# Patient Record
Sex: MALE | Race: BLACK OR AFRICAN AMERICAN
[De-identification: names, ages, dates, MRNs, and addresses within clinical notes are randomized per-mention and may not be internally consistent; named-entity substitution may affect disease eponyms.]

---

## 2018-07-09 ENCOUNTER — HOSPITAL ENCOUNTER (INPATIENT)
Dept: HOSPITAL 87 - ER | Age: 68
LOS: 21 days | Discharge: NURSING, REHAB OR LONG-TERM CARE | DRG: 20 | End: 2018-07-30
Attending: INTERNAL MEDICINE | Admitting: INTERNAL MEDICINE
Payer: MEDICARE

## 2018-07-09 VITALS — WEIGHT: 124.37 LBS | BODY MASS INDEX: 16.84 KG/M2 | HEIGHT: 72 IN

## 2018-07-09 DIAGNOSIS — K70.9: ICD-10-CM

## 2018-07-09 DIAGNOSIS — R47.01: ICD-10-CM

## 2018-07-09 DIAGNOSIS — E78.1: ICD-10-CM

## 2018-07-09 DIAGNOSIS — B19.20: ICD-10-CM

## 2018-07-09 DIAGNOSIS — Z87.891: ICD-10-CM

## 2018-07-09 DIAGNOSIS — I10: ICD-10-CM

## 2018-07-09 DIAGNOSIS — Z79.899: ICD-10-CM
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
## 2018-07-09 DIAGNOSIS — E43: ICD-10-CM

## 2018-07-09 DIAGNOSIS — J96.90: ICD-10-CM

## 2018-07-09 DIAGNOSIS — N39.0: ICD-10-CM
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
## 2018-07-09 DIAGNOSIS — D68.9: ICD-10-CM

## 2018-07-09 DIAGNOSIS — S06.5X9A: Primary | ICD-10-CM

## 2018-07-09 DIAGNOSIS — K59.00: ICD-10-CM

## 2018-07-09 DIAGNOSIS — G93.49: ICD-10-CM

## 2018-07-09 DIAGNOSIS — D68.4: ICD-10-CM

## 2018-07-09 DIAGNOSIS — H91.92: ICD-10-CM

## 2018-07-09 DIAGNOSIS — R26.9: ICD-10-CM

## 2018-07-09 DIAGNOSIS — A41.9: ICD-10-CM

## 2018-07-09 DIAGNOSIS — B96.20: ICD-10-CM
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
## 2018-07-09 DIAGNOSIS — D63.8: ICD-10-CM

## 2018-07-09 DIAGNOSIS — E87.3: ICD-10-CM

## 2018-07-09 DIAGNOSIS — F10.21: ICD-10-CM

## 2018-07-09 DIAGNOSIS — I95.9: ICD-10-CM

## 2018-07-09 PROCEDURE — 87040 BLOOD CULTURE FOR BACTERIA: CPT

## 2018-07-09 PROCEDURE — 83690 ASSAY OF LIPASE: CPT

## 2018-07-09 PROCEDURE — 83605 ASSAY OF LACTIC ACID: CPT

## 2018-07-09 PROCEDURE — 84478 ASSAY OF TRIGLYCERIDES: CPT

## 2018-07-09 PROCEDURE — 85027 COMPLETE CBC AUTOMATED: CPT

## 2018-07-09 PROCEDURE — 36569 INSJ PICC 5 YR+ W/O IMAGING: CPT

## 2018-07-09 PROCEDURE — 80053 COMPREHEN METABOLIC PANEL: CPT

## 2018-07-09 PROCEDURE — 83735 ASSAY OF MAGNESIUM: CPT

## 2018-07-09 PROCEDURE — 83880 ASSAY OF NATRIURETIC PEPTIDE: CPT

## 2018-07-09 PROCEDURE — 97535 SELF CARE MNGMENT TRAINING: CPT

## 2018-07-09 PROCEDURE — 85610 PROTHROMBIN TIME: CPT

## 2018-07-09 PROCEDURE — 94002 VENT MGMT INPAT INIT DAY: CPT

## 2018-07-09 PROCEDURE — 97162 PT EVAL MOD COMPLEX 30 MIN: CPT

## 2018-07-09 PROCEDURE — 97166 OT EVAL MOD COMPLEX 45 MIN: CPT

## 2018-07-09 PROCEDURE — 81003 URINALYSIS AUTO W/O SCOPE: CPT

## 2018-07-09 PROCEDURE — 96365 THER/PROPH/DIAG IV INF INIT: CPT

## 2018-07-09 PROCEDURE — 85007 BL SMEAR W/DIFF WBC COUNT: CPT

## 2018-07-09 PROCEDURE — 71045 X-RAY EXAM CHEST 1 VIEW: CPT

## 2018-07-09 PROCEDURE — 77001 FLUOROGUIDE FOR VEIN DEVICE: CPT

## 2018-07-09 PROCEDURE — 86920 COMPATIBILITY TEST SPIN: CPT

## 2018-07-09 PROCEDURE — 70450 CT HEAD/BRAIN W/O DYE: CPT

## 2018-07-09 PROCEDURE — 84484 ASSAY OF TROPONIN QUANT: CPT

## 2018-07-09 PROCEDURE — 96376 TX/PRO/DX INJ SAME DRUG ADON: CPT

## 2018-07-09 PROCEDURE — 85025 COMPLETE CBC W/AUTO DIFF WBC: CPT

## 2018-07-09 PROCEDURE — 82805 BLOOD GASES W/O2 SATURATION: CPT

## 2018-07-09 PROCEDURE — 36415 COLL VENOUS BLD VENIPUNCTURE: CPT

## 2018-07-09 PROCEDURE — 87186 SC STD MICRODIL/AGAR DIL: CPT

## 2018-07-09 PROCEDURE — 99291 CRITICAL CARE FIRST HOUR: CPT

## 2018-07-09 PROCEDURE — 73501 X-RAY EXAM HIP UNI 1 VIEW: CPT

## 2018-07-09 PROCEDURE — 82962 GLUCOSE BLOOD TEST: CPT

## 2018-07-09 PROCEDURE — 97530 THERAPEUTIC ACTIVITIES: CPT

## 2018-07-09 PROCEDURE — 80305 DRUG TEST PRSMV DIR OPT OBS: CPT

## 2018-07-09 PROCEDURE — 93005 ELECTROCARDIOGRAM TRACING: CPT

## 2018-07-09 PROCEDURE — 82140 ASSAY OF AMMONIA: CPT

## 2018-07-09 PROCEDURE — 87086 URINE CULTURE/COLONY COUNT: CPT

## 2018-07-09 PROCEDURE — 94003 VENT MGMT INPAT SUBQ DAY: CPT

## 2018-07-09 PROCEDURE — A4315 CATH W/DRAINAGE 2-WAY SILCNE: HCPCS

## 2018-07-09 PROCEDURE — 82375 ASSAY CARBOXYHB QUANT: CPT

## 2018-07-09 PROCEDURE — 36600 WITHDRAWAL OF ARTERIAL BLOOD: CPT

## 2018-07-09 PROCEDURE — 86927 PLASMA FRESH FROZEN: CPT

## 2018-07-09 PROCEDURE — 86850 RBC ANTIBODY SCREEN: CPT

## 2018-07-09 PROCEDURE — 96375 TX/PRO/DX INJ NEW DRUG ADDON: CPT

## 2018-07-09 PROCEDURE — 80048 BASIC METABOLIC PNL TOTAL CA: CPT

## 2018-07-09 PROCEDURE — 88300 SURGICAL PATH GROSS: CPT

## 2018-07-09 PROCEDURE — 97116 GAIT TRAINING THERAPY: CPT

## 2018-07-09 PROCEDURE — 92610 EVALUATE SWALLOWING FUNCTION: CPT

## 2018-07-09 PROCEDURE — 87077 CULTURE AEROBIC IDENTIFY: CPT

## 2018-07-09 PROCEDURE — 94640 AIRWAY INHALATION TREATMENT: CPT

## 2018-07-09 PROCEDURE — 93970 EXTREMITY STUDY: CPT

## 2018-07-09 PROCEDURE — 86900 BLOOD TYPING SEROLOGIC ABO: CPT

## 2018-07-09 PROCEDURE — 76937 US GUIDE VASCULAR ACCESS: CPT

## 2018-07-09 PROCEDURE — 85730 THROMBOPLASTIN TIME PARTIAL: CPT

## 2018-07-09 PROCEDURE — 36430 TRANSFUSION BLD/BLD COMPNT: CPT

## 2018-07-10 VITALS — DIASTOLIC BLOOD PRESSURE: 85 MMHG | SYSTOLIC BLOOD PRESSURE: 124 MMHG

## 2018-07-10 VITALS — SYSTOLIC BLOOD PRESSURE: 111 MMHG | DIASTOLIC BLOOD PRESSURE: 73 MMHG

## 2018-07-10 VITALS — DIASTOLIC BLOOD PRESSURE: 82 MMHG | SYSTOLIC BLOOD PRESSURE: 111 MMHG

## 2018-07-10 VITALS — SYSTOLIC BLOOD PRESSURE: 119 MMHG | DIASTOLIC BLOOD PRESSURE: 68 MMHG

## 2018-07-10 VITALS — DIASTOLIC BLOOD PRESSURE: 81 MMHG | SYSTOLIC BLOOD PRESSURE: 100 MMHG

## 2018-07-10 VITALS — SYSTOLIC BLOOD PRESSURE: 166 MMHG | DIASTOLIC BLOOD PRESSURE: 80 MMHG

## 2018-07-10 VITALS — SYSTOLIC BLOOD PRESSURE: 115 MMHG | DIASTOLIC BLOOD PRESSURE: 76 MMHG

## 2018-07-10 VITALS — SYSTOLIC BLOOD PRESSURE: 161 MMHG | DIASTOLIC BLOOD PRESSURE: 87 MMHG

## 2018-07-10 VITALS — DIASTOLIC BLOOD PRESSURE: 51 MMHG | SYSTOLIC BLOOD PRESSURE: 103 MMHG

## 2018-07-10 VITALS — SYSTOLIC BLOOD PRESSURE: 128 MMHG | DIASTOLIC BLOOD PRESSURE: 79 MMHG

## 2018-07-10 VITALS — SYSTOLIC BLOOD PRESSURE: 123 MMHG | DIASTOLIC BLOOD PRESSURE: 78 MMHG

## 2018-07-10 VITALS — DIASTOLIC BLOOD PRESSURE: 80 MMHG | SYSTOLIC BLOOD PRESSURE: 112 MMHG

## 2018-07-10 VITALS — DIASTOLIC BLOOD PRESSURE: 74 MMHG | SYSTOLIC BLOOD PRESSURE: 139 MMHG

## 2018-07-10 VITALS — SYSTOLIC BLOOD PRESSURE: 142 MMHG | DIASTOLIC BLOOD PRESSURE: 63 MMHG

## 2018-07-10 VITALS — SYSTOLIC BLOOD PRESSURE: 128 MMHG | DIASTOLIC BLOOD PRESSURE: 70 MMHG

## 2018-07-10 VITALS — SYSTOLIC BLOOD PRESSURE: 108 MMHG | DIASTOLIC BLOOD PRESSURE: 80 MMHG

## 2018-07-10 VITALS — SYSTOLIC BLOOD PRESSURE: 147 MMHG | DIASTOLIC BLOOD PRESSURE: 87 MMHG

## 2018-07-10 VITALS — SYSTOLIC BLOOD PRESSURE: 103 MMHG | DIASTOLIC BLOOD PRESSURE: 75 MMHG

## 2018-07-10 VITALS — DIASTOLIC BLOOD PRESSURE: 80 MMHG | SYSTOLIC BLOOD PRESSURE: 133 MMHG

## 2018-07-10 VITALS — DIASTOLIC BLOOD PRESSURE: 97 MMHG | SYSTOLIC BLOOD PRESSURE: 112 MMHG

## 2018-07-10 VITALS — SYSTOLIC BLOOD PRESSURE: 111 MMHG | DIASTOLIC BLOOD PRESSURE: 79 MMHG

## 2018-07-10 VITALS — DIASTOLIC BLOOD PRESSURE: 84 MMHG | SYSTOLIC BLOOD PRESSURE: 107 MMHG

## 2018-07-10 VITALS — SYSTOLIC BLOOD PRESSURE: 133 MMHG | DIASTOLIC BLOOD PRESSURE: 105 MMHG

## 2018-07-10 VITALS — DIASTOLIC BLOOD PRESSURE: 83 MMHG | SYSTOLIC BLOOD PRESSURE: 117 MMHG

## 2018-07-10 VITALS — SYSTOLIC BLOOD PRESSURE: 131 MMHG | DIASTOLIC BLOOD PRESSURE: 86 MMHG

## 2018-07-10 VITALS — SYSTOLIC BLOOD PRESSURE: 125 MMHG | DIASTOLIC BLOOD PRESSURE: 80 MMHG

## 2018-07-10 VITALS — SYSTOLIC BLOOD PRESSURE: 139 MMHG | DIASTOLIC BLOOD PRESSURE: 95 MMHG

## 2018-07-10 VITALS — DIASTOLIC BLOOD PRESSURE: 67 MMHG | SYSTOLIC BLOOD PRESSURE: 130 MMHG

## 2018-07-10 VITALS — SYSTOLIC BLOOD PRESSURE: 142 MMHG | DIASTOLIC BLOOD PRESSURE: 83 MMHG

## 2018-07-10 VITALS — DIASTOLIC BLOOD PRESSURE: 72 MMHG | SYSTOLIC BLOOD PRESSURE: 101 MMHG

## 2018-07-10 VITALS — SYSTOLIC BLOOD PRESSURE: 112 MMHG | DIASTOLIC BLOOD PRESSURE: 77 MMHG

## 2018-07-10 VITALS — SYSTOLIC BLOOD PRESSURE: 134 MMHG | DIASTOLIC BLOOD PRESSURE: 76 MMHG

## 2018-07-10 VITALS — DIASTOLIC BLOOD PRESSURE: 78 MMHG | SYSTOLIC BLOOD PRESSURE: 110 MMHG

## 2018-07-10 VITALS — SYSTOLIC BLOOD PRESSURE: 109 MMHG | DIASTOLIC BLOOD PRESSURE: 79 MMHG

## 2018-07-10 VITALS — SYSTOLIC BLOOD PRESSURE: 116 MMHG | DIASTOLIC BLOOD PRESSURE: 77 MMHG

## 2018-07-10 VITALS — SYSTOLIC BLOOD PRESSURE: 174 MMHG | DIASTOLIC BLOOD PRESSURE: 90 MMHG

## 2018-07-10 VITALS — DIASTOLIC BLOOD PRESSURE: 85 MMHG | SYSTOLIC BLOOD PRESSURE: 116 MMHG

## 2018-07-10 VITALS — SYSTOLIC BLOOD PRESSURE: 131 MMHG | DIASTOLIC BLOOD PRESSURE: 68 MMHG

## 2018-07-10 VITALS — SYSTOLIC BLOOD PRESSURE: 100 MMHG | DIASTOLIC BLOOD PRESSURE: 81 MMHG

## 2018-07-10 VITALS — SYSTOLIC BLOOD PRESSURE: 121 MMHG | DIASTOLIC BLOOD PRESSURE: 68 MMHG

## 2018-07-10 VITALS — DIASTOLIC BLOOD PRESSURE: 77 MMHG | SYSTOLIC BLOOD PRESSURE: 161 MMHG

## 2018-07-10 VITALS — SYSTOLIC BLOOD PRESSURE: 146 MMHG | DIASTOLIC BLOOD PRESSURE: 92 MMHG

## 2018-07-10 VITALS — SYSTOLIC BLOOD PRESSURE: 117 MMHG | DIASTOLIC BLOOD PRESSURE: 78 MMHG

## 2018-07-10 VITALS — SYSTOLIC BLOOD PRESSURE: 143 MMHG | DIASTOLIC BLOOD PRESSURE: 92 MMHG

## 2018-07-10 VITALS — DIASTOLIC BLOOD PRESSURE: 96 MMHG | SYSTOLIC BLOOD PRESSURE: 106 MMHG

## 2018-07-10 VITALS — DIASTOLIC BLOOD PRESSURE: 63 MMHG | SYSTOLIC BLOOD PRESSURE: 116 MMHG

## 2018-07-10 VITALS — DIASTOLIC BLOOD PRESSURE: 85 MMHG | SYSTOLIC BLOOD PRESSURE: 130 MMHG

## 2018-07-10 VITALS — DIASTOLIC BLOOD PRESSURE: 83 MMHG | SYSTOLIC BLOOD PRESSURE: 122 MMHG

## 2018-07-10 VITALS — DIASTOLIC BLOOD PRESSURE: 76 MMHG | SYSTOLIC BLOOD PRESSURE: 157 MMHG

## 2018-07-10 VITALS — DIASTOLIC BLOOD PRESSURE: 68 MMHG | SYSTOLIC BLOOD PRESSURE: 133 MMHG

## 2018-07-10 VITALS — SYSTOLIC BLOOD PRESSURE: 111 MMHG | DIASTOLIC BLOOD PRESSURE: 76 MMHG

## 2018-07-10 VITALS — SYSTOLIC BLOOD PRESSURE: 107 MMHG | DIASTOLIC BLOOD PRESSURE: 80 MMHG

## 2018-07-10 VITALS — DIASTOLIC BLOOD PRESSURE: 86 MMHG | SYSTOLIC BLOOD PRESSURE: 153 MMHG

## 2018-07-10 VITALS — DIASTOLIC BLOOD PRESSURE: 74 MMHG | SYSTOLIC BLOOD PRESSURE: 105 MMHG

## 2018-07-10 VITALS — SYSTOLIC BLOOD PRESSURE: 117 MMHG | DIASTOLIC BLOOD PRESSURE: 85 MMHG

## 2018-07-10 VITALS — DIASTOLIC BLOOD PRESSURE: 65 MMHG | SYSTOLIC BLOOD PRESSURE: 141 MMHG

## 2018-07-10 VITALS — DIASTOLIC BLOOD PRESSURE: 76 MMHG | SYSTOLIC BLOOD PRESSURE: 115 MMHG

## 2018-07-10 VITALS — SYSTOLIC BLOOD PRESSURE: 117 MMHG | DIASTOLIC BLOOD PRESSURE: 77 MMHG

## 2018-07-10 VITALS — SYSTOLIC BLOOD PRESSURE: 113 MMHG | DIASTOLIC BLOOD PRESSURE: 81 MMHG

## 2018-07-10 VITALS — SYSTOLIC BLOOD PRESSURE: 108 MMHG | DIASTOLIC BLOOD PRESSURE: 79 MMHG

## 2018-07-10 VITALS — SYSTOLIC BLOOD PRESSURE: 114 MMHG | DIASTOLIC BLOOD PRESSURE: 79 MMHG

## 2018-07-10 VITALS — SYSTOLIC BLOOD PRESSURE: 128 MMHG | DIASTOLIC BLOOD PRESSURE: 84 MMHG

## 2018-07-10 VITALS — SYSTOLIC BLOOD PRESSURE: 112 MMHG | DIASTOLIC BLOOD PRESSURE: 74 MMHG

## 2018-07-10 VITALS — DIASTOLIC BLOOD PRESSURE: 75 MMHG | SYSTOLIC BLOOD PRESSURE: 110 MMHG

## 2018-07-10 VITALS — DIASTOLIC BLOOD PRESSURE: 108 MMHG | SYSTOLIC BLOOD PRESSURE: 153 MMHG

## 2018-07-10 VITALS — SYSTOLIC BLOOD PRESSURE: 131 MMHG | DIASTOLIC BLOOD PRESSURE: 60 MMHG

## 2018-07-10 VITALS — SYSTOLIC BLOOD PRESSURE: 117 MMHG | DIASTOLIC BLOOD PRESSURE: 73 MMHG

## 2018-07-10 VITALS — DIASTOLIC BLOOD PRESSURE: 66 MMHG | SYSTOLIC BLOOD PRESSURE: 128 MMHG

## 2018-07-10 VITALS — DIASTOLIC BLOOD PRESSURE: 78 MMHG | SYSTOLIC BLOOD PRESSURE: 116 MMHG

## 2018-07-10 VITALS — SYSTOLIC BLOOD PRESSURE: 115 MMHG | DIASTOLIC BLOOD PRESSURE: 82 MMHG

## 2018-07-10 VITALS — SYSTOLIC BLOOD PRESSURE: 118 MMHG | DIASTOLIC BLOOD PRESSURE: 85 MMHG

## 2018-07-10 VITALS — DIASTOLIC BLOOD PRESSURE: 80 MMHG | SYSTOLIC BLOOD PRESSURE: 155 MMHG

## 2018-07-10 VITALS — SYSTOLIC BLOOD PRESSURE: 115 MMHG | DIASTOLIC BLOOD PRESSURE: 77 MMHG

## 2018-07-10 LAB
AMMONIA PLAS-SCNC: 25 UMOL/L (ref ?–32)
AMPHETAMINES UR QL SCN: NEGATIVE
APPEARANCE UR: CLEAR
APTT PPP: 27.3 SEC (ref 23.4–31)
BARBITURATES UR QL SCN: NEGATIVE
BASE EXCESS BLDA CALC-SCNC: 1.5 MMOL/L (ref -2–2)
BASOPHILS NFR BLD AUTO: 0.6 % (ref 0–2)
BENZODIAZ UR QL SCN: NEGATIVE
BG TOTAL RESPIRATORY RATE: 28 B/MIN
BZE UR QL SCN: NEGATIVE
CANNABINOIDS UR QL SCN: NEGATIVE
CHLORIDE SERPL-SCNC: 104 MEQ/L (ref 98–107)
COHGB MFR BLDA: 0.2 % (ref 0.5–1.5)
COLOR UR: YELLOW
DO-HGB MFR BLDA: 1.5 % (ref 0–5)
EOSINOPHIL NFR BLD AUTO: 0.2 % (ref 0–5)
ERYTHROCYTE [DISTWIDTH] IN BLOOD BY AUTOMATED COUNT: 13.9 % (ref 11.6–14.6)
ETHANOL SERPL-MCNC: < 10 MG/DL
HCO3 BLDA-SCNC: 21.2 MMOL/L (ref 22–26)
HCT VFR BLD AUTO: 37.3 % (ref 42–52)
HGB BLD-MCNC: 13 G/DL (ref 14–18)
HGB BLDA OXIMETRY-MCNC: 12.7 G/DL (ref 12–18)
HGB UR QL STRIP: (no result)
INHALED O2 CONCENTRATION: 28 %
INR PPP: 1.5
INR PPP: 2.4
KETONES UR STRIP-MCNC: (no result) MG/DL
LEUKOCYTE ESTERASE UR QL STRIP: NEGATIVE
LYMPHOCYTES NFR BLD AUTO: 14.8 % (ref 20–50)
MCH RBC QN AUTO: 30.3 PG (ref 28–32)
MCV RBC AUTO: 87.1 FL (ref 80–94)
METHADONE UR QL SCN: NEGATIVE
METHGB MFR BLDA: 0.2 % (ref 0–1.5)
MONOCYTES NFR BLD AUTO: 7.1 % (ref 2–8)
NEUTROPHILS NFR BLD AUTO: 77.3 % (ref 40–76)
NITRITE UR QL STRIP: NEGATIVE
OPIATES UR QL SCN: NEGATIVE
OXYHGB MFR BLDA: 98.1 % (ref 94–97)
PCO2 TEMP ADJ BLDA: 21.7 MMHG (ref 35–45)
PCP UR QL SCN: NEGATIVE
PH TEMP ADJ BLDA: 7.61 [PH] (ref 7.35–7.45)
PH UR STRIP: 5 [PH] (ref 4.5–8)
PLATELET # BLD AUTO: 200 X1000/UL (ref 130–400)
PMV BLD AUTO: 7.8 FL (ref 7.4–10.4)
PO2 TEMP ADJ BLDA: 123.3 MMHG (ref 75–100)
PRESSURE SUPPORT SETTING VENT: 5 CM[H2O]
PROT UR QL STRIP: (no result)
PROTHROMBIN TIME: 15.9 SEC (ref 9.4–11.6)
PROTHROMBIN TIME: 24.8 SEC (ref 9.4–11.6)
RBC # BLD AUTO: 4.29 MILL/UL (ref 4.7–6.1)
SAO2 % BLDA: 98.5 % (ref 92–98.5)
SP GR UR STRIP: 1.02 (ref 1–1.03)
SPECIMEN DRAWN FROM PATIENT: (no result)
UROBILINOGEN UR STRIP-MCNC: 1 E.U./DL (ref 0.2–1)
VENTILATION MODE VENT: (no result)

## 2018-07-10 PROCEDURE — 00H002Z INSERTION OF MONITORING DEVICE INTO BRAIN, OPEN APPROACH: ICD-10-PCS | Performed by: NEUROLOGICAL SURGERY

## 2018-07-10 PROCEDURE — 0BH17EZ INSERTION OF ENDOTRACHEAL AIRWAY INTO TRACHEA, VIA NATURAL OR ARTIFICIAL OPENING: ICD-10-PCS | Performed by: INTERNAL MEDICINE

## 2018-07-10 PROCEDURE — 5A1935Z RESPIRATORY VENTILATION, LESS THAN 24 CONSECUTIVE HOURS: ICD-10-PCS | Performed by: INTERNAL MEDICINE

## 2018-07-10 PROCEDURE — 00C40ZZ EXTIRPATION OF MATTER FROM INTRACRANIAL SUBDURAL SPACE, OPEN APPROACH: ICD-10-PCS | Performed by: NEUROLOGICAL SURGERY

## 2018-07-10 PROCEDURE — 00U207Z SUPPLEMENT DURA MATER WITH AUTOLOGOUS TISSUE SUBSTITUTE, OPEN APPROACH: ICD-10-PCS | Performed by: NEUROLOGICAL SURGERY

## 2018-07-10 PROCEDURE — 30233L1 TRANSFUSION OF NONAUTOLOGOUS FRESH PLASMA INTO PERIPHERAL VEIN, PERCUTANEOUS APPROACH: ICD-10-PCS | Performed by: INTERNAL MEDICINE

## 2018-07-10 PROCEDURE — 30233K1 TRANSFUSION OF NONAUTOLOGOUS FROZEN PLASMA INTO PERIPHERAL VEIN, PERCUTANEOUS APPROACH: ICD-10-PCS | Performed by: INTERNAL MEDICINE

## 2018-07-10 PROCEDURE — 4A107BD MONITORING OF INTRACRANIAL PRESSURE, VIA NATURAL OR ARTIFICIAL OPENING: ICD-10-PCS | Performed by: NEUROLOGICAL SURGERY

## 2018-07-10 RX ADMIN — IPRATROPIUM BROMIDE AND ALBUTEROL SULFATE SCH ML: .5; 3 SOLUTION RESPIRATORY (INHALATION) at 20:02

## 2018-07-10 RX ADMIN — SODIUM CHLORIDE SCH MLS/HR: 9 INJECTION, SOLUTION INTRAVENOUS at 10:00

## 2018-07-10 RX ADMIN — POTASSIUM CHLORIDE SCH MLS/HR: 2 INJECTION, SOLUTION, CONCENTRATE INTRAVENOUS at 21:33

## 2018-07-10 RX ADMIN — MORPHINE SULFATE PRN MG: 4 INJECTION, SOLUTION INTRAMUSCULAR; INTRAVENOUS at 15:13

## 2018-07-10 RX ADMIN — PANTOPRAZOLE SODIUM PRN MLS/HR: 40 INJECTION, POWDER, FOR SOLUTION INTRAVENOUS at 17:45

## 2018-07-10 RX ADMIN — PROPOFOL PRN MLS/HR: 10 INJECTION, EMULSION INTRAVENOUS at 16:27

## 2018-07-10 RX ADMIN — PROPOFOL PRN MLS/HR: 10 INJECTION, EMULSION INTRAVENOUS at 21:38

## 2018-07-10 RX ADMIN — SODIUM CHLORIDE SCH MLS/HR: 9 INJECTION, SOLUTION INTRAVENOUS at 21:33

## 2018-07-10 RX ADMIN — POTASSIUM CHLORIDE SCH MLS/HR: 2 INJECTION, SOLUTION, CONCENTRATE INTRAVENOUS at 06:57

## 2018-07-11 VITALS — DIASTOLIC BLOOD PRESSURE: 76 MMHG | SYSTOLIC BLOOD PRESSURE: 111 MMHG

## 2018-07-11 VITALS — SYSTOLIC BLOOD PRESSURE: 115 MMHG | DIASTOLIC BLOOD PRESSURE: 89 MMHG

## 2018-07-11 VITALS — SYSTOLIC BLOOD PRESSURE: 138 MMHG | DIASTOLIC BLOOD PRESSURE: 76 MMHG

## 2018-07-11 VITALS — DIASTOLIC BLOOD PRESSURE: 67 MMHG | SYSTOLIC BLOOD PRESSURE: 125 MMHG

## 2018-07-11 VITALS — SYSTOLIC BLOOD PRESSURE: 111 MMHG | DIASTOLIC BLOOD PRESSURE: 75 MMHG

## 2018-07-11 VITALS — DIASTOLIC BLOOD PRESSURE: 131 MMHG | SYSTOLIC BLOOD PRESSURE: 182 MMHG

## 2018-07-11 VITALS — DIASTOLIC BLOOD PRESSURE: 80 MMHG | SYSTOLIC BLOOD PRESSURE: 107 MMHG

## 2018-07-11 VITALS — DIASTOLIC BLOOD PRESSURE: 75 MMHG | SYSTOLIC BLOOD PRESSURE: 116 MMHG

## 2018-07-11 VITALS — DIASTOLIC BLOOD PRESSURE: 85 MMHG | SYSTOLIC BLOOD PRESSURE: 117 MMHG

## 2018-07-11 VITALS — SYSTOLIC BLOOD PRESSURE: 103 MMHG | DIASTOLIC BLOOD PRESSURE: 79 MMHG

## 2018-07-11 VITALS — SYSTOLIC BLOOD PRESSURE: 119 MMHG | DIASTOLIC BLOOD PRESSURE: 88 MMHG

## 2018-07-11 VITALS — SYSTOLIC BLOOD PRESSURE: 134 MMHG | DIASTOLIC BLOOD PRESSURE: 93 MMHG

## 2018-07-11 VITALS — SYSTOLIC BLOOD PRESSURE: 144 MMHG | DIASTOLIC BLOOD PRESSURE: 95 MMHG

## 2018-07-11 VITALS — DIASTOLIC BLOOD PRESSURE: 82 MMHG | SYSTOLIC BLOOD PRESSURE: 135 MMHG

## 2018-07-11 VITALS — DIASTOLIC BLOOD PRESSURE: 83 MMHG | SYSTOLIC BLOOD PRESSURE: 134 MMHG

## 2018-07-11 VITALS — SYSTOLIC BLOOD PRESSURE: 142 MMHG | DIASTOLIC BLOOD PRESSURE: 75 MMHG

## 2018-07-11 VITALS — SYSTOLIC BLOOD PRESSURE: 115 MMHG | DIASTOLIC BLOOD PRESSURE: 85 MMHG

## 2018-07-11 VITALS — DIASTOLIC BLOOD PRESSURE: 83 MMHG | SYSTOLIC BLOOD PRESSURE: 108 MMHG

## 2018-07-11 VITALS — DIASTOLIC BLOOD PRESSURE: 77 MMHG | SYSTOLIC BLOOD PRESSURE: 114 MMHG

## 2018-07-11 VITALS — SYSTOLIC BLOOD PRESSURE: 115 MMHG | DIASTOLIC BLOOD PRESSURE: 91 MMHG

## 2018-07-11 VITALS — DIASTOLIC BLOOD PRESSURE: 87 MMHG | SYSTOLIC BLOOD PRESSURE: 130 MMHG

## 2018-07-11 VITALS — SYSTOLIC BLOOD PRESSURE: 129 MMHG | DIASTOLIC BLOOD PRESSURE: 91 MMHG

## 2018-07-11 VITALS — SYSTOLIC BLOOD PRESSURE: 133 MMHG | DIASTOLIC BLOOD PRESSURE: 75 MMHG

## 2018-07-11 VITALS — SYSTOLIC BLOOD PRESSURE: 118 MMHG | DIASTOLIC BLOOD PRESSURE: 85 MMHG

## 2018-07-11 VITALS — DIASTOLIC BLOOD PRESSURE: 84 MMHG | SYSTOLIC BLOOD PRESSURE: 115 MMHG

## 2018-07-11 VITALS — SYSTOLIC BLOOD PRESSURE: 115 MMHG | DIASTOLIC BLOOD PRESSURE: 86 MMHG

## 2018-07-11 VITALS — DIASTOLIC BLOOD PRESSURE: 70 MMHG | SYSTOLIC BLOOD PRESSURE: 117 MMHG

## 2018-07-11 VITALS — SYSTOLIC BLOOD PRESSURE: 123 MMHG | DIASTOLIC BLOOD PRESSURE: 89 MMHG

## 2018-07-11 VITALS — SYSTOLIC BLOOD PRESSURE: 115 MMHG | DIASTOLIC BLOOD PRESSURE: 83 MMHG

## 2018-07-11 VITALS — DIASTOLIC BLOOD PRESSURE: 88 MMHG | SYSTOLIC BLOOD PRESSURE: 113 MMHG

## 2018-07-11 VITALS — DIASTOLIC BLOOD PRESSURE: 90 MMHG | SYSTOLIC BLOOD PRESSURE: 131 MMHG

## 2018-07-11 VITALS — SYSTOLIC BLOOD PRESSURE: 105 MMHG | DIASTOLIC BLOOD PRESSURE: 78 MMHG

## 2018-07-11 VITALS — DIASTOLIC BLOOD PRESSURE: 78 MMHG | SYSTOLIC BLOOD PRESSURE: 105 MMHG

## 2018-07-11 VITALS — DIASTOLIC BLOOD PRESSURE: 78 MMHG | SYSTOLIC BLOOD PRESSURE: 126 MMHG

## 2018-07-11 VITALS — SYSTOLIC BLOOD PRESSURE: 110 MMHG | DIASTOLIC BLOOD PRESSURE: 85 MMHG

## 2018-07-11 VITALS — DIASTOLIC BLOOD PRESSURE: 87 MMHG | SYSTOLIC BLOOD PRESSURE: 125 MMHG

## 2018-07-11 VITALS — DIASTOLIC BLOOD PRESSURE: 86 MMHG | SYSTOLIC BLOOD PRESSURE: 143 MMHG

## 2018-07-11 VITALS — DIASTOLIC BLOOD PRESSURE: 90 MMHG | SYSTOLIC BLOOD PRESSURE: 125 MMHG

## 2018-07-11 VITALS — SYSTOLIC BLOOD PRESSURE: 123 MMHG | DIASTOLIC BLOOD PRESSURE: 83 MMHG

## 2018-07-11 VITALS — DIASTOLIC BLOOD PRESSURE: 89 MMHG | SYSTOLIC BLOOD PRESSURE: 138 MMHG

## 2018-07-11 VITALS — SYSTOLIC BLOOD PRESSURE: 126 MMHG | DIASTOLIC BLOOD PRESSURE: 72 MMHG

## 2018-07-11 VITALS — DIASTOLIC BLOOD PRESSURE: 82 MMHG | SYSTOLIC BLOOD PRESSURE: 128 MMHG

## 2018-07-11 VITALS — SYSTOLIC BLOOD PRESSURE: 112 MMHG | DIASTOLIC BLOOD PRESSURE: 79 MMHG

## 2018-07-11 VITALS — SYSTOLIC BLOOD PRESSURE: 119 MMHG | DIASTOLIC BLOOD PRESSURE: 85 MMHG

## 2018-07-11 VITALS — DIASTOLIC BLOOD PRESSURE: 84 MMHG | SYSTOLIC BLOOD PRESSURE: 128 MMHG

## 2018-07-11 VITALS — SYSTOLIC BLOOD PRESSURE: 120 MMHG | DIASTOLIC BLOOD PRESSURE: 80 MMHG

## 2018-07-11 VITALS — SYSTOLIC BLOOD PRESSURE: 114 MMHG | DIASTOLIC BLOOD PRESSURE: 74 MMHG

## 2018-07-11 VITALS — SYSTOLIC BLOOD PRESSURE: 130 MMHG | DIASTOLIC BLOOD PRESSURE: 85 MMHG

## 2018-07-11 VITALS — SYSTOLIC BLOOD PRESSURE: 123 MMHG | DIASTOLIC BLOOD PRESSURE: 85 MMHG

## 2018-07-11 VITALS — SYSTOLIC BLOOD PRESSURE: 121 MMHG | DIASTOLIC BLOOD PRESSURE: 76 MMHG

## 2018-07-11 VITALS — SYSTOLIC BLOOD PRESSURE: 143 MMHG | DIASTOLIC BLOOD PRESSURE: 91 MMHG

## 2018-07-11 VITALS — DIASTOLIC BLOOD PRESSURE: 79 MMHG | SYSTOLIC BLOOD PRESSURE: 110 MMHG

## 2018-07-11 VITALS — DIASTOLIC BLOOD PRESSURE: 91 MMHG | SYSTOLIC BLOOD PRESSURE: 126 MMHG

## 2018-07-11 VITALS — DIASTOLIC BLOOD PRESSURE: 68 MMHG | SYSTOLIC BLOOD PRESSURE: 121 MMHG

## 2018-07-11 VITALS — DIASTOLIC BLOOD PRESSURE: 70 MMHG | SYSTOLIC BLOOD PRESSURE: 127 MMHG

## 2018-07-11 VITALS — DIASTOLIC BLOOD PRESSURE: 76 MMHG | SYSTOLIC BLOOD PRESSURE: 116 MMHG

## 2018-07-11 VITALS — DIASTOLIC BLOOD PRESSURE: 85 MMHG | SYSTOLIC BLOOD PRESSURE: 119 MMHG

## 2018-07-11 VITALS — DIASTOLIC BLOOD PRESSURE: 84 MMHG | SYSTOLIC BLOOD PRESSURE: 143 MMHG

## 2018-07-11 VITALS — DIASTOLIC BLOOD PRESSURE: 76 MMHG | SYSTOLIC BLOOD PRESSURE: 135 MMHG

## 2018-07-11 VITALS — SYSTOLIC BLOOD PRESSURE: 95 MMHG | DIASTOLIC BLOOD PRESSURE: 58 MMHG

## 2018-07-11 VITALS — SYSTOLIC BLOOD PRESSURE: 124 MMHG | DIASTOLIC BLOOD PRESSURE: 82 MMHG

## 2018-07-11 VITALS — DIASTOLIC BLOOD PRESSURE: 94 MMHG | SYSTOLIC BLOOD PRESSURE: 138 MMHG

## 2018-07-11 VITALS — DIASTOLIC BLOOD PRESSURE: 83 MMHG | SYSTOLIC BLOOD PRESSURE: 131 MMHG

## 2018-07-11 VITALS — DIASTOLIC BLOOD PRESSURE: 96 MMHG | SYSTOLIC BLOOD PRESSURE: 136 MMHG

## 2018-07-11 VITALS — SYSTOLIC BLOOD PRESSURE: 117 MMHG | DIASTOLIC BLOOD PRESSURE: 88 MMHG

## 2018-07-11 VITALS — DIASTOLIC BLOOD PRESSURE: 67 MMHG | SYSTOLIC BLOOD PRESSURE: 123 MMHG

## 2018-07-11 VITALS — SYSTOLIC BLOOD PRESSURE: 112 MMHG | DIASTOLIC BLOOD PRESSURE: 81 MMHG

## 2018-07-11 VITALS — DIASTOLIC BLOOD PRESSURE: 82 MMHG | SYSTOLIC BLOOD PRESSURE: 140 MMHG

## 2018-07-11 VITALS — DIASTOLIC BLOOD PRESSURE: 89 MMHG | SYSTOLIC BLOOD PRESSURE: 133 MMHG

## 2018-07-11 VITALS — DIASTOLIC BLOOD PRESSURE: 88 MMHG | SYSTOLIC BLOOD PRESSURE: 118 MMHG

## 2018-07-11 VITALS — SYSTOLIC BLOOD PRESSURE: 109 MMHG | DIASTOLIC BLOOD PRESSURE: 75 MMHG

## 2018-07-11 VITALS — DIASTOLIC BLOOD PRESSURE: 83 MMHG | SYSTOLIC BLOOD PRESSURE: 127 MMHG

## 2018-07-11 VITALS — DIASTOLIC BLOOD PRESSURE: 72 MMHG | SYSTOLIC BLOOD PRESSURE: 116 MMHG

## 2018-07-11 VITALS — DIASTOLIC BLOOD PRESSURE: 83 MMHG | SYSTOLIC BLOOD PRESSURE: 118 MMHG

## 2018-07-11 VITALS — SYSTOLIC BLOOD PRESSURE: 120 MMHG | DIASTOLIC BLOOD PRESSURE: 73 MMHG

## 2018-07-11 VITALS — DIASTOLIC BLOOD PRESSURE: 74 MMHG | SYSTOLIC BLOOD PRESSURE: 105 MMHG

## 2018-07-11 VITALS — SYSTOLIC BLOOD PRESSURE: 133 MMHG | DIASTOLIC BLOOD PRESSURE: 73 MMHG

## 2018-07-11 VITALS — SYSTOLIC BLOOD PRESSURE: 128 MMHG | DIASTOLIC BLOOD PRESSURE: 86 MMHG

## 2018-07-11 VITALS — DIASTOLIC BLOOD PRESSURE: 76 MMHG | SYSTOLIC BLOOD PRESSURE: 130 MMHG

## 2018-07-11 VITALS — DIASTOLIC BLOOD PRESSURE: 74 MMHG | SYSTOLIC BLOOD PRESSURE: 125 MMHG

## 2018-07-11 VITALS — SYSTOLIC BLOOD PRESSURE: 110 MMHG | DIASTOLIC BLOOD PRESSURE: 79 MMHG

## 2018-07-11 VITALS — SYSTOLIC BLOOD PRESSURE: 116 MMHG | DIASTOLIC BLOOD PRESSURE: 73 MMHG

## 2018-07-11 VITALS — SYSTOLIC BLOOD PRESSURE: 111 MMHG | DIASTOLIC BLOOD PRESSURE: 74 MMHG

## 2018-07-11 VITALS — SYSTOLIC BLOOD PRESSURE: 133 MMHG | DIASTOLIC BLOOD PRESSURE: 81 MMHG

## 2018-07-11 VITALS — SYSTOLIC BLOOD PRESSURE: 120 MMHG | DIASTOLIC BLOOD PRESSURE: 70 MMHG

## 2018-07-11 VITALS — DIASTOLIC BLOOD PRESSURE: 70 MMHG | SYSTOLIC BLOOD PRESSURE: 114 MMHG

## 2018-07-11 VITALS — DIASTOLIC BLOOD PRESSURE: 86 MMHG | SYSTOLIC BLOOD PRESSURE: 120 MMHG

## 2018-07-11 LAB
APTT PPP: 24.4 SEC (ref 23.4–31)
BASE EXCESS BLDA CALC-SCNC: -0.8 MMOL/L (ref -2–2)
BASE EXCESS BLDA CALC-SCNC: 0 MMOL/L (ref -2–2)
BASOPHILS NFR BLD AUTO: 0.1 % (ref 0–2)
BG VENT RATE: 14 SET
CHLORIDE SERPL-SCNC: 108 MEQ/L (ref 98–107)
COHGB MFR BLDA: 0.3 % (ref 0.5–1.5)
COHGB MFR BLDA: 0.3 % (ref 0.5–1.5)
DO-HGB MFR BLDA: 1.3 % (ref 0–5)
DO-HGB MFR BLDA: 1.5 % (ref 0–5)
EOSINOPHIL NFR BLD AUTO: 0.1 % (ref 0–5)
ERYTHROCYTE [DISTWIDTH] IN BLOOD BY AUTOMATED COUNT: 14.4 % (ref 11.6–14.6)
HCO3 BLDA-SCNC: 20.8 MMOL/L (ref 22–26)
HCO3 BLDA-SCNC: 21.4 MMOL/L (ref 22–26)
HCT VFR BLD AUTO: 35.2 % (ref 42–52)
HGB BLD-MCNC: 12.2 G/DL (ref 14–18)
HGB BLDA OXIMETRY-MCNC: 11.7 G/DL (ref 12–18)
HGB BLDA OXIMETRY-MCNC: 11.8 G/DL (ref 12–18)
INHALED O2 CONCENTRATION: 40 %
INHALED O2 CONCENTRATION: 40 %
INR PPP: 1.3
LYMPHOCYTES NFR BLD AUTO: 13.8 % (ref 20–50)
MCH RBC QN AUTO: 30.6 PG (ref 28–32)
MCV RBC AUTO: 87.9 FL (ref 80–94)
METHGB MFR BLDA: 0.2 % (ref 0–1.5)
METHGB MFR BLDA: 0.2 % (ref 0–1.5)
MONOCYTES NFR BLD AUTO: 7.8 % (ref 2–8)
NEUTROPHILS NFR BLD AUTO: 78.2 % (ref 40–76)
OXYHGB MFR BLDA: 98 % (ref 94–97)
OXYHGB MFR BLDA: 98.2 % (ref 94–97)
PCO2 TEMP ADJ BLDA: 23.8 MMHG (ref 35–45)
PCO2 TEMP ADJ BLDA: 27.9 MMHG (ref 35–45)
PEEP SETTING VENT: 500 ML
PH TEMP ADJ BLDA: 7.5 [PH] (ref 7.35–7.45)
PH TEMP ADJ BLDA: 7.56 [PH] (ref 7.35–7.45)
PLATELET # BLD AUTO: 167 X1000/UL (ref 130–400)
PMV BLD AUTO: 8.7 FL (ref 7.4–10.4)
PO2 TEMP ADJ BLDA: 142.3 MMHG (ref 75–100)
PO2 TEMP ADJ BLDA: 177.7 MMHG (ref 75–100)
PROTHROMBIN TIME: 13.6 SEC (ref 9.4–11.6)
RBC # BLD AUTO: 4 MILL/UL (ref 4.7–6.1)
SAO2 % BLDA: 98.5 % (ref 92–98.5)
SAO2 % BLDA: 98.7 % (ref 92–98.5)
SPECIMEN DRAWN FROM PATIENT: (no result)
SPECIMEN DRAWN FROM PATIENT: (no result)
VENTILATION MODE VENT: (no result)
VENTILATION MODE VENT: (no result)

## 2018-07-11 RX ADMIN — PANTOPRAZOLE SODIUM SCH MG: 40 INJECTION, POWDER, FOR SOLUTION INTRAVENOUS at 09:12

## 2018-07-11 RX ADMIN — POTASSIUM CHLORIDE SCH MLS/HR: 200 INJECTION, SOLUTION INTRAVENOUS at 09:16

## 2018-07-11 RX ADMIN — IPRATROPIUM BROMIDE AND ALBUTEROL SULFATE SCH ML: .5; 3 SOLUTION RESPIRATORY (INHALATION) at 01:43

## 2018-07-11 RX ADMIN — SODIUM CHLORIDE SCH MLS/HR: 9 INJECTION, SOLUTION INTRAVENOUS at 09:15

## 2018-07-11 RX ADMIN — IPRATROPIUM BROMIDE AND ALBUTEROL SULFATE SCH ML: .5; 3 SOLUTION RESPIRATORY (INHALATION) at 20:21

## 2018-07-11 RX ADMIN — PROPOFOL PRN MLS/HR: 10 INJECTION, EMULSION INTRAVENOUS at 09:13

## 2018-07-11 RX ADMIN — POTASSIUM CHLORIDE SCH MLS/HR: 200 INJECTION, SOLUTION INTRAVENOUS at 11:39

## 2018-07-11 RX ADMIN — POTASSIUM CHLORIDE SCH MLS/HR: 2 INJECTION, SOLUTION, CONCENTRATE INTRAVENOUS at 19:42

## 2018-07-11 RX ADMIN — SODIUM CHLORIDE SCH MLS/HR: 9 INJECTION, SOLUTION INTRAVENOUS at 21:14

## 2018-07-11 RX ADMIN — IPRATROPIUM BROMIDE AND ALBUTEROL SULFATE SCH ML: .5; 3 SOLUTION RESPIRATORY (INHALATION) at 07:29

## 2018-07-11 RX ADMIN — IPRATROPIUM BROMIDE AND ALBUTEROL SULFATE SCH ML: .5; 3 SOLUTION RESPIRATORY (INHALATION) at 13:07

## 2018-07-11 RX ADMIN — PROPOFOL PRN MLS/HR: 10 INJECTION, EMULSION INTRAVENOUS at 02:56

## 2018-07-12 VITALS — DIASTOLIC BLOOD PRESSURE: 78 MMHG | SYSTOLIC BLOOD PRESSURE: 123 MMHG

## 2018-07-12 VITALS — DIASTOLIC BLOOD PRESSURE: 94 MMHG | SYSTOLIC BLOOD PRESSURE: 125 MMHG

## 2018-07-12 VITALS — SYSTOLIC BLOOD PRESSURE: 112 MMHG | DIASTOLIC BLOOD PRESSURE: 62 MMHG

## 2018-07-12 VITALS — SYSTOLIC BLOOD PRESSURE: 131 MMHG | DIASTOLIC BLOOD PRESSURE: 81 MMHG

## 2018-07-12 VITALS — SYSTOLIC BLOOD PRESSURE: 125 MMHG | DIASTOLIC BLOOD PRESSURE: 82 MMHG

## 2018-07-12 VITALS — DIASTOLIC BLOOD PRESSURE: 79 MMHG | SYSTOLIC BLOOD PRESSURE: 113 MMHG

## 2018-07-12 VITALS — DIASTOLIC BLOOD PRESSURE: 73 MMHG | SYSTOLIC BLOOD PRESSURE: 115 MMHG

## 2018-07-12 VITALS — SYSTOLIC BLOOD PRESSURE: 109 MMHG | DIASTOLIC BLOOD PRESSURE: 64 MMHG

## 2018-07-12 VITALS — DIASTOLIC BLOOD PRESSURE: 64 MMHG | SYSTOLIC BLOOD PRESSURE: 107 MMHG

## 2018-07-12 VITALS — DIASTOLIC BLOOD PRESSURE: 87 MMHG | SYSTOLIC BLOOD PRESSURE: 130 MMHG

## 2018-07-12 VITALS — SYSTOLIC BLOOD PRESSURE: 126 MMHG | DIASTOLIC BLOOD PRESSURE: 86 MMHG

## 2018-07-12 VITALS — SYSTOLIC BLOOD PRESSURE: 116 MMHG | DIASTOLIC BLOOD PRESSURE: 65 MMHG

## 2018-07-12 VITALS — DIASTOLIC BLOOD PRESSURE: 83 MMHG | SYSTOLIC BLOOD PRESSURE: 133 MMHG

## 2018-07-12 VITALS — DIASTOLIC BLOOD PRESSURE: 111 MMHG | SYSTOLIC BLOOD PRESSURE: 135 MMHG

## 2018-07-12 VITALS — SYSTOLIC BLOOD PRESSURE: 135 MMHG | DIASTOLIC BLOOD PRESSURE: 85 MMHG

## 2018-07-12 VITALS — DIASTOLIC BLOOD PRESSURE: 77 MMHG | SYSTOLIC BLOOD PRESSURE: 112 MMHG

## 2018-07-12 VITALS — SYSTOLIC BLOOD PRESSURE: 124 MMHG | DIASTOLIC BLOOD PRESSURE: 69 MMHG

## 2018-07-12 VITALS — SYSTOLIC BLOOD PRESSURE: 129 MMHG | DIASTOLIC BLOOD PRESSURE: 81 MMHG

## 2018-07-12 VITALS — SYSTOLIC BLOOD PRESSURE: 122 MMHG | DIASTOLIC BLOOD PRESSURE: 70 MMHG

## 2018-07-12 VITALS — DIASTOLIC BLOOD PRESSURE: 77 MMHG | SYSTOLIC BLOOD PRESSURE: 129 MMHG

## 2018-07-12 VITALS — SYSTOLIC BLOOD PRESSURE: 118 MMHG | DIASTOLIC BLOOD PRESSURE: 81 MMHG

## 2018-07-12 VITALS — SYSTOLIC BLOOD PRESSURE: 122 MMHG | DIASTOLIC BLOOD PRESSURE: 75 MMHG

## 2018-07-12 VITALS — DIASTOLIC BLOOD PRESSURE: 88 MMHG | SYSTOLIC BLOOD PRESSURE: 136 MMHG

## 2018-07-12 VITALS — SYSTOLIC BLOOD PRESSURE: 124 MMHG | DIASTOLIC BLOOD PRESSURE: 80 MMHG

## 2018-07-12 VITALS — DIASTOLIC BLOOD PRESSURE: 69 MMHG | SYSTOLIC BLOOD PRESSURE: 114 MMHG

## 2018-07-12 VITALS — SYSTOLIC BLOOD PRESSURE: 135 MMHG | DIASTOLIC BLOOD PRESSURE: 75 MMHG

## 2018-07-12 VITALS — DIASTOLIC BLOOD PRESSURE: 79 MMHG | SYSTOLIC BLOOD PRESSURE: 101 MMHG

## 2018-07-12 VITALS — DIASTOLIC BLOOD PRESSURE: 67 MMHG | SYSTOLIC BLOOD PRESSURE: 113 MMHG

## 2018-07-12 VITALS — SYSTOLIC BLOOD PRESSURE: 132 MMHG | DIASTOLIC BLOOD PRESSURE: 78 MMHG

## 2018-07-12 VITALS — SYSTOLIC BLOOD PRESSURE: 118 MMHG | DIASTOLIC BLOOD PRESSURE: 79 MMHG

## 2018-07-12 VITALS — SYSTOLIC BLOOD PRESSURE: 147 MMHG | DIASTOLIC BLOOD PRESSURE: 83 MMHG

## 2018-07-12 VITALS — SYSTOLIC BLOOD PRESSURE: 110 MMHG | DIASTOLIC BLOOD PRESSURE: 70 MMHG

## 2018-07-12 VITALS — DIASTOLIC BLOOD PRESSURE: 68 MMHG | SYSTOLIC BLOOD PRESSURE: 127 MMHG

## 2018-07-12 VITALS — DIASTOLIC BLOOD PRESSURE: 73 MMHG | SYSTOLIC BLOOD PRESSURE: 136 MMHG

## 2018-07-12 VITALS — DIASTOLIC BLOOD PRESSURE: 71 MMHG | SYSTOLIC BLOOD PRESSURE: 119 MMHG

## 2018-07-12 VITALS — SYSTOLIC BLOOD PRESSURE: 120 MMHG | DIASTOLIC BLOOD PRESSURE: 68 MMHG

## 2018-07-12 VITALS — SYSTOLIC BLOOD PRESSURE: 133 MMHG | DIASTOLIC BLOOD PRESSURE: 75 MMHG

## 2018-07-12 VITALS — DIASTOLIC BLOOD PRESSURE: 63 MMHG | SYSTOLIC BLOOD PRESSURE: 112 MMHG

## 2018-07-12 VITALS — DIASTOLIC BLOOD PRESSURE: 72 MMHG | SYSTOLIC BLOOD PRESSURE: 123 MMHG

## 2018-07-12 VITALS — SYSTOLIC BLOOD PRESSURE: 123 MMHG | DIASTOLIC BLOOD PRESSURE: 70 MMHG

## 2018-07-12 VITALS — SYSTOLIC BLOOD PRESSURE: 127 MMHG | DIASTOLIC BLOOD PRESSURE: 78 MMHG

## 2018-07-12 VITALS — SYSTOLIC BLOOD PRESSURE: 106 MMHG | DIASTOLIC BLOOD PRESSURE: 73 MMHG

## 2018-07-12 VITALS — DIASTOLIC BLOOD PRESSURE: 75 MMHG | SYSTOLIC BLOOD PRESSURE: 116 MMHG

## 2018-07-12 VITALS — DIASTOLIC BLOOD PRESSURE: 73 MMHG | SYSTOLIC BLOOD PRESSURE: 113 MMHG

## 2018-07-12 VITALS — DIASTOLIC BLOOD PRESSURE: 75 MMHG | SYSTOLIC BLOOD PRESSURE: 120 MMHG

## 2018-07-12 VITALS — SYSTOLIC BLOOD PRESSURE: 122 MMHG | DIASTOLIC BLOOD PRESSURE: 68 MMHG

## 2018-07-12 VITALS — SYSTOLIC BLOOD PRESSURE: 127 MMHG | DIASTOLIC BLOOD PRESSURE: 87 MMHG

## 2018-07-12 VITALS — DIASTOLIC BLOOD PRESSURE: 64 MMHG | SYSTOLIC BLOOD PRESSURE: 97 MMHG

## 2018-07-12 VITALS — DIASTOLIC BLOOD PRESSURE: 67 MMHG | SYSTOLIC BLOOD PRESSURE: 121 MMHG

## 2018-07-12 VITALS — SYSTOLIC BLOOD PRESSURE: 111 MMHG | DIASTOLIC BLOOD PRESSURE: 73 MMHG

## 2018-07-12 VITALS — SYSTOLIC BLOOD PRESSURE: 120 MMHG | DIASTOLIC BLOOD PRESSURE: 69 MMHG

## 2018-07-12 VITALS — SYSTOLIC BLOOD PRESSURE: 133 MMHG | DIASTOLIC BLOOD PRESSURE: 80 MMHG

## 2018-07-12 VITALS — SYSTOLIC BLOOD PRESSURE: 114 MMHG | DIASTOLIC BLOOD PRESSURE: 70 MMHG

## 2018-07-12 VITALS — DIASTOLIC BLOOD PRESSURE: 70 MMHG | SYSTOLIC BLOOD PRESSURE: 101 MMHG

## 2018-07-12 VITALS — SYSTOLIC BLOOD PRESSURE: 130 MMHG | DIASTOLIC BLOOD PRESSURE: 83 MMHG

## 2018-07-12 VITALS — DIASTOLIC BLOOD PRESSURE: 77 MMHG | SYSTOLIC BLOOD PRESSURE: 104 MMHG

## 2018-07-12 VITALS — SYSTOLIC BLOOD PRESSURE: 129 MMHG | DIASTOLIC BLOOD PRESSURE: 78 MMHG

## 2018-07-12 VITALS — DIASTOLIC BLOOD PRESSURE: 68 MMHG | SYSTOLIC BLOOD PRESSURE: 118 MMHG

## 2018-07-12 VITALS — SYSTOLIC BLOOD PRESSURE: 111 MMHG | DIASTOLIC BLOOD PRESSURE: 62 MMHG

## 2018-07-12 VITALS — SYSTOLIC BLOOD PRESSURE: 119 MMHG | DIASTOLIC BLOOD PRESSURE: 72 MMHG

## 2018-07-12 VITALS — DIASTOLIC BLOOD PRESSURE: 80 MMHG | SYSTOLIC BLOOD PRESSURE: 117 MMHG

## 2018-07-12 VITALS — SYSTOLIC BLOOD PRESSURE: 109 MMHG | DIASTOLIC BLOOD PRESSURE: 78 MMHG

## 2018-07-12 VITALS — DIASTOLIC BLOOD PRESSURE: 74 MMHG | SYSTOLIC BLOOD PRESSURE: 112 MMHG

## 2018-07-12 VITALS — SYSTOLIC BLOOD PRESSURE: 126 MMHG | DIASTOLIC BLOOD PRESSURE: 67 MMHG

## 2018-07-12 VITALS — DIASTOLIC BLOOD PRESSURE: 76 MMHG | SYSTOLIC BLOOD PRESSURE: 115 MMHG

## 2018-07-12 VITALS — DIASTOLIC BLOOD PRESSURE: 83 MMHG | SYSTOLIC BLOOD PRESSURE: 118 MMHG

## 2018-07-12 VITALS — SYSTOLIC BLOOD PRESSURE: 122 MMHG | DIASTOLIC BLOOD PRESSURE: 79 MMHG

## 2018-07-12 VITALS — SYSTOLIC BLOOD PRESSURE: 125 MMHG | DIASTOLIC BLOOD PRESSURE: 81 MMHG

## 2018-07-12 VITALS — DIASTOLIC BLOOD PRESSURE: 92 MMHG | SYSTOLIC BLOOD PRESSURE: 121 MMHG

## 2018-07-12 VITALS — DIASTOLIC BLOOD PRESSURE: 61 MMHG | SYSTOLIC BLOOD PRESSURE: 109 MMHG

## 2018-07-12 VITALS — SYSTOLIC BLOOD PRESSURE: 141 MMHG | DIASTOLIC BLOOD PRESSURE: 83 MMHG

## 2018-07-12 VITALS — SYSTOLIC BLOOD PRESSURE: 131 MMHG | DIASTOLIC BLOOD PRESSURE: 78 MMHG

## 2018-07-12 RX ADMIN — SODIUM CHLORIDE SCH MLS/HR: 9 INJECTION, SOLUTION INTRAVENOUS at 09:11

## 2018-07-12 RX ADMIN — POTASSIUM CHLORIDE SCH MLS/HR: 2 INJECTION, SOLUTION, CONCENTRATE INTRAVENOUS at 14:02

## 2018-07-12 RX ADMIN — POTASSIUM CHLORIDE SCH MLS/HR: 2 INJECTION, SOLUTION, CONCENTRATE INTRAVENOUS at 16:00

## 2018-07-12 RX ADMIN — IPRATROPIUM BROMIDE AND ALBUTEROL SULFATE SCH ML: .5; 3 SOLUTION RESPIRATORY (INHALATION) at 09:16

## 2018-07-12 RX ADMIN — IPRATROPIUM BROMIDE AND ALBUTEROL SULFATE SCH ML: .5; 3 SOLUTION RESPIRATORY (INHALATION) at 20:00

## 2018-07-12 RX ADMIN — IPRATROPIUM BROMIDE AND ALBUTEROL SULFATE SCH ML: .5; 3 SOLUTION RESPIRATORY (INHALATION) at 14:54

## 2018-07-12 RX ADMIN — LEVETIRACETAM SCH MG: 500 TABLET, FILM COATED ORAL at 21:16

## 2018-07-12 RX ADMIN — IPRATROPIUM BROMIDE AND ALBUTEROL SULFATE SCH ML: .5; 3 SOLUTION RESPIRATORY (INHALATION) at 02:13

## 2018-07-12 RX ADMIN — PANTOPRAZOLE SODIUM SCH MG: 40 INJECTION, POWDER, FOR SOLUTION INTRAVENOUS at 08:40

## 2018-07-13 VITALS — SYSTOLIC BLOOD PRESSURE: 133 MMHG | DIASTOLIC BLOOD PRESSURE: 80 MMHG

## 2018-07-13 VITALS — DIASTOLIC BLOOD PRESSURE: 75 MMHG | SYSTOLIC BLOOD PRESSURE: 131 MMHG

## 2018-07-13 VITALS — DIASTOLIC BLOOD PRESSURE: 93 MMHG | SYSTOLIC BLOOD PRESSURE: 132 MMHG

## 2018-07-13 VITALS — DIASTOLIC BLOOD PRESSURE: 89 MMHG | SYSTOLIC BLOOD PRESSURE: 154 MMHG

## 2018-07-13 VITALS — DIASTOLIC BLOOD PRESSURE: 84 MMHG | SYSTOLIC BLOOD PRESSURE: 135 MMHG

## 2018-07-13 VITALS — SYSTOLIC BLOOD PRESSURE: 137 MMHG | DIASTOLIC BLOOD PRESSURE: 92 MMHG

## 2018-07-13 VITALS — DIASTOLIC BLOOD PRESSURE: 99 MMHG | SYSTOLIC BLOOD PRESSURE: 140 MMHG

## 2018-07-13 VITALS — SYSTOLIC BLOOD PRESSURE: 124 MMHG | DIASTOLIC BLOOD PRESSURE: 78 MMHG

## 2018-07-13 VITALS — DIASTOLIC BLOOD PRESSURE: 87 MMHG | SYSTOLIC BLOOD PRESSURE: 139 MMHG

## 2018-07-13 VITALS — DIASTOLIC BLOOD PRESSURE: 89 MMHG | SYSTOLIC BLOOD PRESSURE: 147 MMHG

## 2018-07-13 VITALS — DIASTOLIC BLOOD PRESSURE: 78 MMHG | SYSTOLIC BLOOD PRESSURE: 153 MMHG

## 2018-07-13 VITALS — DIASTOLIC BLOOD PRESSURE: 87 MMHG | SYSTOLIC BLOOD PRESSURE: 133 MMHG

## 2018-07-13 VITALS — DIASTOLIC BLOOD PRESSURE: 75 MMHG | SYSTOLIC BLOOD PRESSURE: 136 MMHG

## 2018-07-13 VITALS — SYSTOLIC BLOOD PRESSURE: 140 MMHG | DIASTOLIC BLOOD PRESSURE: 93 MMHG

## 2018-07-13 VITALS — DIASTOLIC BLOOD PRESSURE: 80 MMHG | SYSTOLIC BLOOD PRESSURE: 145 MMHG

## 2018-07-13 VITALS — SYSTOLIC BLOOD PRESSURE: 149 MMHG | DIASTOLIC BLOOD PRESSURE: 86 MMHG

## 2018-07-13 VITALS — SYSTOLIC BLOOD PRESSURE: 127 MMHG | DIASTOLIC BLOOD PRESSURE: 75 MMHG

## 2018-07-13 VITALS — SYSTOLIC BLOOD PRESSURE: 147 MMHG | DIASTOLIC BLOOD PRESSURE: 105 MMHG

## 2018-07-13 VITALS — DIASTOLIC BLOOD PRESSURE: 86 MMHG | SYSTOLIC BLOOD PRESSURE: 139 MMHG

## 2018-07-13 VITALS — DIASTOLIC BLOOD PRESSURE: 92 MMHG | SYSTOLIC BLOOD PRESSURE: 137 MMHG

## 2018-07-13 VITALS — DIASTOLIC BLOOD PRESSURE: 86 MMHG | SYSTOLIC BLOOD PRESSURE: 126 MMHG

## 2018-07-13 VITALS — SYSTOLIC BLOOD PRESSURE: 139 MMHG | DIASTOLIC BLOOD PRESSURE: 71 MMHG

## 2018-07-13 VITALS — SYSTOLIC BLOOD PRESSURE: 157 MMHG | DIASTOLIC BLOOD PRESSURE: 94 MMHG

## 2018-07-13 VITALS — SYSTOLIC BLOOD PRESSURE: 152 MMHG | DIASTOLIC BLOOD PRESSURE: 79 MMHG

## 2018-07-13 VITALS — SYSTOLIC BLOOD PRESSURE: 133 MMHG | DIASTOLIC BLOOD PRESSURE: 85 MMHG

## 2018-07-13 VITALS — DIASTOLIC BLOOD PRESSURE: 97 MMHG | SYSTOLIC BLOOD PRESSURE: 147 MMHG

## 2018-07-13 VITALS — SYSTOLIC BLOOD PRESSURE: 137 MMHG | DIASTOLIC BLOOD PRESSURE: 78 MMHG

## 2018-07-13 VITALS — SYSTOLIC BLOOD PRESSURE: 131 MMHG | DIASTOLIC BLOOD PRESSURE: 97 MMHG

## 2018-07-13 VITALS — SYSTOLIC BLOOD PRESSURE: 129 MMHG | DIASTOLIC BLOOD PRESSURE: 100 MMHG

## 2018-07-13 VITALS — SYSTOLIC BLOOD PRESSURE: 143 MMHG | DIASTOLIC BLOOD PRESSURE: 87 MMHG

## 2018-07-13 VITALS — DIASTOLIC BLOOD PRESSURE: 84 MMHG | SYSTOLIC BLOOD PRESSURE: 138 MMHG

## 2018-07-13 VITALS — SYSTOLIC BLOOD PRESSURE: 100 MMHG | DIASTOLIC BLOOD PRESSURE: 72 MMHG

## 2018-07-13 VITALS — SYSTOLIC BLOOD PRESSURE: 156 MMHG | DIASTOLIC BLOOD PRESSURE: 84 MMHG

## 2018-07-13 VITALS — DIASTOLIC BLOOD PRESSURE: 84 MMHG | SYSTOLIC BLOOD PRESSURE: 159 MMHG

## 2018-07-13 VITALS — SYSTOLIC BLOOD PRESSURE: 139 MMHG | DIASTOLIC BLOOD PRESSURE: 77 MMHG

## 2018-07-13 VITALS — SYSTOLIC BLOOD PRESSURE: 151 MMHG | DIASTOLIC BLOOD PRESSURE: 122 MMHG

## 2018-07-13 VITALS — SYSTOLIC BLOOD PRESSURE: 133 MMHG | DIASTOLIC BLOOD PRESSURE: 77 MMHG

## 2018-07-13 VITALS — DIASTOLIC BLOOD PRESSURE: 91 MMHG | SYSTOLIC BLOOD PRESSURE: 143 MMHG

## 2018-07-13 VITALS — DIASTOLIC BLOOD PRESSURE: 71 MMHG | SYSTOLIC BLOOD PRESSURE: 130 MMHG

## 2018-07-13 VITALS — DIASTOLIC BLOOD PRESSURE: 93 MMHG | SYSTOLIC BLOOD PRESSURE: 154 MMHG

## 2018-07-13 VITALS — SYSTOLIC BLOOD PRESSURE: 138 MMHG | DIASTOLIC BLOOD PRESSURE: 79 MMHG

## 2018-07-13 VITALS — DIASTOLIC BLOOD PRESSURE: 85 MMHG | SYSTOLIC BLOOD PRESSURE: 134 MMHG

## 2018-07-13 VITALS — SYSTOLIC BLOOD PRESSURE: 139 MMHG | DIASTOLIC BLOOD PRESSURE: 91 MMHG

## 2018-07-13 VITALS — DIASTOLIC BLOOD PRESSURE: 94 MMHG | SYSTOLIC BLOOD PRESSURE: 163 MMHG

## 2018-07-13 VITALS — DIASTOLIC BLOOD PRESSURE: 74 MMHG | SYSTOLIC BLOOD PRESSURE: 143 MMHG

## 2018-07-13 VITALS — SYSTOLIC BLOOD PRESSURE: 159 MMHG | DIASTOLIC BLOOD PRESSURE: 92 MMHG

## 2018-07-13 VITALS — SYSTOLIC BLOOD PRESSURE: 147 MMHG | DIASTOLIC BLOOD PRESSURE: 74 MMHG

## 2018-07-13 VITALS — DIASTOLIC BLOOD PRESSURE: 86 MMHG | SYSTOLIC BLOOD PRESSURE: 144 MMHG

## 2018-07-13 VITALS — DIASTOLIC BLOOD PRESSURE: 93 MMHG | SYSTOLIC BLOOD PRESSURE: 136 MMHG

## 2018-07-13 VITALS — SYSTOLIC BLOOD PRESSURE: 143 MMHG | DIASTOLIC BLOOD PRESSURE: 93 MMHG

## 2018-07-13 VITALS — DIASTOLIC BLOOD PRESSURE: 102 MMHG | SYSTOLIC BLOOD PRESSURE: 149 MMHG

## 2018-07-13 LAB
BASOPHILS NFR BLD AUTO: 0.5 % (ref 0–2)
CHLORIDE SERPL-SCNC: 106 MEQ/L (ref 98–107)
EOSINOPHIL NFR BLD AUTO: 1.4 % (ref 0–5)
ERYTHROCYTE [DISTWIDTH] IN BLOOD BY AUTOMATED COUNT: 14.1 % (ref 11.6–14.6)
HCT VFR BLD AUTO: 29.8 % (ref 42–52)
HGB BLD-MCNC: 10.5 G/DL (ref 14–18)
LYMPHOCYTES NFR BLD AUTO: 7.6 % (ref 20–50)
MCH RBC QN AUTO: 30.6 PG (ref 28–32)
MCV RBC AUTO: 87.2 FL (ref 80–94)
MONOCYTES NFR BLD AUTO: 5.8 % (ref 2–8)
NEUTROPHILS NFR BLD AUTO: 84.7 % (ref 40–76)
PLATELET # BLD AUTO: 132 X1000/UL (ref 130–400)
PMV BLD AUTO: 8.6 FL (ref 7.4–10.4)
RBC # BLD AUTO: 3.42 MILL/UL (ref 4.7–6.1)

## 2018-07-13 RX ADMIN — POTASSIUM CHLORIDE SCH MLS/HR: 2 INJECTION, SOLUTION, CONCENTRATE INTRAVENOUS at 04:59

## 2018-07-13 RX ADMIN — IPRATROPIUM BROMIDE AND ALBUTEROL SULFATE SCH ML: .5; 3 SOLUTION RESPIRATORY (INHALATION) at 13:40

## 2018-07-13 RX ADMIN — PANTOPRAZOLE SODIUM SCH MG: 40 INJECTION, POWDER, FOR SOLUTION INTRAVENOUS at 09:30

## 2018-07-13 RX ADMIN — LEVETIRACETAM SCH MG: 500 TABLET, FILM COATED ORAL at 09:30

## 2018-07-13 RX ADMIN — IPRATROPIUM BROMIDE AND ALBUTEROL SULFATE SCH ML: .5; 3 SOLUTION RESPIRATORY (INHALATION) at 01:26

## 2018-07-13 RX ADMIN — POTASSIUM CHLORIDE SCH MLS/HR: 2 INJECTION, SOLUTION, CONCENTRATE INTRAVENOUS at 23:19

## 2018-07-13 RX ADMIN — LEVETIRACETAM SCH MG: 500 TABLET, FILM COATED ORAL at 20:15

## 2018-07-13 RX ADMIN — IPRATROPIUM BROMIDE AND ALBUTEROL SULFATE SCH ML: .5; 3 SOLUTION RESPIRATORY (INHALATION) at 08:26

## 2018-07-13 RX ADMIN — PANTOPRAZOLE SODIUM PRN MLS/HR: 40 INJECTION, POWDER, FOR SOLUTION INTRAVENOUS at 23:08

## 2018-07-13 RX ADMIN — IPRATROPIUM BROMIDE AND ALBUTEROL SULFATE SCH ML: .5; 3 SOLUTION RESPIRATORY (INHALATION) at 20:16

## 2018-07-14 VITALS — DIASTOLIC BLOOD PRESSURE: 80 MMHG | SYSTOLIC BLOOD PRESSURE: 133 MMHG

## 2018-07-14 VITALS — SYSTOLIC BLOOD PRESSURE: 115 MMHG | DIASTOLIC BLOOD PRESSURE: 76 MMHG

## 2018-07-14 VITALS — DIASTOLIC BLOOD PRESSURE: 82 MMHG | SYSTOLIC BLOOD PRESSURE: 121 MMHG

## 2018-07-14 VITALS — SYSTOLIC BLOOD PRESSURE: 134 MMHG | DIASTOLIC BLOOD PRESSURE: 79 MMHG

## 2018-07-14 VITALS — SYSTOLIC BLOOD PRESSURE: 132 MMHG | DIASTOLIC BLOOD PRESSURE: 81 MMHG

## 2018-07-14 VITALS — DIASTOLIC BLOOD PRESSURE: 92 MMHG | SYSTOLIC BLOOD PRESSURE: 150 MMHG

## 2018-07-14 VITALS — SYSTOLIC BLOOD PRESSURE: 149 MMHG | DIASTOLIC BLOOD PRESSURE: 96 MMHG

## 2018-07-14 VITALS — SYSTOLIC BLOOD PRESSURE: 134 MMHG | DIASTOLIC BLOOD PRESSURE: 90 MMHG

## 2018-07-14 VITALS — SYSTOLIC BLOOD PRESSURE: 121 MMHG | DIASTOLIC BLOOD PRESSURE: 75 MMHG

## 2018-07-14 VITALS — SYSTOLIC BLOOD PRESSURE: 110 MMHG | DIASTOLIC BLOOD PRESSURE: 69 MMHG

## 2018-07-14 VITALS — SYSTOLIC BLOOD PRESSURE: 127 MMHG | DIASTOLIC BLOOD PRESSURE: 80 MMHG

## 2018-07-14 VITALS — SYSTOLIC BLOOD PRESSURE: 140 MMHG | DIASTOLIC BLOOD PRESSURE: 90 MMHG

## 2018-07-14 VITALS — SYSTOLIC BLOOD PRESSURE: 122 MMHG | DIASTOLIC BLOOD PRESSURE: 75 MMHG

## 2018-07-14 VITALS — SYSTOLIC BLOOD PRESSURE: 124 MMHG | DIASTOLIC BLOOD PRESSURE: 80 MMHG

## 2018-07-14 VITALS — SYSTOLIC BLOOD PRESSURE: 114 MMHG | DIASTOLIC BLOOD PRESSURE: 81 MMHG

## 2018-07-14 VITALS — DIASTOLIC BLOOD PRESSURE: 79 MMHG | SYSTOLIC BLOOD PRESSURE: 132 MMHG

## 2018-07-14 VITALS — SYSTOLIC BLOOD PRESSURE: 133 MMHG | DIASTOLIC BLOOD PRESSURE: 75 MMHG

## 2018-07-14 VITALS — DIASTOLIC BLOOD PRESSURE: 91 MMHG | SYSTOLIC BLOOD PRESSURE: 162 MMHG

## 2018-07-14 VITALS — SYSTOLIC BLOOD PRESSURE: 126 MMHG | DIASTOLIC BLOOD PRESSURE: 78 MMHG

## 2018-07-14 VITALS — DIASTOLIC BLOOD PRESSURE: 85 MMHG | SYSTOLIC BLOOD PRESSURE: 138 MMHG

## 2018-07-14 VITALS — SYSTOLIC BLOOD PRESSURE: 144 MMHG | DIASTOLIC BLOOD PRESSURE: 88 MMHG

## 2018-07-14 VITALS — DIASTOLIC BLOOD PRESSURE: 85 MMHG | SYSTOLIC BLOOD PRESSURE: 133 MMHG

## 2018-07-14 VITALS — SYSTOLIC BLOOD PRESSURE: 130 MMHG | DIASTOLIC BLOOD PRESSURE: 85 MMHG

## 2018-07-14 VITALS — SYSTOLIC BLOOD PRESSURE: 121 MMHG | DIASTOLIC BLOOD PRESSURE: 79 MMHG

## 2018-07-14 VITALS — DIASTOLIC BLOOD PRESSURE: 81 MMHG | SYSTOLIC BLOOD PRESSURE: 123 MMHG

## 2018-07-14 VITALS — SYSTOLIC BLOOD PRESSURE: 117 MMHG | DIASTOLIC BLOOD PRESSURE: 73 MMHG

## 2018-07-14 VITALS — DIASTOLIC BLOOD PRESSURE: 101 MMHG | SYSTOLIC BLOOD PRESSURE: 141 MMHG

## 2018-07-14 VITALS — SYSTOLIC BLOOD PRESSURE: 142 MMHG | DIASTOLIC BLOOD PRESSURE: 80 MMHG

## 2018-07-14 VITALS — SYSTOLIC BLOOD PRESSURE: 114 MMHG | DIASTOLIC BLOOD PRESSURE: 69 MMHG

## 2018-07-14 VITALS — DIASTOLIC BLOOD PRESSURE: 77 MMHG | SYSTOLIC BLOOD PRESSURE: 127 MMHG

## 2018-07-14 VITALS — SYSTOLIC BLOOD PRESSURE: 131 MMHG | DIASTOLIC BLOOD PRESSURE: 80 MMHG

## 2018-07-14 VITALS — SYSTOLIC BLOOD PRESSURE: 134 MMHG | DIASTOLIC BLOOD PRESSURE: 92 MMHG

## 2018-07-14 VITALS — SYSTOLIC BLOOD PRESSURE: 130 MMHG | DIASTOLIC BLOOD PRESSURE: 76 MMHG

## 2018-07-14 VITALS — DIASTOLIC BLOOD PRESSURE: 94 MMHG | SYSTOLIC BLOOD PRESSURE: 144 MMHG

## 2018-07-14 VITALS — SYSTOLIC BLOOD PRESSURE: 145 MMHG | DIASTOLIC BLOOD PRESSURE: 87 MMHG

## 2018-07-14 VITALS — DIASTOLIC BLOOD PRESSURE: 85 MMHG | SYSTOLIC BLOOD PRESSURE: 129 MMHG

## 2018-07-14 VITALS — SYSTOLIC BLOOD PRESSURE: 116 MMHG | DIASTOLIC BLOOD PRESSURE: 72 MMHG

## 2018-07-14 VITALS — SYSTOLIC BLOOD PRESSURE: 132 MMHG | DIASTOLIC BLOOD PRESSURE: 77 MMHG

## 2018-07-14 VITALS — DIASTOLIC BLOOD PRESSURE: 79 MMHG | SYSTOLIC BLOOD PRESSURE: 133 MMHG

## 2018-07-14 VITALS — DIASTOLIC BLOOD PRESSURE: 86 MMHG | SYSTOLIC BLOOD PRESSURE: 125 MMHG

## 2018-07-14 VITALS — DIASTOLIC BLOOD PRESSURE: 83 MMHG | SYSTOLIC BLOOD PRESSURE: 122 MMHG

## 2018-07-14 VITALS — DIASTOLIC BLOOD PRESSURE: 77 MMHG | SYSTOLIC BLOOD PRESSURE: 114 MMHG

## 2018-07-14 VITALS — DIASTOLIC BLOOD PRESSURE: 85 MMHG | SYSTOLIC BLOOD PRESSURE: 139 MMHG

## 2018-07-14 VITALS — DIASTOLIC BLOOD PRESSURE: 85 MMHG | SYSTOLIC BLOOD PRESSURE: 142 MMHG

## 2018-07-14 VITALS — DIASTOLIC BLOOD PRESSURE: 64 MMHG | SYSTOLIC BLOOD PRESSURE: 143 MMHG

## 2018-07-14 VITALS — SYSTOLIC BLOOD PRESSURE: 130 MMHG | DIASTOLIC BLOOD PRESSURE: 82 MMHG

## 2018-07-14 VITALS — DIASTOLIC BLOOD PRESSURE: 83 MMHG | SYSTOLIC BLOOD PRESSURE: 137 MMHG

## 2018-07-14 VITALS — SYSTOLIC BLOOD PRESSURE: 132 MMHG | DIASTOLIC BLOOD PRESSURE: 82 MMHG

## 2018-07-14 VITALS — SYSTOLIC BLOOD PRESSURE: 114 MMHG | DIASTOLIC BLOOD PRESSURE: 79 MMHG

## 2018-07-14 VITALS — DIASTOLIC BLOOD PRESSURE: 86 MMHG | SYSTOLIC BLOOD PRESSURE: 116 MMHG

## 2018-07-14 VITALS — SYSTOLIC BLOOD PRESSURE: 141 MMHG | DIASTOLIC BLOOD PRESSURE: 83 MMHG

## 2018-07-14 VITALS — SYSTOLIC BLOOD PRESSURE: 138 MMHG | DIASTOLIC BLOOD PRESSURE: 108 MMHG

## 2018-07-14 VITALS — SYSTOLIC BLOOD PRESSURE: 133 MMHG | DIASTOLIC BLOOD PRESSURE: 78 MMHG

## 2018-07-14 VITALS — DIASTOLIC BLOOD PRESSURE: 100 MMHG | SYSTOLIC BLOOD PRESSURE: 134 MMHG

## 2018-07-14 VITALS — SYSTOLIC BLOOD PRESSURE: 125 MMHG | DIASTOLIC BLOOD PRESSURE: 76 MMHG

## 2018-07-14 VITALS — DIASTOLIC BLOOD PRESSURE: 76 MMHG | SYSTOLIC BLOOD PRESSURE: 119 MMHG

## 2018-07-14 VITALS — DIASTOLIC BLOOD PRESSURE: 82 MMHG | SYSTOLIC BLOOD PRESSURE: 131 MMHG

## 2018-07-14 VITALS — SYSTOLIC BLOOD PRESSURE: 125 MMHG | DIASTOLIC BLOOD PRESSURE: 90 MMHG

## 2018-07-14 VITALS — DIASTOLIC BLOOD PRESSURE: 83 MMHG | SYSTOLIC BLOOD PRESSURE: 126 MMHG

## 2018-07-14 VITALS — DIASTOLIC BLOOD PRESSURE: 98 MMHG | SYSTOLIC BLOOD PRESSURE: 128 MMHG

## 2018-07-14 VITALS — SYSTOLIC BLOOD PRESSURE: 128 MMHG | DIASTOLIC BLOOD PRESSURE: 77 MMHG

## 2018-07-14 VITALS — DIASTOLIC BLOOD PRESSURE: 98 MMHG | SYSTOLIC BLOOD PRESSURE: 135 MMHG

## 2018-07-14 VITALS — DIASTOLIC BLOOD PRESSURE: 76 MMHG | SYSTOLIC BLOOD PRESSURE: 125 MMHG

## 2018-07-14 VITALS — DIASTOLIC BLOOD PRESSURE: 92 MMHG | SYSTOLIC BLOOD PRESSURE: 142 MMHG

## 2018-07-14 VITALS — DIASTOLIC BLOOD PRESSURE: 82 MMHG | SYSTOLIC BLOOD PRESSURE: 124 MMHG

## 2018-07-14 VITALS — DIASTOLIC BLOOD PRESSURE: 90 MMHG | SYSTOLIC BLOOD PRESSURE: 144 MMHG

## 2018-07-14 VITALS — SYSTOLIC BLOOD PRESSURE: 139 MMHG | DIASTOLIC BLOOD PRESSURE: 94 MMHG

## 2018-07-14 VITALS — SYSTOLIC BLOOD PRESSURE: 105 MMHG | DIASTOLIC BLOOD PRESSURE: 65 MMHG

## 2018-07-14 VITALS — DIASTOLIC BLOOD PRESSURE: 93 MMHG | SYSTOLIC BLOOD PRESSURE: 147 MMHG

## 2018-07-14 VITALS — SYSTOLIC BLOOD PRESSURE: 174 MMHG | DIASTOLIC BLOOD PRESSURE: 93 MMHG

## 2018-07-14 VITALS — SYSTOLIC BLOOD PRESSURE: 121 MMHG | DIASTOLIC BLOOD PRESSURE: 73 MMHG

## 2018-07-14 VITALS — DIASTOLIC BLOOD PRESSURE: 80 MMHG | SYSTOLIC BLOOD PRESSURE: 104 MMHG

## 2018-07-14 VITALS — DIASTOLIC BLOOD PRESSURE: 82 MMHG | SYSTOLIC BLOOD PRESSURE: 123 MMHG

## 2018-07-14 VITALS — DIASTOLIC BLOOD PRESSURE: 68 MMHG | SYSTOLIC BLOOD PRESSURE: 122 MMHG

## 2018-07-14 VITALS — DIASTOLIC BLOOD PRESSURE: 79 MMHG | SYSTOLIC BLOOD PRESSURE: 125 MMHG

## 2018-07-14 VITALS — SYSTOLIC BLOOD PRESSURE: 138 MMHG | DIASTOLIC BLOOD PRESSURE: 76 MMHG

## 2018-07-14 VITALS — DIASTOLIC BLOOD PRESSURE: 84 MMHG | SYSTOLIC BLOOD PRESSURE: 121 MMHG

## 2018-07-14 VITALS — SYSTOLIC BLOOD PRESSURE: 140 MMHG | DIASTOLIC BLOOD PRESSURE: 83 MMHG

## 2018-07-14 VITALS — SYSTOLIC BLOOD PRESSURE: 143 MMHG | DIASTOLIC BLOOD PRESSURE: 84 MMHG

## 2018-07-14 VITALS — DIASTOLIC BLOOD PRESSURE: 72 MMHG | SYSTOLIC BLOOD PRESSURE: 136 MMHG

## 2018-07-14 VITALS — SYSTOLIC BLOOD PRESSURE: 127 MMHG | DIASTOLIC BLOOD PRESSURE: 82 MMHG

## 2018-07-14 VITALS — DIASTOLIC BLOOD PRESSURE: 82 MMHG | SYSTOLIC BLOOD PRESSURE: 136 MMHG

## 2018-07-14 VITALS — SYSTOLIC BLOOD PRESSURE: 147 MMHG | DIASTOLIC BLOOD PRESSURE: 85 MMHG

## 2018-07-14 VITALS — SYSTOLIC BLOOD PRESSURE: 138 MMHG | DIASTOLIC BLOOD PRESSURE: 93 MMHG

## 2018-07-14 VITALS — DIASTOLIC BLOOD PRESSURE: 81 MMHG | SYSTOLIC BLOOD PRESSURE: 134 MMHG

## 2018-07-14 VITALS — DIASTOLIC BLOOD PRESSURE: 91 MMHG | SYSTOLIC BLOOD PRESSURE: 145 MMHG

## 2018-07-14 VITALS — SYSTOLIC BLOOD PRESSURE: 130 MMHG | DIASTOLIC BLOOD PRESSURE: 87 MMHG

## 2018-07-14 VITALS — DIASTOLIC BLOOD PRESSURE: 73 MMHG | SYSTOLIC BLOOD PRESSURE: 125 MMHG

## 2018-07-14 VITALS — DIASTOLIC BLOOD PRESSURE: 87 MMHG | SYSTOLIC BLOOD PRESSURE: 134 MMHG

## 2018-07-14 VITALS — DIASTOLIC BLOOD PRESSURE: 83 MMHG | SYSTOLIC BLOOD PRESSURE: 129 MMHG

## 2018-07-14 VITALS — DIASTOLIC BLOOD PRESSURE: 75 MMHG | SYSTOLIC BLOOD PRESSURE: 148 MMHG

## 2018-07-14 VITALS — DIASTOLIC BLOOD PRESSURE: 84 MMHG | SYSTOLIC BLOOD PRESSURE: 135 MMHG

## 2018-07-14 VITALS — DIASTOLIC BLOOD PRESSURE: 78 MMHG | SYSTOLIC BLOOD PRESSURE: 146 MMHG

## 2018-07-14 VITALS — DIASTOLIC BLOOD PRESSURE: 76 MMHG | SYSTOLIC BLOOD PRESSURE: 135 MMHG

## 2018-07-14 VITALS — SYSTOLIC BLOOD PRESSURE: 146 MMHG | DIASTOLIC BLOOD PRESSURE: 105 MMHG

## 2018-07-14 VITALS — DIASTOLIC BLOOD PRESSURE: 75 MMHG | SYSTOLIC BLOOD PRESSURE: 118 MMHG

## 2018-07-14 VITALS — DIASTOLIC BLOOD PRESSURE: 86 MMHG | SYSTOLIC BLOOD PRESSURE: 137 MMHG

## 2018-07-14 LAB
ERYTHROCYTE [DISTWIDTH] IN BLOOD BY AUTOMATED COUNT: 14.3 % (ref 11.6–14.6)
HCT VFR BLD AUTO: 28.9 % (ref 42–52)
HGB BLD-MCNC: 10.2 G/DL (ref 14–18)
INR PPP: 1.2
MCH RBC QN AUTO: 30.4 PG (ref 28–32)
MCV RBC AUTO: 86.2 FL (ref 80–94)
PLATELET # BLD AUTO: 176 X1000/UL (ref 130–400)
PROTHROMBIN TIME: 12 SEC (ref 9.4–11.6)
RBC # BLD AUTO: 3.35 MILL/UL (ref 4.7–6.1)

## 2018-07-14 RX ADMIN — IPRATROPIUM BROMIDE AND ALBUTEROL SULFATE SCH ML: .5; 3 SOLUTION RESPIRATORY (INHALATION) at 08:58

## 2018-07-14 RX ADMIN — POTASSIUM CHLORIDE SCH MLS/HR: 2 INJECTION, SOLUTION, CONCENTRATE INTRAVENOUS at 16:25

## 2018-07-14 RX ADMIN — POTASSIUM CHLORIDE SCH MLS/HR: 200 INJECTION, SOLUTION INTRAVENOUS at 12:56

## 2018-07-14 RX ADMIN — HYDRALAZINE HYDROCHLORIDE SCH MG: 25 TABLET, FILM COATED ORAL at 21:02

## 2018-07-14 RX ADMIN — LOSARTAN POTASSIUM SCH MG: 25 TABLET ORAL at 12:07

## 2018-07-14 RX ADMIN — MORPHINE SULFATE PRN MG: 4 INJECTION, SOLUTION INTRAMUSCULAR; INTRAVENOUS at 12:07

## 2018-07-14 RX ADMIN — IPRATROPIUM BROMIDE AND ALBUTEROL SULFATE SCH ML: .5; 3 SOLUTION RESPIRATORY (INHALATION) at 14:54

## 2018-07-14 RX ADMIN — MORPHINE SULFATE PRN MG: 4 INJECTION, SOLUTION INTRAMUSCULAR; INTRAVENOUS at 01:23

## 2018-07-14 RX ADMIN — LEVETIRACETAM SCH MG: 500 TABLET, FILM COATED ORAL at 09:22

## 2018-07-14 RX ADMIN — NICOTINE SCH MG: 21 PATCH, EXTENDED RELEASE TRANSDERMAL at 12:15

## 2018-07-14 RX ADMIN — METOPROLOL TARTRATE SCH MG: 25 TABLET ORAL at 21:02

## 2018-07-14 RX ADMIN — IPRATROPIUM BROMIDE AND ALBUTEROL SULFATE SCH ML: .5; 3 SOLUTION RESPIRATORY (INHALATION) at 21:30

## 2018-07-14 RX ADMIN — DOCUSATE SODIUM PRN MG: 250 CAPSULE, LIQUID FILLED ORAL at 12:07

## 2018-07-14 RX ADMIN — MORPHINE SULFATE PRN MG: 4 INJECTION, SOLUTION INTRAMUSCULAR; INTRAVENOUS at 07:21

## 2018-07-14 RX ADMIN — MORPHINE SULFATE PRN MG: 4 INJECTION, SOLUTION INTRAMUSCULAR; INTRAVENOUS at 22:02

## 2018-07-14 RX ADMIN — POTASSIUM CHLORIDE SCH MLS/HR: 200 INJECTION, SOLUTION INTRAVENOUS at 14:49

## 2018-07-14 RX ADMIN — LEVETIRACETAM SCH MG: 500 TABLET, FILM COATED ORAL at 21:02

## 2018-07-14 RX ADMIN — MORPHINE SULFATE PRN MG: 4 INJECTION, SOLUTION INTRAMUSCULAR; INTRAVENOUS at 16:25

## 2018-07-14 RX ADMIN — PANTOPRAZOLE SODIUM SCH MG: 40 INJECTION, POWDER, FOR SOLUTION INTRAVENOUS at 09:22

## 2018-07-14 RX ADMIN — POTASSIUM CHLORIDE SCH MLS/HR: 2 INJECTION, SOLUTION, CONCENTRATE INTRAVENOUS at 03:47

## 2018-07-14 RX ADMIN — HYDRALAZINE HYDROCHLORIDE SCH MG: 25 TABLET, FILM COATED ORAL at 14:49

## 2018-07-14 RX ADMIN — IPRATROPIUM BROMIDE AND ALBUTEROL SULFATE SCH ML: .5; 3 SOLUTION RESPIRATORY (INHALATION) at 00:31

## 2018-07-15 VITALS — DIASTOLIC BLOOD PRESSURE: 89 MMHG | SYSTOLIC BLOOD PRESSURE: 134 MMHG

## 2018-07-15 VITALS — DIASTOLIC BLOOD PRESSURE: 89 MMHG | SYSTOLIC BLOOD PRESSURE: 136 MMHG

## 2018-07-15 VITALS — SYSTOLIC BLOOD PRESSURE: 134 MMHG | DIASTOLIC BLOOD PRESSURE: 83 MMHG

## 2018-07-15 VITALS — SYSTOLIC BLOOD PRESSURE: 150 MMHG | DIASTOLIC BLOOD PRESSURE: 83 MMHG

## 2018-07-15 VITALS — SYSTOLIC BLOOD PRESSURE: 142 MMHG | DIASTOLIC BLOOD PRESSURE: 87 MMHG

## 2018-07-15 VITALS — SYSTOLIC BLOOD PRESSURE: 124 MMHG | DIASTOLIC BLOOD PRESSURE: 80 MMHG

## 2018-07-15 VITALS — SYSTOLIC BLOOD PRESSURE: 131 MMHG | DIASTOLIC BLOOD PRESSURE: 84 MMHG

## 2018-07-15 VITALS — SYSTOLIC BLOOD PRESSURE: 136 MMHG | DIASTOLIC BLOOD PRESSURE: 90 MMHG

## 2018-07-15 VITALS — SYSTOLIC BLOOD PRESSURE: 99 MMHG | DIASTOLIC BLOOD PRESSURE: 65 MMHG

## 2018-07-15 VITALS — SYSTOLIC BLOOD PRESSURE: 133 MMHG | DIASTOLIC BLOOD PRESSURE: 94 MMHG

## 2018-07-15 VITALS — DIASTOLIC BLOOD PRESSURE: 83 MMHG | SYSTOLIC BLOOD PRESSURE: 125 MMHG

## 2018-07-15 VITALS — SYSTOLIC BLOOD PRESSURE: 101 MMHG | DIASTOLIC BLOOD PRESSURE: 71 MMHG

## 2018-07-15 VITALS — DIASTOLIC BLOOD PRESSURE: 67 MMHG | SYSTOLIC BLOOD PRESSURE: 105 MMHG

## 2018-07-15 VITALS — SYSTOLIC BLOOD PRESSURE: 133 MMHG | DIASTOLIC BLOOD PRESSURE: 87 MMHG

## 2018-07-15 VITALS — SYSTOLIC BLOOD PRESSURE: 129 MMHG | DIASTOLIC BLOOD PRESSURE: 69 MMHG

## 2018-07-15 VITALS — SYSTOLIC BLOOD PRESSURE: 122 MMHG | DIASTOLIC BLOOD PRESSURE: 83 MMHG

## 2018-07-15 VITALS — DIASTOLIC BLOOD PRESSURE: 100 MMHG | SYSTOLIC BLOOD PRESSURE: 154 MMHG

## 2018-07-15 VITALS — SYSTOLIC BLOOD PRESSURE: 144 MMHG | DIASTOLIC BLOOD PRESSURE: 84 MMHG

## 2018-07-15 VITALS — SYSTOLIC BLOOD PRESSURE: 99 MMHG | DIASTOLIC BLOOD PRESSURE: 68 MMHG

## 2018-07-15 VITALS — DIASTOLIC BLOOD PRESSURE: 80 MMHG | SYSTOLIC BLOOD PRESSURE: 125 MMHG

## 2018-07-15 VITALS — DIASTOLIC BLOOD PRESSURE: 69 MMHG | SYSTOLIC BLOOD PRESSURE: 134 MMHG

## 2018-07-15 VITALS — SYSTOLIC BLOOD PRESSURE: 97 MMHG | DIASTOLIC BLOOD PRESSURE: 65 MMHG

## 2018-07-15 VITALS — SYSTOLIC BLOOD PRESSURE: 146 MMHG | DIASTOLIC BLOOD PRESSURE: 96 MMHG

## 2018-07-15 VITALS — DIASTOLIC BLOOD PRESSURE: 97 MMHG | SYSTOLIC BLOOD PRESSURE: 142 MMHG

## 2018-07-15 VITALS — DIASTOLIC BLOOD PRESSURE: 99 MMHG | SYSTOLIC BLOOD PRESSURE: 146 MMHG

## 2018-07-15 VITALS — SYSTOLIC BLOOD PRESSURE: 99 MMHG | DIASTOLIC BLOOD PRESSURE: 66 MMHG

## 2018-07-15 VITALS — DIASTOLIC BLOOD PRESSURE: 83 MMHG | SYSTOLIC BLOOD PRESSURE: 133 MMHG

## 2018-07-15 VITALS — SYSTOLIC BLOOD PRESSURE: 98 MMHG | DIASTOLIC BLOOD PRESSURE: 69 MMHG

## 2018-07-15 VITALS — DIASTOLIC BLOOD PRESSURE: 87 MMHG | SYSTOLIC BLOOD PRESSURE: 134 MMHG

## 2018-07-15 VITALS — SYSTOLIC BLOOD PRESSURE: 121 MMHG | DIASTOLIC BLOOD PRESSURE: 93 MMHG

## 2018-07-15 VITALS — SYSTOLIC BLOOD PRESSURE: 143 MMHG | DIASTOLIC BLOOD PRESSURE: 108 MMHG

## 2018-07-15 VITALS — DIASTOLIC BLOOD PRESSURE: 84 MMHG | SYSTOLIC BLOOD PRESSURE: 129 MMHG

## 2018-07-15 VITALS — SYSTOLIC BLOOD PRESSURE: 156 MMHG | DIASTOLIC BLOOD PRESSURE: 96 MMHG

## 2018-07-15 VITALS — DIASTOLIC BLOOD PRESSURE: 66 MMHG | SYSTOLIC BLOOD PRESSURE: 103 MMHG

## 2018-07-15 VITALS — DIASTOLIC BLOOD PRESSURE: 80 MMHG | SYSTOLIC BLOOD PRESSURE: 127 MMHG

## 2018-07-15 VITALS — DIASTOLIC BLOOD PRESSURE: 70 MMHG | SYSTOLIC BLOOD PRESSURE: 105 MMHG

## 2018-07-15 VITALS — DIASTOLIC BLOOD PRESSURE: 97 MMHG | SYSTOLIC BLOOD PRESSURE: 139 MMHG

## 2018-07-15 VITALS — DIASTOLIC BLOOD PRESSURE: 66 MMHG | SYSTOLIC BLOOD PRESSURE: 93 MMHG

## 2018-07-15 VITALS — DIASTOLIC BLOOD PRESSURE: 78 MMHG | SYSTOLIC BLOOD PRESSURE: 129 MMHG

## 2018-07-15 VITALS — SYSTOLIC BLOOD PRESSURE: 95 MMHG | DIASTOLIC BLOOD PRESSURE: 63 MMHG

## 2018-07-15 VITALS — DIASTOLIC BLOOD PRESSURE: 95 MMHG | SYSTOLIC BLOOD PRESSURE: 155 MMHG

## 2018-07-15 VITALS — DIASTOLIC BLOOD PRESSURE: 90 MMHG | SYSTOLIC BLOOD PRESSURE: 127 MMHG

## 2018-07-15 VITALS — DIASTOLIC BLOOD PRESSURE: 70 MMHG | SYSTOLIC BLOOD PRESSURE: 139 MMHG

## 2018-07-15 VITALS — DIASTOLIC BLOOD PRESSURE: 65 MMHG | SYSTOLIC BLOOD PRESSURE: 104 MMHG

## 2018-07-15 VITALS — SYSTOLIC BLOOD PRESSURE: 137 MMHG | DIASTOLIC BLOOD PRESSURE: 87 MMHG

## 2018-07-15 VITALS — DIASTOLIC BLOOD PRESSURE: 82 MMHG | SYSTOLIC BLOOD PRESSURE: 133 MMHG

## 2018-07-15 VITALS — DIASTOLIC BLOOD PRESSURE: 76 MMHG | SYSTOLIC BLOOD PRESSURE: 121 MMHG

## 2018-07-15 VITALS — SYSTOLIC BLOOD PRESSURE: 144 MMHG | DIASTOLIC BLOOD PRESSURE: 99 MMHG

## 2018-07-15 VITALS — DIASTOLIC BLOOD PRESSURE: 81 MMHG | SYSTOLIC BLOOD PRESSURE: 139 MMHG

## 2018-07-15 VITALS — SYSTOLIC BLOOD PRESSURE: 140 MMHG | DIASTOLIC BLOOD PRESSURE: 82 MMHG

## 2018-07-15 VITALS — DIASTOLIC BLOOD PRESSURE: 87 MMHG | SYSTOLIC BLOOD PRESSURE: 133 MMHG

## 2018-07-15 VITALS — DIASTOLIC BLOOD PRESSURE: 75 MMHG | SYSTOLIC BLOOD PRESSURE: 114 MMHG

## 2018-07-15 VITALS — SYSTOLIC BLOOD PRESSURE: 140 MMHG | DIASTOLIC BLOOD PRESSURE: 88 MMHG

## 2018-07-15 VITALS — DIASTOLIC BLOOD PRESSURE: 68 MMHG | SYSTOLIC BLOOD PRESSURE: 104 MMHG

## 2018-07-15 VITALS — SYSTOLIC BLOOD PRESSURE: 145 MMHG | DIASTOLIC BLOOD PRESSURE: 91 MMHG

## 2018-07-15 VITALS — DIASTOLIC BLOOD PRESSURE: 92 MMHG | SYSTOLIC BLOOD PRESSURE: 153 MMHG

## 2018-07-15 VITALS — SYSTOLIC BLOOD PRESSURE: 140 MMHG | DIASTOLIC BLOOD PRESSURE: 74 MMHG

## 2018-07-15 VITALS — DIASTOLIC BLOOD PRESSURE: 85 MMHG | SYSTOLIC BLOOD PRESSURE: 148 MMHG

## 2018-07-15 VITALS — DIASTOLIC BLOOD PRESSURE: 86 MMHG | SYSTOLIC BLOOD PRESSURE: 136 MMHG

## 2018-07-15 VITALS — DIASTOLIC BLOOD PRESSURE: 70 MMHG | SYSTOLIC BLOOD PRESSURE: 111 MMHG

## 2018-07-15 VITALS — DIASTOLIC BLOOD PRESSURE: 92 MMHG | SYSTOLIC BLOOD PRESSURE: 141 MMHG

## 2018-07-15 VITALS — SYSTOLIC BLOOD PRESSURE: 158 MMHG | DIASTOLIC BLOOD PRESSURE: 77 MMHG

## 2018-07-15 VITALS — DIASTOLIC BLOOD PRESSURE: 63 MMHG | SYSTOLIC BLOOD PRESSURE: 100 MMHG

## 2018-07-15 VITALS — SYSTOLIC BLOOD PRESSURE: 146 MMHG | DIASTOLIC BLOOD PRESSURE: 92 MMHG

## 2018-07-15 VITALS — DIASTOLIC BLOOD PRESSURE: 86 MMHG | SYSTOLIC BLOOD PRESSURE: 133 MMHG

## 2018-07-15 VITALS — DIASTOLIC BLOOD PRESSURE: 71 MMHG | SYSTOLIC BLOOD PRESSURE: 101 MMHG

## 2018-07-15 VITALS — DIASTOLIC BLOOD PRESSURE: 94 MMHG | SYSTOLIC BLOOD PRESSURE: 137 MMHG

## 2018-07-15 VITALS — SYSTOLIC BLOOD PRESSURE: 133 MMHG | DIASTOLIC BLOOD PRESSURE: 86 MMHG

## 2018-07-15 VITALS — SYSTOLIC BLOOD PRESSURE: 122 MMHG | DIASTOLIC BLOOD PRESSURE: 80 MMHG

## 2018-07-15 VITALS — DIASTOLIC BLOOD PRESSURE: 71 MMHG | SYSTOLIC BLOOD PRESSURE: 99 MMHG

## 2018-07-15 VITALS — SYSTOLIC BLOOD PRESSURE: 102 MMHG | DIASTOLIC BLOOD PRESSURE: 62 MMHG

## 2018-07-15 VITALS — DIASTOLIC BLOOD PRESSURE: 87 MMHG | SYSTOLIC BLOOD PRESSURE: 132 MMHG

## 2018-07-15 VITALS — SYSTOLIC BLOOD PRESSURE: 156 MMHG | DIASTOLIC BLOOD PRESSURE: 110 MMHG

## 2018-07-15 VITALS — DIASTOLIC BLOOD PRESSURE: 88 MMHG | SYSTOLIC BLOOD PRESSURE: 127 MMHG

## 2018-07-15 VITALS — DIASTOLIC BLOOD PRESSURE: 96 MMHG | SYSTOLIC BLOOD PRESSURE: 152 MMHG

## 2018-07-15 VITALS — SYSTOLIC BLOOD PRESSURE: 133 MMHG | DIASTOLIC BLOOD PRESSURE: 85 MMHG

## 2018-07-15 VITALS — DIASTOLIC BLOOD PRESSURE: 68 MMHG | SYSTOLIC BLOOD PRESSURE: 114 MMHG

## 2018-07-15 VITALS — SYSTOLIC BLOOD PRESSURE: 128 MMHG | DIASTOLIC BLOOD PRESSURE: 94 MMHG

## 2018-07-15 VITALS — DIASTOLIC BLOOD PRESSURE: 78 MMHG | SYSTOLIC BLOOD PRESSURE: 114 MMHG

## 2018-07-15 VITALS — DIASTOLIC BLOOD PRESSURE: 70 MMHG | SYSTOLIC BLOOD PRESSURE: 112 MMHG

## 2018-07-15 VITALS — SYSTOLIC BLOOD PRESSURE: 104 MMHG | DIASTOLIC BLOOD PRESSURE: 77 MMHG

## 2018-07-15 VITALS — DIASTOLIC BLOOD PRESSURE: 89 MMHG | SYSTOLIC BLOOD PRESSURE: 141 MMHG

## 2018-07-15 VITALS — SYSTOLIC BLOOD PRESSURE: 94 MMHG | DIASTOLIC BLOOD PRESSURE: 39 MMHG

## 2018-07-15 VITALS — DIASTOLIC BLOOD PRESSURE: 96 MMHG | SYSTOLIC BLOOD PRESSURE: 155 MMHG

## 2018-07-15 VITALS — DIASTOLIC BLOOD PRESSURE: 76 MMHG | SYSTOLIC BLOOD PRESSURE: 110 MMHG

## 2018-07-15 VITALS — DIASTOLIC BLOOD PRESSURE: 90 MMHG | SYSTOLIC BLOOD PRESSURE: 133 MMHG

## 2018-07-15 VITALS — SYSTOLIC BLOOD PRESSURE: 135 MMHG | DIASTOLIC BLOOD PRESSURE: 88 MMHG

## 2018-07-15 VITALS — DIASTOLIC BLOOD PRESSURE: 83 MMHG | SYSTOLIC BLOOD PRESSURE: 127 MMHG

## 2018-07-15 VITALS — SYSTOLIC BLOOD PRESSURE: 110 MMHG | DIASTOLIC BLOOD PRESSURE: 67 MMHG

## 2018-07-15 VITALS — DIASTOLIC BLOOD PRESSURE: 88 MMHG | SYSTOLIC BLOOD PRESSURE: 141 MMHG

## 2018-07-15 VITALS — SYSTOLIC BLOOD PRESSURE: 159 MMHG | DIASTOLIC BLOOD PRESSURE: 74 MMHG

## 2018-07-15 VITALS — DIASTOLIC BLOOD PRESSURE: 78 MMHG | SYSTOLIC BLOOD PRESSURE: 111 MMHG

## 2018-07-15 VITALS — SYSTOLIC BLOOD PRESSURE: 132 MMHG | DIASTOLIC BLOOD PRESSURE: 82 MMHG

## 2018-07-15 VITALS — DIASTOLIC BLOOD PRESSURE: 87 MMHG | SYSTOLIC BLOOD PRESSURE: 135 MMHG

## 2018-07-15 VITALS — SYSTOLIC BLOOD PRESSURE: 139 MMHG | DIASTOLIC BLOOD PRESSURE: 88 MMHG

## 2018-07-15 VITALS — SYSTOLIC BLOOD PRESSURE: 111 MMHG | DIASTOLIC BLOOD PRESSURE: 75 MMHG

## 2018-07-15 VITALS — DIASTOLIC BLOOD PRESSURE: 91 MMHG | SYSTOLIC BLOOD PRESSURE: 138 MMHG

## 2018-07-15 VITALS — SYSTOLIC BLOOD PRESSURE: 143 MMHG | DIASTOLIC BLOOD PRESSURE: 83 MMHG

## 2018-07-15 VITALS — DIASTOLIC BLOOD PRESSURE: 88 MMHG | SYSTOLIC BLOOD PRESSURE: 142 MMHG

## 2018-07-15 VITALS — SYSTOLIC BLOOD PRESSURE: 152 MMHG | DIASTOLIC BLOOD PRESSURE: 89 MMHG

## 2018-07-15 VITALS — DIASTOLIC BLOOD PRESSURE: 69 MMHG | SYSTOLIC BLOOD PRESSURE: 98 MMHG

## 2018-07-15 VITALS — SYSTOLIC BLOOD PRESSURE: 132 MMHG | DIASTOLIC BLOOD PRESSURE: 83 MMHG

## 2018-07-15 VITALS — DIASTOLIC BLOOD PRESSURE: 92 MMHG | SYSTOLIC BLOOD PRESSURE: 134 MMHG

## 2018-07-15 VITALS — DIASTOLIC BLOOD PRESSURE: 75 MMHG | SYSTOLIC BLOOD PRESSURE: 125 MMHG

## 2018-07-15 VITALS — SYSTOLIC BLOOD PRESSURE: 145 MMHG | DIASTOLIC BLOOD PRESSURE: 97 MMHG

## 2018-07-15 VITALS — SYSTOLIC BLOOD PRESSURE: 101 MMHG | DIASTOLIC BLOOD PRESSURE: 72 MMHG

## 2018-07-15 LAB
BASOPHILS NFR BLD AUTO: 0.3 % (ref 0–2)
CHLORIDE SERPL-SCNC: 101 MEQ/L (ref 98–107)
EOSINOPHIL NFR BLD AUTO: 1.8 % (ref 0–5)
ERYTHROCYTE [DISTWIDTH] IN BLOOD BY AUTOMATED COUNT: 13.7 % (ref 11.6–14.6)
HCT VFR BLD AUTO: 34.1 % (ref 42–52)
HGB BLD-MCNC: 11.7 G/DL (ref 14–18)
LYMPHOCYTES NFR BLD AUTO: 12.7 % (ref 20–50)
MCH RBC QN AUTO: 29.7 PG (ref 28–32)
MCV RBC AUTO: 86.3 FL (ref 80–94)
MONOCYTES NFR BLD AUTO: 7.9 % (ref 2–8)
NEUTROPHILS NFR BLD AUTO: 77.3 % (ref 40–76)
PLATELET # BLD AUTO: 272 X1000/UL (ref 130–400)
PMV BLD AUTO: 8.7 FL (ref 7.4–10.4)
RBC # BLD AUTO: 3.95 MILL/UL (ref 4.7–6.1)

## 2018-07-15 RX ADMIN — METOPROLOL TARTRATE SCH MG: 25 TABLET ORAL at 21:21

## 2018-07-15 RX ADMIN — MORPHINE SULFATE PRN MG: 4 INJECTION, SOLUTION INTRAMUSCULAR; INTRAVENOUS at 02:21

## 2018-07-15 RX ADMIN — METOPROLOL TARTRATE SCH MG: 25 TABLET ORAL at 08:09

## 2018-07-15 RX ADMIN — PANTOPRAZOLE SODIUM PRN MLS/HR: 40 INJECTION, POWDER, FOR SOLUTION INTRAVENOUS at 23:55

## 2018-07-15 RX ADMIN — MORPHINE SULFATE PRN MG: 4 INJECTION, SOLUTION INTRAMUSCULAR; INTRAVENOUS at 15:19

## 2018-07-15 RX ADMIN — HYDRALAZINE HYDROCHLORIDE SCH MG: 25 TABLET, FILM COATED ORAL at 13:42

## 2018-07-15 RX ADMIN — NIFEDIPINE SCH MG: 60 TABLET, FILM COATED, EXTENDED RELEASE ORAL at 08:09

## 2018-07-15 RX ADMIN — PANTOPRAZOLE SODIUM PRN MLS/HR: 40 INJECTION, POWDER, FOR SOLUTION INTRAVENOUS at 01:00

## 2018-07-15 RX ADMIN — MORPHINE SULFATE PRN MG: 4 INJECTION, SOLUTION INTRAMUSCULAR; INTRAVENOUS at 21:21

## 2018-07-15 RX ADMIN — IPRATROPIUM BROMIDE AND ALBUTEROL SULFATE SCH ML: .5; 3 SOLUTION RESPIRATORY (INHALATION) at 03:01

## 2018-07-15 RX ADMIN — NICOTINE SCH MG: 21 PATCH, EXTENDED RELEASE TRANSDERMAL at 08:08

## 2018-07-15 RX ADMIN — LEVETIRACETAM SCH MG: 500 TABLET, FILM COATED ORAL at 08:08

## 2018-07-15 RX ADMIN — POTASSIUM CHLORIDE SCH MLS/HR: 2 INJECTION, SOLUTION, CONCENTRATE INTRAVENOUS at 05:46

## 2018-07-15 RX ADMIN — PANTOPRAZOLE SODIUM SCH MG: 40 INJECTION, POWDER, FOR SOLUTION INTRAVENOUS at 08:08

## 2018-07-15 RX ADMIN — IPRATROPIUM BROMIDE AND ALBUTEROL SULFATE SCH ML: .5; 3 SOLUTION RESPIRATORY (INHALATION) at 03:04

## 2018-07-15 RX ADMIN — MORPHINE SULFATE PRN MG: 4 INJECTION, SOLUTION INTRAMUSCULAR; INTRAVENOUS at 07:31

## 2018-07-15 RX ADMIN — DOCUSATE SODIUM PRN MG: 250 CAPSULE, LIQUID FILLED ORAL at 08:09

## 2018-07-15 RX ADMIN — POTASSIUM CHLORIDE SCH MLS/HR: 2 INJECTION, SOLUTION, CONCENTRATE INTRAVENOUS at 18:51

## 2018-07-15 RX ADMIN — LEVETIRACETAM SCH MG: 500 TABLET, FILM COATED ORAL at 21:21

## 2018-07-15 RX ADMIN — LOSARTAN POTASSIUM SCH MG: 25 TABLET ORAL at 08:08

## 2018-07-15 RX ADMIN — IPRATROPIUM BROMIDE AND ALBUTEROL SULFATE SCH ML: .5; 3 SOLUTION RESPIRATORY (INHALATION) at 08:29

## 2018-07-15 RX ADMIN — HYDRALAZINE HYDROCHLORIDE SCH MG: 25 TABLET, FILM COATED ORAL at 05:46

## 2018-07-15 RX ADMIN — HYDRALAZINE HYDROCHLORIDE SCH MG: 25 TABLET, FILM COATED ORAL at 21:21

## 2018-07-16 VITALS — DIASTOLIC BLOOD PRESSURE: 81 MMHG | SYSTOLIC BLOOD PRESSURE: 117 MMHG

## 2018-07-16 VITALS — DIASTOLIC BLOOD PRESSURE: 74 MMHG | SYSTOLIC BLOOD PRESSURE: 132 MMHG

## 2018-07-16 VITALS — DIASTOLIC BLOOD PRESSURE: 66 MMHG | SYSTOLIC BLOOD PRESSURE: 116 MMHG

## 2018-07-16 VITALS — SYSTOLIC BLOOD PRESSURE: 105 MMHG | DIASTOLIC BLOOD PRESSURE: 57 MMHG

## 2018-07-16 VITALS — SYSTOLIC BLOOD PRESSURE: 140 MMHG | DIASTOLIC BLOOD PRESSURE: 87 MMHG

## 2018-07-16 VITALS — DIASTOLIC BLOOD PRESSURE: 74 MMHG | SYSTOLIC BLOOD PRESSURE: 124 MMHG

## 2018-07-16 VITALS — SYSTOLIC BLOOD PRESSURE: 122 MMHG | DIASTOLIC BLOOD PRESSURE: 77 MMHG

## 2018-07-16 VITALS — SYSTOLIC BLOOD PRESSURE: 127 MMHG | DIASTOLIC BLOOD PRESSURE: 81 MMHG

## 2018-07-16 VITALS — DIASTOLIC BLOOD PRESSURE: 81 MMHG | SYSTOLIC BLOOD PRESSURE: 125 MMHG

## 2018-07-16 VITALS — DIASTOLIC BLOOD PRESSURE: 80 MMHG | SYSTOLIC BLOOD PRESSURE: 153 MMHG

## 2018-07-16 VITALS — DIASTOLIC BLOOD PRESSURE: 81 MMHG | SYSTOLIC BLOOD PRESSURE: 120 MMHG

## 2018-07-16 VITALS — DIASTOLIC BLOOD PRESSURE: 79 MMHG | SYSTOLIC BLOOD PRESSURE: 129 MMHG

## 2018-07-16 VITALS — DIASTOLIC BLOOD PRESSURE: 78 MMHG | SYSTOLIC BLOOD PRESSURE: 120 MMHG

## 2018-07-16 VITALS — SYSTOLIC BLOOD PRESSURE: 133 MMHG | DIASTOLIC BLOOD PRESSURE: 79 MMHG

## 2018-07-16 VITALS — SYSTOLIC BLOOD PRESSURE: 158 MMHG | DIASTOLIC BLOOD PRESSURE: 61 MMHG

## 2018-07-16 VITALS — DIASTOLIC BLOOD PRESSURE: 82 MMHG | SYSTOLIC BLOOD PRESSURE: 143 MMHG

## 2018-07-16 VITALS — DIASTOLIC BLOOD PRESSURE: 98 MMHG | SYSTOLIC BLOOD PRESSURE: 120 MMHG

## 2018-07-16 VITALS — DIASTOLIC BLOOD PRESSURE: 72 MMHG | SYSTOLIC BLOOD PRESSURE: 113 MMHG

## 2018-07-16 VITALS — SYSTOLIC BLOOD PRESSURE: 54 MMHG | DIASTOLIC BLOOD PRESSURE: 36 MMHG

## 2018-07-16 VITALS — SYSTOLIC BLOOD PRESSURE: 131 MMHG | DIASTOLIC BLOOD PRESSURE: 83 MMHG

## 2018-07-16 VITALS — DIASTOLIC BLOOD PRESSURE: 76 MMHG | SYSTOLIC BLOOD PRESSURE: 115 MMHG

## 2018-07-16 VITALS — SYSTOLIC BLOOD PRESSURE: 127 MMHG | DIASTOLIC BLOOD PRESSURE: 94 MMHG

## 2018-07-16 VITALS — SYSTOLIC BLOOD PRESSURE: 115 MMHG | DIASTOLIC BLOOD PRESSURE: 69 MMHG

## 2018-07-16 VITALS — DIASTOLIC BLOOD PRESSURE: 73 MMHG | SYSTOLIC BLOOD PRESSURE: 125 MMHG

## 2018-07-16 VITALS — DIASTOLIC BLOOD PRESSURE: 80 MMHG | SYSTOLIC BLOOD PRESSURE: 122 MMHG

## 2018-07-16 VITALS — SYSTOLIC BLOOD PRESSURE: 129 MMHG | DIASTOLIC BLOOD PRESSURE: 73 MMHG

## 2018-07-16 VITALS — SYSTOLIC BLOOD PRESSURE: 124 MMHG | DIASTOLIC BLOOD PRESSURE: 74 MMHG

## 2018-07-16 VITALS — DIASTOLIC BLOOD PRESSURE: 91 MMHG | SYSTOLIC BLOOD PRESSURE: 129 MMHG

## 2018-07-16 VITALS — DIASTOLIC BLOOD PRESSURE: 80 MMHG | SYSTOLIC BLOOD PRESSURE: 126 MMHG

## 2018-07-16 VITALS — SYSTOLIC BLOOD PRESSURE: 101 MMHG | DIASTOLIC BLOOD PRESSURE: 63 MMHG

## 2018-07-16 VITALS — SYSTOLIC BLOOD PRESSURE: 129 MMHG | DIASTOLIC BLOOD PRESSURE: 91 MMHG

## 2018-07-16 VITALS — SYSTOLIC BLOOD PRESSURE: 124 MMHG | DIASTOLIC BLOOD PRESSURE: 76 MMHG

## 2018-07-16 VITALS — DIASTOLIC BLOOD PRESSURE: 77 MMHG | SYSTOLIC BLOOD PRESSURE: 115 MMHG

## 2018-07-16 VITALS — SYSTOLIC BLOOD PRESSURE: 104 MMHG | DIASTOLIC BLOOD PRESSURE: 65 MMHG

## 2018-07-16 VITALS — DIASTOLIC BLOOD PRESSURE: 99 MMHG | SYSTOLIC BLOOD PRESSURE: 141 MMHG

## 2018-07-16 VITALS — SYSTOLIC BLOOD PRESSURE: 136 MMHG | DIASTOLIC BLOOD PRESSURE: 104 MMHG

## 2018-07-16 VITALS — SYSTOLIC BLOOD PRESSURE: 141 MMHG | DIASTOLIC BLOOD PRESSURE: 84 MMHG

## 2018-07-16 VITALS — DIASTOLIC BLOOD PRESSURE: 84 MMHG | SYSTOLIC BLOOD PRESSURE: 133 MMHG

## 2018-07-16 VITALS — DIASTOLIC BLOOD PRESSURE: 80 MMHG | SYSTOLIC BLOOD PRESSURE: 128 MMHG

## 2018-07-16 VITALS — SYSTOLIC BLOOD PRESSURE: 119 MMHG | DIASTOLIC BLOOD PRESSURE: 80 MMHG

## 2018-07-16 VITALS — DIASTOLIC BLOOD PRESSURE: 77 MMHG | SYSTOLIC BLOOD PRESSURE: 122 MMHG

## 2018-07-16 VITALS — SYSTOLIC BLOOD PRESSURE: 125 MMHG | DIASTOLIC BLOOD PRESSURE: 80 MMHG

## 2018-07-16 VITALS — DIASTOLIC BLOOD PRESSURE: 71 MMHG | SYSTOLIC BLOOD PRESSURE: 119 MMHG

## 2018-07-16 VITALS — DIASTOLIC BLOOD PRESSURE: 86 MMHG | SYSTOLIC BLOOD PRESSURE: 137 MMHG

## 2018-07-16 VITALS — DIASTOLIC BLOOD PRESSURE: 85 MMHG | SYSTOLIC BLOOD PRESSURE: 114 MMHG

## 2018-07-16 VITALS — SYSTOLIC BLOOD PRESSURE: 146 MMHG | DIASTOLIC BLOOD PRESSURE: 87 MMHG

## 2018-07-16 VITALS — SYSTOLIC BLOOD PRESSURE: 135 MMHG | DIASTOLIC BLOOD PRESSURE: 87 MMHG

## 2018-07-16 VITALS — SYSTOLIC BLOOD PRESSURE: 142 MMHG | DIASTOLIC BLOOD PRESSURE: 88 MMHG

## 2018-07-16 VITALS — DIASTOLIC BLOOD PRESSURE: 75 MMHG | SYSTOLIC BLOOD PRESSURE: 122 MMHG

## 2018-07-16 VITALS — DIASTOLIC BLOOD PRESSURE: 108 MMHG | SYSTOLIC BLOOD PRESSURE: 162 MMHG

## 2018-07-16 VITALS — SYSTOLIC BLOOD PRESSURE: 117 MMHG | DIASTOLIC BLOOD PRESSURE: 77 MMHG

## 2018-07-16 VITALS — SYSTOLIC BLOOD PRESSURE: 132 MMHG | DIASTOLIC BLOOD PRESSURE: 85 MMHG

## 2018-07-16 VITALS — SYSTOLIC BLOOD PRESSURE: 135 MMHG | DIASTOLIC BLOOD PRESSURE: 85 MMHG

## 2018-07-16 VITALS — DIASTOLIC BLOOD PRESSURE: 68 MMHG | SYSTOLIC BLOOD PRESSURE: 123 MMHG

## 2018-07-16 VITALS — DIASTOLIC BLOOD PRESSURE: 75 MMHG | SYSTOLIC BLOOD PRESSURE: 118 MMHG

## 2018-07-16 VITALS — SYSTOLIC BLOOD PRESSURE: 120 MMHG | DIASTOLIC BLOOD PRESSURE: 77 MMHG

## 2018-07-16 VITALS — DIASTOLIC BLOOD PRESSURE: 66 MMHG | SYSTOLIC BLOOD PRESSURE: 105 MMHG

## 2018-07-16 VITALS — DIASTOLIC BLOOD PRESSURE: 83 MMHG | SYSTOLIC BLOOD PRESSURE: 126 MMHG

## 2018-07-16 VITALS — SYSTOLIC BLOOD PRESSURE: 126 MMHG | DIASTOLIC BLOOD PRESSURE: 96 MMHG

## 2018-07-16 VITALS — SYSTOLIC BLOOD PRESSURE: 141 MMHG | DIASTOLIC BLOOD PRESSURE: 61 MMHG

## 2018-07-16 VITALS — SYSTOLIC BLOOD PRESSURE: 137 MMHG | DIASTOLIC BLOOD PRESSURE: 86 MMHG

## 2018-07-16 VITALS — DIASTOLIC BLOOD PRESSURE: 89 MMHG | SYSTOLIC BLOOD PRESSURE: 124 MMHG

## 2018-07-16 VITALS — SYSTOLIC BLOOD PRESSURE: 148 MMHG | DIASTOLIC BLOOD PRESSURE: 93 MMHG

## 2018-07-16 VITALS — DIASTOLIC BLOOD PRESSURE: 63 MMHG | SYSTOLIC BLOOD PRESSURE: 108 MMHG

## 2018-07-16 VITALS — DIASTOLIC BLOOD PRESSURE: 78 MMHG | SYSTOLIC BLOOD PRESSURE: 125 MMHG

## 2018-07-16 VITALS — DIASTOLIC BLOOD PRESSURE: 80 MMHG | SYSTOLIC BLOOD PRESSURE: 117 MMHG

## 2018-07-16 VITALS — SYSTOLIC BLOOD PRESSURE: 118 MMHG | DIASTOLIC BLOOD PRESSURE: 79 MMHG

## 2018-07-16 VITALS — DIASTOLIC BLOOD PRESSURE: 80 MMHG | SYSTOLIC BLOOD PRESSURE: 127 MMHG

## 2018-07-16 LAB
CHLORIDE SERPL-SCNC: 99 MEQ/L (ref 98–107)
EOSINOPHIL NFR BLD MANUAL: 1 % (ref 0–5)
ERYTHROCYTE [DISTWIDTH] IN BLOOD BY AUTOMATED COUNT: 13.5 % (ref 11.6–14.6)
HCT VFR BLD AUTO: 32.9 % (ref 42–52)
HGB BLD-MCNC: 11.5 G/DL (ref 14–18)
LYMPHOCYTES NFR BLD MANUAL: 7 % (ref 20–50)
MCH RBC QN AUTO: 29.7 PG (ref 28–32)
MCV RBC AUTO: 85 FL (ref 80–94)
MONOCYTES NFR BLD MANUAL: 12 % (ref 2–8)
NEUTS BAND NFR BLD MANUAL: 1 % (ref 1–6)
NEUTS SEG NFR BLD MANUAL: 79 % (ref 45–75)
PLATELET # BLD AUTO: 293 X1000/UL (ref 130–400)
PLATELET # BLD EST: NORMAL 10*3/UL
RBC # BLD AUTO: 3.88 MILL/UL (ref 4.7–6.1)

## 2018-07-16 RX ADMIN — LACTULOSE PRN ML: 10 SOLUTION ORAL at 19:25

## 2018-07-16 RX ADMIN — HYDRALAZINE HYDROCHLORIDE SCH MG: 25 TABLET, FILM COATED ORAL at 14:00

## 2018-07-16 RX ADMIN — METOPROLOL TARTRATE SCH MG: 25 TABLET ORAL at 21:08

## 2018-07-16 RX ADMIN — LEVETIRACETAM SCH MG: 500 TABLET, FILM COATED ORAL at 09:00

## 2018-07-16 RX ADMIN — METOPROLOL TARTRATE SCH MG: 25 TABLET ORAL at 09:00

## 2018-07-16 RX ADMIN — LOSARTAN POTASSIUM SCH MG: 25 TABLET ORAL at 08:48

## 2018-07-16 RX ADMIN — POTASSIUM CHLORIDE SCH MLS/HR: 2 INJECTION, SOLUTION, CONCENTRATE INTRAVENOUS at 09:30

## 2018-07-16 RX ADMIN — LEVETIRACETAM SCH MG: 500 TABLET, FILM COATED ORAL at 21:08

## 2018-07-16 RX ADMIN — NIFEDIPINE SCH MG: 60 TABLET, FILM COATED, EXTENDED RELEASE ORAL at 09:45

## 2018-07-16 RX ADMIN — NICOTINE SCH MG: 21 PATCH, EXTENDED RELEASE TRANSDERMAL at 08:48

## 2018-07-16 RX ADMIN — HYDRALAZINE HYDROCHLORIDE SCH MG: 25 TABLET, FILM COATED ORAL at 22:50

## 2018-07-16 RX ADMIN — HYDRALAZINE HYDROCHLORIDE SCH MG: 25 TABLET, FILM COATED ORAL at 05:48

## 2018-07-16 RX ADMIN — PANTOPRAZOLE SODIUM SCH MG: 40 INJECTION, POWDER, FOR SOLUTION INTRAVENOUS at 08:48

## 2018-07-16 RX ADMIN — DOCUSATE SODIUM PRN MG: 250 CAPSULE, LIQUID FILLED ORAL at 08:48

## 2018-07-17 VITALS — DIASTOLIC BLOOD PRESSURE: 65 MMHG | SYSTOLIC BLOOD PRESSURE: 124 MMHG

## 2018-07-17 VITALS — DIASTOLIC BLOOD PRESSURE: 68 MMHG | SYSTOLIC BLOOD PRESSURE: 115 MMHG

## 2018-07-17 VITALS — SYSTOLIC BLOOD PRESSURE: 87 MMHG | DIASTOLIC BLOOD PRESSURE: 59 MMHG

## 2018-07-17 VITALS — DIASTOLIC BLOOD PRESSURE: 70 MMHG | SYSTOLIC BLOOD PRESSURE: 96 MMHG

## 2018-07-17 VITALS — DIASTOLIC BLOOD PRESSURE: 65 MMHG | SYSTOLIC BLOOD PRESSURE: 109 MMHG

## 2018-07-17 VITALS — SYSTOLIC BLOOD PRESSURE: 123 MMHG | DIASTOLIC BLOOD PRESSURE: 82 MMHG

## 2018-07-17 VITALS — SYSTOLIC BLOOD PRESSURE: 121 MMHG | DIASTOLIC BLOOD PRESSURE: 80 MMHG

## 2018-07-17 VITALS — SYSTOLIC BLOOD PRESSURE: 100 MMHG | DIASTOLIC BLOOD PRESSURE: 67 MMHG

## 2018-07-17 VITALS — SYSTOLIC BLOOD PRESSURE: 82 MMHG | DIASTOLIC BLOOD PRESSURE: 51 MMHG

## 2018-07-17 VITALS — DIASTOLIC BLOOD PRESSURE: 94 MMHG | SYSTOLIC BLOOD PRESSURE: 139 MMHG

## 2018-07-17 VITALS — SYSTOLIC BLOOD PRESSURE: 102 MMHG | DIASTOLIC BLOOD PRESSURE: 66 MMHG

## 2018-07-17 VITALS — SYSTOLIC BLOOD PRESSURE: 100 MMHG | DIASTOLIC BLOOD PRESSURE: 77 MMHG

## 2018-07-17 VITALS — SYSTOLIC BLOOD PRESSURE: 94 MMHG | DIASTOLIC BLOOD PRESSURE: 61 MMHG

## 2018-07-17 VITALS — DIASTOLIC BLOOD PRESSURE: 84 MMHG | SYSTOLIC BLOOD PRESSURE: 131 MMHG

## 2018-07-17 VITALS — DIASTOLIC BLOOD PRESSURE: 84 MMHG | SYSTOLIC BLOOD PRESSURE: 140 MMHG

## 2018-07-17 VITALS — DIASTOLIC BLOOD PRESSURE: 81 MMHG | SYSTOLIC BLOOD PRESSURE: 137 MMHG

## 2018-07-17 VITALS — SYSTOLIC BLOOD PRESSURE: 119 MMHG | DIASTOLIC BLOOD PRESSURE: 80 MMHG

## 2018-07-17 VITALS — SYSTOLIC BLOOD PRESSURE: 88 MMHG | DIASTOLIC BLOOD PRESSURE: 57 MMHG

## 2018-07-17 VITALS — DIASTOLIC BLOOD PRESSURE: 100 MMHG | SYSTOLIC BLOOD PRESSURE: 122 MMHG

## 2018-07-17 VITALS — DIASTOLIC BLOOD PRESSURE: 63 MMHG | SYSTOLIC BLOOD PRESSURE: 124 MMHG

## 2018-07-17 VITALS — SYSTOLIC BLOOD PRESSURE: 125 MMHG | DIASTOLIC BLOOD PRESSURE: 96 MMHG

## 2018-07-17 VITALS — SYSTOLIC BLOOD PRESSURE: 132 MMHG | DIASTOLIC BLOOD PRESSURE: 70 MMHG

## 2018-07-17 VITALS — SYSTOLIC BLOOD PRESSURE: 96 MMHG | DIASTOLIC BLOOD PRESSURE: 60 MMHG

## 2018-07-17 VITALS — DIASTOLIC BLOOD PRESSURE: 63 MMHG | SYSTOLIC BLOOD PRESSURE: 96 MMHG

## 2018-07-17 VITALS — SYSTOLIC BLOOD PRESSURE: 107 MMHG | DIASTOLIC BLOOD PRESSURE: 68 MMHG

## 2018-07-17 VITALS — DIASTOLIC BLOOD PRESSURE: 66 MMHG | SYSTOLIC BLOOD PRESSURE: 102 MMHG

## 2018-07-17 VITALS — DIASTOLIC BLOOD PRESSURE: 79 MMHG | SYSTOLIC BLOOD PRESSURE: 118 MMHG

## 2018-07-17 VITALS — DIASTOLIC BLOOD PRESSURE: 96 MMHG | SYSTOLIC BLOOD PRESSURE: 124 MMHG

## 2018-07-17 VITALS — DIASTOLIC BLOOD PRESSURE: 86 MMHG | SYSTOLIC BLOOD PRESSURE: 144 MMHG

## 2018-07-17 VITALS — DIASTOLIC BLOOD PRESSURE: 84 MMHG | SYSTOLIC BLOOD PRESSURE: 122 MMHG

## 2018-07-17 VITALS — DIASTOLIC BLOOD PRESSURE: 79 MMHG | SYSTOLIC BLOOD PRESSURE: 138 MMHG

## 2018-07-17 VITALS — SYSTOLIC BLOOD PRESSURE: 134 MMHG | DIASTOLIC BLOOD PRESSURE: 86 MMHG

## 2018-07-17 VITALS — DIASTOLIC BLOOD PRESSURE: 69 MMHG | SYSTOLIC BLOOD PRESSURE: 103 MMHG

## 2018-07-17 VITALS — SYSTOLIC BLOOD PRESSURE: 153 MMHG | DIASTOLIC BLOOD PRESSURE: 81 MMHG

## 2018-07-17 VITALS — DIASTOLIC BLOOD PRESSURE: 81 MMHG | SYSTOLIC BLOOD PRESSURE: 122 MMHG

## 2018-07-17 VITALS — SYSTOLIC BLOOD PRESSURE: 131 MMHG | DIASTOLIC BLOOD PRESSURE: 93 MMHG

## 2018-07-17 RX ADMIN — NIFEDIPINE SCH MG: 60 TABLET, FILM COATED, EXTENDED RELEASE ORAL at 08:33

## 2018-07-17 RX ADMIN — LOSARTAN POTASSIUM SCH MG: 25 TABLET ORAL at 08:33

## 2018-07-17 RX ADMIN — NICOTINE SCH MG: 21 PATCH, EXTENDED RELEASE TRANSDERMAL at 08:35

## 2018-07-17 RX ADMIN — HYDRALAZINE HYDROCHLORIDE SCH MG: 25 TABLET, FILM COATED ORAL at 06:29

## 2018-07-17 RX ADMIN — METOPROLOL TARTRATE SCH MG: 25 TABLET ORAL at 08:33

## 2018-07-17 RX ADMIN — METOPROLOL TARTRATE SCH MG: 25 TABLET ORAL at 21:00

## 2018-07-17 RX ADMIN — PANTOPRAZOLE SODIUM SCH MG: 40 INJECTION, POWDER, FOR SOLUTION INTRAVENOUS at 08:33

## 2018-07-17 RX ADMIN — LEVETIRACETAM SCH MG: 500 TABLET, FILM COATED ORAL at 21:44

## 2018-07-17 RX ADMIN — HYDRALAZINE HYDROCHLORIDE SCH MG: 25 TABLET, FILM COATED ORAL at 21:45

## 2018-07-17 RX ADMIN — LEVETIRACETAM SCH MG: 500 TABLET, FILM COATED ORAL at 08:33

## 2018-07-17 RX ADMIN — HYDRALAZINE HYDROCHLORIDE SCH MG: 25 TABLET, FILM COATED ORAL at 14:00

## 2018-07-18 VITALS — SYSTOLIC BLOOD PRESSURE: 116 MMHG | DIASTOLIC BLOOD PRESSURE: 53 MMHG

## 2018-07-18 VITALS — SYSTOLIC BLOOD PRESSURE: 99 MMHG | DIASTOLIC BLOOD PRESSURE: 51 MMHG

## 2018-07-18 VITALS — DIASTOLIC BLOOD PRESSURE: 68 MMHG | SYSTOLIC BLOOD PRESSURE: 105 MMHG

## 2018-07-18 VITALS — SYSTOLIC BLOOD PRESSURE: 101 MMHG | DIASTOLIC BLOOD PRESSURE: 59 MMHG

## 2018-07-18 VITALS — SYSTOLIC BLOOD PRESSURE: 122 MMHG | DIASTOLIC BLOOD PRESSURE: 77 MMHG

## 2018-07-18 VITALS — SYSTOLIC BLOOD PRESSURE: 94 MMHG | DIASTOLIC BLOOD PRESSURE: 58 MMHG

## 2018-07-18 RX ADMIN — HYDRALAZINE HYDROCHLORIDE SCH MG: 25 TABLET, FILM COATED ORAL at 22:12

## 2018-07-18 RX ADMIN — NICOTINE SCH MG: 21 PATCH, EXTENDED RELEASE TRANSDERMAL at 09:23

## 2018-07-18 RX ADMIN — HYDRALAZINE HYDROCHLORIDE SCH MG: 25 TABLET, FILM COATED ORAL at 13:40

## 2018-07-18 RX ADMIN — PANTOPRAZOLE SODIUM SCH MG: 40 INJECTION, POWDER, FOR SOLUTION INTRAVENOUS at 09:23

## 2018-07-18 RX ADMIN — LOSARTAN POTASSIUM SCH MG: 25 TABLET ORAL at 08:56

## 2018-07-18 RX ADMIN — METOPROLOL TARTRATE SCH MG: 25 TABLET ORAL at 08:56

## 2018-07-18 RX ADMIN — METOPROLOL TARTRATE SCH MG: 25 TABLET ORAL at 20:34

## 2018-07-18 RX ADMIN — LEVETIRACETAM SCH MG: 500 TABLET, FILM COATED ORAL at 20:34

## 2018-07-18 RX ADMIN — MORPHINE SULFATE PRN MG: 4 INJECTION, SOLUTION INTRAMUSCULAR; INTRAVENOUS at 19:43

## 2018-07-18 RX ADMIN — NIFEDIPINE SCH MG: 60 TABLET, FILM COATED, EXTENDED RELEASE ORAL at 08:55

## 2018-07-18 RX ADMIN — LEVETIRACETAM SCH MG: 500 TABLET, FILM COATED ORAL at 09:23

## 2018-07-18 RX ADMIN — HYDRALAZINE HYDROCHLORIDE SCH MG: 25 TABLET, FILM COATED ORAL at 06:00

## 2018-07-19 VITALS — SYSTOLIC BLOOD PRESSURE: 101 MMHG | DIASTOLIC BLOOD PRESSURE: 70 MMHG

## 2018-07-19 VITALS — SYSTOLIC BLOOD PRESSURE: 136 MMHG | DIASTOLIC BLOOD PRESSURE: 71 MMHG

## 2018-07-19 VITALS — DIASTOLIC BLOOD PRESSURE: 69 MMHG | SYSTOLIC BLOOD PRESSURE: 126 MMHG

## 2018-07-19 VITALS — SYSTOLIC BLOOD PRESSURE: 123 MMHG | DIASTOLIC BLOOD PRESSURE: 83 MMHG

## 2018-07-19 VITALS — DIASTOLIC BLOOD PRESSURE: 62 MMHG | SYSTOLIC BLOOD PRESSURE: 100 MMHG

## 2018-07-19 VITALS — SYSTOLIC BLOOD PRESSURE: 110 MMHG | DIASTOLIC BLOOD PRESSURE: 71 MMHG

## 2018-07-19 RX ADMIN — HYDRALAZINE HYDROCHLORIDE SCH MG: 25 TABLET, FILM COATED ORAL at 21:31

## 2018-07-19 RX ADMIN — METOPROLOL TARTRATE SCH MG: 25 TABLET ORAL at 10:00

## 2018-07-19 RX ADMIN — MORPHINE SULFATE PRN MG: 4 INJECTION, SOLUTION INTRAMUSCULAR; INTRAVENOUS at 01:41

## 2018-07-19 RX ADMIN — METOPROLOL TARTRATE SCH MG: 25 TABLET ORAL at 20:17

## 2018-07-19 RX ADMIN — LOSARTAN POTASSIUM SCH MG: 25 TABLET ORAL at 10:54

## 2018-07-19 RX ADMIN — LEVETIRACETAM SCH MG: 500 TABLET, FILM COATED ORAL at 10:00

## 2018-07-19 RX ADMIN — PANTOPRAZOLE SODIUM SCH MG: 40 INJECTION, POWDER, FOR SOLUTION INTRAVENOUS at 10:01

## 2018-07-19 RX ADMIN — HYDRALAZINE HYDROCHLORIDE SCH MG: 25 TABLET, FILM COATED ORAL at 14:00

## 2018-07-19 RX ADMIN — MORPHINE SULFATE PRN MG: 4 INJECTION, SOLUTION INTRAMUSCULAR; INTRAVENOUS at 06:08

## 2018-07-19 RX ADMIN — LEVETIRACETAM SCH MG: 500 TABLET, FILM COATED ORAL at 20:21

## 2018-07-19 RX ADMIN — HYDRALAZINE HYDROCHLORIDE SCH MG: 25 TABLET, FILM COATED ORAL at 06:08

## 2018-07-19 RX ADMIN — NIFEDIPINE SCH MG: 60 TABLET, FILM COATED, EXTENDED RELEASE ORAL at 10:01

## 2018-07-19 RX ADMIN — NICOTINE SCH MG: 21 PATCH, EXTENDED RELEASE TRANSDERMAL at 10:00

## 2018-07-20 VITALS — SYSTOLIC BLOOD PRESSURE: 119 MMHG | DIASTOLIC BLOOD PRESSURE: 76 MMHG

## 2018-07-20 VITALS — SYSTOLIC BLOOD PRESSURE: 102 MMHG | DIASTOLIC BLOOD PRESSURE: 62 MMHG

## 2018-07-20 VITALS — SYSTOLIC BLOOD PRESSURE: 125 MMHG | DIASTOLIC BLOOD PRESSURE: 78 MMHG

## 2018-07-20 VITALS — SYSTOLIC BLOOD PRESSURE: 93 MMHG | DIASTOLIC BLOOD PRESSURE: 60 MMHG

## 2018-07-20 VITALS — DIASTOLIC BLOOD PRESSURE: 60 MMHG | SYSTOLIC BLOOD PRESSURE: 118 MMHG

## 2018-07-20 VITALS — SYSTOLIC BLOOD PRESSURE: 106 MMHG | DIASTOLIC BLOOD PRESSURE: 68 MMHG

## 2018-07-20 RX ADMIN — METOPROLOL TARTRATE SCH MG: 25 TABLET ORAL at 08:17

## 2018-07-20 RX ADMIN — HYDRALAZINE HYDROCHLORIDE SCH MG: 25 TABLET, FILM COATED ORAL at 06:51

## 2018-07-20 RX ADMIN — METOPROLOL TARTRATE SCH MG: 25 TABLET ORAL at 21:00

## 2018-07-20 RX ADMIN — HYDRALAZINE HYDROCHLORIDE SCH MG: 25 TABLET, FILM COATED ORAL at 13:09

## 2018-07-20 RX ADMIN — LEVETIRACETAM SCH MG: 500 TABLET, FILM COATED ORAL at 21:34

## 2018-07-20 RX ADMIN — NIFEDIPINE SCH MG: 60 TABLET, FILM COATED, EXTENDED RELEASE ORAL at 08:18

## 2018-07-20 RX ADMIN — NICOTINE SCH MG: 21 PATCH, EXTENDED RELEASE TRANSDERMAL at 08:17

## 2018-07-20 RX ADMIN — PANTOPRAZOLE SODIUM SCH MG: 40 INJECTION, POWDER, FOR SOLUTION INTRAVENOUS at 08:17

## 2018-07-20 RX ADMIN — LEVETIRACETAM SCH MG: 500 TABLET, FILM COATED ORAL at 08:18

## 2018-07-20 RX ADMIN — HYDRALAZINE HYDROCHLORIDE SCH MG: 25 TABLET, FILM COATED ORAL at 21:35

## 2018-07-20 RX ADMIN — LOSARTAN POTASSIUM SCH MG: 25 TABLET ORAL at 08:17

## 2018-07-21 VITALS — SYSTOLIC BLOOD PRESSURE: 106 MMHG | DIASTOLIC BLOOD PRESSURE: 64 MMHG

## 2018-07-21 VITALS — SYSTOLIC BLOOD PRESSURE: 108 MMHG | DIASTOLIC BLOOD PRESSURE: 72 MMHG

## 2018-07-21 VITALS — SYSTOLIC BLOOD PRESSURE: 102 MMHG | DIASTOLIC BLOOD PRESSURE: 65 MMHG

## 2018-07-21 VITALS — SYSTOLIC BLOOD PRESSURE: 100 MMHG | DIASTOLIC BLOOD PRESSURE: 71 MMHG

## 2018-07-21 VITALS — SYSTOLIC BLOOD PRESSURE: 112 MMHG | DIASTOLIC BLOOD PRESSURE: 71 MMHG

## 2018-07-21 VITALS — SYSTOLIC BLOOD PRESSURE: 106 MMHG | DIASTOLIC BLOOD PRESSURE: 71 MMHG

## 2018-07-21 RX ADMIN — NICOTINE SCH MG: 21 PATCH, EXTENDED RELEASE TRANSDERMAL at 08:10

## 2018-07-21 RX ADMIN — LEVETIRACETAM SCH MG: 500 TABLET, FILM COATED ORAL at 08:10

## 2018-07-21 RX ADMIN — METOPROLOL TARTRATE SCH MG: 25 TABLET ORAL at 08:11

## 2018-07-21 RX ADMIN — METOPROLOL TARTRATE SCH MG: 25 TABLET ORAL at 21:08

## 2018-07-21 RX ADMIN — HYDRALAZINE HYDROCHLORIDE SCH MG: 25 TABLET, FILM COATED ORAL at 06:00

## 2018-07-21 RX ADMIN — HYDRALAZINE HYDROCHLORIDE SCH MG: 25 TABLET, FILM COATED ORAL at 14:00

## 2018-07-21 RX ADMIN — LEVETIRACETAM SCH MG: 500 TABLET, FILM COATED ORAL at 21:07

## 2018-07-21 RX ADMIN — HYDRALAZINE HYDROCHLORIDE SCH MG: 25 TABLET, FILM COATED ORAL at 21:07

## 2018-07-21 RX ADMIN — NIFEDIPINE SCH MG: 60 TABLET, FILM COATED, EXTENDED RELEASE ORAL at 08:11

## 2018-07-21 RX ADMIN — HYDROCODONE BITARTRATE AND ACETAMINOPHEN PRN TAB: 5; 325 TABLET ORAL at 17:05

## 2018-07-21 RX ADMIN — PANTOPRAZOLE SODIUM SCH MG: 40 INJECTION, POWDER, FOR SOLUTION INTRAVENOUS at 08:09

## 2018-07-21 RX ADMIN — LOSARTAN POTASSIUM SCH MG: 25 TABLET ORAL at 08:11

## 2018-07-22 VITALS — SYSTOLIC BLOOD PRESSURE: 117 MMHG | DIASTOLIC BLOOD PRESSURE: 81 MMHG

## 2018-07-22 VITALS — DIASTOLIC BLOOD PRESSURE: 80 MMHG | SYSTOLIC BLOOD PRESSURE: 135 MMHG

## 2018-07-22 VITALS — SYSTOLIC BLOOD PRESSURE: 119 MMHG | DIASTOLIC BLOOD PRESSURE: 77 MMHG

## 2018-07-22 VITALS — SYSTOLIC BLOOD PRESSURE: 121 MMHG | DIASTOLIC BLOOD PRESSURE: 79 MMHG

## 2018-07-22 VITALS — DIASTOLIC BLOOD PRESSURE: 71 MMHG | SYSTOLIC BLOOD PRESSURE: 116 MMHG

## 2018-07-22 VITALS — DIASTOLIC BLOOD PRESSURE: 73 MMHG | SYSTOLIC BLOOD PRESSURE: 117 MMHG

## 2018-07-22 RX ADMIN — METOPROLOL TARTRATE SCH MG: 25 TABLET ORAL at 22:07

## 2018-07-22 RX ADMIN — PANTOPRAZOLE SODIUM SCH MG: 40 INJECTION, POWDER, FOR SOLUTION INTRAVENOUS at 08:41

## 2018-07-22 RX ADMIN — HYDRALAZINE HYDROCHLORIDE SCH MG: 25 TABLET, FILM COATED ORAL at 15:43

## 2018-07-22 RX ADMIN — NIFEDIPINE SCH MG: 60 TABLET, FILM COATED, EXTENDED RELEASE ORAL at 09:00

## 2018-07-22 RX ADMIN — HYDROCODONE BITARTRATE AND ACETAMINOPHEN PRN TAB: 5; 325 TABLET ORAL at 00:43

## 2018-07-22 RX ADMIN — NICOTINE SCH MG: 21 PATCH, EXTENDED RELEASE TRANSDERMAL at 08:41

## 2018-07-22 RX ADMIN — HYDRALAZINE HYDROCHLORIDE SCH MG: 25 TABLET, FILM COATED ORAL at 05:45

## 2018-07-22 RX ADMIN — HYDROCODONE BITARTRATE AND ACETAMINOPHEN PRN TAB: 5; 325 TABLET ORAL at 06:45

## 2018-07-22 RX ADMIN — HYDROCODONE BITARTRATE AND ACETAMINOPHEN PRN TAB: 5; 325 TABLET ORAL at 10:58

## 2018-07-22 RX ADMIN — HYDROCODONE BITARTRATE AND ACETAMINOPHEN PRN TAB: 5; 325 TABLET ORAL at 17:18

## 2018-07-22 RX ADMIN — LEVETIRACETAM SCH MG: 500 TABLET, FILM COATED ORAL at 22:06

## 2018-07-22 RX ADMIN — METOPROLOL TARTRATE SCH MG: 25 TABLET ORAL at 09:00

## 2018-07-22 RX ADMIN — LOSARTAN POTASSIUM SCH MG: 25 TABLET ORAL at 09:00

## 2018-07-22 RX ADMIN — HYDRALAZINE HYDROCHLORIDE SCH MG: 25 TABLET, FILM COATED ORAL at 22:06

## 2018-07-22 RX ADMIN — LEVETIRACETAM SCH MG: 500 TABLET, FILM COATED ORAL at 08:41

## 2018-07-23 VITALS — DIASTOLIC BLOOD PRESSURE: 78 MMHG | SYSTOLIC BLOOD PRESSURE: 125 MMHG

## 2018-07-23 VITALS — SYSTOLIC BLOOD PRESSURE: 126 MMHG | DIASTOLIC BLOOD PRESSURE: 85 MMHG

## 2018-07-23 VITALS — SYSTOLIC BLOOD PRESSURE: 93 MMHG | DIASTOLIC BLOOD PRESSURE: 59 MMHG

## 2018-07-23 VITALS — SYSTOLIC BLOOD PRESSURE: 97 MMHG | DIASTOLIC BLOOD PRESSURE: 59 MMHG

## 2018-07-23 VITALS — DIASTOLIC BLOOD PRESSURE: 82 MMHG | SYSTOLIC BLOOD PRESSURE: 134 MMHG

## 2018-07-23 VITALS — DIASTOLIC BLOOD PRESSURE: 73 MMHG | SYSTOLIC BLOOD PRESSURE: 115 MMHG

## 2018-07-23 RX ADMIN — HYDROCODONE BITARTRATE AND ACETAMINOPHEN PRN TAB: 5; 325 TABLET ORAL at 13:07

## 2018-07-23 RX ADMIN — METOPROLOL TARTRATE SCH MG: 25 TABLET ORAL at 09:19

## 2018-07-23 RX ADMIN — NICOTINE SCH MG: 21 PATCH, EXTENDED RELEASE TRANSDERMAL at 09:20

## 2018-07-23 RX ADMIN — HYDRALAZINE HYDROCHLORIDE SCH MG: 25 TABLET, FILM COATED ORAL at 13:07

## 2018-07-23 RX ADMIN — LEVETIRACETAM SCH MG: 500 TABLET, FILM COATED ORAL at 21:18

## 2018-07-23 RX ADMIN — LOSARTAN POTASSIUM SCH MG: 25 TABLET ORAL at 09:20

## 2018-07-23 RX ADMIN — METOPROLOL TARTRATE SCH MG: 25 TABLET ORAL at 21:00

## 2018-07-23 RX ADMIN — NIFEDIPINE SCH MG: 60 TABLET, FILM COATED, EXTENDED RELEASE ORAL at 09:20

## 2018-07-23 RX ADMIN — PANTOPRAZOLE SODIUM SCH MG: 40 INJECTION, POWDER, FOR SOLUTION INTRAVENOUS at 09:19

## 2018-07-23 RX ADMIN — HYDRALAZINE HYDROCHLORIDE SCH MG: 25 TABLET, FILM COATED ORAL at 06:30

## 2018-07-23 RX ADMIN — HYDROCODONE BITARTRATE AND ACETAMINOPHEN PRN TAB: 5; 325 TABLET ORAL at 06:26

## 2018-07-23 RX ADMIN — HYDRALAZINE HYDROCHLORIDE SCH MG: 25 TABLET, FILM COATED ORAL at 21:19

## 2018-07-23 RX ADMIN — HYDROCODONE BITARTRATE AND ACETAMINOPHEN PRN TAB: 5; 325 TABLET ORAL at 21:18

## 2018-07-23 RX ADMIN — LEVETIRACETAM SCH MG: 500 TABLET, FILM COATED ORAL at 09:19

## 2018-07-24 VITALS — DIASTOLIC BLOOD PRESSURE: 78 MMHG | SYSTOLIC BLOOD PRESSURE: 119 MMHG

## 2018-07-24 VITALS — SYSTOLIC BLOOD PRESSURE: 108 MMHG | DIASTOLIC BLOOD PRESSURE: 70 MMHG

## 2018-07-24 VITALS — DIASTOLIC BLOOD PRESSURE: 60 MMHG | SYSTOLIC BLOOD PRESSURE: 103 MMHG

## 2018-07-24 VITALS — SYSTOLIC BLOOD PRESSURE: 101 MMHG | DIASTOLIC BLOOD PRESSURE: 68 MMHG

## 2018-07-24 VITALS — DIASTOLIC BLOOD PRESSURE: 55 MMHG | SYSTOLIC BLOOD PRESSURE: 99 MMHG

## 2018-07-24 RX ADMIN — METOPROLOL TARTRATE SCH MG: 25 TABLET ORAL at 08:54

## 2018-07-24 RX ADMIN — PANTOPRAZOLE SODIUM SCH MG: 40 INJECTION, POWDER, FOR SOLUTION INTRAVENOUS at 08:43

## 2018-07-24 RX ADMIN — LOSARTAN POTASSIUM SCH MG: 25 TABLET ORAL at 08:43

## 2018-07-24 RX ADMIN — HYDRALAZINE HYDROCHLORIDE SCH MG: 25 TABLET, FILM COATED ORAL at 23:08

## 2018-07-24 RX ADMIN — HYDROCODONE BITARTRATE AND ACETAMINOPHEN PRN TAB: 5; 325 TABLET ORAL at 20:38

## 2018-07-24 RX ADMIN — HYDRALAZINE HYDROCHLORIDE SCH MG: 25 TABLET, FILM COATED ORAL at 14:00

## 2018-07-24 RX ADMIN — HYDRALAZINE HYDROCHLORIDE SCH MG: 25 TABLET, FILM COATED ORAL at 05:56

## 2018-07-24 RX ADMIN — NIFEDIPINE SCH MG: 60 TABLET, FILM COATED, EXTENDED RELEASE ORAL at 08:54

## 2018-07-24 RX ADMIN — METOPROLOL TARTRATE SCH MG: 25 TABLET ORAL at 20:39

## 2018-07-24 RX ADMIN — HYDROCODONE BITARTRATE AND ACETAMINOPHEN PRN TAB: 5; 325 TABLET ORAL at 12:44

## 2018-07-24 RX ADMIN — HYDROCODONE BITARTRATE AND ACETAMINOPHEN PRN TAB: 5; 325 TABLET ORAL at 05:53

## 2018-07-24 RX ADMIN — LEVETIRACETAM SCH MG: 500 TABLET, FILM COATED ORAL at 20:39

## 2018-07-24 RX ADMIN — NICOTINE SCH MG: 21 PATCH, EXTENDED RELEASE TRANSDERMAL at 08:44

## 2018-07-24 RX ADMIN — LEVETIRACETAM SCH MG: 500 TABLET, FILM COATED ORAL at 08:43

## 2018-07-25 VITALS — DIASTOLIC BLOOD PRESSURE: 82 MMHG | SYSTOLIC BLOOD PRESSURE: 131 MMHG

## 2018-07-25 VITALS — DIASTOLIC BLOOD PRESSURE: 70 MMHG | SYSTOLIC BLOOD PRESSURE: 105 MMHG

## 2018-07-25 VITALS — DIASTOLIC BLOOD PRESSURE: 61 MMHG | SYSTOLIC BLOOD PRESSURE: 105 MMHG

## 2018-07-25 VITALS — SYSTOLIC BLOOD PRESSURE: 104 MMHG | DIASTOLIC BLOOD PRESSURE: 68 MMHG

## 2018-07-25 VITALS — DIASTOLIC BLOOD PRESSURE: 61 MMHG | SYSTOLIC BLOOD PRESSURE: 102 MMHG

## 2018-07-25 VITALS — DIASTOLIC BLOOD PRESSURE: 80 MMHG | SYSTOLIC BLOOD PRESSURE: 122 MMHG

## 2018-07-25 RX ADMIN — HYDROCODONE BITARTRATE AND ACETAMINOPHEN PRN TAB: 5; 325 TABLET ORAL at 08:50

## 2018-07-25 RX ADMIN — NICOTINE SCH MG: 21 PATCH, EXTENDED RELEASE TRANSDERMAL at 08:49

## 2018-07-25 RX ADMIN — HYDRALAZINE HYDROCHLORIDE SCH MG: 25 TABLET, FILM COATED ORAL at 06:15

## 2018-07-25 RX ADMIN — LOSARTAN POTASSIUM SCH MG: 25 TABLET ORAL at 08:38

## 2018-07-25 RX ADMIN — PANTOPRAZOLE SODIUM SCH MG: 40 INJECTION, POWDER, FOR SOLUTION INTRAVENOUS at 09:00

## 2018-07-25 RX ADMIN — METOPROLOL TARTRATE SCH MG: 25 TABLET ORAL at 20:37

## 2018-07-25 RX ADMIN — HYDROCODONE BITARTRATE AND ACETAMINOPHEN PRN TAB: 5; 325 TABLET ORAL at 20:45

## 2018-07-25 RX ADMIN — LEVETIRACETAM SCH MG: 500 TABLET, FILM COATED ORAL at 20:36

## 2018-07-25 RX ADMIN — LEVETIRACETAM SCH MG: 500 TABLET, FILM COATED ORAL at 08:49

## 2018-07-25 RX ADMIN — HYDRALAZINE HYDROCHLORIDE SCH MG: 25 TABLET, FILM COATED ORAL at 14:00

## 2018-07-25 RX ADMIN — METOPROLOL TARTRATE SCH MG: 25 TABLET ORAL at 08:38

## 2018-07-25 RX ADMIN — HYDRALAZINE HYDROCHLORIDE SCH MG: 25 TABLET, FILM COATED ORAL at 22:31

## 2018-07-25 RX ADMIN — NIFEDIPINE SCH MG: 60 TABLET, FILM COATED, EXTENDED RELEASE ORAL at 08:39

## 2018-07-26 VITALS — DIASTOLIC BLOOD PRESSURE: 77 MMHG | SYSTOLIC BLOOD PRESSURE: 132 MMHG

## 2018-07-26 VITALS — DIASTOLIC BLOOD PRESSURE: 55 MMHG | SYSTOLIC BLOOD PRESSURE: 87 MMHG

## 2018-07-26 VITALS — DIASTOLIC BLOOD PRESSURE: 43 MMHG | SYSTOLIC BLOOD PRESSURE: 92 MMHG

## 2018-07-26 VITALS — SYSTOLIC BLOOD PRESSURE: 111 MMHG | DIASTOLIC BLOOD PRESSURE: 69 MMHG

## 2018-07-26 VITALS — DIASTOLIC BLOOD PRESSURE: 50 MMHG | SYSTOLIC BLOOD PRESSURE: 90 MMHG

## 2018-07-26 VITALS — DIASTOLIC BLOOD PRESSURE: 82 MMHG | SYSTOLIC BLOOD PRESSURE: 128 MMHG

## 2018-07-26 LAB
CHLORIDE SERPL-SCNC: 95 MEQ/L (ref 98–107)
ERYTHROCYTE [DISTWIDTH] IN BLOOD BY AUTOMATED COUNT: 14 % (ref 11.6–14.6)
HCT VFR BLD AUTO: 29.6 % (ref 42–52)
HGB BLD-MCNC: 10.2 G/DL (ref 14–18)
LYMPHOCYTES NFR BLD MANUAL: 4 % (ref 20–50)
MCH RBC QN AUTO: 29.4 PG (ref 28–32)
MCV RBC AUTO: 85.2 FL (ref 80–94)
MONOCYTES NFR BLD MANUAL: 4 % (ref 2–8)
NEUTS BAND NFR BLD MANUAL: 9 % (ref 1–6)
NEUTS SEG NFR BLD MANUAL: 83 % (ref 45–75)
PLATELET # BLD AUTO: 227 X1000/UL (ref 130–400)
PLATELET # BLD EST: NORMAL 10*3/UL
PMV BLD AUTO: 8.3 FL (ref 7.4–10.4)
RBC # BLD AUTO: 3.48 MILL/UL (ref 4.7–6.1)

## 2018-07-26 RX ADMIN — HYDRALAZINE HYDROCHLORIDE SCH MG: 25 TABLET, FILM COATED ORAL at 21:03

## 2018-07-26 RX ADMIN — LEVETIRACETAM SCH MG: 500 TABLET, FILM COATED ORAL at 20:43

## 2018-07-26 RX ADMIN — DEXTROSE SCH MLS/HR: 5 SOLUTION INTRAVENOUS at 11:45

## 2018-07-26 RX ADMIN — NICOTINE SCH MG: 21 PATCH, EXTENDED RELEASE TRANSDERMAL at 08:57

## 2018-07-26 RX ADMIN — METOPROLOL TARTRATE SCH MG: 25 TABLET ORAL at 08:56

## 2018-07-26 RX ADMIN — NIFEDIPINE SCH MG: 60 TABLET, FILM COATED, EXTENDED RELEASE ORAL at 08:56

## 2018-07-26 RX ADMIN — HYDRALAZINE HYDROCHLORIDE SCH MG: 25 TABLET, FILM COATED ORAL at 06:14

## 2018-07-26 RX ADMIN — LOSARTAN POTASSIUM SCH MG: 25 TABLET ORAL at 09:00

## 2018-07-26 RX ADMIN — PANTOPRAZOLE SODIUM SCH MG: 40 INJECTION, POWDER, FOR SOLUTION INTRAVENOUS at 09:03

## 2018-07-26 RX ADMIN — PANTOPRAZOLE SODIUM SCH MG: 40 INJECTION, POWDER, FOR SOLUTION INTRAVENOUS at 09:00

## 2018-07-26 RX ADMIN — PANTOPRAZOLE SODIUM SCH MG: 40 INJECTION, POWDER, FOR SOLUTION INTRAVENOUS at 08:55

## 2018-07-26 RX ADMIN — LOSARTAN POTASSIUM SCH MG: 25 TABLET ORAL at 08:56

## 2018-07-26 RX ADMIN — METOPROLOL TARTRATE SCH MG: 25 TABLET ORAL at 20:11

## 2018-07-26 RX ADMIN — HYDRALAZINE HYDROCHLORIDE SCH MG: 25 TABLET, FILM COATED ORAL at 14:00

## 2018-07-26 RX ADMIN — LEVETIRACETAM SCH MG: 500 TABLET, FILM COATED ORAL at 08:56

## 2018-07-27 VITALS — DIASTOLIC BLOOD PRESSURE: 66 MMHG | SYSTOLIC BLOOD PRESSURE: 108 MMHG

## 2018-07-27 VITALS — SYSTOLIC BLOOD PRESSURE: 91 MMHG | DIASTOLIC BLOOD PRESSURE: 58 MMHG

## 2018-07-27 VITALS — SYSTOLIC BLOOD PRESSURE: 106 MMHG | DIASTOLIC BLOOD PRESSURE: 68 MMHG

## 2018-07-27 VITALS — DIASTOLIC BLOOD PRESSURE: 60 MMHG | SYSTOLIC BLOOD PRESSURE: 98 MMHG

## 2018-07-27 VITALS — SYSTOLIC BLOOD PRESSURE: 95 MMHG | DIASTOLIC BLOOD PRESSURE: 57 MMHG

## 2018-07-27 VITALS — SYSTOLIC BLOOD PRESSURE: 97 MMHG | DIASTOLIC BLOOD PRESSURE: 61 MMHG

## 2018-07-27 VITALS — SYSTOLIC BLOOD PRESSURE: 101 MMHG | DIASTOLIC BLOOD PRESSURE: 67 MMHG

## 2018-07-27 VITALS — DIASTOLIC BLOOD PRESSURE: 55 MMHG | SYSTOLIC BLOOD PRESSURE: 96 MMHG

## 2018-07-27 VITALS — DIASTOLIC BLOOD PRESSURE: 67 MMHG | SYSTOLIC BLOOD PRESSURE: 100 MMHG

## 2018-07-27 LAB
APPEARANCE UR: (no result)
COLOR UR: (no result)
HGB UR QL STRIP: NEGATIVE
KETONES UR STRIP-MCNC: NEGATIVE MG/DL
LEUKOCYTE ESTERASE UR QL STRIP: (no result)
NITRITE UR QL STRIP: NEGATIVE
PH UR STRIP: 5 [PH] (ref 4.5–8)
PROT UR QL STRIP: (no result)
SP GR UR STRIP: 1.02 (ref 1–1.03)
UROBILINOGEN UR STRIP-MCNC: 1 E.U./DL (ref 0.2–1)

## 2018-07-27 PROCEDURE — B5181ZA FLUOROSCOPY OF SUPERIOR VENA CAVA USING LOW OSMOLAR CONTRAST, GUIDANCE: ICD-10-PCS | Performed by: INTERNAL MEDICINE

## 2018-07-27 PROCEDURE — B548ZZA ULTRASONOGRAPHY OF SUPERIOR VENA CAVA, GUIDANCE: ICD-10-PCS | Performed by: INTERNAL MEDICINE

## 2018-07-27 PROCEDURE — 02HV33Z INSERTION OF INFUSION DEVICE INTO SUPERIOR VENA CAVA, PERCUTANEOUS APPROACH: ICD-10-PCS | Performed by: INTERNAL MEDICINE

## 2018-07-27 RX ADMIN — NIFEDIPINE SCH MG: 60 TABLET, FILM COATED, EXTENDED RELEASE ORAL at 08:53

## 2018-07-27 RX ADMIN — HYDRALAZINE HYDROCHLORIDE SCH MG: 25 TABLET, FILM COATED ORAL at 20:46

## 2018-07-27 RX ADMIN — METOPROLOL TARTRATE SCH MG: 25 TABLET ORAL at 20:45

## 2018-07-27 RX ADMIN — PANTOPRAZOLE SODIUM SCH MG: 40 INJECTION, POWDER, FOR SOLUTION INTRAVENOUS at 09:01

## 2018-07-27 RX ADMIN — HYDRALAZINE HYDROCHLORIDE SCH MG: 25 TABLET, FILM COATED ORAL at 14:00

## 2018-07-27 RX ADMIN — HYDRALAZINE HYDROCHLORIDE SCH MG: 25 TABLET, FILM COATED ORAL at 05:59

## 2018-07-27 RX ADMIN — CEFTRIAXONE SCH MLS/HR: 1 INJECTION, SOLUTION INTRAVENOUS at 11:56

## 2018-07-27 RX ADMIN — LOSARTAN POTASSIUM SCH MG: 25 TABLET ORAL at 08:54

## 2018-07-27 RX ADMIN — NICOTINE SCH MG: 21 PATCH, EXTENDED RELEASE TRANSDERMAL at 09:06

## 2018-07-27 RX ADMIN — LEVETIRACETAM SCH MG: 500 TABLET, FILM COATED ORAL at 09:01

## 2018-07-27 RX ADMIN — LEVETIRACETAM SCH MG: 500 TABLET, FILM COATED ORAL at 20:44

## 2018-07-27 RX ADMIN — DEXTROSE SCH MLS/HR: 5 SOLUTION INTRAVENOUS at 01:05

## 2018-07-27 RX ADMIN — METOPROLOL TARTRATE SCH MG: 25 TABLET ORAL at 08:54

## 2018-07-27 RX ADMIN — HYDROCODONE BITARTRATE AND ACETAMINOPHEN PRN TAB: 5; 325 TABLET ORAL at 16:51

## 2018-07-28 VITALS — DIASTOLIC BLOOD PRESSURE: 23 MMHG | SYSTOLIC BLOOD PRESSURE: 37 MMHG

## 2018-07-28 VITALS — SYSTOLIC BLOOD PRESSURE: 103 MMHG | DIASTOLIC BLOOD PRESSURE: 61 MMHG

## 2018-07-28 VITALS — DIASTOLIC BLOOD PRESSURE: 64 MMHG | SYSTOLIC BLOOD PRESSURE: 101 MMHG

## 2018-07-28 VITALS — DIASTOLIC BLOOD PRESSURE: 61 MMHG | SYSTOLIC BLOOD PRESSURE: 99 MMHG

## 2018-07-28 VITALS — DIASTOLIC BLOOD PRESSURE: 51 MMHG | SYSTOLIC BLOOD PRESSURE: 62 MMHG

## 2018-07-28 VITALS — SYSTOLIC BLOOD PRESSURE: 93 MMHG | DIASTOLIC BLOOD PRESSURE: 72 MMHG

## 2018-07-28 VITALS — DIASTOLIC BLOOD PRESSURE: 65 MMHG | SYSTOLIC BLOOD PRESSURE: 107 MMHG

## 2018-07-28 VITALS — DIASTOLIC BLOOD PRESSURE: 68 MMHG | SYSTOLIC BLOOD PRESSURE: 105 MMHG

## 2018-07-28 VITALS — SYSTOLIC BLOOD PRESSURE: 101 MMHG | DIASTOLIC BLOOD PRESSURE: 61 MMHG

## 2018-07-28 VITALS — SYSTOLIC BLOOD PRESSURE: 101 MMHG | DIASTOLIC BLOOD PRESSURE: 62 MMHG

## 2018-07-28 VITALS — DIASTOLIC BLOOD PRESSURE: 70 MMHG | SYSTOLIC BLOOD PRESSURE: 103 MMHG

## 2018-07-28 VITALS — DIASTOLIC BLOOD PRESSURE: 31 MMHG | SYSTOLIC BLOOD PRESSURE: 135 MMHG

## 2018-07-28 VITALS — SYSTOLIC BLOOD PRESSURE: 97 MMHG | DIASTOLIC BLOOD PRESSURE: 60 MMHG

## 2018-07-28 VITALS — SYSTOLIC BLOOD PRESSURE: 92 MMHG | DIASTOLIC BLOOD PRESSURE: 45 MMHG

## 2018-07-28 VITALS — SYSTOLIC BLOOD PRESSURE: 109 MMHG | DIASTOLIC BLOOD PRESSURE: 63 MMHG

## 2018-07-28 VITALS — DIASTOLIC BLOOD PRESSURE: 64 MMHG | SYSTOLIC BLOOD PRESSURE: 97 MMHG

## 2018-07-28 VITALS — DIASTOLIC BLOOD PRESSURE: 65 MMHG | SYSTOLIC BLOOD PRESSURE: 100 MMHG

## 2018-07-28 VITALS — DIASTOLIC BLOOD PRESSURE: 24 MMHG | SYSTOLIC BLOOD PRESSURE: 130 MMHG

## 2018-07-28 LAB
CHLORIDE SERPL-SCNC: 100 MEQ/L (ref 98–107)
ERYTHROCYTE [DISTWIDTH] IN BLOOD BY AUTOMATED COUNT: 13.6 % (ref 11.6–14.6)
HCT VFR BLD AUTO: 23.2 % (ref 42–52)
HGB BLD-MCNC: 8.3 G/DL (ref 14–18)
LYMPHOCYTES NFR BLD MANUAL: 6 % (ref 20–50)
MCH RBC QN AUTO: 30.1 PG (ref 28–32)
MCV RBC AUTO: 83.9 FL (ref 80–94)
MONOCYTES NFR BLD MANUAL: 4 % (ref 2–8)
NEUTS SEG NFR BLD MANUAL: 90 % (ref 45–75)
PLATELET # BLD AUTO: 154 X1000/UL (ref 130–400)
PLATELET # BLD EST: NORMAL 10*3/UL
PMV BLD AUTO: 8.6 FL (ref 7.4–10.4)
RBC # BLD AUTO: 2.76 MILL/UL (ref 4.7–6.1)

## 2018-07-28 RX ADMIN — METOPROLOL TARTRATE SCH MG: 25 TABLET ORAL at 09:11

## 2018-07-28 RX ADMIN — LEVETIRACETAM SCH MG: 500 TABLET, FILM COATED ORAL at 21:00

## 2018-07-28 RX ADMIN — LEVETIRACETAM SCH MG: 500 TABLET, FILM COATED ORAL at 09:09

## 2018-07-28 RX ADMIN — HYDROCODONE BITARTRATE AND ACETAMINOPHEN PRN TAB: 5; 325 TABLET ORAL at 21:02

## 2018-07-28 RX ADMIN — NIFEDIPINE SCH MG: 60 TABLET, FILM COATED, EXTENDED RELEASE ORAL at 09:10

## 2018-07-28 RX ADMIN — HYDRALAZINE HYDROCHLORIDE SCH MG: 25 TABLET, FILM COATED ORAL at 06:00

## 2018-07-28 RX ADMIN — PANTOPRAZOLE SODIUM SCH MG: 40 INJECTION, POWDER, FOR SOLUTION INTRAVENOUS at 09:09

## 2018-07-28 RX ADMIN — DEXTROSE SCH MLS/HR: 5 SOLUTION INTRAVENOUS at 03:23

## 2018-07-28 RX ADMIN — NICOTINE SCH MG: 21 PATCH, EXTENDED RELEASE TRANSDERMAL at 09:11

## 2018-07-28 RX ADMIN — METOPROLOL TARTRATE SCH MG: 25 TABLET ORAL at 21:00

## 2018-07-28 RX ADMIN — METOPROLOL TARTRATE SCH MG: 25 TABLET ORAL at 09:00

## 2018-07-28 RX ADMIN — HYDROCODONE BITARTRATE AND ACETAMINOPHEN PRN TAB: 5; 325 TABLET ORAL at 03:19

## 2018-07-28 RX ADMIN — NIFEDIPINE SCH MG: 60 TABLET, FILM COATED, EXTENDED RELEASE ORAL at 09:00

## 2018-07-28 RX ADMIN — HYDRALAZINE HYDROCHLORIDE SCH MG: 25 TABLET, FILM COATED ORAL at 22:00

## 2018-07-28 RX ADMIN — HYDRALAZINE HYDROCHLORIDE SCH MG: 25 TABLET, FILM COATED ORAL at 14:00

## 2018-07-28 RX ADMIN — LOSARTAN POTASSIUM SCH MG: 25 TABLET ORAL at 09:00

## 2018-07-28 RX ADMIN — HYDROCODONE BITARTRATE AND ACETAMINOPHEN PRN TAB: 5; 325 TABLET ORAL at 15:18

## 2018-07-28 RX ADMIN — CEFTRIAXONE SCH MLS/HR: 1 INJECTION, SOLUTION INTRAVENOUS at 09:45

## 2018-07-29 VITALS — SYSTOLIC BLOOD PRESSURE: 107 MMHG | DIASTOLIC BLOOD PRESSURE: 67 MMHG

## 2018-07-29 VITALS — SYSTOLIC BLOOD PRESSURE: 105 MMHG | DIASTOLIC BLOOD PRESSURE: 58 MMHG

## 2018-07-29 VITALS — SYSTOLIC BLOOD PRESSURE: 127 MMHG | DIASTOLIC BLOOD PRESSURE: 63 MMHG

## 2018-07-29 VITALS — SYSTOLIC BLOOD PRESSURE: 106 MMHG | DIASTOLIC BLOOD PRESSURE: 73 MMHG

## 2018-07-29 VITALS — SYSTOLIC BLOOD PRESSURE: 108 MMHG | DIASTOLIC BLOOD PRESSURE: 70 MMHG

## 2018-07-29 VITALS — DIASTOLIC BLOOD PRESSURE: 58 MMHG | SYSTOLIC BLOOD PRESSURE: 112 MMHG

## 2018-07-29 VITALS — SYSTOLIC BLOOD PRESSURE: 106 MMHG | DIASTOLIC BLOOD PRESSURE: 63 MMHG

## 2018-07-29 VITALS — SYSTOLIC BLOOD PRESSURE: 97 MMHG | DIASTOLIC BLOOD PRESSURE: 71 MMHG

## 2018-07-29 VITALS — DIASTOLIC BLOOD PRESSURE: 60 MMHG | SYSTOLIC BLOOD PRESSURE: 110 MMHG

## 2018-07-29 VITALS — SYSTOLIC BLOOD PRESSURE: 100 MMHG | DIASTOLIC BLOOD PRESSURE: 70 MMHG

## 2018-07-29 LAB
BASOPHILS NFR BLD AUTO: 0.5 % (ref 0–2)
CHLORIDE SERPL-SCNC: 102 MEQ/L (ref 98–107)
EOSINOPHIL NFR BLD AUTO: 0.4 % (ref 0–5)
ERYTHROCYTE [DISTWIDTH] IN BLOOD BY AUTOMATED COUNT: 13.8 % (ref 11.6–14.6)
HCT VFR BLD AUTO: 23.5 % (ref 42–52)
HGB BLD-MCNC: 8.4 G/DL (ref 14–18)
LYMPHOCYTES NFR BLD AUTO: 7.5 % (ref 20–50)
MCH RBC QN AUTO: 29.8 PG (ref 28–32)
MCV RBC AUTO: 83.1 FL (ref 80–94)
MONOCYTES NFR BLD AUTO: 9.4 % (ref 2–8)
NEUTROPHILS NFR BLD AUTO: 82.2 % (ref 40–76)
PLATELET # BLD AUTO: 183 X1000/UL (ref 130–400)
PMV BLD AUTO: 8.7 FL (ref 7.4–10.4)
RBC # BLD AUTO: 2.83 MILL/UL (ref 4.7–6.1)

## 2018-07-29 RX ADMIN — LOSARTAN POTASSIUM SCH MG: 25 TABLET ORAL at 08:48

## 2018-07-29 RX ADMIN — CEFTRIAXONE SCH MLS/HR: 1 INJECTION, SOLUTION INTRAVENOUS at 08:47

## 2018-07-29 RX ADMIN — METOPROLOL TARTRATE SCH MG: 25 TABLET ORAL at 08:48

## 2018-07-29 RX ADMIN — HYDRALAZINE HYDROCHLORIDE SCH MG: 25 TABLET, FILM COATED ORAL at 06:00

## 2018-07-29 RX ADMIN — HYDROCODONE BITARTRATE AND ACETAMINOPHEN PRN TAB: 5; 325 TABLET ORAL at 14:05

## 2018-07-29 RX ADMIN — NICOTINE SCH MG: 21 PATCH, EXTENDED RELEASE TRANSDERMAL at 08:47

## 2018-07-29 RX ADMIN — HYDRALAZINE HYDROCHLORIDE SCH MG: 25 TABLET, FILM COATED ORAL at 21:02

## 2018-07-29 RX ADMIN — NIFEDIPINE SCH MG: 60 TABLET, FILM COATED, EXTENDED RELEASE ORAL at 08:49

## 2018-07-29 RX ADMIN — PANTOPRAZOLE SODIUM SCH MG: 40 INJECTION, POWDER, FOR SOLUTION INTRAVENOUS at 08:47

## 2018-07-29 RX ADMIN — LEVETIRACETAM SCH MG: 500 TABLET, FILM COATED ORAL at 08:47

## 2018-07-29 RX ADMIN — LEVETIRACETAM SCH MG: 500 TABLET, FILM COATED ORAL at 20:23

## 2018-07-29 RX ADMIN — LACTULOSE PRN ML: 10 SOLUTION ORAL at 08:48

## 2018-07-29 RX ADMIN — HYDRALAZINE HYDROCHLORIDE SCH MG: 25 TABLET, FILM COATED ORAL at 14:00

## 2018-07-29 RX ADMIN — HYDROCODONE BITARTRATE AND ACETAMINOPHEN PRN TAB: 5; 325 TABLET ORAL at 06:18

## 2018-07-29 RX ADMIN — HYDROCODONE BITARTRATE AND ACETAMINOPHEN PRN TAB: 5; 325 TABLET ORAL at 20:23

## 2018-07-29 RX ADMIN — METOPROLOL TARTRATE SCH MG: 25 TABLET ORAL at 20:25

## 2018-07-30 VITALS — SYSTOLIC BLOOD PRESSURE: 98 MMHG | DIASTOLIC BLOOD PRESSURE: 60 MMHG

## 2018-07-30 VITALS — DIASTOLIC BLOOD PRESSURE: 61 MMHG | SYSTOLIC BLOOD PRESSURE: 94 MMHG

## 2018-07-30 VITALS — SYSTOLIC BLOOD PRESSURE: 111 MMHG | DIASTOLIC BLOOD PRESSURE: 68 MMHG

## 2018-07-30 VITALS — DIASTOLIC BLOOD PRESSURE: 69 MMHG | SYSTOLIC BLOOD PRESSURE: 104 MMHG

## 2018-07-30 VITALS — DIASTOLIC BLOOD PRESSURE: 62 MMHG | SYSTOLIC BLOOD PRESSURE: 104 MMHG

## 2018-07-30 VITALS — SYSTOLIC BLOOD PRESSURE: 97 MMHG | DIASTOLIC BLOOD PRESSURE: 66 MMHG

## 2018-07-30 VITALS — SYSTOLIC BLOOD PRESSURE: 109 MMHG | DIASTOLIC BLOOD PRESSURE: 72 MMHG

## 2018-07-30 VITALS — DIASTOLIC BLOOD PRESSURE: 70 MMHG | SYSTOLIC BLOOD PRESSURE: 118 MMHG

## 2018-07-30 VITALS — DIASTOLIC BLOOD PRESSURE: 69 MMHG | SYSTOLIC BLOOD PRESSURE: 93 MMHG

## 2018-07-30 VITALS — DIASTOLIC BLOOD PRESSURE: 70 MMHG | SYSTOLIC BLOOD PRESSURE: 102 MMHG

## 2018-07-30 VITALS — SYSTOLIC BLOOD PRESSURE: 113 MMHG | DIASTOLIC BLOOD PRESSURE: 68 MMHG

## 2018-07-30 VITALS — SYSTOLIC BLOOD PRESSURE: 120 MMHG | DIASTOLIC BLOOD PRESSURE: 72 MMHG

## 2018-07-30 VITALS — SYSTOLIC BLOOD PRESSURE: 106 MMHG | DIASTOLIC BLOOD PRESSURE: 59 MMHG

## 2018-07-30 RX ADMIN — HYDRALAZINE HYDROCHLORIDE SCH MG: 25 TABLET, FILM COATED ORAL at 20:36

## 2018-07-30 RX ADMIN — NIFEDIPINE SCH MG: 60 TABLET, FILM COATED, EXTENDED RELEASE ORAL at 08:26

## 2018-07-30 RX ADMIN — HYDRALAZINE HYDROCHLORIDE SCH MG: 25 TABLET, FILM COATED ORAL at 14:00

## 2018-07-30 RX ADMIN — METOPROLOL TARTRATE SCH MG: 25 TABLET ORAL at 20:38

## 2018-07-30 RX ADMIN — LEVETIRACETAM SCH MG: 500 TABLET, FILM COATED ORAL at 08:26

## 2018-07-30 RX ADMIN — NICOTINE SCH MG: 21 PATCH, EXTENDED RELEASE TRANSDERMAL at 08:26

## 2018-07-30 RX ADMIN — METOPROLOL TARTRATE SCH MG: 25 TABLET ORAL at 08:24

## 2018-07-30 RX ADMIN — LEVETIRACETAM SCH MG: 500 TABLET, FILM COATED ORAL at 20:37

## 2018-07-30 RX ADMIN — HYDRALAZINE HYDROCHLORIDE SCH MG: 25 TABLET, FILM COATED ORAL at 05:09

## 2018-07-30 RX ADMIN — NITROFURANTOIN MONOHYDRATE AND NITROFURANTOIN MACROCRYSTALLINE SCH MG: 75; 25 CAPSULE ORAL at 20:36

## 2018-07-30 RX ADMIN — DOCUSATE SODIUM PRN MG: 250 CAPSULE, LIQUID FILLED ORAL at 08:27

## 2018-07-30 RX ADMIN — LOSARTAN POTASSIUM SCH MG: 25 TABLET ORAL at 08:23

## 2018-07-30 RX ADMIN — NITROFURANTOIN MONOHYDRATE AND NITROFURANTOIN MACROCRYSTALLINE SCH MG: 75; 25 CAPSULE ORAL at 08:30

## 2018-09-30 ENCOUNTER — HOSPITAL ENCOUNTER (INPATIENT)
Dept: HOSPITAL 87 - ER | Age: 68
LOS: 7 days | Discharge: HOME HEALTH | DRG: 720 | End: 2018-10-07
Attending: INTERNAL MEDICINE | Admitting: INTERNAL MEDICINE
Payer: MEDICARE

## 2018-09-30 VITALS — WEIGHT: 128 LBS | HEIGHT: 68 IN | BODY MASS INDEX: 19.4 KG/M2

## 2018-09-30 DIAGNOSIS — E87.2: ICD-10-CM

## 2018-09-30 DIAGNOSIS — H91.92: ICD-10-CM

## 2018-09-30 DIAGNOSIS — J96.00: ICD-10-CM

## 2018-09-30 DIAGNOSIS — D69.6: ICD-10-CM

## 2018-09-30 DIAGNOSIS — Z91.19: ICD-10-CM

## 2018-09-30 DIAGNOSIS — Z87.820: ICD-10-CM

## 2018-09-30 DIAGNOSIS — K70.9: ICD-10-CM

## 2018-09-30 DIAGNOSIS — N39.0: ICD-10-CM

## 2018-09-30 DIAGNOSIS — D62: ICD-10-CM

## 2018-09-30 DIAGNOSIS — J69.0: ICD-10-CM

## 2018-09-30 DIAGNOSIS — E11.22: ICD-10-CM

## 2018-09-30 DIAGNOSIS — B19.20: ICD-10-CM

## 2018-09-30 DIAGNOSIS — N18.9: ICD-10-CM

## 2018-09-30 DIAGNOSIS — A41.51: Primary | ICD-10-CM

## 2018-09-30 DIAGNOSIS — I12.9: ICD-10-CM

## 2018-09-30 DIAGNOSIS — E83.42: ICD-10-CM

## 2018-09-30 DIAGNOSIS — Z79.899: ICD-10-CM

## 2018-09-30 DIAGNOSIS — N17.0: ICD-10-CM

## 2018-09-30 DIAGNOSIS — E43: ICD-10-CM

## 2018-09-30 DIAGNOSIS — F10.21: ICD-10-CM

## 2018-09-30 DIAGNOSIS — E87.6: ICD-10-CM

## 2018-09-30 DIAGNOSIS — K81.0: ICD-10-CM

## 2018-09-30 DIAGNOSIS — G92: ICD-10-CM

## 2018-09-30 DIAGNOSIS — D68.9: ICD-10-CM

## 2018-09-30 DIAGNOSIS — Z82.49: ICD-10-CM

## 2018-09-30 DIAGNOSIS — R65.21: ICD-10-CM

## 2018-09-30 PROCEDURE — 80307 DRUG TEST PRSMV CHEM ANLYZR: CPT

## 2018-09-30 PROCEDURE — 97166 OT EVAL MOD COMPLEX 45 MIN: CPT

## 2018-09-30 PROCEDURE — 78227 HEPATOBIL SYST IMAGE W/DRUG: CPT

## 2018-09-30 PROCEDURE — 80202 ASSAY OF VANCOMYCIN: CPT

## 2018-09-30 PROCEDURE — 97162 PT EVAL MOD COMPLEX 30 MIN: CPT

## 2018-09-30 PROCEDURE — 36430 TRANSFUSION BLD/BLD COMPNT: CPT

## 2018-09-30 PROCEDURE — 84484 ASSAY OF TROPONIN QUANT: CPT

## 2018-09-30 PROCEDURE — 82270 OCCULT BLOOD FECES: CPT

## 2018-09-30 PROCEDURE — 83605 ASSAY OF LACTIC ACID: CPT

## 2018-09-30 PROCEDURE — 82962 GLUCOSE BLOOD TEST: CPT

## 2018-09-30 PROCEDURE — 86900 BLOOD TYPING SEROLOGIC ABO: CPT

## 2018-09-30 PROCEDURE — 93970 EXTREMITY STUDY: CPT

## 2018-09-30 PROCEDURE — 96365 THER/PROPH/DIAG IV INF INIT: CPT

## 2018-09-30 PROCEDURE — 80305 DRUG TEST PRSMV DIR OPT OBS: CPT

## 2018-09-30 PROCEDURE — 85610 PROTHROMBIN TIME: CPT

## 2018-09-30 PROCEDURE — 85027 COMPLETE CBC AUTOMATED: CPT

## 2018-09-30 PROCEDURE — A4315 CATH W/DRAINAGE 2-WAY SILCNE: HCPCS

## 2018-09-30 PROCEDURE — 84443 ASSAY THYROID STIM HORMONE: CPT

## 2018-09-30 PROCEDURE — 76700 US EXAM ABDOM COMPLETE: CPT

## 2018-09-30 PROCEDURE — 82550 ASSAY OF CK (CPK): CPT

## 2018-09-30 PROCEDURE — 86160 COMPLEMENT ANTIGEN: CPT

## 2018-09-30 PROCEDURE — 86850 RBC ANTIBODY SCREEN: CPT

## 2018-09-30 PROCEDURE — 82140 ASSAY OF AMMONIA: CPT

## 2018-09-30 PROCEDURE — 85025 COMPLETE CBC W/AUTO DIFF WBC: CPT

## 2018-09-30 PROCEDURE — 86920 COMPATIBILITY TEST SPIN: CPT

## 2018-09-30 PROCEDURE — 80329 ANALGESICS NON-OPIOID 1 OR 2: CPT

## 2018-09-30 PROCEDURE — 85007 BL SMEAR W/DIFF WBC COUNT: CPT

## 2018-09-30 PROCEDURE — 80076 HEPATIC FUNCTION PANEL: CPT

## 2018-09-30 PROCEDURE — 99291 CRITICAL CARE FIRST HOUR: CPT

## 2018-09-30 PROCEDURE — 87086 URINE CULTURE/COLONY COUNT: CPT

## 2018-09-30 PROCEDURE — 87186 SC STD MICRODIL/AGAR DIL: CPT

## 2018-09-30 PROCEDURE — 87040 BLOOD CULTURE FOR BACTERIA: CPT

## 2018-09-30 PROCEDURE — 85018 HEMOGLOBIN: CPT

## 2018-09-30 PROCEDURE — 80048 BASIC METABOLIC PNL TOTAL CA: CPT

## 2018-09-30 PROCEDURE — 36569 INSJ PICC 5 YR+ W/O IMAGING: CPT

## 2018-09-30 PROCEDURE — 81003 URINALYSIS AUTO W/O SCOPE: CPT

## 2018-09-30 PROCEDURE — 80053 COMPREHEN METABOLIC PANEL: CPT

## 2018-09-30 PROCEDURE — 83010 ASSAY OF HAPTOGLOBIN QUANT: CPT

## 2018-09-30 PROCEDURE — 97116 GAIT TRAINING THERAPY: CPT

## 2018-09-30 PROCEDURE — 85014 HEMATOCRIT: CPT

## 2018-09-30 PROCEDURE — 36415 COLL VENOUS BLD VENIPUNCTURE: CPT

## 2018-09-30 PROCEDURE — 93005 ELECTROCARDIOGRAM TRACING: CPT

## 2018-09-30 PROCEDURE — 96375 TX/PRO/DX INJ NEW DRUG ADDON: CPT

## 2018-09-30 PROCEDURE — 86927 PLASMA FRESH FROZEN: CPT

## 2018-09-30 PROCEDURE — 84100 ASSAY OF PHOSPHORUS: CPT

## 2018-09-30 PROCEDURE — 83615 LACTATE (LD) (LDH) ENZYME: CPT

## 2018-09-30 PROCEDURE — 82553 CREATINE MB FRACTION: CPT

## 2018-09-30 PROCEDURE — 83690 ASSAY OF LIPASE: CPT

## 2018-09-30 PROCEDURE — 71045 X-RAY EXAM CHEST 1 VIEW: CPT

## 2018-09-30 PROCEDURE — 76937 US GUIDE VASCULAR ACCESS: CPT

## 2018-09-30 PROCEDURE — 83735 ASSAY OF MAGNESIUM: CPT

## 2018-09-30 PROCEDURE — 87077 CULTURE AEROBIC IDENTIFY: CPT

## 2018-09-30 PROCEDURE — 70450 CT HEAD/BRAIN W/O DYE: CPT

## 2018-10-01 VITALS — DIASTOLIC BLOOD PRESSURE: 67 MMHG | SYSTOLIC BLOOD PRESSURE: 104 MMHG

## 2018-10-01 VITALS — SYSTOLIC BLOOD PRESSURE: 88 MMHG | DIASTOLIC BLOOD PRESSURE: 62 MMHG

## 2018-10-01 VITALS — SYSTOLIC BLOOD PRESSURE: 138 MMHG | DIASTOLIC BLOOD PRESSURE: 76 MMHG

## 2018-10-01 VITALS — SYSTOLIC BLOOD PRESSURE: 74 MMHG | DIASTOLIC BLOOD PRESSURE: 50 MMHG

## 2018-10-01 VITALS — SYSTOLIC BLOOD PRESSURE: 92 MMHG | DIASTOLIC BLOOD PRESSURE: 57 MMHG

## 2018-10-01 VITALS — SYSTOLIC BLOOD PRESSURE: 79 MMHG | DIASTOLIC BLOOD PRESSURE: 56 MMHG

## 2018-10-01 VITALS — SYSTOLIC BLOOD PRESSURE: 107 MMHG | DIASTOLIC BLOOD PRESSURE: 73 MMHG

## 2018-10-01 VITALS — SYSTOLIC BLOOD PRESSURE: 101 MMHG | DIASTOLIC BLOOD PRESSURE: 70 MMHG

## 2018-10-01 VITALS — SYSTOLIC BLOOD PRESSURE: 98 MMHG | DIASTOLIC BLOOD PRESSURE: 41 MMHG

## 2018-10-01 VITALS — DIASTOLIC BLOOD PRESSURE: 73 MMHG | SYSTOLIC BLOOD PRESSURE: 123 MMHG

## 2018-10-01 VITALS — DIASTOLIC BLOOD PRESSURE: 29 MMHG | SYSTOLIC BLOOD PRESSURE: 90 MMHG

## 2018-10-01 VITALS — DIASTOLIC BLOOD PRESSURE: 60 MMHG | SYSTOLIC BLOOD PRESSURE: 89 MMHG

## 2018-10-01 VITALS — SYSTOLIC BLOOD PRESSURE: 102 MMHG | DIASTOLIC BLOOD PRESSURE: 69 MMHG

## 2018-10-01 VITALS — SYSTOLIC BLOOD PRESSURE: 86 MMHG | DIASTOLIC BLOOD PRESSURE: 62 MMHG

## 2018-10-01 VITALS — SYSTOLIC BLOOD PRESSURE: 68 MMHG | DIASTOLIC BLOOD PRESSURE: 43 MMHG

## 2018-10-01 VITALS — SYSTOLIC BLOOD PRESSURE: 94 MMHG | DIASTOLIC BLOOD PRESSURE: 64 MMHG

## 2018-10-01 VITALS — SYSTOLIC BLOOD PRESSURE: 216 MMHG | DIASTOLIC BLOOD PRESSURE: 74 MMHG

## 2018-10-01 VITALS — DIASTOLIC BLOOD PRESSURE: 73 MMHG | SYSTOLIC BLOOD PRESSURE: 106 MMHG

## 2018-10-01 VITALS — DIASTOLIC BLOOD PRESSURE: 41 MMHG | SYSTOLIC BLOOD PRESSURE: 64 MMHG

## 2018-10-01 VITALS — DIASTOLIC BLOOD PRESSURE: 65 MMHG | SYSTOLIC BLOOD PRESSURE: 90 MMHG

## 2018-10-01 VITALS — DIASTOLIC BLOOD PRESSURE: 68 MMHG | SYSTOLIC BLOOD PRESSURE: 109 MMHG

## 2018-10-01 VITALS — DIASTOLIC BLOOD PRESSURE: 82 MMHG | SYSTOLIC BLOOD PRESSURE: 116 MMHG

## 2018-10-01 VITALS — SYSTOLIC BLOOD PRESSURE: 122 MMHG | DIASTOLIC BLOOD PRESSURE: 85 MMHG

## 2018-10-01 VITALS — DIASTOLIC BLOOD PRESSURE: 86 MMHG | SYSTOLIC BLOOD PRESSURE: 127 MMHG

## 2018-10-01 VITALS — DIASTOLIC BLOOD PRESSURE: 78 MMHG | SYSTOLIC BLOOD PRESSURE: 120 MMHG

## 2018-10-01 VITALS — DIASTOLIC BLOOD PRESSURE: 62 MMHG | SYSTOLIC BLOOD PRESSURE: 95 MMHG

## 2018-10-01 VITALS — SYSTOLIC BLOOD PRESSURE: 93 MMHG | DIASTOLIC BLOOD PRESSURE: 64 MMHG

## 2018-10-01 VITALS — DIASTOLIC BLOOD PRESSURE: 73 MMHG | SYSTOLIC BLOOD PRESSURE: 115 MMHG

## 2018-10-01 VITALS — DIASTOLIC BLOOD PRESSURE: 72 MMHG | SYSTOLIC BLOOD PRESSURE: 109 MMHG

## 2018-10-01 VITALS — DIASTOLIC BLOOD PRESSURE: 51 MMHG | SYSTOLIC BLOOD PRESSURE: 79 MMHG

## 2018-10-01 VITALS — SYSTOLIC BLOOD PRESSURE: 89 MMHG | DIASTOLIC BLOOD PRESSURE: 61 MMHG

## 2018-10-01 VITALS — SYSTOLIC BLOOD PRESSURE: 90 MMHG | DIASTOLIC BLOOD PRESSURE: 60 MMHG

## 2018-10-01 VITALS — DIASTOLIC BLOOD PRESSURE: 69 MMHG | SYSTOLIC BLOOD PRESSURE: 99 MMHG

## 2018-10-01 VITALS — SYSTOLIC BLOOD PRESSURE: 126 MMHG | DIASTOLIC BLOOD PRESSURE: 64 MMHG

## 2018-10-01 VITALS — DIASTOLIC BLOOD PRESSURE: 67 MMHG | SYSTOLIC BLOOD PRESSURE: 95 MMHG

## 2018-10-01 VITALS — DIASTOLIC BLOOD PRESSURE: 77 MMHG | SYSTOLIC BLOOD PRESSURE: 123 MMHG

## 2018-10-01 VITALS — DIASTOLIC BLOOD PRESSURE: 66 MMHG | SYSTOLIC BLOOD PRESSURE: 108 MMHG

## 2018-10-01 VITALS — DIASTOLIC BLOOD PRESSURE: 46 MMHG | SYSTOLIC BLOOD PRESSURE: 69 MMHG

## 2018-10-01 VITALS — SYSTOLIC BLOOD PRESSURE: 85 MMHG | DIASTOLIC BLOOD PRESSURE: 55 MMHG

## 2018-10-01 VITALS — DIASTOLIC BLOOD PRESSURE: 49 MMHG | SYSTOLIC BLOOD PRESSURE: 70 MMHG

## 2018-10-01 VITALS — SYSTOLIC BLOOD PRESSURE: 110 MMHG | DIASTOLIC BLOOD PRESSURE: 74 MMHG

## 2018-10-01 VITALS — DIASTOLIC BLOOD PRESSURE: 50 MMHG | SYSTOLIC BLOOD PRESSURE: 72 MMHG

## 2018-10-01 VITALS — DIASTOLIC BLOOD PRESSURE: 70 MMHG | SYSTOLIC BLOOD PRESSURE: 113 MMHG

## 2018-10-01 VITALS — DIASTOLIC BLOOD PRESSURE: 56 MMHG | SYSTOLIC BLOOD PRESSURE: 79 MMHG

## 2018-10-01 VITALS — SYSTOLIC BLOOD PRESSURE: 122 MMHG | DIASTOLIC BLOOD PRESSURE: 79 MMHG

## 2018-10-01 VITALS — DIASTOLIC BLOOD PRESSURE: 55 MMHG | SYSTOLIC BLOOD PRESSURE: 92 MMHG

## 2018-10-01 VITALS — SYSTOLIC BLOOD PRESSURE: 95 MMHG | DIASTOLIC BLOOD PRESSURE: 66 MMHG

## 2018-10-01 VITALS — DIASTOLIC BLOOD PRESSURE: 44 MMHG | SYSTOLIC BLOOD PRESSURE: 68 MMHG

## 2018-10-01 VITALS — SYSTOLIC BLOOD PRESSURE: 130 MMHG | DIASTOLIC BLOOD PRESSURE: 85 MMHG

## 2018-10-01 VITALS — SYSTOLIC BLOOD PRESSURE: 108 MMHG | DIASTOLIC BLOOD PRESSURE: 70 MMHG

## 2018-10-01 VITALS — DIASTOLIC BLOOD PRESSURE: 54 MMHG | SYSTOLIC BLOOD PRESSURE: 113 MMHG

## 2018-10-01 VITALS — SYSTOLIC BLOOD PRESSURE: 57 MMHG | DIASTOLIC BLOOD PRESSURE: 37 MMHG

## 2018-10-01 VITALS — DIASTOLIC BLOOD PRESSURE: 62 MMHG | SYSTOLIC BLOOD PRESSURE: 97 MMHG

## 2018-10-01 VITALS — SYSTOLIC BLOOD PRESSURE: 88 MMHG | DIASTOLIC BLOOD PRESSURE: 61 MMHG

## 2018-10-01 VITALS — DIASTOLIC BLOOD PRESSURE: 69 MMHG | SYSTOLIC BLOOD PRESSURE: 98 MMHG

## 2018-10-01 VITALS — DIASTOLIC BLOOD PRESSURE: 43 MMHG | SYSTOLIC BLOOD PRESSURE: 79 MMHG

## 2018-10-01 VITALS — SYSTOLIC BLOOD PRESSURE: 90 MMHG | DIASTOLIC BLOOD PRESSURE: 29 MMHG

## 2018-10-01 VITALS — DIASTOLIC BLOOD PRESSURE: 46 MMHG | SYSTOLIC BLOOD PRESSURE: 67 MMHG

## 2018-10-01 VITALS — DIASTOLIC BLOOD PRESSURE: 67 MMHG | SYSTOLIC BLOOD PRESSURE: 101 MMHG

## 2018-10-01 VITALS — DIASTOLIC BLOOD PRESSURE: 34 MMHG | SYSTOLIC BLOOD PRESSURE: 59 MMHG

## 2018-10-01 VITALS — DIASTOLIC BLOOD PRESSURE: 64 MMHG | SYSTOLIC BLOOD PRESSURE: 100 MMHG

## 2018-10-01 VITALS — SYSTOLIC BLOOD PRESSURE: 72 MMHG | DIASTOLIC BLOOD PRESSURE: 49 MMHG

## 2018-10-01 VITALS — DIASTOLIC BLOOD PRESSURE: 74 MMHG | SYSTOLIC BLOOD PRESSURE: 110 MMHG

## 2018-10-01 VITALS — DIASTOLIC BLOOD PRESSURE: 33 MMHG | SYSTOLIC BLOOD PRESSURE: 66 MMHG

## 2018-10-01 VITALS — SYSTOLIC BLOOD PRESSURE: 113 MMHG | DIASTOLIC BLOOD PRESSURE: 54 MMHG

## 2018-10-01 VITALS — DIASTOLIC BLOOD PRESSURE: 78 MMHG | SYSTOLIC BLOOD PRESSURE: 117 MMHG

## 2018-10-01 VITALS — DIASTOLIC BLOOD PRESSURE: 45 MMHG | SYSTOLIC BLOOD PRESSURE: 72 MMHG

## 2018-10-01 VITALS — DIASTOLIC BLOOD PRESSURE: 66 MMHG | SYSTOLIC BLOOD PRESSURE: 93 MMHG

## 2018-10-01 VITALS — SYSTOLIC BLOOD PRESSURE: 69 MMHG | DIASTOLIC BLOOD PRESSURE: 48 MMHG

## 2018-10-01 VITALS — SYSTOLIC BLOOD PRESSURE: 75 MMHG | DIASTOLIC BLOOD PRESSURE: 43 MMHG

## 2018-10-01 VITALS — DIASTOLIC BLOOD PRESSURE: 67 MMHG | SYSTOLIC BLOOD PRESSURE: 98 MMHG

## 2018-10-01 VITALS — SYSTOLIC BLOOD PRESSURE: 124 MMHG | DIASTOLIC BLOOD PRESSURE: 83 MMHG

## 2018-10-01 VITALS — DIASTOLIC BLOOD PRESSURE: 45 MMHG | SYSTOLIC BLOOD PRESSURE: 66 MMHG

## 2018-10-01 VITALS — DIASTOLIC BLOOD PRESSURE: 58 MMHG | SYSTOLIC BLOOD PRESSURE: 90 MMHG

## 2018-10-01 VITALS — DIASTOLIC BLOOD PRESSURE: 42 MMHG | SYSTOLIC BLOOD PRESSURE: 68 MMHG

## 2018-10-01 VITALS — SYSTOLIC BLOOD PRESSURE: 107 MMHG | DIASTOLIC BLOOD PRESSURE: 70 MMHG

## 2018-10-01 VITALS — DIASTOLIC BLOOD PRESSURE: 51 MMHG | SYSTOLIC BLOOD PRESSURE: 74 MMHG

## 2018-10-01 VITALS — SYSTOLIC BLOOD PRESSURE: 108 MMHG | DIASTOLIC BLOOD PRESSURE: 71 MMHG

## 2018-10-01 VITALS — SYSTOLIC BLOOD PRESSURE: 89 MMHG | DIASTOLIC BLOOD PRESSURE: 59 MMHG

## 2018-10-01 VITALS — DIASTOLIC BLOOD PRESSURE: 60 MMHG | SYSTOLIC BLOOD PRESSURE: 90 MMHG

## 2018-10-01 LAB
AMMONIA PLAS-SCNC: 32 UMOL/L (ref ?–32)
AMPHETAMINES UR QL SCN: NEGATIVE
APPEARANCE UR: (no result)
BARBITURATES UR QL SCN: NEGATIVE
BENZODIAZ UR QL SCN: NEGATIVE
BZE UR QL SCN: NEGATIVE
CANNABINOIDS UR QL SCN: NEGATIVE
CHLORIDE SERPL-SCNC: 109 MEQ/L (ref 98–107)
CK MB SERPL-CCNC: 1.6 NG/ML (ref 0.5–3.6)
CK SERPL-CCNC: 394 IU/L (ref 39–308)
COLOR UR: YELLOW
ERYTHROCYTE [DISTWIDTH] IN BLOOD BY AUTOMATED COUNT: 21.8 % (ref 11.6–14.6)
ERYTHROCYTE [DISTWIDTH] IN BLOOD BY AUTOMATED COUNT: 22.5 % (ref 11.6–14.6)
ETHANOL SERPL-MCNC: < 10 MG/DL
HCT VFR BLD AUTO: 13.8 % (ref 42–52)
HCT VFR BLD AUTO: 14 % (ref 42–52)
HCT VFR BLD AUTO: 24.6 % (ref 42–52)
HGB BLD-MCNC: 4.4 G/DL (ref 14–18)
HGB BLD-MCNC: 4.5 G/DL (ref 14–18)
HGB BLD-MCNC: 8.1 G/DL (ref 14–18)
HGB UR QL STRIP: (no result)
INR PPP: 2
KETONES UR STRIP-MCNC: (no result) MG/DL
LEUKOCYTE ESTERASE UR QL STRIP: (no result)
LYMPHOCYTES NFR BLD MANUAL: 5 % (ref 20–50)
LYMPHOCYTES NFR BLD MANUAL: 6 % (ref 20–50)
MCH RBC QN AUTO: 29.8 PG (ref 28–32)
MCH RBC QN AUTO: 30.1 PG (ref 28–32)
MCV RBC AUTO: 93.9 FL (ref 80–94)
MCV RBC AUTO: 95.1 FL (ref 80–94)
METAMYELOCYTES NFR BLD MANUAL: 5 % (ref 0–0)
METAMYELOCYTES NFR BLD MANUAL: 6 % (ref 0–0)
METHADONE UR QL SCN: NEGATIVE
MONOCYTES NFR BLD MANUAL: 1 % (ref 2–8)
MONOCYTES NFR BLD MANUAL: 1 % (ref 2–8)
MYELOCYTES NFR BLD MANUAL: 1 % (ref 0–0)
NEUTS BAND NFR BLD MANUAL: 28 % (ref 1–6)
NEUTS BAND NFR BLD MANUAL: 30 % (ref 1–6)
NEUTS SEG NFR BLD MANUAL: 56 % (ref 45–75)
NEUTS SEG NFR BLD MANUAL: 61 % (ref 45–75)
NITRITE UR QL STRIP: NEGATIVE
OPIATES UR QL SCN: NEGATIVE
PCP UR QL SCN: NEGATIVE
PH UR STRIP: 5.5 [PH] (ref 4.5–8)
PLATELET # BLD AUTO: 68 X1000/UL (ref 130–400)
PLATELET # BLD AUTO: 75 X1000/UL (ref 130–400)
PLATELET # BLD EST: (no result) 10*3/UL
PLATELET # BLD EST: (no result) 10*3/UL
PMV BLD AUTO: 8.3 FL (ref 7.4–10.4)
PMV BLD AUTO: 8.5 FL (ref 7.4–10.4)
PROT UR QL STRIP: (no result)
PROTHROMBIN TIME: 19.7 SEC (ref 9.1–11.1)
RBC # BLD AUTO: 1.46 MILL/UL (ref 4.7–6.1)
RBC # BLD AUTO: 1.49 MILL/UL (ref 4.7–6.1)
SP GR UR STRIP: 1.01 (ref 1–1.03)
UROBILINOGEN UR STRIP-MCNC: 1 E.U./DL (ref 0.2–1)
WBC NRBC COR # BLD AUTO: 1 /100 WBC
WBC NRBC COR # BLD AUTO: 1 /100 WBC

## 2018-10-01 PROCEDURE — 02HV33Z INSERTION OF INFUSION DEVICE INTO SUPERIOR VENA CAVA, PERCUTANEOUS APPROACH: ICD-10-PCS | Performed by: RADIOLOGY

## 2018-10-01 PROCEDURE — B548ZZA ULTRASONOGRAPHY OF SUPERIOR VENA CAVA, GUIDANCE: ICD-10-PCS | Performed by: RADIOLOGY

## 2018-10-01 PROCEDURE — 30233N1 TRANSFUSION OF NONAUTOLOGOUS RED BLOOD CELLS INTO PERIPHERAL VEIN, PERCUTANEOUS APPROACH: ICD-10-PCS | Performed by: INTERNAL MEDICINE

## 2018-10-01 PROCEDURE — 30233K1 TRANSFUSION OF NONAUTOLOGOUS FROZEN PLASMA INTO PERIPHERAL VEIN, PERCUTANEOUS APPROACH: ICD-10-PCS | Performed by: INTERNAL MEDICINE

## 2018-10-01 PROCEDURE — 30233L1 TRANSFUSION OF NONAUTOLOGOUS FRESH PLASMA INTO PERIPHERAL VEIN, PERCUTANEOUS APPROACH: ICD-10-PCS | Performed by: INTERNAL MEDICINE

## 2018-10-01 RX ADMIN — SUCRALFATE SCH G: 1 SUSPENSION ORAL at 21:36

## 2018-10-01 RX ADMIN — SUCRALFATE SCH G: 1 SUSPENSION ORAL at 13:08

## 2018-10-01 RX ADMIN — TRAMADOL HYDROCHLORIDE PRN MG: 50 TABLET, COATED ORAL at 14:12

## 2018-10-01 RX ADMIN — FOLIC ACID SCH MLS/HR: 5 INJECTION, SOLUTION INTRAMUSCULAR; INTRAVENOUS; SUBCUTANEOUS at 09:52

## 2018-10-01 RX ADMIN — PANTOPRAZOLE SODIUM SCH MG: 40 INJECTION, POWDER, FOR SOLUTION INTRAVENOUS at 21:36

## 2018-10-01 RX ADMIN — FOLIC ACID SCH MLS/HR: 5 INJECTION, SOLUTION INTRAMUSCULAR; INTRAVENOUS; SUBCUTANEOUS at 18:11

## 2018-10-01 RX ADMIN — TAZOBACTAM SODIUM AND PIPERACILLIN SODIUM SCH MLS/HR: 250; 2 INJECTION, SOLUTION INTRAVENOUS at 18:11

## 2018-10-01 RX ADMIN — TAZOBACTAM SODIUM AND PIPERACILLIN SODIUM SCH MLS/HR: 250; 2 INJECTION, SOLUTION INTRAVENOUS at 13:06

## 2018-10-01 RX ADMIN — SUCRALFATE SCH G: 1 SUSPENSION ORAL at 18:09

## 2018-10-01 RX ADMIN — PANTOPRAZOLE SODIUM SCH MG: 40 INJECTION, POWDER, FOR SOLUTION INTRAVENOUS at 11:24

## 2018-10-02 VITALS — DIASTOLIC BLOOD PRESSURE: 77 MMHG | SYSTOLIC BLOOD PRESSURE: 116 MMHG

## 2018-10-02 VITALS — SYSTOLIC BLOOD PRESSURE: 106 MMHG | DIASTOLIC BLOOD PRESSURE: 75 MMHG

## 2018-10-02 VITALS — DIASTOLIC BLOOD PRESSURE: 62 MMHG | SYSTOLIC BLOOD PRESSURE: 104 MMHG

## 2018-10-02 VITALS — SYSTOLIC BLOOD PRESSURE: 105 MMHG | DIASTOLIC BLOOD PRESSURE: 70 MMHG

## 2018-10-02 VITALS — DIASTOLIC BLOOD PRESSURE: 69 MMHG | SYSTOLIC BLOOD PRESSURE: 102 MMHG

## 2018-10-02 VITALS — SYSTOLIC BLOOD PRESSURE: 103 MMHG | DIASTOLIC BLOOD PRESSURE: 49 MMHG

## 2018-10-02 VITALS — DIASTOLIC BLOOD PRESSURE: 71 MMHG | SYSTOLIC BLOOD PRESSURE: 106 MMHG

## 2018-10-02 VITALS — DIASTOLIC BLOOD PRESSURE: 75 MMHG | SYSTOLIC BLOOD PRESSURE: 116 MMHG

## 2018-10-02 VITALS — SYSTOLIC BLOOD PRESSURE: 90 MMHG | DIASTOLIC BLOOD PRESSURE: 60 MMHG

## 2018-10-02 VITALS — SYSTOLIC BLOOD PRESSURE: 100 MMHG | DIASTOLIC BLOOD PRESSURE: 68 MMHG

## 2018-10-02 VITALS — DIASTOLIC BLOOD PRESSURE: 60 MMHG | SYSTOLIC BLOOD PRESSURE: 90 MMHG

## 2018-10-02 VITALS — DIASTOLIC BLOOD PRESSURE: 69 MMHG | SYSTOLIC BLOOD PRESSURE: 101 MMHG

## 2018-10-02 VITALS — DIASTOLIC BLOOD PRESSURE: 65 MMHG | SYSTOLIC BLOOD PRESSURE: 105 MMHG

## 2018-10-02 VITALS — DIASTOLIC BLOOD PRESSURE: 69 MMHG | SYSTOLIC BLOOD PRESSURE: 110 MMHG

## 2018-10-02 VITALS — SYSTOLIC BLOOD PRESSURE: 103 MMHG | DIASTOLIC BLOOD PRESSURE: 67 MMHG

## 2018-10-02 VITALS — DIASTOLIC BLOOD PRESSURE: 69 MMHG | SYSTOLIC BLOOD PRESSURE: 104 MMHG

## 2018-10-02 VITALS — DIASTOLIC BLOOD PRESSURE: 72 MMHG | SYSTOLIC BLOOD PRESSURE: 87 MMHG

## 2018-10-02 VITALS — DIASTOLIC BLOOD PRESSURE: 57 MMHG | SYSTOLIC BLOOD PRESSURE: 85 MMHG

## 2018-10-02 VITALS — SYSTOLIC BLOOD PRESSURE: 109 MMHG | DIASTOLIC BLOOD PRESSURE: 72 MMHG

## 2018-10-02 VITALS — SYSTOLIC BLOOD PRESSURE: 88 MMHG | DIASTOLIC BLOOD PRESSURE: 60 MMHG

## 2018-10-02 VITALS — DIASTOLIC BLOOD PRESSURE: 71 MMHG | SYSTOLIC BLOOD PRESSURE: 102 MMHG

## 2018-10-02 VITALS — DIASTOLIC BLOOD PRESSURE: 70 MMHG | SYSTOLIC BLOOD PRESSURE: 103 MMHG

## 2018-10-02 VITALS — SYSTOLIC BLOOD PRESSURE: 108 MMHG | DIASTOLIC BLOOD PRESSURE: 71 MMHG

## 2018-10-02 VITALS — SYSTOLIC BLOOD PRESSURE: 115 MMHG | DIASTOLIC BLOOD PRESSURE: 72 MMHG

## 2018-10-02 VITALS — DIASTOLIC BLOOD PRESSURE: 71 MMHG | SYSTOLIC BLOOD PRESSURE: 101 MMHG

## 2018-10-02 VITALS — SYSTOLIC BLOOD PRESSURE: 104 MMHG | DIASTOLIC BLOOD PRESSURE: 70 MMHG

## 2018-10-02 VITALS — DIASTOLIC BLOOD PRESSURE: 66 MMHG | SYSTOLIC BLOOD PRESSURE: 95 MMHG

## 2018-10-02 VITALS — SYSTOLIC BLOOD PRESSURE: 103 MMHG | DIASTOLIC BLOOD PRESSURE: 68 MMHG

## 2018-10-02 VITALS — SYSTOLIC BLOOD PRESSURE: 103 MMHG | DIASTOLIC BLOOD PRESSURE: 63 MMHG

## 2018-10-02 VITALS — SYSTOLIC BLOOD PRESSURE: 113 MMHG | DIASTOLIC BLOOD PRESSURE: 78 MMHG

## 2018-10-02 VITALS — SYSTOLIC BLOOD PRESSURE: 94 MMHG | DIASTOLIC BLOOD PRESSURE: 69 MMHG

## 2018-10-02 VITALS — DIASTOLIC BLOOD PRESSURE: 79 MMHG | SYSTOLIC BLOOD PRESSURE: 114 MMHG

## 2018-10-02 VITALS — SYSTOLIC BLOOD PRESSURE: 124 MMHG | DIASTOLIC BLOOD PRESSURE: 31 MMHG

## 2018-10-02 VITALS — SYSTOLIC BLOOD PRESSURE: 106 MMHG | DIASTOLIC BLOOD PRESSURE: 71 MMHG

## 2018-10-02 VITALS — SYSTOLIC BLOOD PRESSURE: 90 MMHG | DIASTOLIC BLOOD PRESSURE: 61 MMHG

## 2018-10-02 VITALS — SYSTOLIC BLOOD PRESSURE: 147 MMHG | DIASTOLIC BLOOD PRESSURE: 111 MMHG

## 2018-10-02 VITALS — DIASTOLIC BLOOD PRESSURE: 74 MMHG | SYSTOLIC BLOOD PRESSURE: 108 MMHG

## 2018-10-02 VITALS — SYSTOLIC BLOOD PRESSURE: 106 MMHG | DIASTOLIC BLOOD PRESSURE: 72 MMHG

## 2018-10-02 VITALS — DIASTOLIC BLOOD PRESSURE: 62 MMHG | SYSTOLIC BLOOD PRESSURE: 97 MMHG

## 2018-10-02 VITALS — SYSTOLIC BLOOD PRESSURE: 96 MMHG | DIASTOLIC BLOOD PRESSURE: 68 MMHG

## 2018-10-02 VITALS — SYSTOLIC BLOOD PRESSURE: 110 MMHG | DIASTOLIC BLOOD PRESSURE: 73 MMHG

## 2018-10-02 VITALS — SYSTOLIC BLOOD PRESSURE: 115 MMHG | DIASTOLIC BLOOD PRESSURE: 70 MMHG

## 2018-10-02 VITALS — SYSTOLIC BLOOD PRESSURE: 107 MMHG | DIASTOLIC BLOOD PRESSURE: 68 MMHG

## 2018-10-02 VITALS — SYSTOLIC BLOOD PRESSURE: 95 MMHG | DIASTOLIC BLOOD PRESSURE: 66 MMHG

## 2018-10-02 VITALS — SYSTOLIC BLOOD PRESSURE: 97 MMHG | DIASTOLIC BLOOD PRESSURE: 61 MMHG

## 2018-10-02 VITALS — SYSTOLIC BLOOD PRESSURE: 107 MMHG | DIASTOLIC BLOOD PRESSURE: 70 MMHG

## 2018-10-02 VITALS — DIASTOLIC BLOOD PRESSURE: 67 MMHG | SYSTOLIC BLOOD PRESSURE: 99 MMHG

## 2018-10-02 VITALS — DIASTOLIC BLOOD PRESSURE: 74 MMHG | SYSTOLIC BLOOD PRESSURE: 112 MMHG

## 2018-10-02 LAB
AMMONIA PLAS-SCNC: 57 UMOL/L (ref ?–32)
CHLORIDE SERPL-SCNC: 111 MEQ/L (ref 98–107)
CK MB SERPL-CCNC: 1.1 NG/ML (ref 0.5–3.6)
CK SERPL-CCNC: 274 IU/L (ref 39–308)
ERYTHROCYTE [DISTWIDTH] IN BLOOD BY AUTOMATED COUNT: 17 % (ref 11.6–14.6)
HAPTOGLOBIN: 213 MG/DL (ref 30–200)
HCT VFR BLD AUTO: 22.1 % (ref 42–52)
HCT VFR BLD AUTO: 26.1 % (ref 42–52)
HCT VFR BLD AUTO: 29 % (ref 42–52)
HGB BLD-MCNC: 7.3 G/DL (ref 14–18)
HGB BLD-MCNC: 8.7 G/DL (ref 14–18)
HGB BLD-MCNC: 9.6 G/DL (ref 14–18)
LYMPHOCYTES NFR BLD MANUAL: 4 % (ref 20–50)
MCH RBC QN AUTO: 30.1 PG (ref 28–32)
MCV RBC AUTO: 89.8 FL (ref 80–94)
METAMYELOCYTES NFR BLD MANUAL: 1 % (ref 0–0)
MONOCYTES NFR BLD MANUAL: 1 % (ref 2–8)
NEUTS BAND NFR BLD MANUAL: 22 % (ref 1–6)
NEUTS SEG NFR BLD MANUAL: 72 % (ref 45–75)
PHOSPHATE SERPL-MCNC: 4.4 MG/DL (ref 2.5–4.9)
PLATELET # BLD AUTO: 51 X1000/UL (ref 130–400)
PLATELET # BLD EST: (no result) 10*3/UL
RBC # BLD AUTO: 2.9 MILL/UL (ref 4.7–6.1)

## 2018-10-02 RX ADMIN — SUCRALFATE SCH G: 1 SUSPENSION ORAL at 12:10

## 2018-10-02 RX ADMIN — TRAMADOL HYDROCHLORIDE PRN MG: 50 TABLET, COATED ORAL at 12:20

## 2018-10-02 RX ADMIN — TAZOBACTAM SODIUM AND PIPERACILLIN SODIUM SCH MLS/HR: 250; 2 INJECTION, SOLUTION INTRAVENOUS at 00:10

## 2018-10-02 RX ADMIN — TRAMADOL HYDROCHLORIDE PRN MG: 50 TABLET, COATED ORAL at 06:41

## 2018-10-02 RX ADMIN — PANTOPRAZOLE SODIUM SCH MG: 40 INJECTION, POWDER, FOR SOLUTION INTRAVENOUS at 20:27

## 2018-10-02 RX ADMIN — PANTOPRAZOLE SODIUM SCH MG: 40 INJECTION, POWDER, FOR SOLUTION INTRAVENOUS at 08:19

## 2018-10-02 RX ADMIN — SUCRALFATE SCH G: 1 SUSPENSION ORAL at 16:41

## 2018-10-02 RX ADMIN — LEVOFLOXACIN SCH MLS/HR: 5 INJECTION, SOLUTION INTRAVENOUS at 13:41

## 2018-10-02 RX ADMIN — FOLIC ACID SCH MLS/HR: 5 INJECTION, SOLUTION INTRAMUSCULAR; INTRAVENOUS; SUBCUTANEOUS at 09:00

## 2018-10-02 RX ADMIN — SUCRALFATE SCH G: 1 SUSPENSION ORAL at 08:19

## 2018-10-02 RX ADMIN — SUCRALFATE SCH G: 1 SUSPENSION ORAL at 20:27

## 2018-10-02 RX ADMIN — FOLIC ACID SCH MLS/HR: 5 INJECTION, SOLUTION INTRAMUSCULAR; INTRAVENOUS; SUBCUTANEOUS at 08:48

## 2018-10-02 RX ADMIN — TAZOBACTAM SODIUM AND PIPERACILLIN SODIUM SCH MLS/HR: 250; 2 INJECTION, SOLUTION INTRAVENOUS at 06:30

## 2018-10-03 VITALS — DIASTOLIC BLOOD PRESSURE: 91 MMHG | SYSTOLIC BLOOD PRESSURE: 124 MMHG

## 2018-10-03 VITALS — DIASTOLIC BLOOD PRESSURE: 82 MMHG | SYSTOLIC BLOOD PRESSURE: 121 MMHG

## 2018-10-03 VITALS — DIASTOLIC BLOOD PRESSURE: 86 MMHG | SYSTOLIC BLOOD PRESSURE: 122 MMHG

## 2018-10-03 VITALS — DIASTOLIC BLOOD PRESSURE: 88 MMHG | SYSTOLIC BLOOD PRESSURE: 133 MMHG

## 2018-10-03 VITALS — SYSTOLIC BLOOD PRESSURE: 138 MMHG | DIASTOLIC BLOOD PRESSURE: 95 MMHG

## 2018-10-03 VITALS — SYSTOLIC BLOOD PRESSURE: 118 MMHG | DIASTOLIC BLOOD PRESSURE: 81 MMHG

## 2018-10-03 VITALS — DIASTOLIC BLOOD PRESSURE: 91 MMHG | SYSTOLIC BLOOD PRESSURE: 142 MMHG

## 2018-10-03 LAB
CHLORIDE SERPL-SCNC: 108 MEQ/L (ref 98–107)
ERYTHROCYTE [DISTWIDTH] IN BLOOD BY AUTOMATED COUNT: 17.1 % (ref 11.6–14.6)
HCT VFR BLD AUTO: 34 % (ref 42–52)
HGB BLD-MCNC: 11.3 G/DL (ref 14–18)
LYMPHOCYTES NFR BLD MANUAL: 4 % (ref 20–50)
MCH RBC QN AUTO: 29.6 PG (ref 28–32)
MCV RBC AUTO: 88.7 FL (ref 80–94)
NEUTS BAND NFR BLD MANUAL: 2 % (ref 1–6)
NEUTS SEG NFR BLD MANUAL: 94 % (ref 45–75)
PHOSPHATE SERPL-MCNC: 3.8 MG/DL (ref 2.5–4.9)
PLATELET # BLD AUTO: 81 X1000/UL (ref 130–400)
PLATELET # BLD EST: (no result) 10*3/UL
PMV BLD AUTO: 9.4 FL (ref 7.4–10.4)
RBC # BLD AUTO: 3.83 MILL/UL (ref 4.7–6.1)

## 2018-10-03 RX ADMIN — PANTOPRAZOLE SODIUM SCH MG: 40 INJECTION, POWDER, FOR SOLUTION INTRAVENOUS at 10:07

## 2018-10-03 RX ADMIN — SUCRALFATE SCH G: 1 SUSPENSION ORAL at 06:10

## 2018-10-03 RX ADMIN — SUCRALFATE SCH G: 1 SUSPENSION ORAL at 16:07

## 2018-10-03 RX ADMIN — ACETAMINOPHEN PRN MG: 325 TABLET, FILM COATED ORAL at 21:47

## 2018-10-03 RX ADMIN — SUCRALFATE SCH G: 1 SUSPENSION ORAL at 12:13

## 2018-10-03 RX ADMIN — TRAMADOL HYDROCHLORIDE PRN MG: 50 TABLET, COATED ORAL at 16:07

## 2018-10-03 RX ADMIN — TRAMADOL HYDROCHLORIDE PRN MG: 50 TABLET, COATED ORAL at 09:26

## 2018-10-03 RX ADMIN — SUCRALFATE SCH G: 1 SUSPENSION ORAL at 21:47

## 2018-10-03 RX ADMIN — FOLIC ACID SCH MLS/HR: 5 INJECTION, SOLUTION INTRAMUSCULAR; INTRAVENOUS; SUBCUTANEOUS at 21:49

## 2018-10-03 RX ADMIN — PANTOPRAZOLE SODIUM SCH MG: 40 INJECTION, POWDER, FOR SOLUTION INTRAVENOUS at 21:47

## 2018-10-04 VITALS — SYSTOLIC BLOOD PRESSURE: 123 MMHG | DIASTOLIC BLOOD PRESSURE: 82 MMHG

## 2018-10-04 VITALS — SYSTOLIC BLOOD PRESSURE: 107 MMHG | DIASTOLIC BLOOD PRESSURE: 71 MMHG

## 2018-10-04 VITALS — SYSTOLIC BLOOD PRESSURE: 108 MMHG | DIASTOLIC BLOOD PRESSURE: 74 MMHG

## 2018-10-04 VITALS — SYSTOLIC BLOOD PRESSURE: 124 MMHG | DIASTOLIC BLOOD PRESSURE: 81 MMHG

## 2018-10-04 VITALS — DIASTOLIC BLOOD PRESSURE: 76 MMHG | SYSTOLIC BLOOD PRESSURE: 113 MMHG

## 2018-10-04 LAB
C3 SERPL-MCNC: 100 MG/DL (ref 82–167)
CHLORIDE SERPL-SCNC: 107 MEQ/L (ref 98–107)
ERYTHROCYTE [DISTWIDTH] IN BLOOD BY AUTOMATED COUNT: 16.7 % (ref 11.6–14.6)
HCT VFR BLD AUTO: 32.7 % (ref 42–52)
HGB BLD-MCNC: 11.3 G/DL (ref 14–18)
MCH RBC QN AUTO: 30.3 PG (ref 28–32)
MCV RBC AUTO: 87.7 FL (ref 80–94)
METAMYELOCYTES NFR BLD MANUAL: 1 % (ref 0–0)
MONOCYTES NFR BLD MANUAL: 1 % (ref 2–8)
NEUTS BAND NFR BLD MANUAL: 15 % (ref 1–6)
NEUTS SEG NFR BLD MANUAL: 83 % (ref 45–75)
PHOSPHATE SERPL-MCNC: 2.9 MG/DL (ref 2.5–4.9)
PLATELET # BLD AUTO: 73 X1000/UL (ref 130–400)
PLATELET # BLD EST: (no result) 10*3/UL
PMV BLD AUTO: 9.1 FL (ref 7.4–10.4)
RBC # BLD AUTO: 3.73 MILL/UL (ref 4.7–6.1)

## 2018-10-04 RX ADMIN — Medication NR MLS/HR: at 15:32

## 2018-10-04 RX ADMIN — PANTOPRAZOLE SODIUM SCH MG: 40 INJECTION, POWDER, FOR SOLUTION INTRAVENOUS at 21:34

## 2018-10-04 RX ADMIN — SUCRALFATE SCH G: 1 SUSPENSION ORAL at 21:34

## 2018-10-04 RX ADMIN — SUCRALFATE SCH G: 1 SUSPENSION ORAL at 06:40

## 2018-10-04 RX ADMIN — SUCRALFATE SCH G: 1 SUSPENSION ORAL at 11:46

## 2018-10-04 RX ADMIN — LEVOFLOXACIN SCH MLS/HR: 5 INJECTION, SOLUTION INTRAVENOUS at 11:27

## 2018-10-04 RX ADMIN — Medication NR MLS/HR: at 17:17

## 2018-10-04 RX ADMIN — PANTOPRAZOLE SODIUM SCH MG: 40 INJECTION, POWDER, FOR SOLUTION INTRAVENOUS at 09:46

## 2018-10-04 RX ADMIN — TRAMADOL HYDROCHLORIDE PRN MG: 50 TABLET, COATED ORAL at 09:48

## 2018-10-04 RX ADMIN — LACTULOSE SCH ML: 10 SOLUTION ORAL at 15:32

## 2018-10-04 RX ADMIN — SUCRALFATE SCH G: 1 SUSPENSION ORAL at 15:50

## 2018-10-05 VITALS — DIASTOLIC BLOOD PRESSURE: 87 MMHG | SYSTOLIC BLOOD PRESSURE: 139 MMHG

## 2018-10-05 VITALS — SYSTOLIC BLOOD PRESSURE: 119 MMHG | DIASTOLIC BLOOD PRESSURE: 82 MMHG

## 2018-10-05 VITALS — DIASTOLIC BLOOD PRESSURE: 85 MMHG | SYSTOLIC BLOOD PRESSURE: 128 MMHG

## 2018-10-05 VITALS — SYSTOLIC BLOOD PRESSURE: 126 MMHG | DIASTOLIC BLOOD PRESSURE: 84 MMHG

## 2018-10-05 VITALS — SYSTOLIC BLOOD PRESSURE: 115 MMHG | DIASTOLIC BLOOD PRESSURE: 85 MMHG

## 2018-10-05 VITALS — SYSTOLIC BLOOD PRESSURE: 139 MMHG | DIASTOLIC BLOOD PRESSURE: 91 MMHG

## 2018-10-05 LAB
AMMONIA PLAS-SCNC: 50 UMOL/L (ref ?–32)
CHLORIDE SERPL-SCNC: 107 MEQ/L (ref 98–107)
ERYTHROCYTE [DISTWIDTH] IN BLOOD BY AUTOMATED COUNT: 16.7 % (ref 11.6–14.6)
HCT VFR BLD AUTO: 32 % (ref 42–52)
HGB BLD-MCNC: 11 G/DL (ref 14–18)
LYMPHOCYTES NFR BLD MANUAL: 6 % (ref 20–50)
MCH RBC QN AUTO: 30 PG (ref 28–32)
MCV RBC AUTO: 87.4 FL (ref 80–94)
METAMYELOCYTES NFR BLD MANUAL: 1 % (ref 0–0)
MONOCYTES NFR BLD MANUAL: 7 % (ref 2–8)
NEUTS BAND NFR BLD MANUAL: 6 % (ref 1–6)
NEUTS SEG NFR BLD MANUAL: 80 % (ref 45–75)
PHOSPHATE SERPL-MCNC: 2.6 MG/DL (ref 2.5–4.9)
PLATELET # BLD AUTO: 79 X1000/UL (ref 130–400)
PLATELET # BLD EST: (no result) 10*3/UL
PMV BLD AUTO: 10.1 FL (ref 7.4–10.4)
RBC # BLD AUTO: 3.66 MILL/UL (ref 4.7–6.1)

## 2018-10-05 RX ADMIN — TRAMADOL HYDROCHLORIDE PRN MG: 50 TABLET, COATED ORAL at 14:53

## 2018-10-05 RX ADMIN — SUCRALFATE SCH G: 1 SUSPENSION ORAL at 16:10

## 2018-10-05 RX ADMIN — VANCOMYCIN HYDROCHLORIDE SCH MLS/HR: 750 INJECTION, SOLUTION INTRAVENOUS at 16:10

## 2018-10-05 RX ADMIN — PANTOPRAZOLE SODIUM SCH MG: 40 INJECTION, POWDER, FOR SOLUTION INTRAVENOUS at 08:47

## 2018-10-05 RX ADMIN — TRAMADOL HYDROCHLORIDE PRN MG: 50 TABLET, COATED ORAL at 06:22

## 2018-10-05 RX ADMIN — SUCRALFATE SCH G: 1 SUSPENSION ORAL at 11:13

## 2018-10-05 RX ADMIN — ACETAMINOPHEN PRN MG: 325 TABLET, FILM COATED ORAL at 20:03

## 2018-10-05 RX ADMIN — SUCRALFATE SCH G: 1 SUSPENSION ORAL at 20:03

## 2018-10-05 RX ADMIN — LACTULOSE SCH ML: 10 SOLUTION ORAL at 08:48

## 2018-10-05 RX ADMIN — SUCRALFATE SCH G: 1 SUSPENSION ORAL at 06:02

## 2018-10-05 RX ADMIN — GUAIFENESIN PRN ML: 100 SOLUTION ORAL at 14:53

## 2018-10-06 VITALS — DIASTOLIC BLOOD PRESSURE: 85 MMHG | SYSTOLIC BLOOD PRESSURE: 142 MMHG

## 2018-10-06 VITALS — DIASTOLIC BLOOD PRESSURE: 84 MMHG | SYSTOLIC BLOOD PRESSURE: 136 MMHG

## 2018-10-06 VITALS — DIASTOLIC BLOOD PRESSURE: 83 MMHG | SYSTOLIC BLOOD PRESSURE: 139 MMHG

## 2018-10-06 VITALS — DIASTOLIC BLOOD PRESSURE: 63 MMHG | SYSTOLIC BLOOD PRESSURE: 126 MMHG

## 2018-10-06 VITALS — DIASTOLIC BLOOD PRESSURE: 93 MMHG | SYSTOLIC BLOOD PRESSURE: 145 MMHG

## 2018-10-06 LAB
CHLORIDE SERPL-SCNC: 108 MEQ/L (ref 98–107)
ERYTHROCYTE [DISTWIDTH] IN BLOOD BY AUTOMATED COUNT: 16.8 % (ref 11.6–14.6)
HCT VFR BLD AUTO: 31.9 % (ref 42–52)
HGB BLD-MCNC: 10.8 G/DL (ref 14–18)
LYMPHOCYTES NFR BLD MANUAL: 12 % (ref 20–50)
MCH RBC QN AUTO: 29.8 PG (ref 28–32)
MCV RBC AUTO: 88.2 FL (ref 80–94)
METAMYELOCYTES NFR BLD MANUAL: 1 % (ref 0–0)
MONOCYTES NFR BLD MANUAL: 9 % (ref 2–8)
NEUTS BAND NFR BLD MANUAL: 3 % (ref 1–6)
NEUTS SEG NFR BLD MANUAL: 75 % (ref 45–75)
PHOSPHATE SERPL-MCNC: 2.4 MG/DL (ref 2.5–4.9)
PLATELET # BLD AUTO: 128 X1000/UL (ref 130–400)
PLATELET # BLD EST: (no result) 10*3/UL
PMV BLD AUTO: 9.5 FL (ref 7.4–10.4)
RBC # BLD AUTO: 3.62 MILL/UL (ref 4.7–6.1)

## 2018-10-06 RX ADMIN — PANTOPRAZOLE SODIUM SCH MG: 40 TABLET, DELAYED RELEASE ORAL at 06:42

## 2018-10-06 RX ADMIN — SUCRALFATE SCH G: 1 SUSPENSION ORAL at 11:59

## 2018-10-06 RX ADMIN — ACETAMINOPHEN PRN MG: 325 TABLET, FILM COATED ORAL at 22:06

## 2018-10-06 RX ADMIN — GUAIFENESIN PRN ML: 100 SOLUTION ORAL at 16:48

## 2018-10-06 RX ADMIN — LACTULOSE SCH ML: 10 SOLUTION ORAL at 09:43

## 2018-10-06 RX ADMIN — SUCRALFATE SCH G: 1 SUSPENSION ORAL at 16:48

## 2018-10-06 RX ADMIN — VANCOMYCIN HYDROCHLORIDE SCH MLS/HR: 750 INJECTION, SOLUTION INTRAVENOUS at 09:00

## 2018-10-06 RX ADMIN — SUCRALFATE SCH G: 1 SUSPENSION ORAL at 06:45

## 2018-10-06 RX ADMIN — SUCRALFATE SCH G: 1 SUSPENSION ORAL at 22:02

## 2018-10-06 RX ADMIN — TRAMADOL HYDROCHLORIDE PRN MG: 50 TABLET, COATED ORAL at 06:48

## 2018-10-06 RX ADMIN — POTASSIUM & SODIUM PHOSPHATES POWDER PACK 280-160-250 MG SCH PACKET: 280-160-250 PACK at 16:49

## 2018-10-06 RX ADMIN — ACETAMINOPHEN PRN MG: 325 TABLET, FILM COATED ORAL at 16:48

## 2018-10-07 VITALS — DIASTOLIC BLOOD PRESSURE: 82 MMHG | SYSTOLIC BLOOD PRESSURE: 131 MMHG

## 2018-10-07 VITALS — DIASTOLIC BLOOD PRESSURE: 88 MMHG | SYSTOLIC BLOOD PRESSURE: 143 MMHG

## 2018-10-07 VITALS — SYSTOLIC BLOOD PRESSURE: 144 MMHG | DIASTOLIC BLOOD PRESSURE: 88 MMHG

## 2018-10-07 VITALS — DIASTOLIC BLOOD PRESSURE: 94 MMHG | SYSTOLIC BLOOD PRESSURE: 136 MMHG

## 2018-10-07 VITALS — DIASTOLIC BLOOD PRESSURE: 88 MMHG | SYSTOLIC BLOOD PRESSURE: 144 MMHG

## 2018-10-07 LAB
CHLORIDE SERPL-SCNC: 107 MEQ/L (ref 98–107)
ERYTHROCYTE [DISTWIDTH] IN BLOOD BY AUTOMATED COUNT: 16.6 % (ref 11.6–14.6)
HCT VFR BLD AUTO: 31.5 % (ref 42–52)
HGB BLD-MCNC: 10.6 G/DL (ref 14–18)
LYMPHOCYTES NFR BLD MANUAL: 11 % (ref 20–50)
MCH RBC QN AUTO: 29.5 PG (ref 28–32)
MCV RBC AUTO: 87.9 FL (ref 80–94)
MONOCYTES NFR BLD MANUAL: 7 % (ref 2–8)
NEUTS BAND NFR BLD MANUAL: 3 % (ref 1–6)
NEUTS SEG NFR BLD MANUAL: 79 % (ref 45–75)
PHOSPHATE SERPL-MCNC: 2.8 MG/DL (ref 2.5–4.9)
PLATELET # BLD AUTO: 167 X1000/UL (ref 130–400)
PLATELET # BLD EST: NORMAL 10*3/UL
PMV BLD AUTO: 9 FL (ref 7.4–10.4)
RBC # BLD AUTO: 3.58 MILL/UL (ref 4.7–6.1)

## 2018-10-07 RX ADMIN — SUCRALFATE SCH G: 1 SUSPENSION ORAL at 06:47

## 2018-10-07 RX ADMIN — POTASSIUM & SODIUM PHOSPHATES POWDER PACK 280-160-250 MG SCH PACKET: 280-160-250 PACK at 08:23

## 2018-10-07 RX ADMIN — ACETAMINOPHEN PRN MG: 325 TABLET, FILM COATED ORAL at 09:43

## 2018-10-07 RX ADMIN — LACTULOSE SCH ML: 10 SOLUTION ORAL at 08:23

## 2018-10-07 RX ADMIN — SUCRALFATE SCH G: 1 SUSPENSION ORAL at 12:08

## 2018-10-07 RX ADMIN — PANTOPRAZOLE SODIUM SCH MG: 40 TABLET, DELAYED RELEASE ORAL at 06:47

## 2018-12-29 ENCOUNTER — HOSPITAL ENCOUNTER (EMERGENCY)
Dept: HOSPITAL 87 - ER | Age: 68
Discharge: HOME | End: 2018-12-29
Payer: MEDICARE

## 2018-12-29 VITALS — BODY MASS INDEX: 24.22 KG/M2 | WEIGHT: 154.32 LBS | HEIGHT: 67 IN

## 2018-12-29 VITALS — SYSTOLIC BLOOD PRESSURE: 136 MMHG | DIASTOLIC BLOOD PRESSURE: 84 MMHG

## 2018-12-29 DIAGNOSIS — M19.90: Primary | ICD-10-CM

## 2018-12-29 DIAGNOSIS — I10: ICD-10-CM

## 2018-12-29 PROCEDURE — 99283 EMERGENCY DEPT VISIT LOW MDM: CPT

## 2018-12-29 PROCEDURE — 96372 THER/PROPH/DIAG INJ SC/IM: CPT

## 2019-01-10 ENCOUNTER — HOSPITAL ENCOUNTER (INPATIENT)
Dept: HOSPITAL 91 - TEL | Age: 69
LOS: 13 days | Discharge: NURSING, REHAB OR LONG-TERM CARE | DRG: 542 | End: 2019-01-23
Payer: MEDICARE

## 2019-01-10 ENCOUNTER — HOSPITAL ENCOUNTER (INPATIENT)
Dept: HOSPITAL 10 - TEL | Age: 69
LOS: 13 days | Discharge: NURSING, REHAB OR LONG-TERM CARE | DRG: 542 | End: 2019-01-23
Attending: INTERNAL MEDICINE | Admitting: INTERNAL MEDICINE
Payer: MEDICARE

## 2019-01-10 VITALS — SYSTOLIC BLOOD PRESSURE: 132 MMHG | DIASTOLIC BLOOD PRESSURE: 74 MMHG | RESPIRATION RATE: 18 BRPM | HEART RATE: 96 BPM

## 2019-01-10 VITALS
HEIGHT: 70 IN | HEIGHT: 70 IN | WEIGHT: 105.16 LBS | WEIGHT: 105.16 LBS | BODY MASS INDEX: 15.05 KG/M2 | BODY MASS INDEX: 15.05 KG/M2

## 2019-01-10 VITALS — HEART RATE: 107 BPM

## 2019-01-10 DIAGNOSIS — C61: ICD-10-CM

## 2019-01-10 DIAGNOSIS — R59.1: ICD-10-CM

## 2019-01-10 DIAGNOSIS — E43: ICD-10-CM

## 2019-01-10 DIAGNOSIS — N17.9: ICD-10-CM

## 2019-01-10 DIAGNOSIS — C78.00: ICD-10-CM

## 2019-01-10 DIAGNOSIS — N13.1: ICD-10-CM

## 2019-01-10 DIAGNOSIS — Z86.19: ICD-10-CM

## 2019-01-10 DIAGNOSIS — F10.21: ICD-10-CM

## 2019-01-10 DIAGNOSIS — K92.2: ICD-10-CM

## 2019-01-10 DIAGNOSIS — K29.00: ICD-10-CM

## 2019-01-10 DIAGNOSIS — D69.6: ICD-10-CM

## 2019-01-10 DIAGNOSIS — C79.51: Primary | ICD-10-CM

## 2019-01-10 DIAGNOSIS — D63.8: ICD-10-CM

## 2019-01-10 DIAGNOSIS — G93.41: ICD-10-CM

## 2019-01-10 PROCEDURE — 86920 COMPATIBILITY TEST SPIN: CPT

## 2019-01-10 PROCEDURE — 86301 IMMUNOASSAY TUMOR CA 19-9: CPT

## 2019-01-10 PROCEDURE — 84154 ASSAY OF PSA FREE: CPT

## 2019-01-10 PROCEDURE — 82247 BILIRUBIN TOTAL: CPT

## 2019-01-10 PROCEDURE — 84460 ALANINE AMINO (ALT) (SGPT): CPT

## 2019-01-10 PROCEDURE — 82728 ASSAY OF FERRITIN: CPT

## 2019-01-10 PROCEDURE — 84450 TRANSFERASE (AST) (SGOT): CPT

## 2019-01-10 PROCEDURE — 74181 MRI ABDOMEN W/O CONTRAST: CPT

## 2019-01-10 PROCEDURE — 80076 HEPATIC FUNCTION PANEL: CPT

## 2019-01-10 PROCEDURE — 82270 OCCULT BLOOD FECES: CPT

## 2019-01-10 PROCEDURE — 76705 ECHO EXAM OF ABDOMEN: CPT

## 2019-01-10 PROCEDURE — 83540 ASSAY OF IRON: CPT

## 2019-01-10 PROCEDURE — 97161 PT EVAL LOW COMPLEX 20 MIN: CPT

## 2019-01-10 PROCEDURE — 76775 US EXAM ABDO BACK WALL LIM: CPT

## 2019-01-10 PROCEDURE — 88305 TISSUE EXAM BY PATHOLOGIST: CPT

## 2019-01-10 PROCEDURE — 86901 BLOOD TYPING SEROLOGIC RH(D): CPT

## 2019-01-10 PROCEDURE — 88312 SPECIAL STAINS GROUP 1: CPT

## 2019-01-10 PROCEDURE — 78306 BONE IMAGING WHOLE BODY: CPT

## 2019-01-10 PROCEDURE — 74018 RADEX ABDOMEN 1 VIEW: CPT

## 2019-01-10 PROCEDURE — 86900 BLOOD TYPING SEROLOGIC ABO: CPT

## 2019-01-10 PROCEDURE — 80048 BASIC METABOLIC PNL TOTAL CA: CPT

## 2019-01-10 PROCEDURE — 84100 ASSAY OF PHOSPHORUS: CPT

## 2019-01-10 PROCEDURE — 72170 X-RAY EXAM OF PELVIS: CPT

## 2019-01-10 PROCEDURE — 84080 ASSAY ALKALINE PHOSPHATASES: CPT

## 2019-01-10 PROCEDURE — 80053 COMPREHEN METABOLIC PANEL: CPT

## 2019-01-10 PROCEDURE — 84153 ASSAY OF PSA TOTAL: CPT

## 2019-01-10 PROCEDURE — P9016 RBC LEUKOCYTES REDUCED: HCPCS

## 2019-01-10 PROCEDURE — 82378 CARCINOEMBRYONIC ANTIGEN: CPT

## 2019-01-10 PROCEDURE — 82105 ALPHA-FETOPROTEIN SERUM: CPT

## 2019-01-10 PROCEDURE — 72100 X-RAY EXAM L-S SPINE 2/3 VWS: CPT

## 2019-01-10 PROCEDURE — 36430 TRANSFUSION BLD/BLD COMPNT: CPT

## 2019-01-10 PROCEDURE — 85025 COMPLETE CBC W/AUTO DIFF WBC: CPT

## 2019-01-10 PROCEDURE — 86850 RBC ANTIBODY SCREEN: CPT

## 2019-01-10 PROCEDURE — A9503 TC99M MEDRONATE: HCPCS

## 2019-01-10 PROCEDURE — 83735 ASSAY OF MAGNESIUM: CPT

## 2019-01-10 NOTE — NUR
Direct admit from [facility], by ambulance. Pt a/a/ox3, Agitated and 
anxious. c/o B legs pain. VS stable, afebrile. Skin intact picture taken. call light within 
reach. Continue to monitor.

## 2019-01-11 VITALS — SYSTOLIC BLOOD PRESSURE: 121 MMHG | DIASTOLIC BLOOD PRESSURE: 77 MMHG | RESPIRATION RATE: 18 BRPM | HEART RATE: 78 BPM

## 2019-01-11 VITALS — SYSTOLIC BLOOD PRESSURE: 120 MMHG | HEART RATE: 92 BPM | DIASTOLIC BLOOD PRESSURE: 70 MMHG | RESPIRATION RATE: 20 BRPM

## 2019-01-11 VITALS — DIASTOLIC BLOOD PRESSURE: 76 MMHG | HEART RATE: 75 BPM | SYSTOLIC BLOOD PRESSURE: 126 MMHG | RESPIRATION RATE: 18 BRPM

## 2019-01-11 VITALS — SYSTOLIC BLOOD PRESSURE: 129 MMHG | DIASTOLIC BLOOD PRESSURE: 72 MMHG | RESPIRATION RATE: 20 BRPM | HEART RATE: 76 BPM

## 2019-01-11 VITALS — HEART RATE: 97 BPM

## 2019-01-11 VITALS — DIASTOLIC BLOOD PRESSURE: 75 MMHG | RESPIRATION RATE: 20 BRPM | SYSTOLIC BLOOD PRESSURE: 121 MMHG | HEART RATE: 100 BPM

## 2019-01-11 VITALS — HEART RATE: 98 BPM

## 2019-01-11 VITALS — HEART RATE: 111 BPM

## 2019-01-11 VITALS — HEART RATE: 102 BPM

## 2019-01-11 VITALS — HEART RATE: 73 BPM

## 2019-01-11 LAB
% IRON SATURATION: 26 % SAT (ref 22–52)
ABNORMAL IP MESSAGE: 1
ADD MAN DIFF?: NO
ALANINE AMINOTRANSFERASE: 9 IU/L (ref 13–69)
ALBUMIN/GLOBULIN RATIO: 0.96
ALBUMIN: 3.2 G/DL (ref 3.3–4.9)
ALKALINE PHOSPHATASE: 268 IU/L (ref 42–121)
ANION GAP: 7 (ref 5–13)
ANISOCYTOSIS: (no result) (ref 0–0)
ASPARTATE AMINO TRANSFERASE: 51 IU/L (ref 15–46)
BAND NEUTROPHILS #M: 0.1 10^3/UL (ref 0–0.6)
BAND NEUTROPHILS % (M): 2 % (ref 0–4)
BASOPHIL #: 0 10^3/UL (ref 0–0.1)
BASOPHILS %: 0.2 % (ref 0–2)
BILIRUBIN,DIRECT: 0 MG/DL (ref 0–0.2)
BILIRUBIN,TOTAL: 0 MG/DL (ref 0.2–1.3)
BLOOD UREA NITROGEN: 28 MG/DL (ref 7–20)
BURR CELLS: (no result) (ref 0–0)
CALCIUM: 8.7 MG/DL (ref 8.4–10.2)
CARBON DIOXIDE: 25 MMOL/L (ref 21–31)
CHLORIDE: 106 MMOL/L (ref 97–110)
CREATININE: 1.49 MG/DL (ref 0.61–1.24)
EOSINOPHILS #: 0 10^3/UL (ref 0–0.5)
EOSINOPHILS %: 0.7 % (ref 0–7)
ERYTHROBLAST% (NRBC) (M): 1 % (ref 0–0)
FERRITIN: 1670 NG/ML (ref 11.1–264)
GIANT THROMBO% (M): 1 % (ref 0–0)
GLOBULIN: 3.3 G/DL (ref 1.3–3.2)
GLUCOSE: 124 MG/DL (ref 70–220)
HEMATOCRIT: 20.2 % (ref 42–52)
HEMOGLOBIN: 6.4 G/DL (ref 14–18)
IMMATURE GRANS #M: 0.13 10^3/UL (ref 0–0.03)
IMMATURE GRANS % (M): 2.2 % (ref 0–0.43)
IMMEDIATE SPIN CROSSMATCH: 1 1
IRON: 66 UG/DL (ref 35–150)
LYMPHOCYTES #: 1.3 10^3/UL (ref 0.8–2.9)
LYMPHOCYTES #M: 1.4 10^3/UL (ref 0.8–2.9)
LYMPHOCYTES % (M): 25 % (ref 15–51)
LYMPHOCYTES %: 22.5 % (ref 15–51)
MEAN CORPUSCULAR HEMOGLOBIN: 29 PG (ref 29–33)
MEAN CORPUSCULAR HGB CONC: 31.7 G/DL (ref 32–37)
MEAN CORPUSCULAR VOLUME: 91.4 FL (ref 82–101)
MEAN PLATELET VOLUME: 10.3 FL (ref 7.4–10.4)
MICROCYTOSIS: (no result) (ref 0–0)
MONOCYTE #: 0.6 10^3/UL (ref 0.3–0.9)
MONOCYTE #M: 0.1 10^3/UL (ref 0.3–0.9)
MONOCYTES % (M): 3 % (ref 0–11)
MONOCYTES %: 9.5 % (ref 0–11)
MYELOCYTES #M: 0 10^3/UL (ref 0–0)
MYELOCYTES % (M): 1 % (ref 0–0)
NEUTROPHIL #: 3.8 10^3/UL (ref 1.6–7.5)
NEUTROPHILS %: 64.9 % (ref 39–77)
NUCLEATED RED BLOOD CELLS #: 0 10^3/UL (ref 0–0)
NUCLEATED RED BLOOD CELLS%: 0.5 /100WBC (ref 0–0)
OVALOCYTES: (no result) (ref 0–0)
PATH REVIEW?: YES
PLATELET COUNT: 149 10^3/UL (ref 140–415)
PLATELET ESTIMATE: NORMAL
POIKILOCYTOSIS: (no result) (ref 0–0)
POLYCHROMASIA: (no result) (ref 0–0)
POSITIVE DIFF: (no result)
POTASSIUM: 4.5 MMOL/L (ref 3.5–5.1)
PROSTATE SPECIFIC ANTIGEN: > 1000 NG/ML (ref 0–4)
RED BLOOD COUNT: 2.21 10^6/UL (ref 4.7–6.1)
RED CELL DISTRIBUTION WIDTH: 16.7 % (ref 11.5–14.5)
SEG NEUT #M: 4 10^3/UL (ref 1.6–7.5)
SEGMENTED NEUTROPHILS (M) %: 69 % (ref 39–77)
SMUDGE%M: 12 % (ref 0–0)
SODIUM: 138 MMOL/L (ref 135–144)
TOTAL IRON BINDING CAPACITY: 254 UG/DL (ref 241–421)
TOTAL PROTEIN: 6.5 G/DL (ref 6.1–8.1)
WHITE BLOOD COUNT: 5.8 10^3/UL (ref 4.8–10.8)

## 2019-01-11 PROCEDURE — 30233N1 TRANSFUSION OF NONAUTOLOGOUS RED BLOOD CELLS INTO PERIPHERAL VEIN, PERCUTANEOUS APPROACH: ICD-10-PCS

## 2019-01-11 RX ADMIN — METOPROLOL TARTRATE SCH MG: 50 TABLET, FILM COATED ORAL at 09:51

## 2019-01-11 RX ADMIN — FOLIC ACID SCH MLS/HR: 5 INJECTION, SOLUTION INTRAMUSCULAR; INTRAVENOUS; SUBCUTANEOUS at 01:17

## 2019-01-11 RX ADMIN — THIAMINE HYDROCHLORIDE 1 MLS/HR: 100 INJECTION, SOLUTION INTRAMUSCULAR; INTRAVENOUS at 14:10

## 2019-01-11 RX ADMIN — FERROUS SULFATE TAB 325 MG (65 MG ELEMENTAL FE) SCH MG: 325 (65 FE) TAB at 09:50

## 2019-01-11 RX ADMIN — FAMOTIDINE 1 MG: 20 TABLET ORAL at 09:51

## 2019-01-11 RX ADMIN — METOPROLOL TARTRATE 1 MG: 50 TABLET, FILM COATED ORAL at 21:03

## 2019-01-11 RX ADMIN — TAMSULOSIN HYDROCHLORIDE SCH MG: 0.4 CAPSULE ORAL at 21:03

## 2019-01-11 RX ADMIN — THIAMINE HCL TAB 100 MG SCH MG: 100 TAB at 09:50

## 2019-01-11 RX ADMIN — METOPROLOL TARTRATE 1 MG: 50 TABLET, FILM COATED ORAL at 09:51

## 2019-01-11 RX ADMIN — FERROUS SULFATE TAB 325 MG (65 MG ELEMENTAL FE) 1 MG: 325 (65 FE) TAB at 09:50

## 2019-01-11 RX ADMIN — MULTIPLE VITAMINS W/ MINERALS TAB SCH TAB: TAB at 09:51

## 2019-01-11 RX ADMIN — FOLIC ACID SCH MLS/HR: 5 INJECTION, SOLUTION INTRAMUSCULAR; INTRAVENOUS; SUBCUTANEOUS at 21:23

## 2019-01-11 RX ADMIN — THIAMINE HYDROCHLORIDE 1 MLS/HR: 100 INJECTION, SOLUTION INTRAMUSCULAR; INTRAVENOUS at 01:17

## 2019-01-11 RX ADMIN — FAMOTIDINE SCH MG: 20 TABLET ORAL at 09:51

## 2019-01-11 RX ADMIN — FOLIC ACID SCH MLS/HR: 5 INJECTION, SOLUTION INTRAMUSCULAR; INTRAVENOUS; SUBCUTANEOUS at 14:10

## 2019-01-11 RX ADMIN — PANTOPRAZOLE SODIUM 1 MG: 40 TABLET, DELAYED RELEASE ORAL at 05:48

## 2019-01-11 RX ADMIN — PANTOPRAZOLE SODIUM SCH MG: 40 TABLET, DELAYED RELEASE ORAL at 05:48

## 2019-01-11 RX ADMIN — FERROUS SULFATE TAB 325 MG (65 MG ELEMENTAL FE) 1 MG: 325 (65 FE) TAB at 21:03

## 2019-01-11 RX ADMIN — Medication 1 MG: at 01:18

## 2019-01-11 RX ADMIN — TAMSULOSIN HYDROCHLORIDE 1 MG: 0.4 CAPSULE ORAL at 21:03

## 2019-01-11 RX ADMIN — FERROUS SULFATE TAB 325 MG (65 MG ELEMENTAL FE) SCH MG: 325 (65 FE) TAB at 13:19

## 2019-01-11 RX ADMIN — METOPROLOL TARTRATE SCH MG: 50 TABLET, FILM COATED ORAL at 21:03

## 2019-01-11 RX ADMIN — THIAMINE HCL TAB 100 MG 1 MG: 100 TAB at 09:50

## 2019-01-11 RX ADMIN — FERROUS SULFATE TAB 325 MG (65 MG ELEMENTAL FE) SCH MG: 325 (65 FE) TAB at 21:03

## 2019-01-11 RX ADMIN — FERROUS SULFATE TAB 325 MG (65 MG ELEMENTAL FE) 1 MG: 325 (65 FE) TAB at 13:19

## 2019-01-11 RX ADMIN — MULTIPLE VITAMINS W/ MINERALS TAB 1 TAB: TAB at 09:51

## 2019-01-11 RX ADMIN — FOLIC ACID 1 MG: 1 TABLET ORAL at 09:51

## 2019-01-11 RX ADMIN — FOLIC ACID SCH MG: 1 TABLET ORAL at 09:51

## 2019-01-11 RX ADMIN — THIAMINE HYDROCHLORIDE 1 MLS/HR: 100 INJECTION, SOLUTION INTRAMUSCULAR; INTRAVENOUS at 21:23

## 2019-01-11 NOTE — NUR
RN Notes:

Received pt around 1850 from [facility] via bed, pt alert and verbally responsive, breathing even 
and unlabored. Endorsed to next shift for skin assessment, transfer assessment and 
continuity of care.

## 2019-01-11 NOTE — QN
Documentation


Comment


SEEN AND EXAMINED WITH NP


CHART  REVIEWED


METS TO BONE/LAD AND PULM> LIKLEY PROSTATE CA, WILL NEED TISSUE


IR FO BIOPSY ? LAD


1 UNIT PRBC











TOMAS BRICE MD              Jan 11, 2019 18:09

## 2019-01-11 NOTE — NUR
RN NOTES: EOSS

Pt s/p 1 unit PRBC, labs ordered in the AM. VS stable. Pt a/o x 2-3, agitated, confused at 
times. Report given to 2E. Transported via bed with all personal belongings. Care 
relinquished.

## 2019-01-11 NOTE — NUR
Pt a/a/ox3 agitated, VS stable, afebrile. Hemoglobin 6.4, Dr Jimenez was notified, awaiting he 
call back. Pt has BM x2 dark color, . Refused to sign the paper for belonging and did not 
want to be checked  his bags.  Report to incoming nurse Jennifer. Continue to monitor.

## 2019-01-11 NOTE — HP
Date/Time of Note


Date/Time of Note


DATE: 1/11/19 


TIME: 16:06





Assessment/Plan


VTE Prophylaxis


Risk score (from [facility])>0 risk:  2


SCD applied (from [facility]):  No


SCD contraindicated:  patient refusal


Pharmacological prophylaxis:  NA/contraindicated


Pharm contraindication:  blood coag disorder





Lines/Catheters


IV Catheter Type (from [facility]):  Peripheral IV


Urinary Cath still in place:  No





Assessment/Plan


Hospital Course


1. AMS,  metabolic encephalopathy


2.  Lymphadenopathy concerning for malignancy unknown primary source.  


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


3.  Acute kidney injury


4.  Severe anemia


5.  Recent weight loss


6.  Severe malnourishment


7.  Thrombocytopenia


8.  History of subdural hematoma


9.  History of hepatitis C


10.  History of alcoholism


11.  Hypertension


12.  Mild left hydronephrosis with obstructing distal left ureteral calculus


Assessment/Plan


-Blood transfusion in process


-DVT prophylaxis SCD


 -GI prophylaxis


-Dr. Mcnulty/Rafael from oncology consult is appreciated


-Biopsy is pending


-Palliative care 


- tumor markers are pending s


-pt has difficulties to urinate so bladder scanner as needed


-PSA


-MEdsurg


Result Diagram:  


1/11/19 0605                                                                    


           1/11/19 0605





Results 24hrs





Laboratory Tests


               Test
                                1/11/19
06:05


               White Blood Count                            5.8


               Red Blood Count                            2.21  L


               Hemoglobin                                 6.4  *L


               Hematocrit                                 20.2  L


               Mean Corpuscular Volume                     91.4


               Mean Corpuscular Hemoglobin                 29.0


               Mean Corpuscular Hemoglobin
Concent       31.7  L



               Red Cell Distribution Width                16.7  H


               Platelet Count                               149


               Mean Platelet Volume                        10.3


               Immature Granulocytes %                   2.200  H


               Neutrophils %                               64.9


               Segmented Neutrophils %
(Manual)             69  



               Band Neutrophils % (Manual)                    2


               Lymphocytes %                               22.5


               Lymphocytes % (Manual)                        25


               Monocytes %                                  9.5


               Monocytes % (Manual)                           3


               Eosinophils %                                0.7


               Basophils %                                  0.2


               Myelocytes % (Manual)                         1  H


               Nucleated Red Blood Cells %                   1  H


               Immature Granulocytes #                   0.130  H


               Neutrophils #                                3.8


               Neutrophils # (Manual)                       4.0


               Band Neutrophils #                           0.1


               Lymphocytes (Manual)                         1.4


               Lymphocytes #                                1.3


               Monocytes #                                  0.6


               Monocytes # (Manual)                        0.1  L


               Eosinophils #                                0.0


               Basophils #                                  0.0


               Myelocytes #                                 0.0


               Nucleated Red Blood Cells #                  0.0


               Pathologist Review
(Hematology)      YES  



               Platelet Estimate                    NORMAL


               Giant Platelets                               1  H


               Polychromasia                                 1+


               Poikilocytosis                                2+


               Anisocytosis                                  2+


               Microcytosis                                  2+


               Ovalocytes                                    1+


               Sodium Level                                 138


               Potassium Level                              4.5


               Chloride Level                               106


               Carbon Dioxide Level                          25


               Anion Gap                                      7


               Blood Urea Nitrogen                          28  H


               Creatinine                                 1.49  H


               Est Glomerular Filtrat Rate
mL/min          47  L



               Glucose Level                                124


               Calcium Level                                8.7


               Total Bilirubin                             0.0  L


               Direct Bilirubin                            0.00


               Indirect Bilirubin                           0.0


               Aspartate Amino Transf
(AST/SGOT)           51  H



               Alanine Aminotransferase
(ALT/SGPT)          9  L



               Alkaline Phosphatase                        268  H


               Total Protein                                6.5


               Albumin                                     3.2  L


               Globulin                                   3.30  H


               Albumin/Globulin Ratio                      0.96








HPI/ROS


Admit Date/Time


Admit Date/Time


Andrey 10, 2019 at 22:30





Hx of Present Illness


This 70-year-old frail gentleman was brought from [facility] 


with history of hypertension subdural hematoma alcoholism anemia with the 


concern of metastatic disease with metastasis to bones and lungs.  He is prior 


to source is unknown.  Patient has no capacity to make decisions for himself,  


his brother is making decision there will need to find for the consult but they 


did not sign.  Dr. Hall oncologist for the patient.  Patient is a poor 


historian





ROS


Cardiovascular:  no complaints


Gastrointestinal:  pain


Musculoskeletal:  back pain, bone/joint pain





PMH/Family/Social


Past Medical History


Medical History:  hypertension


Medications





Current Medications


Pantoprazole (Protonix Tab) 40 mg DAILY@06 PO  Last administered on 1/11/19at 


05:48; Admin Dose 40 MG;  Start 1/11/19 at 06:00


Morphine Sulfate (morphine SULFATE (PF)) 2 mg Q4H  PRN IV SEVERE PAIN LEVEL 7-10


Last administered on 1/11/19at 01:18; Admin Dose 2 MG;  Start 1/11/19 at 00:40


Sodium Chloride 1,000 ml @  75 mls/hr [id number] IV  Last administered on 1/11/19at


01:17; Admin Dose 75 MLS/HR;  Start 1/11/19 at 00:50


Oxycodone/ Acetaminophen (Percocet (5/ 325)) 2 tab Q6H  PRN PO PAIN LEVEL 6-10; 


Start 1/11/19 at 01:00


Oxycodone/ Acetaminophen (Percocet (5/ 325)) 1 tab Q6H  PRN PO MODERATE PAIN 


LEVEL 4-6;  Start 1/11/19 at 01:00


Folic Acid (Folic Acid) 1 mg DAILY PO  Last administered on 1/11/19at 09:51; 


Admin Dose 1 MG;  Start 1/11/19 at 09:00


Multivitamins/ Minerals (Theragran-M) 1 tab DAILY PO  Last administered on 


1/11/19at 09:51; Admin Dose 1 TAB;  Start 1/11/19 at 09:00


Thiamine HCl (Vitamin B1) 100 mg DAILY PO  Last administered on 1/11/19at 09:50;


Admin Dose 100 MG;  Start 1/11/19 at 09:00


Polyethylene Glycol (Miralax) 17 gm DAILY  PRN PO CONSTIPATION;  Start 1/11/19 


at 01:00


Metoprolol Tartrate (Lopressor) 50 mg BID PO  Last administered on 1/11/19at 


09:51; Admin Dose 50 MG;  Start 1/11/19 at 09:00


Ferrous Sulfate (Ferrous Sulfate (Ec)) 325 mg TID PO  Last administered on 


1/11/19at 13:19; Admin Dose 325 MG;  Start 1/11/19 at 09:00


Hydralazine HCl (Apresoline) 25 mg TID PO  Last administered on 1/11/19at 09:51;


Admin Dose 25 MG;  Start 1/11/19 at 09:00


Tamsulosin HCl (Flomax) 0.4 mg HS PO ;  Start 1/11/19 at 21:00


Famotidine (Pepcid) 20 mg DAILY PO  Last administered on 1/11/19at 09:51; Admin 


Dose 20 MG;  Start 1/11/19 at 09:00


Magnesium Hydroxide (Milk Of Mag) 30 ml DAILY  PRN PO CONSTIPATION;  Start 


1/11/19 at 01:00


Acetaminophen (Tylenol Tab) 650 mg Q6H  PRN PO MILD PAIN(1-3)OR ELEVATED TEMP;  


Start 1/11/19 at 01:00


Ondansetron HCl (Zofran Inj) 4 mg Q8  PRN IV NAUSEA AND/OR VOMITING;  Start 


1/11/19 at 01:00


Senna (Senokot) 2 tab BID  PRN PO CONSTIPATION;  Start 1/11/19 at 01:00


Zolpidem Tartrate (Ambien) 5 mg HS  PRN PO INSOMNIA;  Start 1/11/19 at 01:00


Tramadol HCl (Ultram) 50 mg TID  PRN PO PAIN LEVEL 1-5;  Start 1/11/19 at 01:00


Miscellaneous Information Patients own medicat... BID@1000,1600 XX ;  Start 


1/11/19 at 10:00


Coded Allergies:  


     No Known Allergy (Unverified , 1/11/19)





Past Surgical History


Past Surgical Hx:  other (unknown)





Social History


Alcohol Use:  occasionally


Smoking Status:  Never smoker





Exam/Review of Systems


Vital Signs


Vitals





Vital Signs


  Date      Temp  Pulse  Resp  B/P (MAP)   Pulse Ox  O2          O2 Flow    FiO2


Time                                                 Delivery    Rate


   1/11/19  98.3     76    20      129/72        96


     15:44                           (91)


   1/11/19                                           Nasal             2.0


     08:30                                           Cannula








Intake and Output





1/10/19


1/10/19


1/11/19





1515:00


23:00


07:00





IntakeIntake Total


1100 ml





OutputOutput Total


400 ml





BalanceBalance


700 ml














Exam


Head:  normocephalic


ENMT:  nl external ears & nose


Neck:  supple


Respiratory:  crackles/rales, diminished breath sounds


Cardiovascular:  regular rate and rhythm


Gastrointestinal:  soft


Musculoskeletal:  muscle weakness


Neurological:  confused











ELISHA CHRISTENSEN                Jan 11, 2019 16:11

## 2019-01-12 VITALS — RESPIRATION RATE: 16 BRPM | HEART RATE: 67 BPM | SYSTOLIC BLOOD PRESSURE: 112 MMHG | DIASTOLIC BLOOD PRESSURE: 66 MMHG

## 2019-01-12 VITALS — HEART RATE: 68 BPM | RESPIRATION RATE: 17 BRPM | DIASTOLIC BLOOD PRESSURE: 78 MMHG | SYSTOLIC BLOOD PRESSURE: 136 MMHG

## 2019-01-12 VITALS — RESPIRATION RATE: 18 BRPM | SYSTOLIC BLOOD PRESSURE: 120 MMHG | HEART RATE: 67 BPM | DIASTOLIC BLOOD PRESSURE: 68 MMHG

## 2019-01-12 LAB
ADD MAN DIFF?: NO
ANION GAP: 11 (ref 5–13)
BASOPHIL #: 0 10^3/UL (ref 0–0.1)
BASOPHILS %: 0.3 % (ref 0–2)
BLOOD UREA NITROGEN: 21 MG/DL (ref 7–20)
CALCIUM: 8.6 MG/DL (ref 8.4–10.2)
CARBON DIOXIDE: 24 MMOL/L (ref 21–31)
CHLORIDE: 104 MMOL/L (ref 97–110)
CREATININE: 1.27 MG/DL (ref 0.61–1.24)
EOSINOPHILS #: 0.1 10^3/UL (ref 0–0.5)
EOSINOPHILS %: 1 % (ref 0–7)
GLUCOSE: 107 MG/DL (ref 70–220)
HEMATOCRIT: 23.7 % (ref 42–52)
HEMOGLOBIN: 7.5 G/DL (ref 14–18)
IMMATURE GRANS #M: 0.18 10^3/UL (ref 0–0.03)
IMMATURE GRANS % (M): 2.9 % (ref 0–0.43)
LYMPHOCYTES #: 1.2 10^3/UL (ref 0.8–2.9)
LYMPHOCYTES %: 19.7 % (ref 15–51)
MEAN CORPUSCULAR HEMOGLOBIN: 29 PG (ref 29–33)
MEAN CORPUSCULAR HGB CONC: 31.6 G/DL (ref 32–37)
MEAN CORPUSCULAR VOLUME: 91.5 FL (ref 82–101)
MEAN PLATELET VOLUME: 9.7 FL (ref 7.4–10.4)
MONOCYTE #: 0.6 10^3/UL (ref 0.3–0.9)
MONOCYTES %: 10 % (ref 0–11)
NEUTROPHIL #: 4.1 10^3/UL (ref 1.6–7.5)
NEUTROPHILS %: 66.1 % (ref 39–77)
NUCLEATED RED BLOOD CELLS #: 0 10^3/UL (ref 0–0)
NUCLEATED RED BLOOD CELLS%: 0.3 /100WBC (ref 0–0)
PLATELET COUNT: 154 10^3/UL (ref 140–415)
POTASSIUM: 4.2 MMOL/L (ref 3.5–5.1)
RED BLOOD COUNT: 2.59 10^6/UL (ref 4.7–6.1)
RED CELL DISTRIBUTION WIDTH: 15.9 % (ref 11.5–14.5)
SODIUM: 139 MMOL/L (ref 135–144)
WHITE BLOOD COUNT: 6.2 10^3/UL (ref 4.8–10.8)

## 2019-01-12 RX ADMIN — FERROUS SULFATE TAB 325 MG (65 MG ELEMENTAL FE) SCH MG: 325 (65 FE) TAB at 08:38

## 2019-01-12 RX ADMIN — THIAMINE HYDROCHLORIDE 1 MLS/HR: 100 INJECTION, SOLUTION INTRAMUSCULAR; INTRAVENOUS at 10:22

## 2019-01-12 RX ADMIN — FOLIC ACID SCH MG: 1 TABLET ORAL at 08:38

## 2019-01-12 RX ADMIN — METOPROLOL TARTRATE SCH MG: 50 TABLET, FILM COATED ORAL at 08:38

## 2019-01-12 RX ADMIN — PANTOPRAZOLE SODIUM SCH MG: 40 TABLET, DELAYED RELEASE ORAL at 06:00

## 2019-01-12 RX ADMIN — TAMSULOSIN HYDROCHLORIDE SCH MG: 0.4 CAPSULE ORAL at 21:29

## 2019-01-12 RX ADMIN — BICALUTAMIDE 1 MG: 50 TABLET, FILM COATED ORAL at 18:16

## 2019-01-12 RX ADMIN — MULTIPLE VITAMINS W/ MINERALS TAB 1 TAB: TAB at 08:38

## 2019-01-12 RX ADMIN — PANTOPRAZOLE SODIUM SCH MG: 40 TABLET, DELAYED RELEASE ORAL at 06:39

## 2019-01-12 RX ADMIN — PANTOPRAZOLE SODIUM 1 MG: 40 TABLET, DELAYED RELEASE ORAL at 06:39

## 2019-01-12 RX ADMIN — METOPROLOL TARTRATE 1 MG: 50 TABLET, FILM COATED ORAL at 08:38

## 2019-01-12 RX ADMIN — FAMOTIDINE 1 MG: 20 TABLET ORAL at 08:38

## 2019-01-12 RX ADMIN — METOPROLOL TARTRATE SCH MG: 50 TABLET, FILM COATED ORAL at 21:30

## 2019-01-12 RX ADMIN — FERROUS SULFATE TAB 325 MG (65 MG ELEMENTAL FE) 1 MG: 325 (65 FE) TAB at 08:38

## 2019-01-12 RX ADMIN — THIAMINE HCL TAB 100 MG 1 MG: 100 TAB at 08:38

## 2019-01-12 RX ADMIN — MULTIPLE VITAMINS W/ MINERALS TAB SCH TAB: TAB at 08:38

## 2019-01-12 RX ADMIN — FOLIC ACID SCH MLS/HR: 5 INJECTION, SOLUTION INTRAMUSCULAR; INTRAVENOUS; SUBCUTANEOUS at 10:22

## 2019-01-12 RX ADMIN — FAMOTIDINE SCH MG: 20 TABLET ORAL at 08:38

## 2019-01-12 RX ADMIN — FERROUS SULFATE TAB 325 MG (65 MG ELEMENTAL FE) SCH MG: 325 (65 FE) TAB at 21:29

## 2019-01-12 RX ADMIN — TAMSULOSIN HYDROCHLORIDE 1 MG: 0.4 CAPSULE ORAL at 21:29

## 2019-01-12 RX ADMIN — FOLIC ACID 1 MG: 1 TABLET ORAL at 08:38

## 2019-01-12 RX ADMIN — FERROUS SULFATE TAB 325 MG (65 MG ELEMENTAL FE) SCH MG: 325 (65 FE) TAB at 12:37

## 2019-01-12 RX ADMIN — FERROUS SULFATE TAB 325 MG (65 MG ELEMENTAL FE) 1 MG: 325 (65 FE) TAB at 12:37

## 2019-01-12 RX ADMIN — BICALUTAMIDE SCH MG: 50 TABLET, FILM COATED ORAL at 18:16

## 2019-01-12 RX ADMIN — FERROUS SULFATE TAB 325 MG (65 MG ELEMENTAL FE) 1 MG: 325 (65 FE) TAB at 21:29

## 2019-01-12 RX ADMIN — PANTOPRAZOLE SODIUM 1 MG: 40 TABLET, DELAYED RELEASE ORAL at 06:00

## 2019-01-12 RX ADMIN — METOPROLOL TARTRATE 1 MG: 50 TABLET, FILM COATED ORAL at 21:30

## 2019-01-12 RX ADMIN — THIAMINE HCL TAB 100 MG SCH MG: 100 TAB at 08:38

## 2019-01-12 NOTE — DS
Date/Time of Note


Date/Time of Note


DATE: 1/12/19 


TIME: 16:23





Discharge Summary


Admission/Discharge Info


Admit Date/Time


Andrey 10, 2019 at 22:30


Discharge Date/Time





Patient Condition:  Stable


Hx of Present Illness


This 70-year-old frail gentleman was brought from [facility] 


with history of hypertension subdural hematoma alcoholism anemia with the con


cern of metastatic disease with metastasis to bones and lungs.  He is prior to 


source is unknown.  Patient has no capacity to make decisions for himself,  his 


brother is making decision there will need to find for the consult but they did 


not sign.  Dr. Hall oncologist for the patient.  Patient is a poor historian


Hospital Course


1. AMS,  metabolic encephalopathy


2.  Lymphadenopathy concerning for malignancy unknown primary source, more 


likely prostate cancer, PSA 1000.  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


3.  Acute kidney injury, better


4.  Severe anemia


5.  Recent weight loss


6.  Severe malnourishment


7.  Thrombocytopenia


8.  History of subdural hematoma


9.  History of hepatitis C


10.  History of alcoholism


11.  Hypertension


12.  Mild left hydronephrosis with obstructing distal left ureteral calculus


Primary Care Provider


Care Physician No Primary


Pending Labs





Laboratory Tests


         Test
                                            1/12/19
04:41


         White Blood Count
                      6.2 10^3/ul
(4.8-10.8)


         Red Blood Count
                      2.59 10^6/ul
(4.70-6.10)


         Hemoglobin
                               7.5 g/dl
(14.0-18.0)


         Hematocrit
                                 23.7 %
(42.0-52.0)


         Mean Corpuscular Volume
                  91.5 fl
(82.0-101.0)


         Mean Corpuscular Hemoglobin
               29.0 pg
(29.0-33.0)


         Mean Corpuscular Hemoglobin
Concent      31.6 g/dl
(32.0-37.0)


         Red Cell Distribution Width
                15.9 %
(11.5-14.5)


         Platelet Count
                          154 10^3/UL
(140-415)


         Mean Platelet Volume
                        9.7 fl
(7.4-10.4)


         Immature Granulocytes %
                 2.900 %
(0.001-0.429)


         Neutrophils %
                              66.1 %
(39.0-77.0)


         Lymphocytes %
                              19.7 %
(15.0-51.0)


         Monocytes %
                                 10.0 %
(0.0-11.0)


         Eosinophils %
                                 1.0 %
(0.0-7.0)


         Basophils %
                                   0.3 %
(0.0-2.0)


         Nucleated Red Blood Cells %
             0.3 /100WBC
(0.0-0.0)


         Immature Granulocytes #
             0.180 10^3/ul
(0.0-0.031)


         Neutrophils #
                           4.1 10^3/ul
(1.6-7.5)


         Lymphocytes #
                           1.2 10^3/ul
(0.8-2.9)


         Monocytes #
                             0.6 10^3/ul
(0.3-0.9)


         Eosinophils #
                           0.1 10^3/ul
(0.0-0.5)


         Basophils #
                             0.0 10^3/ul
(0.0-0.1)


         Nucleated Red Blood Cells #
             0.0 10^3/ul
(0.0-0.0)


         Sodium Level
                             139 mmol/L
(135-144)


         Potassium Level
                          4.2 mmol/L
(3.5-5.1)


         Chloride Level
                            104 mmol/L
()


         Carbon Dioxide Level
                        24 mmol/L
(21-31)


         Anion Gap                                           11 (5-13)


         Blood Urea Nitrogen
                           21 mg/dl
(7-20)


         Creatinine
                             1.27 mg/dl
(0.61-1.24)


         Est Glomerular Filtrat Rate
mL/min             56 mL/min
(>60)


         Glucose Level
                              107 mg/dl
()


         Calcium Level
                            8.6 mg/dl
(8.4-10.2)














ELISHA CHRISTENSEN                Jan 12, 2019 16:24

## 2019-01-12 NOTE — NUR
pt alert,oriented x4, no sob, denies pain. pt appears pleasant and calm at this time but 
refusing skin assessment . Offered to clean the pt but according to the pt  he is fine and 
not wet. Asked pt if ok to help reposition him every 2 hours, but pt refused stating that he 
could do it by himself. reinforced teaching regarding safety and prevention of wound/injury. 
charge nurse made aware. pt requested not to disturb him for 0200 vital signs despite 
explaining to pt it is for his safety.

## 2019-01-12 NOTE — NUR
RN Notes:

Patient remains alert and oriented, afebrile, breathing even and unlabored, no c/o 
pain/discomfort. Pt agreed for skin assessment on his back and bilateral feet, skin clean 
and intact noted with skin pigmentation on both heels. Offered pt to get out of bed and to 
try his balance/mobility but pt refused per pt he feel weak cause it's not been eating solid 
food for the few days and requested to upgrade his diet and said once he started to eat 
regular food maybe he will have strength to try to get out of bed for mobility. Dr. Valentin 
came and see pt per MD pt refusing to do some procedure. Kiara NP came made aware regarding 
pt's request and upgraded pt's diet. Pt aware and happy about the order.  Pt voiding well, 
able to use urinal with clear yellow urine output. Hourly rounding done, call light within 
reach, bed alarm on. Will continue to  monitor until the end of the shift.

-------------------------------------------------------------------------------

Addendum: 01/12/19 at 1749 by VIVIENNE KENT RN

-------------------------------------------------------------------------------

pt was seen by DR. Potts and received order for Xray of Pelvis and Spine Lumbar, NM Bone 
Whole Body Scan Panel and to start pt on Casodex. Noted that Casodex was Chemo med and 
initial dose need to be adm by chemo nurse, called 4w spoke to Arcelia VILLAVICENCIO made aware 
regarding initial dose for Casodex said she will look onto pt's chart and will come to our 
unit to adm medication.

-------------------------------------------------------------------------------

Addendum: 01/12/19 at 1919 by VIVIENNE KENT RN

-------------------------------------------------------------------------------

Kiara SEQUEIRA aware regarding Hemoglobin level of 7.5.

## 2019-01-12 NOTE — NUR
RN Notes:

Arcelia, Chemo nurse came, went to pt room and explained the Casodex meds per pt Kiara, NP told 
him about this medication explained the indication of this medication and he agreed to take 
it, pt signed consent and agreed to give the medication aware and understand the possible 
S/E. Arcelia  initiated the first dose of Casodex. Precaution initiated, signed post on the 
pt's door/room. Tried to call Nuclear Medicine regarding the NM Scan order but they only 
open Monday-Friday. Will continue to monitor and will endorse for continuity of care.

## 2019-01-12 NOTE — PN
Date/Time of Note


Date/Time of Note


DATE: 1/12/19 


TIME: 14:50





Assessment/Plan


VTE Prophylaxis


Risk score (from [facility])>0 risk:  4


SCD applied (from [facility]):  No


SCD contraindicated:  patient refusal


Pharmacological prophylaxis:  NA/contraindicated


Pharm contraindication:  bleeding





Lines/Catheters


IV Catheter Type (from [facility]):  Peripheral IV


Urinary Cath still in place:  No





Assessment/Plan


Hospital Course


1. AMS,  metabolic encephalopathy


2.  Lymphadenopathy concerning for malignancy unknown primary source, more 


likely prostate cancer, PSA 1000.  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


3.  Acute kidney injury, better


4.  Severe anemia


5.  Recent weight loss


6.  Severe malnourishment


7.  Thrombocytopenia


8.  History of subdural hematoma


9.  History of hepatitis C


10.  History of alcoholism


11.  Hypertension


12.  Mild left hydronephrosis with obstructing distal left ureteral calculus


Assessment/Plan


-monitor H and H


-DVT prophylaxis SCD, pt refused


 -GI prophylaxis


-Dr. Mcnulty/Rafael from oncology consult is appreciated


-dr Valentin seen, no note is present per nursing refused colonoscopy


-Biopsy is pending


-advance diet


-Palliative care 


- tumor markers are pending s


-pt has difficulties to urinate so bladder scanner as needed


-PSA is very high


-Medsurg


-Start Casodex 50 mg po daily


Result Diagram:  


1/12/19 0441                                                                    


           1/12/19 0441





Results 24hrs





Laboratory Tests


               Test
                                1/12/19
04:41


               White Blood Count                            6.2


               Red Blood Count                            2.59  L


               Hemoglobin                                  7.5  L


               Hematocrit                                 23.7  L


               Mean Corpuscular Volume                     91.5


               Mean Corpuscular Hemoglobin                 29.0


               Mean Corpuscular Hemoglobin
Concent       31.6  L



               Red Cell Distribution Width                15.9  H


               Platelet Count                               154


               Mean Platelet Volume                         9.7


               Immature Granulocytes %                   2.900  H


               Neutrophils %                               66.1


               Lymphocytes %                               19.7


               Monocytes %                                 10.0


               Eosinophils %                                1.0


               Basophils %                                  0.3


               Nucleated Red Blood Cells %                 0.3  H


               Immature Granulocytes #                   0.180  H


               Neutrophils #                                4.1


               Lymphocytes #                                1.2


               Monocytes #                                  0.6


               Eosinophils #                                0.1


               Basophils #                                  0.0


               Nucleated Red Blood Cells #                  0.0


               Sodium Level                                 139


               Potassium Level                              4.2


               Chloride Level                               104


               Carbon Dioxide Level                          24


               Anion Gap                                     11


               Blood Urea Nitrogen                          21  H


               Creatinine                                 1.27  H


               Est Glomerular Filtrat Rate
mL/min          56  L



               Glucose Level                                107


               Calcium Level                                8.6








Subjective


24 Hr Interval Summary


Respiratory:  no complaints


Genitourinary:  dysuria





Exam/Review of Systems


Vital Signs


Vitals





Vital Signs


  Date      Temp  Pulse  Resp  B/P (MAP)   Pulse Ox  O2          O2 Flow    FiO2


Time                                                 Delivery    Rate


   1/12/19  97.8     67    18      120/68        99  Room Air


     07:30                           (85)


   1/11/19                                                             2.0


     08:30








Intake and Output





1/11/19 1/11/19 1/12/19





1515:00


23:00


07:00





IntakeIntake Total


1450 ml


815 ml





OutputOutput Total


900 ml


1000 ml





BalanceBalance


550 ml


-185 ml














Exam


Eyes:  nl conjunctiva


Neck:  supple


Cardiovascular:  regular rate and rhythm


Gastrointestinal:  soft





Medications


Medications





Current Medications


Pantoprazole (Protonix Tab) 40 mg DAILY@06 PO  Last administered on 1/12/19at 


06:39; Admin Dose 40 MG;  Start 1/11/19 at 06:00


Morphine Sulfate (morphine SULFATE (PF)) 2 mg Q4H  PRN IV SEVERE PAIN LEVEL 7-10


Last administered on 1/11/19at 01:18; Admin Dose 2 MG;  Start 1/11/19 at 00:40


Sodium Chloride 1,000 ml @  75 mls/hr [id number] IV  Last administered on 1/12/19at


10:22; Admin Dose 75 MLS/HR;  Start 1/11/19 at 00:50


Oxycodone/ Acetaminophen (Percocet (5/ 325)) 2 tab Q6H  PRN PO PAIN LEVEL 6-10; 


Start 1/11/19 at 01:00


Oxycodone/ Acetaminophen (Percocet (5/ 325)) 1 tab Q6H  PRN PO MODERATE PAIN 


LEVEL 4-6;  Start 1/11/19 at 01:00


Folic Acid (Folic Acid) 1 mg DAILY PO  Last administered on 1/12/19at 08:38; 


Admin Dose 1 MG;  Start 1/11/19 at 09:00


Multivitamins/ Minerals (Theragran-M) 1 tab DAILY PO  Last administered on 


1/12/19at 08:38; Admin Dose 1 TAB;  Start 1/11/19 at 09:00


Thiamine HCl (Vitamin B1) 100 mg DAILY PO  Last administered on 1/12/19at 08:38;


Admin Dose 100 MG;  Start 1/11/19 at 09:00


Polyethylene Glycol (Miralax) 17 gm DAILY  PRN PO CONSTIPATION;  Start 1/11/19 


at 01:00


Metoprolol Tartrate (Lopressor) 50 mg BID PO  Last administered on 1/12/19at 


08:38; Admin Dose 50 MG;  Start 1/11/19 at 09:00


Ferrous Sulfate (Ferrous Sulfate (Ec)) 325 mg TID PO  Last administered on 


1/12/19at 12:37; Admin Dose 325 MG;  Start 1/11/19 at 09:00


Hydralazine HCl (Apresoline) 25 mg TID PO  Last administered on 1/12/19at 12:37;


Admin Dose 25 MG;  Start 1/11/19 at 09:00


Tamsulosin HCl (Flomax) 0.4 mg HS PO  Last administered on 1/11/19at 21:03; 


Admin Dose 0.4 MG;  Start 1/11/19 at 21:00


Famotidine (Pepcid) 20 mg DAILY PO  Last administered on 1/12/19at 08:38; Admin 


Dose 20 MG;  Start 1/11/19 at 09:00


Magnesium Hydroxide (Milk Of Mag) 30 ml DAILY  PRN PO CONSTIPATION;  Start 


1/11/19 at 01:00


Acetaminophen (Tylenol Tab) 650 mg Q6H  PRN PO MILD PAIN(1-3)OR ELEVATED TEMP;  


Start 1/11/19 at 01:00


Ondansetron HCl (Zofran Inj) 4 mg Q8  PRN IV NAUSEA AND/OR VOMITING;  Start 


1/11/19 at 01:00


Senna (Senokot) 2 tab BID  PRN PO CONSTIPATION;  Start 1/11/19 at 01:00


Zolpidem Tartrate (Ambien) 5 mg HS  PRN PO INSOMNIA;  Start 1/11/19 at 01:00


Tramadol HCl (Ultram) 50 mg TID  PRN PO PAIN LEVEL 1-5 Last administered on 


1/12/19at 02:30; Admin Dose 50 MG;  Start 1/11/19 at 01:00


Miscellaneous Information Patients own medicat... BID@1000,1600 XX ;  Start 


1/11/19 at 10:00











ELISHA CHRISTENSEN                Jan 12, 2019 14:52

## 2019-01-12 NOTE — NUR
pt alert,oriented x4,forgetful.  no sob, w/ complaint of gen body pain one time during the 
shift, tramadol tab given as ordered. pt refused skin assessment, repositioning.refused 0200 
vital signs.Reinforced teaching . charge nurse, usha Collins. at 0645 pt allowed cna and nurse 
to change and clean him

## 2019-01-12 NOTE — NUR
CALLED BY VIVIENNE JOSÉ TO [facility] NEW ORDER OF CHEMO MEDS CASODEX 50MG PO DAILY .PATIENT SEEN IN 
RM 2256 LYING IN BED AWAKE,ALERT,ORIENTED X3.HE VERBALIZES HE WAS INFORMED BY HUA GARCIA ABOUT 
THIS NEW MEDICATION FOR HIS PROSTATE.PATIENT SIGNED CONSENT FOR THE MEDICATION AND 
DISCUSSED/EDUCATED PATIENT ON POSSIBLE SIDE EFFECTS AND HE WAS VERY IMPATIENT,NOT INTERESTED 
TO HEAR THE INFORMATION.PATIENT WAS PROVIDED WITH PRINTED DRUG INFO AND SIDE EFFECTS 
.PATIENT WAS ALSO INFORMED ABOUT HANDWASHING,DOUBLE FLUSHING THE TOILET TO PREVENT 
CONTAMINATION.

HAZARDOUS CHEMO ALERTS WAS POSTED IN PATIENTS ROOM AND CHEMO YELLOW BIN TO DISPOSE CHEMO 
RELATED WASTE.

## 2019-01-13 VITALS — SYSTOLIC BLOOD PRESSURE: 142 MMHG | RESPIRATION RATE: 17 BRPM | HEART RATE: 68 BPM | DIASTOLIC BLOOD PRESSURE: 68 MMHG

## 2019-01-13 VITALS — SYSTOLIC BLOOD PRESSURE: 107 MMHG | DIASTOLIC BLOOD PRESSURE: 59 MMHG | HEART RATE: 75 BPM | RESPIRATION RATE: 20 BRPM

## 2019-01-13 VITALS — HEART RATE: 80 BPM | DIASTOLIC BLOOD PRESSURE: 63 MMHG | SYSTOLIC BLOOD PRESSURE: 123 MMHG

## 2019-01-13 VITALS — RESPIRATION RATE: 20 BRPM | DIASTOLIC BLOOD PRESSURE: 65 MMHG | HEART RATE: 75 BPM | SYSTOLIC BLOOD PRESSURE: 105 MMHG

## 2019-01-13 VITALS — DIASTOLIC BLOOD PRESSURE: 57 MMHG | SYSTOLIC BLOOD PRESSURE: 111 MMHG | HEART RATE: 74 BPM | RESPIRATION RATE: 19 BRPM

## 2019-01-13 LAB
ADD MAN DIFF?: NO
ANION GAP: 12 (ref 5–13)
BASOPHIL #: 0 10^3/UL (ref 0–0.1)
BASOPHILS %: 0.4 % (ref 0–2)
BLOOD UREA NITROGEN: 20 MG/DL (ref 7–20)
CALCIUM: 8.7 MG/DL (ref 8.4–10.2)
CARBON DIOXIDE: 23 MMOL/L (ref 21–31)
CHLORIDE: 106 MMOL/L (ref 97–110)
CREATININE: 1.28 MG/DL (ref 0.61–1.24)
EOSINOPHILS #: 0.1 10^3/UL (ref 0–0.5)
EOSINOPHILS %: 1.1 % (ref 0–7)
GLUCOSE: 142 MG/DL (ref 70–220)
HEMATOCRIT: 25.8 % (ref 42–52)
HEMOGLOBIN: 8.1 G/DL (ref 14–18)
IMMATURE GRANS #M: 0.1 10^3/UL (ref 0–0.03)
IMMATURE GRANS % (M): 1.8 % (ref 0–0.43)
LYMPHOCYTES #: 1.1 10^3/UL (ref 0.8–2.9)
LYMPHOCYTES %: 19.8 % (ref 15–51)
MEAN CORPUSCULAR HEMOGLOBIN: 29.1 PG (ref 29–33)
MEAN CORPUSCULAR HGB CONC: 31.4 G/DL (ref 32–37)
MEAN CORPUSCULAR VOLUME: 92.8 FL (ref 82–101)
MEAN PLATELET VOLUME: 10.5 FL (ref 7.4–10.4)
MONOCYTE #: 0.5 10^3/UL (ref 0.3–0.9)
MONOCYTES %: 8.3 % (ref 0–11)
NEUTROPHIL #: 3.9 10^3/UL (ref 1.6–7.5)
NEUTROPHILS %: 68.6 % (ref 39–77)
NUCLEATED RED BLOOD CELLS #: 0 10^3/UL (ref 0–0)
NUCLEATED RED BLOOD CELLS%: 0 /100WBC (ref 0–0)
PLATELET COUNT: 174 10^3/UL (ref 140–415)
POTASSIUM: 4.5 MMOL/L (ref 3.5–5.1)
RED BLOOD COUNT: 2.78 10^6/UL (ref 4.7–6.1)
RED CELL DISTRIBUTION WIDTH: 15.8 % (ref 11.5–14.5)
SODIUM: 141 MMOL/L (ref 135–144)
WHITE BLOOD COUNT: 5.7 10^3/UL (ref 4.8–10.8)

## 2019-01-13 RX ADMIN — FERROUS SULFATE TAB 325 MG (65 MG ELEMENTAL FE) SCH MG: 325 (65 FE) TAB at 08:21

## 2019-01-13 RX ADMIN — PANTOPRAZOLE SODIUM 1 MG: 40 TABLET, DELAYED RELEASE ORAL at 05:15

## 2019-01-13 RX ADMIN — ZOLPIDEM TARTRATE 1 MG: 5 TABLET, FILM COATED ORAL at 21:04

## 2019-01-13 RX ADMIN — FOLIC ACID SCH MLS/HR: 5 INJECTION, SOLUTION INTRAMUSCULAR; INTRAVENOUS; SUBCUTANEOUS at 10:28

## 2019-01-13 RX ADMIN — THIAMINE HYDROCHLORIDE 1 MLS/HR: 100 INJECTION, SOLUTION INTRAMUSCULAR; INTRAVENOUS at 10:28

## 2019-01-13 RX ADMIN — TAMSULOSIN HYDROCHLORIDE SCH MG: 0.4 CAPSULE ORAL at 20:59

## 2019-01-13 RX ADMIN — BICALUTAMIDE 1 MG: 50 TABLET, FILM COATED ORAL at 08:23

## 2019-01-13 RX ADMIN — BICALUTAMIDE SCH MG: 50 TABLET, FILM COATED ORAL at 08:23

## 2019-01-13 RX ADMIN — METOPROLOL TARTRATE SCH MG: 50 TABLET, FILM COATED ORAL at 21:03

## 2019-01-13 RX ADMIN — THIAMINE HCL TAB 100 MG SCH MG: 100 TAB at 08:20

## 2019-01-13 RX ADMIN — MULTIPLE VITAMINS W/ MINERALS TAB SCH TAB: TAB at 08:20

## 2019-01-13 RX ADMIN — FERROUS SULFATE TAB 325 MG (65 MG ELEMENTAL FE) 1 MG: 325 (65 FE) TAB at 21:00

## 2019-01-13 RX ADMIN — FERROUS SULFATE TAB 325 MG (65 MG ELEMENTAL FE) 1 MG: 325 (65 FE) TAB at 08:21

## 2019-01-13 RX ADMIN — METOPROLOL TARTRATE 1 MG: 50 TABLET, FILM COATED ORAL at 08:21

## 2019-01-13 RX ADMIN — FAMOTIDINE 1 MG: 20 TABLET ORAL at 08:21

## 2019-01-13 RX ADMIN — OXYCODONE HYDROCHLORIDE AND ACETAMINOPHEN 1 TAB: 5; 325 TABLET ORAL at 18:40

## 2019-01-13 RX ADMIN — FERROUS SULFATE TAB 325 MG (65 MG ELEMENTAL FE) SCH MG: 325 (65 FE) TAB at 14:46

## 2019-01-13 RX ADMIN — THIAMINE HCL TAB 100 MG 1 MG: 100 TAB at 08:20

## 2019-01-13 RX ADMIN — FOLIC ACID 1 MG: 1 TABLET ORAL at 08:21

## 2019-01-13 RX ADMIN — MULTIPLE VITAMINS W/ MINERALS TAB 1 TAB: TAB at 08:20

## 2019-01-13 RX ADMIN — FAMOTIDINE SCH MG: 20 TABLET ORAL at 08:21

## 2019-01-13 RX ADMIN — ZOLPIDEM TARTRATE PRN MG: 5 TABLET, FILM COATED ORAL at 21:04

## 2019-01-13 RX ADMIN — PANTOPRAZOLE SODIUM SCH MG: 40 TABLET, DELAYED RELEASE ORAL at 05:15

## 2019-01-13 RX ADMIN — METOPROLOL TARTRATE 1 MG: 50 TABLET, FILM COATED ORAL at 21:03

## 2019-01-13 RX ADMIN — METOPROLOL TARTRATE SCH MG: 50 TABLET, FILM COATED ORAL at 08:21

## 2019-01-13 RX ADMIN — FOLIC ACID SCH MG: 1 TABLET ORAL at 08:21

## 2019-01-13 RX ADMIN — FERROUS SULFATE TAB 325 MG (65 MG ELEMENTAL FE) SCH MG: 325 (65 FE) TAB at 21:00

## 2019-01-13 RX ADMIN — FERROUS SULFATE TAB 325 MG (65 MG ELEMENTAL FE) 1 MG: 325 (65 FE) TAB at 14:46

## 2019-01-13 RX ADMIN — TAMSULOSIN HYDROCHLORIDE 1 MG: 0.4 CAPSULE ORAL at 20:59

## 2019-01-13 NOTE — NUR
NURSING NOTE:



PATIENT PULLED OUT HIS IV BY ACCIDENT WHEN HE GOT IT CAUGHT ON THE BED RAIL. RN ATTEMPTED TO 
PLACE IV BUT THE PATIENT IS REFUSING AT THIS TIME AND STATED THEY CAN DO IT IN THE MORNING. 
WILL ENDORSE TO AM RN, CHARGE NURSE IS AWARE.

## 2019-01-13 NOTE — NUR
Lupron is non-formulary. Dr. Hay advised and told that per Pharmacy, [facility] he needed to 
fill out out a specific form. Our  retrieved the form from pharmacy and gave it to 
Dr. Hay on [address]- said he would take care of it.

## 2019-01-13 NOTE — NUR
End of shift- No active bleeding observed or complaints of pain this shift. Refused skin 
assessment until 1630 when I was able to talk him into it. Refused repositioning or to float 
his heels all shift. "I'm fine the way I am". Ate 100% of all meals.

## 2019-01-13 NOTE — NUR
offered to clean pt and he agreed. primary nurse and cna able to clean the pt and change the 
linens. skin intact. noted w/ dark skin pigmentation on perianal area. allevyn placed for 
protection. offered to reposition pt but he refused. allowed nurse to elevate heels using 
pillows. complained of gen body pain, tramadol given as prescribed,effective.

## 2019-01-13 NOTE — PN
Date/Time of Note


Date/Time of Note


DATE: 1/13/19 


TIME: 13:37





Assessment/Plan


VTE Prophylaxis


Risk score (from [facility])>0 risk:  6


SCD applied (from [facility]):  Yes


SCD contraindicated:  other


Pharmacological prophylaxis:  other





Lines/Catheters


IV Catheter Type (from [facility]):  Peripheral IV


Urinary Cath still in place:  No





Assessment/Plan


Hospital Course


1. AMS,  metabolic encephalopathy better


2.  Lymphadenopathy concerning for malignancy unknown primary source, more 


likely prostate cancer, PSA 1000.  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


3.  Acute kidney injury, better


4.  Severe anemia


5.  Recent weight loss


6.  Severe malnourishment


7.  Thrombocytopenia


8.  History of subdural hematoma


9.  History of hepatitis C


10.  History of alcoholism


11.  Hypertension


12.  Mild left hydronephrosis with obstructing distal left ureteral calculus


 plan per dr davidson


Result Diagram:  


1/13/19 0513                                                                    


           1/13/19 0513





Results 24hrs





Laboratory Tests


               Test
                                1/13/19
05:13


               White Blood Count                            5.7


               Red Blood Count                            2.78  L


               Hemoglobin                                  8.1  L


               Hematocrit                                 25.8  L


               Mean Corpuscular Volume                     92.8


               Mean Corpuscular Hemoglobin                 29.1


               Mean Corpuscular Hemoglobin
Concent       31.4  L



               Red Cell Distribution Width                15.8  H


               Platelet Count                               174


               Mean Platelet Volume                       10.5  H


               Immature Granulocytes %                   1.800  H


               Neutrophils %                               68.6


               Lymphocytes %                               19.8


               Monocytes %                                  8.3


               Eosinophils %                                1.1


               Basophils %                                  0.4


               Nucleated Red Blood Cells %                  0.0


               Immature Granulocytes #                   0.100  H


               Neutrophils #                                3.9


               Lymphocytes #                                1.1


               Monocytes #                                  0.5


               Eosinophils #                                0.1


               Basophils #                                  0.0


               Nucleated Red Blood Cells #                  0.0


               Sodium Level                                 141


               Potassium Level                              4.5


               Chloride Level                               106


               Carbon Dioxide Level                          23


               Anion Gap                                     12


               Blood Urea Nitrogen                           20


               Creatinine                                 1.28  H


               Est Glomerular Filtrat Rate
mL/min          56  L



               Glucose Level                                142


               Calcium Level                                8.7








Subjective


24 Hr Interval Summary


Cardiovascular:  no complaints


Gastrointestinal:  no complaints


Genitourinary:  no complaints





Exam/Review of Systems


Vital Signs


Vitals





Vital Signs


  Date      Temp  Pulse  Resp  B/P (MAP)   Pulse Ox  O2          O2 Flow    FiO2


Time                                                 Delivery    Rate


   1/13/19  97.4     75    20      105/65       100


     08:16                           (78)


   1/12/19                                           Room Air


     14:15


   1/11/19                                                             2.0


     08:30








Intake and Output





1/12/19 1/12/19 1/13/19





1515:00


23:00


07:00





IntakeIntake Total


1305 ml


735 ml


730 ml





OutputOutput Total


680 ml


600 ml


700 ml





BalanceBalance


625 ml


135 ml


30 ml














Exam


Neck:  supple


Respiratory:  clear to auscultation


Cardiovascular:  regular rate and rhythm


Gastrointestinal:  soft, bowel sounds


Extremities:  calf tenderness





Medications


Medications





Current Medications


Pantoprazole (Protonix Tab) 40 mg DAILY@06 PO  Last administered on 1/13/19at 


05:15; Admin Dose 40 MG;  Start 1/11/19 at 06:00


Morphine Sulfate (morphine SULFATE (PF)) 2 mg Q4H  PRN IV SEVERE PAIN LEVEL 7-10


Last administered on 1/11/19at 01:18; Admin Dose 2 MG;  Start 1/11/19 at 00:40


Sodium Chloride 1,000 ml @  40 mls/hr Q24H IV  Last administered on 1/13/19at 


10:28; Admin Dose 40 MLS/HR;  Start 1/11/19 at 00:50


Oxycodone/ Acetaminophen (Percocet (5/ 325)) 2 tab Q6H  PRN PO PAIN LEVEL 6-10; 


Start 1/11/19 at 01:00


Oxycodone/ Acetaminophen (Percocet (5/ 325)) 1 tab Q6H  PRN PO MODERATE PAIN 


LEVEL 4-6;  Start 1/11/19 at 01:00


Folic Acid (Folic Acid) 1 mg DAILY PO  Last administered on 1/13/19at 08:21; 


Admin Dose 1 MG;  Start 1/11/19 at 09:00


Multivitamins/ Minerals (Theragran-M) 1 tab DAILY PO  Last administered on 


1/13/19at 08:20; Admin Dose 1 TAB;  Start 1/11/19 at 09:00


Thiamine HCl (Vitamin B1) 100 mg DAILY PO  Last administered on 1/13/19at 08:20;


Admin Dose 100 MG;  Start 1/11/19 at 09:00


Polyethylene Glycol (Miralax) 17 gm DAILY  PRN PO CONSTIPATION;  Start 1/11/19 


at 01:00


Metoprolol Tartrate (Lopressor) 50 mg BID PO  Last administered on 1/13/19at 


08:21; Admin Dose 50 MG;  Start 1/11/19 at 09:00


Ferrous Sulfate (Ferrous Sulfate (Ec)) 325 mg TID PO  Last administered on 


1/13/19at 08:21; Admin Dose 325 MG;  Start 1/11/19 at 09:00


Hydralazine HCl (Apresoline) 25 mg TID PO  Last administered on 1/13/19at 08:21;


Admin Dose 25 MG;  Start 1/11/19 at 09:00


Tamsulosin HCl (Flomax) 0.4 mg HS PO  Last administered on 1/12/19at 21:29; 


Admin Dose 0.4 MG;  Start 1/11/19 at 21:00


Famotidine (Pepcid) 20 mg DAILY PO  Last administered on 1/13/19at 08:21; Admin 


Dose 20 MG;  Start 1/11/19 at 09:00


Magnesium Hydroxide (Milk Of Mag) 30 ml DAILY  PRN PO CONSTIPATION;  Start 


1/11/19 at 01:00


Acetaminophen (Tylenol Tab) 650 mg Q6H  PRN PO MILD PAIN(1-3)OR ELEVATED TEMP;  


Start 1/11/19 at 01:00


Ondansetron HCl (Zofran Inj) 4 mg Q8  PRN IV NAUSEA AND/OR VOMITING;  Start 


1/11/19 at 01:00


Senna (Senokot) 2 tab BID  PRN PO CONSTIPATION;  Start 1/11/19 at 01:00


Zolpidem Tartrate (Ambien) 5 mg HS  PRN PO INSOMNIA;  Start 1/11/19 at 01:00


Tramadol HCl (Ultram) 50 mg TID  PRN PO PAIN LEVEL 1-5 Last administered on 


1/13/19at 04:40; Admin Dose 50 MG;  Start 1/11/19 at 01:00


Miscellaneous Information Patients own medicat... BID@1000,1600 XX ;  Start 


1/11/19 at 10:00


Bicalutamide (Casodex) 50 mg DAILY PO  Last administered on 1/13/19at 08:23; 


Admin Dose 50 MG;  Start 1/12/19 at 16:30











ANKIT MONAE MD                Jan 13, 2019 13:38

## 2019-01-13 NOTE — NUR
Cigarettes were found in patient's jacket, instructed patient cigarettes will be kept in 
charge nurse desk and will be returned upon discharge.

## 2019-01-13 NOTE — NUR
Am meds given. Ate 100% of breakfast. Went over plan of care for today and tomorrow. Pt. 
says he has no pain and no questions. Refusing assessment at this time. "You talk too much". 
Says he needs nothing else at this time. Will try to assess later.

## 2019-01-14 VITALS — RESPIRATION RATE: 15 BRPM | SYSTOLIC BLOOD PRESSURE: 120 MMHG | DIASTOLIC BLOOD PRESSURE: 71 MMHG

## 2019-01-14 VITALS — RESPIRATION RATE: 20 BRPM | DIASTOLIC BLOOD PRESSURE: 70 MMHG | HEART RATE: 64 BPM | SYSTOLIC BLOOD PRESSURE: 118 MMHG

## 2019-01-14 VITALS — HEART RATE: 69 BPM | SYSTOLIC BLOOD PRESSURE: 110 MMHG | DIASTOLIC BLOOD PRESSURE: 67 MMHG | RESPIRATION RATE: 18 BRPM

## 2019-01-14 VITALS — RESPIRATION RATE: 11 BRPM | DIASTOLIC BLOOD PRESSURE: 79 MMHG | SYSTOLIC BLOOD PRESSURE: 129 MMHG | HEART RATE: 60 BPM

## 2019-01-14 VITALS — SYSTOLIC BLOOD PRESSURE: 115 MMHG | DIASTOLIC BLOOD PRESSURE: 71 MMHG | HEART RATE: 62 BPM | RESPIRATION RATE: 14 BRPM

## 2019-01-14 VITALS — RESPIRATION RATE: 12 BRPM | HEART RATE: 64 BPM | DIASTOLIC BLOOD PRESSURE: 71 MMHG | SYSTOLIC BLOOD PRESSURE: 116 MMHG

## 2019-01-14 VITALS — HEART RATE: 73 BPM | RESPIRATION RATE: 18 BRPM | SYSTOLIC BLOOD PRESSURE: 112 MMHG | DIASTOLIC BLOOD PRESSURE: 62 MMHG

## 2019-01-14 VITALS — DIASTOLIC BLOOD PRESSURE: 61 MMHG | SYSTOLIC BLOOD PRESSURE: 98 MMHG | HEART RATE: 64 BPM | RESPIRATION RATE: 14 BRPM

## 2019-01-14 VITALS — SYSTOLIC BLOOD PRESSURE: 105 MMHG | DIASTOLIC BLOOD PRESSURE: 84 MMHG | RESPIRATION RATE: 19 BRPM | HEART RATE: 64 BPM

## 2019-01-14 VITALS — SYSTOLIC BLOOD PRESSURE: 111 MMHG | HEART RATE: 73 BPM | RESPIRATION RATE: 18 BRPM | DIASTOLIC BLOOD PRESSURE: 69 MMHG

## 2019-01-14 VITALS — RESPIRATION RATE: 29 BRPM | SYSTOLIC BLOOD PRESSURE: 115 MMHG | DIASTOLIC BLOOD PRESSURE: 70 MMHG

## 2019-01-14 VITALS — RESPIRATION RATE: 16 BRPM | HEART RATE: 75 BPM | DIASTOLIC BLOOD PRESSURE: 80 MMHG | SYSTOLIC BLOOD PRESSURE: 133 MMHG

## 2019-01-14 VITALS — RESPIRATION RATE: 16 BRPM | HEART RATE: 66 BPM | SYSTOLIC BLOOD PRESSURE: 101 MMHG | DIASTOLIC BLOOD PRESSURE: 64 MMHG

## 2019-01-14 PROCEDURE — 0DB68ZX EXCISION OF STOMACH, VIA NATURAL OR ARTIFICIAL OPENING ENDOSCOPIC, DIAGNOSTIC: ICD-10-PCS

## 2019-01-14 PROCEDURE — 0DB68ZX EXCISION OF STOMACH, VIA NATURAL OR ARTIFICIAL OPENING ENDOSCOPIC, DIAGNOSTIC: ICD-10-PCS | Performed by: INTERNAL MEDICINE

## 2019-01-14 PROCEDURE — 30233N1 TRANSFUSION OF NONAUTOLOGOUS RED BLOOD CELLS INTO PERIPHERAL VEIN, PERCUTANEOUS APPROACH: ICD-10-PCS

## 2019-01-14 RX ADMIN — FERROUS SULFATE TAB 325 MG (65 MG ELEMENTAL FE) SCH MG: 325 (65 FE) TAB at 09:02

## 2019-01-14 RX ADMIN — FERROUS SULFATE TAB 325 MG (65 MG ELEMENTAL FE) 1 MG: 325 (65 FE) TAB at 14:12

## 2019-01-14 RX ADMIN — FOLIC ACID SCH MLS/HR: 5 INJECTION, SOLUTION INTRAMUSCULAR; INTRAVENOUS; SUBCUTANEOUS at 16:50

## 2019-01-14 RX ADMIN — PANTOPRAZOLE SODIUM 1 MG: 40 TABLET, DELAYED RELEASE ORAL at 05:25

## 2019-01-14 RX ADMIN — METOPROLOL TARTRATE 1 MG: 50 TABLET, FILM COATED ORAL at 09:02

## 2019-01-14 RX ADMIN — MULTIPLE VITAMINS W/ MINERALS TAB 1 TAB: TAB at 09:01

## 2019-01-14 RX ADMIN — FOLIC ACID SCH MLS/HR: 5 INJECTION, SOLUTION INTRAMUSCULAR; INTRAVENOUS; SUBCUTANEOUS at 23:36

## 2019-01-14 RX ADMIN — THIAMINE HYDROCHLORIDE 1 MLS/HR: 100 INJECTION, SOLUTION INTRAMUSCULAR; INTRAVENOUS at 23:36

## 2019-01-14 RX ADMIN — PANTOPRAZOLE SODIUM SCH MG: 40 TABLET, DELAYED RELEASE ORAL at 05:25

## 2019-01-14 RX ADMIN — THIAMINE HCL TAB 100 MG 1 MG: 100 TAB at 09:02

## 2019-01-14 RX ADMIN — MULTIPLE VITAMINS W/ MINERALS TAB SCH TAB: TAB at 09:01

## 2019-01-14 RX ADMIN — METOPROLOL TARTRATE SCH MG: 50 TABLET, FILM COATED ORAL at 21:50

## 2019-01-14 RX ADMIN — FOLIC ACID SCH MG: 1 TABLET ORAL at 09:01

## 2019-01-14 RX ADMIN — METOPROLOL TARTRATE SCH MG: 50 TABLET, FILM COATED ORAL at 09:02

## 2019-01-14 RX ADMIN — TAMSULOSIN HYDROCHLORIDE 1 MG: 0.4 CAPSULE ORAL at 21:49

## 2019-01-14 RX ADMIN — METOPROLOL TARTRATE 1 MG: 50 TABLET, FILM COATED ORAL at 21:50

## 2019-01-14 RX ADMIN — TAMSULOSIN HYDROCHLORIDE SCH MG: 0.4 CAPSULE ORAL at 21:49

## 2019-01-14 RX ADMIN — FERROUS SULFATE TAB 325 MG (65 MG ELEMENTAL FE) 1 MG: 325 (65 FE) TAB at 09:02

## 2019-01-14 RX ADMIN — THIAMINE HYDROCHLORIDE 1 MLS/HR: 100 INJECTION, SOLUTION INTRAMUSCULAR; INTRAVENOUS at 16:50

## 2019-01-14 RX ADMIN — FAMOTIDINE SCH MG: 20 TABLET ORAL at 09:02

## 2019-01-14 RX ADMIN — ZOLPIDEM TARTRATE PRN MG: 5 TABLET, FILM COATED ORAL at 21:49

## 2019-01-14 RX ADMIN — BICALUTAMIDE SCH MG: 50 TABLET, FILM COATED ORAL at 09:03

## 2019-01-14 RX ADMIN — FAMOTIDINE 1 MG: 20 TABLET ORAL at 09:02

## 2019-01-14 RX ADMIN — OXYCODONE HYDROCHLORIDE AND ACETAMINOPHEN 1 TAB: 5; 325 TABLET ORAL at 23:35

## 2019-01-14 RX ADMIN — FERROUS SULFATE TAB 325 MG (65 MG ELEMENTAL FE) SCH MG: 325 (65 FE) TAB at 21:49

## 2019-01-14 RX ADMIN — FERROUS SULFATE TAB 325 MG (65 MG ELEMENTAL FE) 1 MG: 325 (65 FE) TAB at 21:49

## 2019-01-14 RX ADMIN — FERROUS SULFATE TAB 325 MG (65 MG ELEMENTAL FE) SCH MG: 325 (65 FE) TAB at 14:12

## 2019-01-14 RX ADMIN — FOLIC ACID 1 MG: 1 TABLET ORAL at 09:01

## 2019-01-14 RX ADMIN — BICALUTAMIDE 1 MG: 50 TABLET, FILM COATED ORAL at 09:03

## 2019-01-14 RX ADMIN — THIAMINE HCL TAB 100 MG SCH MG: 100 TAB at 09:02

## 2019-01-14 RX ADMIN — ZOLPIDEM TARTRATE 1 MG: 5 TABLET, FILM COATED ORAL at 21:49

## 2019-01-14 NOTE — NUR
RN Notes:

Patient remains alert and oriented with period of forgetful and being grumpy with staff 
despite of providing and explaining the care and treatment. Had EGD procedure today under 
DR. Valentin tolerated well, resume his diet to soft, able to eat, no n/v noted, denies 
abdominal pain/discomfort, breathing even and unlabored, no sob noted. Hourly rounding done, 
strained urine as ordered, call light within reach, bed alarm on. Will continue to monitor 
until the end of the shift.

-------------------------------------------------------------------------------

Addendum: 01/14/19 at 1935 by VIVIENNE KENT RN

-------------------------------------------------------------------------------

Pt refused Bone Scan today said it's too much for procedure today and said its ok to do it 
tomorrow, Dr. Delroy kerr and Ace from Nuclear medicine said he will do it tomorrow. 
Endorse to next shift for continuity of care

## 2019-01-14 NOTE — NUR
RN Notes:

Received a call from Nuclear Medicine c/o Ace, said that they received the order and will 
to do the procedure around 1130 to inject the medication and will scan pt around 1430, 
informed Ace that pt has EGD procedure today also but  at this time no time of schedule 
yet, called GI lab c/o Toya said she will coordinate with Ace regarding this matter. Pt 
remains NPO keeps asking for food but explained to him that he has procedure that need to 
keep him Nothing by mouth until the procedure was done. Will continue to monitor.


-------------------------------------------------------------------------------

Addendum: 01/14/19 at 1150 by VIVIENNE KENT RN

-------------------------------------------------------------------------------

Around 1115 pt off unit for EGD procedure under Dr. Valentin and per Toya, GI, ESTEFANÍA Bello from 
Nuclear medicine will adm meds in GI lab and will do Scan around 1430.

## 2019-01-14 NOTE — PREAC
Date/Time of Note


Date/Time of Note


DATE: 1/14/19 


TIME: 12:09





Anesthesia Eval and Record


Evaluation


Time Pre-Procedure Interview


DATE: 1/14/19 


TIME: 12:09


Age


68


Sex


male


NPO:  8 hrs


Preoperative diagnosis


GI bleeding


Planned procedure


EGD





Past Medical History


Past Medical History:  Includes


Cardio:  HTN


Neuro:  Other (encephalopathy)


Renal:  RODRÍGUEZ


Heme:  Other (prostate ca)


Infection(s):  Hep C





Surgery & Anesthesia Issues


No known issue





Meds


Anticoagulation:  No


Beta Blocker within 24 hr:  No


Reason Beta Blocker not given:  Pt. not on B-Blocker





Current Medications


Pantoprazole (Protonix Tab) 40 mg DAILY@06 PO  Last administered on 1/13/19at 05


:15; Admin Dose 40 MG;  Start 1/11/19 at 06:00


Morphine Sulfate (morphine SULFATE (PF)) 2 mg Q4H  PRN IV SEVERE PAIN LEVEL 7-10


Last administered on 1/11/19at 01:18; Admin Dose 2 MG;  Start 1/11/19 at 00:40


Sodium Chloride 1,000 ml @  40 mls/hr Q24H IV  Last administered on 1/13/19at 


10:28; Admin Dose 40 MLS/HR;  Start 1/11/19 at 00:50


Oxycodone/ Acetaminophen (Percocet (5/ 325)) 2 tab Q6H  PRN PO PAIN LEVEL 6-10 


Last administered on 1/13/19at 18:40; Admin Dose 2 TAB;  Start 1/11/19 at 01:00


Oxycodone/ Acetaminophen (Percocet (5/ 325)) 1 tab Q6H  PRN PO MODERATE PAIN 


LEVEL 4-6;  Start 1/11/19 at 01:00


Folic Acid (Folic Acid) 1 mg DAILY PO  Last administered on 1/14/19at 09:01; 


Admin Dose 1 MG;  Start 1/11/19 at 09:00


Multivitamins/ Minerals (Theragran-M) 1 tab DAILY PO  Last administered on 


1/14/19at 09:01; Admin Dose 1 TAB;  Start 1/11/19 at 09:00


Thiamine HCl (Vitamin B1) 100 mg DAILY PO  Last administered on 1/14/19at 09:02;


Admin Dose 100 MG;  Start 1/11/19 at 09:00


Polyethylene Glycol (Miralax) 17 gm DAILY  PRN PO CONSTIPATION;  Start 1/11/19 


at 01:00


Metoprolol Tartrate (Lopressor) 50 mg BID PO  Last administered on 1/14/19at 


09:02; Admin Dose 50 MG;  Start 1/11/19 at 09:00


Ferrous Sulfate (Ferrous Sulfate (Ec)) 325 mg TID PO  Last administered on 


1/14/19at 09:02; Admin Dose 325 MG;  Start 1/11/19 at 09:00


Hydralazine HCl (Apresoline) 25 mg TID PO  Last administered on 1/14/19at 09:02;


Admin Dose 25 MG;  Start 1/11/19 at 09:00


Tamsulosin HCl (Flomax) 0.4 mg HS PO  Last administered on 1/13/19at 20:59; 


Admin Dose 0.4 MG;  Start 1/11/19 at 21:00


Famotidine (Pepcid) 20 mg DAILY PO  Last administered on 1/14/19at 09:02; Admin 


Dose 20 MG;  Start 1/11/19 at 09:00


Magnesium Hydroxide (Milk Of Mag) 30 ml DAILY  PRN PO CONSTIPATION;  Start 


1/11/19 at 01:00


Acetaminophen (Tylenol Tab) 650 mg Q6H  PRN PO MILD PAIN(1-3)OR ELEVATED TEMP;  


Start 1/11/19 at 01:00


Ondansetron HCl (Zofran Inj) 4 mg Q8  PRN IV NAUSEA AND/OR VOMITING;  Start 


1/11/19 at 01:00


Senna (Senokot) 2 tab BID  PRN PO CONSTIPATION;  Start 1/11/19 at 01:00


Zolpidem Tartrate (Ambien) 5 mg HS  PRN PO INSOMNIA Last administered on 


1/13/19at 21:04; Admin Dose 5 MG;  Start 1/11/19 at 01:00


Tramadol HCl (Ultram) 50 mg TID  PRN PO PAIN LEVEL 1-5 Last administered on 


1/13/19at 04:40; Admin Dose 50 MG;  Start 1/11/19 at 01:00


Miscellaneous Information Patients own medicat... BID@1000,1600 XX ;  Start 


1/11/19 at 10:00


Bicalutamide (Casodex) 50 mg DAILY PO  Last administered on 1/14/19at 09:03; 


Admin Dose 50 MG;  Start 1/12/19 at 16:30


Miscellaneous Information 7.5 mg ONCE  ONCE XX ;  Start 1/13/19 at 17:00;  Stop 


1/13/19 at 17:01;  Status UNV


Meds reviewed:  Yes





Allergies


Coded Allergies:  


     No Known Allergy (Unverified , 1/11/19)


Allergies Reviewed:  Yes





Labs/Studies


Labs Reviewed:  Reviewed by anesthesiologist


Result Diagram:  


1/13/19 0513 1/13/19 0513





Pregnancy test:  N/A


Studies:  ECG (sr)





Pre-procedure Exam


Last vitals





Vital Signs


  Date      Temp  Pulse  Resp  B/P (MAP)   Pulse Ox  O2          O2 Flow    FiO2


Time                                                 Delivery    Rate


   1/14/19  98.0     60    11      129/79       100  Room Air


     11:38                           (96)


   1/11/19                                                             2.0


     08:30





Airway:  Adequate mouth opening


Mallampati:  Mallampati I


Teeth:  Abnormal


Lung:  Normal


Heart:  Normal





ASA Physical Status


ASA physical status:  2


Emergency:  None





Planned Anesthetic


General/MAC:  MAC





Pre-operative Attestations


Prior to commencing anesthesia and surgery, the patient was re-evaluated, there 


was verification of:


*The patient's identity


*The results of appropriate recent lab work and preoperative vital signs


*The above evaluation not changing prior to induction


*Anesthetic plan, risk benefits, alternative and complications discussed with 


patient/family; questions answered; patient/family understands, accepts and 


wishes to proceed.











NIKOS CRAWFORD MD            Jan 14, 2019 12:11

## 2019-01-14 NOTE — NUR
NURSING NOTE:



PATIENT REFUSING TO WEAR SCD'S. RISKS AND BENEFITS EXPLAINED. WILL CONTINUE TO MONITOR.

## 2019-01-14 NOTE — NUR
END OF SHIFT SUMMARY: 



PATIENT RESTING COMFORTABLY. V/S STABLE, AFEBRILE. NO ACUTE CHANGES NOTED THIS SHIFT. 
PATIENT ALLOWED SCD'S TO BE PUT BACK ON. VOIDING WELL, AND URINE STRAINED THROUGHOUT SHIFT 
AS ORDERED. PATIENT ENCOURAGED TO REPOSITION THROUGHOUT SHIFT. NPO SINCE MIDNIGHT. PATIENT 
SCHEDULED FOR EGD TODAY, CONSENT SIGNED AND PLACED IN CHART. PATIENT TO ALSO UNDERGO KUB AND 
BONE SCAN TODAY. PATIENT IS CURRENTLY RECEIVING PO CHEMO DAILY, CHEMO PRECAUTIONS MAINTAINED 
THROUGHOUT SHIFT. PATIENT CHECKED FOR INCONTINENCE THROUGHOUT SHIFT. CALL LIGHT AND PHONE IN 
PLACE. INSTRUCTED PATIENT TO CALL FOR ASSISTANCE. BED ALARM SET. WILL ENDORSE PLAN OF CARE 
TO AM RN. 

-------------------------------------------------------------------------------

Addendum: 01/14/19 at 0634 by ABEL LUO RN

-------------------------------------------------------------------------------

PATIENT DOES NOT  HAVE IV ACCESS AT THIS TIME, AND IS REQUESTING A NEW ONE BE PLACED LATER 
THIS AM. CHARGE NURSE AND MD ARE BOTH AWARE. WILL ENDORSE TO AM ESTEFANÍA.

## 2019-01-14 NOTE — PN
Date/Time of Note


Date/Time of Note


DATE: 1/14/19 


TIME: 13:38





Assessment/Plan


VTE Prophylaxis


Risk score (from Ns)>0 risk:  8


SCD applied (from Ns):  Yes


Pharmacological prophylaxis:  NA/contraindicated


Pharm contraindication:  low risk/ambulating





Lines/Catheters


IV Catheter Type (from [facility]):  Peripheral IV


Urinary Cath still in place:  No





Assessment/Plan


Hospital Course


69 y/o with 





1. AMS,  metabolic encephalopathy ?


2.  Lymphadenopathy concerning for malignancy unknown primary source, more 


likely prostate cancer, PSA 1000.  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


3.  Acute kidney injury, better


4.  Severe anemia


5.  Recent weight loss


6.  Severe malnourishment


7.  Thrombocytopenia


8.  History of subdural hematoma


9.  History of hepatitis C


10.  History of alcoholism


11.  Hypertension


12.  Mild left hydronephrosis with obstructing distal left ureteral calculus


13  Chronic gastritis s/p EGD





Plan


- will call pscy to determine if pt has capacity 


- SW consult


- started on casodex


- ? bIopsy 


- cw PPI


- s/p EGD today with chronic gastritis


- CW Flomax


- Bone scan pending


Result Diagram:  


1/13/19 0513                                                                    


           1/13/19 0513








Subjective


24 Hr Interval Summary


Free Text/Dictation


s/p EGD today with chronic gastritis


Pt was informed about possibility of prostate cance but he says that we are 


telling him these things as we can make money by doing things to him





Exam/Review of Systems


Vital Signs


Vitals





Vital Signs


  Date      Temp  Pulse  Resp  B/P (MAP)   Pulse Ox  O2          O2 Flow    FiO2


Time                                                 Delivery    Rate


   1/14/19           62    14      115/71        97  Room Air


     13:10                           (86)


   1/14/19  98.6


     12:35


   1/11/19                                                             2.0


     08:30








Intake and Output





1/13/19 1/13/19 1/14/19





1414:59


22:59


06:59





IntakeIntake Total


220 ml


1230 ml


600 ml





OutputOutput Total


500 ml


1100 ml


1600 ml





BalanceBalance


-280 ml


130 ml


-1000 ml














Exam


Neck:  supple


Respiratory:  clear to auscultation


Cardiovascular:  regular rate and rhythm


Gastrointestinal:  soft, bowel sounds


Extremities:  calf tenderness





Medications


Medications





Current Medications


Pantoprazole (Protonix Tab) 40 mg DAILY@06 PO  Last administered on 1/13/19at 


05:15; Admin Dose 40 MG;  Start 1/11/19 at 06:00


Morphine Sulfate (morphine SULFATE (PF)) 2 mg Q4H  PRN IV SEVERE PAIN LEVEL 7-10


Last administered on 1/11/19at 01:18; Admin Dose 2 MG;  Start 1/11/19 at 00:40


Sodium Chloride 1,000 ml @  40 mls/hr Q24H IV  Last administered on 1/13/19at 


10:28; Admin Dose 40 MLS/HR;  Start 1/11/19 at 00:50


Oxycodone/ Acetaminophen (Percocet (5/ 325)) 2 tab Q6H  PRN PO PAIN LEVEL 6-10 


Last administered on 1/13/19at 18:40; Admin Dose 2 TAB;  Start 1/11/19 at 01:00


Oxycodone/ Acetaminophen (Percocet (5/ 325)) 1 tab Q6H  PRN PO MODERATE PAIN 


LEVEL 4-6;  Start 1/11/19 at 01:00


Folic Acid (Folic Acid) 1 mg DAILY PO  Last administered on 1/14/19at 09:01; 


Admin Dose 1 MG;  Start 1/11/19 at 09:00


Multivitamins/ Minerals (Theragran-M) 1 tab DAILY PO  Last administered on 


1/14/19at 09:01; Admin Dose 1 TAB;  Start 1/11/19 at 09:00


Thiamine HCl (Vitamin B1) 100 mg DAILY PO  Last administered on 1/14/19at 09:02;


Admin Dose 100 MG;  Start 1/11/19 at 09:00


Polyethylene Glycol (Miralax) 17 gm DAILY  PRN PO CONSTIPATION;  Start 1/11/19 


at 01:00


Metoprolol Tartrate (Lopressor) 50 mg BID PO  Last administered on 1/14/19at 


09:02; Admin Dose 50 MG;  Start 1/11/19 at 09:00


Ferrous Sulfate (Ferrous Sulfate (Ec)) 325 mg TID PO  Last administered on 1/14 /19at 09:02; Admin Dose 325 MG;  Start 1/11/19 at 09:00


Hydralazine HCl (Apresoline) 25 mg TID PO  Last administered on 1/14/19at 09:02;


Admin Dose 25 MG;  Start 1/11/19 at 09:00


Tamsulosin HCl (Flomax) 0.4 mg HS PO  Last administered on 1/13/19at 20:59; 


Admin Dose 0.4 MG;  Start 1/11/19 at 21:00


Famotidine (Pepcid) 20 mg DAILY PO  Last administered on 1/14/19at 09:02; Admin 


Dose 20 MG;  Start 1/11/19 at 09:00


Magnesium Hydroxide (Milk Of Mag) 30 ml DAILY  PRN PO CONSTIPATION;  Start 


1/11/19 at 01:00


Acetaminophen (Tylenol Tab) 650 mg Q6H  PRN PO MILD PAIN(1-3)OR ELEVATED TEMP;  


Start 1/11/19 at 01:00


Ondansetron HCl (Zofran Inj) 4 mg Q8  PRN IV NAUSEA AND/OR VOMITING;  Start 


1/11/19 at 01:00


Senna (Senokot) 2 tab BID  PRN PO CONSTIPATION;  Start 1/11/19 at 01:00


Zolpidem Tartrate (Ambien) 5 mg HS  PRN PO INSOMNIA Last administered on 


1/13/19at 21:04; Admin Dose 5 MG;  Start 1/11/19 at 01:00


Tramadol HCl (Ultram) 50 mg TID  PRN PO PAIN LEVEL 1-5 Last administered on 


1/13/19at 04:40; Admin Dose 50 MG;  Start 1/11/19 at 01:00


Miscellaneous Information Patients own medicat... BID@1000,1600 XX ;  Start 


1/11/19 at 10:00


Bicalutamide (Casodex) 50 mg DAILY PO  Last administered on 1/14/19at 09:03; 


Admin Dose 50 MG;  Start 1/12/19 at 16:30


Fentanyl (Sublimaze) 25 mcg PACU ORDER PRN IV MILD PAIN LEVEL 1-3;  Start 


1/14/19 at 12:30;  Stop 1/14/19 at 17:00


Ondansetron HCl (Zofran Inj) 4 mg PACU ORDER PRN IV NAUSEA AND/OR VOMITING;  


Start 1/14/19 at 12:30;  Stop 1/14/19 at 17:00











TOMAS BRICE MD              Jan 14, 2019 13:38

## 2019-01-14 NOTE — NUR
RE: non-formulary medication request

Discussed with Pharmacy Director.  Non-formulary request for Lupron Depot not approved for 
inpatient use.  Discussed with Dr. Potts and requested MD can provide medication and we 
can re-label as patient's own medication

## 2019-01-14 NOTE — PAC
Date/Time of Note


Date/Time of Note


DATE: 1/14/19 


TIME: 21:40





Post-Anesthesia Notes


Post-Anesthesia Note


Last documented vital signs





Vital Signs


  Date      Temp  Pulse  Resp  B/P (MAP)   Pulse Ox  O2          O2 Flow    FiO2


Time                                                 Delivery    Rate


   1/14/19  98.3     73    18      111/69        99


     19:32                           (83)


   1/14/19                                           Room Air


     13:10


   1/11/19                                                             2.0


     08:30





Activity:  WNL


Respiratory function:  WNL


Cardiovascular function:  WNL


Mental status:  Baseline


Pain reasonably controlled:  Yes


Hydration appropriate:  Yes


Nausea/Vomiting absent:  No











NIKOS CRAWFORD MD            Jan 14, 2019 21:40

## 2019-01-15 VITALS — DIASTOLIC BLOOD PRESSURE: 53 MMHG | SYSTOLIC BLOOD PRESSURE: 107 MMHG | HEART RATE: 60 BPM | RESPIRATION RATE: 17 BRPM

## 2019-01-15 VITALS — SYSTOLIC BLOOD PRESSURE: 118 MMHG | DIASTOLIC BLOOD PRESSURE: 62 MMHG | HEART RATE: 66 BPM | RESPIRATION RATE: 18 BRPM

## 2019-01-15 VITALS — DIASTOLIC BLOOD PRESSURE: 67 MMHG | RESPIRATION RATE: 18 BRPM | HEART RATE: 60 BPM | SYSTOLIC BLOOD PRESSURE: 113 MMHG

## 2019-01-15 VITALS — HEART RATE: 71 BPM | SYSTOLIC BLOOD PRESSURE: 126 MMHG | DIASTOLIC BLOOD PRESSURE: 70 MMHG

## 2019-01-15 VITALS — SYSTOLIC BLOOD PRESSURE: 106 MMHG | RESPIRATION RATE: 17 BRPM | HEART RATE: 70 BPM | DIASTOLIC BLOOD PRESSURE: 56 MMHG

## 2019-01-15 LAB
ADD MAN DIFF?: NO
ALPHA FETOPROTEIN: 1.97 IU/L (ref 0–7.21)
ANION GAP: 8 (ref 5–13)
BASOPHIL #: 0 10^3/UL (ref 0–0.1)
BASOPHILS %: 0.3 % (ref 0–2)
BLOOD UREA NITROGEN: 26 MG/DL (ref 7–20)
CALCIUM: 8.8 MG/DL (ref 8.4–10.2)
CANCER ANTIGEN 19-9: < 1.4 U/ML (ref 0–37)
CARBON DIOXIDE: 22 MMOL/L (ref 21–31)
CARCINOEMBRYONIC ANTIGEN: 7 NG/ML (ref 0–5)
CHLORIDE: 108 MMOL/L (ref 97–110)
CREATININE: 1.46 MG/DL (ref 0.61–1.24)
EOSINOPHILS #: 0.1 10^3/UL (ref 0–0.5)
EOSINOPHILS %: 1.9 % (ref 0–7)
GLUCOSE: 101 MG/DL (ref 70–220)
HEMATOCRIT: 23.8 % (ref 42–52)
HEMOGLOBIN: 7.6 G/DL (ref 14–18)
IMMATURE GRANS #M: 0.11 10^3/UL (ref 0–0.03)
IMMATURE GRANS % (M): 1.9 % (ref 0–0.43)
LYMPHOCYTES #: 1.6 10^3/UL (ref 0.8–2.9)
LYMPHOCYTES %: 27.7 % (ref 15–51)
MAGNESIUM: 1.9 MG/DL (ref 1.7–2.5)
MEAN CORPUSCULAR HEMOGLOBIN: 29.2 PG (ref 29–33)
MEAN CORPUSCULAR HGB CONC: 31.9 G/DL (ref 32–37)
MEAN CORPUSCULAR VOLUME: 91.5 FL (ref 82–101)
MEAN PLATELET VOLUME: 10.3 FL (ref 7.4–10.4)
MONOCYTE #: 0.5 10^3/UL (ref 0.3–0.9)
MONOCYTES %: 9 % (ref 0–11)
NEUTROPHIL #: 3.4 10^3/UL (ref 1.6–7.5)
NEUTROPHILS %: 59.2 % (ref 39–77)
NUCLEATED RED BLOOD CELLS #: 0 10^3/UL (ref 0–0)
NUCLEATED RED BLOOD CELLS%: 0 /100WBC (ref 0–0)
PHOSPHORUS: 4.3 MG/DL (ref 2.5–4.9)
PLATELET COUNT: 208 10^3/UL (ref 140–415)
POTASSIUM: 4.2 MMOL/L (ref 3.5–5.1)
RED BLOOD COUNT: 2.6 10^6/UL (ref 4.7–6.1)
RED CELL DISTRIBUTION WIDTH: 15.9 % (ref 11.5–14.5)
SODIUM: 138 MMOL/L (ref 135–144)
WHITE BLOOD COUNT: 5.8 10^3/UL (ref 4.8–10.8)

## 2019-01-15 RX ADMIN — FERROUS SULFATE TAB 325 MG (65 MG ELEMENTAL FE) SCH MG: 325 (65 FE) TAB at 20:31

## 2019-01-15 RX ADMIN — MULTIPLE VITAMINS W/ MINERALS TAB 1 TAB: TAB at 08:50

## 2019-01-15 RX ADMIN — THIAMINE HCL TAB 100 MG 1 MG: 100 TAB at 08:50

## 2019-01-15 RX ADMIN — FERROUS SULFATE TAB 325 MG (65 MG ELEMENTAL FE) 1 MG: 325 (65 FE) TAB at 14:04

## 2019-01-15 RX ADMIN — FERROUS SULFATE TAB 325 MG (65 MG ELEMENTAL FE) SCH MG: 325 (65 FE) TAB at 14:04

## 2019-01-15 RX ADMIN — TAMSULOSIN HYDROCHLORIDE SCH MG: 0.4 CAPSULE ORAL at 20:31

## 2019-01-15 RX ADMIN — METOPROLOL TARTRATE 1 MG: 50 TABLET, FILM COATED ORAL at 08:51

## 2019-01-15 RX ADMIN — METOPROLOL TARTRATE SCH MG: 50 TABLET, FILM COATED ORAL at 08:51

## 2019-01-15 RX ADMIN — FERROUS SULFATE TAB 325 MG (65 MG ELEMENTAL FE) 1 MG: 325 (65 FE) TAB at 08:50

## 2019-01-15 RX ADMIN — FAMOTIDINE 1 MG: 20 TABLET ORAL at 08:50

## 2019-01-15 RX ADMIN — FAMOTIDINE SCH MG: 20 TABLET ORAL at 08:50

## 2019-01-15 RX ADMIN — FOLIC ACID SCH MG: 1 TABLET ORAL at 08:50

## 2019-01-15 RX ADMIN — FERROUS SULFATE TAB 325 MG (65 MG ELEMENTAL FE) 1 MG: 325 (65 FE) TAB at 20:31

## 2019-01-15 RX ADMIN — TAMSULOSIN HYDROCHLORIDE 1 MG: 0.4 CAPSULE ORAL at 20:31

## 2019-01-15 RX ADMIN — FOLIC ACID 1 MG: 1 TABLET ORAL at 08:50

## 2019-01-15 RX ADMIN — PANTOPRAZOLE SODIUM SCH MG: 40 TABLET, DELAYED RELEASE ORAL at 05:28

## 2019-01-15 RX ADMIN — METOPROLOL TARTRATE 1 MG: 50 TABLET, FILM COATED ORAL at 20:32

## 2019-01-15 RX ADMIN — FERROUS SULFATE TAB 325 MG (65 MG ELEMENTAL FE) SCH MG: 325 (65 FE) TAB at 08:50

## 2019-01-15 RX ADMIN — BICALUTAMIDE SCH MG: 50 TABLET, FILM COATED ORAL at 08:52

## 2019-01-15 RX ADMIN — BICALUTAMIDE 1 MG: 50 TABLET, FILM COATED ORAL at 08:52

## 2019-01-15 RX ADMIN — PANTOPRAZOLE SODIUM 1 MG: 40 TABLET, DELAYED RELEASE ORAL at 05:28

## 2019-01-15 RX ADMIN — METOPROLOL TARTRATE SCH MG: 50 TABLET, FILM COATED ORAL at 20:32

## 2019-01-15 RX ADMIN — THIAMINE HCL TAB 100 MG SCH MG: 100 TAB at 08:50

## 2019-01-15 RX ADMIN — MULTIPLE VITAMINS W/ MINERALS TAB SCH TAB: TAB at 08:50

## 2019-01-15 NOTE — PN
Date/Time of Note


Date/Time of Note


DATE: 1/15/19 


TIME: 14:52





Assessment/Plan


VTE Prophylaxis


Risk score (from Ns)>0 risk:  5


SCD applied (from Ns):  Yes


Pharmacological prophylaxis:  NA/contraindicated


Pharm contraindication:  low risk/ambulating





Lines/Catheters


IV Catheter Type (from [facility]):  Peripheral IV


Urinary Cath still in place:  No





Assessment/Plan


Hospital Course


67 y/o with 





1. AMS,  metabolic encephalopathy ?


2.  Lymphadenopathy concerning for malignancy unknown primary source, more 


likely prostate cancer, PSA 1000.  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


3.  Acute kidney injury, better


4.  Severe anemia


5.  Recent weight loss


6.  Severe malnourishment


7.  Thrombocytopenia


8.  History of subdural hematoma


9.  History of hepatitis C


10.  History of alcoholism


11.  Hypertension


12.  Mild left hydronephrosis with obstructing distal left ureteral calculus


13  Chronic gastritis s/p EGD





Plan


- will call pscy to determine if pt has capacity 


- SW consult


- started on casodex ? available 


- ? bIopsy , will ask Dr Hall 


- cw PPI


- s/p EGD today with chronic gastritis


- CW Flomax


- Bone scan pending


Result Diagram:  


1/15/19 0440                                                                    


           1/15/19 0440





Results 24hrs





Laboratory Tests


       Test
                                1/15/19
04:36  1/15/19
04:40


       Alpha Fetoprotein                           1.97


       Carcinoembryonic Antigen                    7.0  H


       CA 19-9 Antigen                      < 1.4


       White Blood Count                                           5.8


       Red Blood Count                                           2.60  L


       Hemoglobin                                                 7.6  L


       Hematocrit                                                23.8  L


       Mean Corpuscular Volume                                    91.5


       Mean Corpuscular Hemoglobin                                29.2


       Mean Corpuscular Hemoglobin
Concent  
                   31.9  L



       Red Cell Distribution Width                               15.9  H


       Platelet Count                                              208


       Mean Platelet Volume                                       10.3


       Immature Granulocytes %                                  1.900  H


       Neutrophils %                                              59.2


       Lymphocytes %                                              27.7


       Monocytes %                                                 9.0


       Eosinophils %                                               1.9


       Basophils %                                                 0.3


       Nucleated Red Blood Cells %                                 0.0


       Immature Granulocytes #                                  0.110  H


       Neutrophils #                                               3.4


       Lymphocytes #                                               1.6


       Monocytes #                                                 0.5


       Eosinophils #                                               0.1


       Basophils #                                                 0.0


       Nucleated Red Blood Cells #                                 0.0


       Sodium Level                                                138


       Potassium Level                                             4.2


       Chloride Level                                              108


       Carbon Dioxide Level                                         22


       Anion Gap                                                     8


       Blood Urea Nitrogen                                         26  H


       Creatinine                                                1.46  H


       Est Glomerular Filtrat Rate
mL/min   
                     48  L



       Glucose Level                                              101  #


       Calcium Level                                               8.8


       Phosphorus Level                                            4.3


       Magnesium Level                                             1.9








Subjective


24 Hr Interval Summary


Free Text/Dictation


Pt was seen in nuclear scan


Wants me to but frito for him


bone scan today





Exam/Review of Systems


Vital Signs


Vitals





Vital Signs


  Date      Temp  Pulse  Resp  B/P (MAP)   Pulse Ox  O2          O2 Flow    FiO2


Time                                                 Delivery    Rate


   1/15/19           71            126/70


     14:07                           (88)


   1/15/19  97.8           18                    99  Room Air


     08:02


   1/11/19                                                             2.0


     08:30








Intake and Output





1/14/19


1/14/19


1/15/19





1414:59


22:59


06:59





IntakeIntake Total


240 ml


640 ml


830 ml





OutputOutput Total


350 ml


350 ml


600 ml





BalanceBalance


-110 ml


290 ml


230 ml














Exam


Exam


Neck:  supple


Respiratory:  clear to auscultation


Cardiovascular:  regular rate and rhythm


Gastrointestinal:  soft, bowel sounds


Extremities:  calf tenderness





Medications


Medications





Current Medications


Pantoprazole (Protonix Tab) 40 mg DAILY@06 PO  Last administered on 1/15/19at 


05:28; Admin Dose 40 MG;  Start 1/11/19 at 06:00


Morphine Sulfate (morphine SULFATE (PF)) 2 mg Q4H  PRN IV SEVERE PAIN LEVEL 7-10


Last administered on 1/11/19at 01:18; Admin Dose 2 MG;  Start 1/11/19 at 00:40


Sodium Chloride 1,000 ml @  40 mls/hr Q24H IV  Last administered on 1/14/19at 


23:36; Admin Dose 40 MLS/HR;  Start 1/11/19 at 00:50


Oxycodone/ Acetaminophen (Percocet (5/ 325)) 2 tab Q6H  PRN PO PAIN LEVEL 6-10 


Last administered on 1/14/19at 23:35; Admin Dose 2 TAB;  Start 1/11/19 at 01:00


Oxycodone/ Acetaminophen (Percocet (5/ 325)) 1 tab Q6H  PRN PO MODERATE PAIN 


LEVEL 4-6;  Start 1/11/19 at 01:00


Folic Acid (Folic Acid) 1 mg DAILY PO  Last administered on 1/15/19at 08:50; 


Admin Dose 1 MG;  Start 1/11/19 at 09:00


Multivitamins/ Minerals (Theragran-M) 1 tab DAILY PO  Last administered on 


1/15/19at 08:50; Admin Dose 1 TAB;  Start 1/11/19 at 09:00


Thiamine HCl (Vitamin B1) 100 mg DAILY PO  Last administered on 1/15/19at 08:50;


Admin Dose 100 MG;  Start 1/11/19 at 09:00


Polyethylene Glycol (Miralax) 17 gm DAILY  PRN PO CONSTIPATION;  Start 1/11/19 


at 01:00


Metoprolol Tartrate (Lopressor) 50 mg BID PO  Last administered on 1/15/19at 


08:51; Admin Dose 50 MG;  Start 1/11/19 at 09:00


Ferrous Sulfate (Ferrous Sulfate (Ec)) 325 mg TID PO  Last administered on 


1/15/19at 14:04; Admin Dose 325 MG;  Start 1/11/19 at 09:00


Hydralazine HCl (Apresoline) 25 mg TID PO  Last administered on 1/15/19at 14:04;


Admin Dose 25 MG;  Start 1/11/19 at 09:00


Tamsulosin HCl (Flomax) 0.4 mg HS PO  Last administered on 1/14/19at 21:49; 


Admin Dose 0.4 MG;  Start 1/11/19 at 21:00


Famotidine (Pepcid) 20 mg DAILY PO  Last administered on 1/15/19at 08:50; Admin 


Dose 20 MG;  Start 1/11/19 at 09:00


Magnesium Hydroxide (Milk Of Mag) 30 ml DAILY  PRN PO CONSTIPATION;  Start 


1/11/19 at 01:00


Acetaminophen (Tylenol Tab) 650 mg Q6H  PRN PO MILD PAIN(1-3)OR ELEVATED TEMP;  


Start 1/11/19 at 01:00


Ondansetron HCl (Zofran Inj) 4 mg Q8  PRN IV NAUSEA AND/OR VOMITING;  Start 


1/11/19 at 01:00


Senna (Senokot) 2 tab BID  PRN PO CONSTIPATION;  Start 1/11/19 at 01:00


Zolpidem Tartrate (Ambien) 5 mg HS  PRN PO INSOMNIA Last administered on 


1/14/19at 21:49; Admin Dose 5 MG;  Start 1/11/19 at 01:00


Tramadol HCl (Ultram) 50 mg TID  PRN PO PAIN LEVEL 1-5 Last administered on 


1/13/19at 04:40; Admin Dose 50 MG;  Start 1/11/19 at 01:00


Miscellaneous Information Patients own medicat... BID@1000,1600 XX ;  Start 


1/11/19 at 10:00


Bicalutamide (Casodex) 50 mg DAILY PO  Last administered on 1/15/19at 08:52; 


Admin Dose 50 MG;  Start 1/12/19 at 16:30











TOMAS BRICE MD              Andrey 15, 2019 14:52

## 2019-01-15 NOTE — NUR
rn notes



patient remains afebrile, no s/s of pain and discomfort, all due medicine given, bone scan 
done and pending ultrasound and patient needs to be NPO for 6 hours for ultrasound, will 
endorse night nurse. urine strained and no stoned noted. call light placed within reach and 
fall precautions observed well, will continue to monitor.

## 2019-01-15 NOTE — PSY
Date/Time of Note


Date/Time of Note


DATE: 1/15/19 


TIME: 18:25





Psychiatric Subjective Eval


Consent


Pt consented to telemedicine:  No





Subjective Evaluation


Patient location:  inpatient


History of present illness


Patient is a 68 year old -American male admitted to [facility] for metastatic 


disease to bones and lung .  On a face-to-face evaluation patient states that he


does not need psychiatry, but after much persuasion patient was able to answer 


interviewer's questions .  Mini-Mental status exam done and patient scored 


22/30.  He has periods of forgetfulness but has a capacity to make his own 


decisions.


Past psychiatric history


Denies


Hospitalization:  other


Allergies:  


Coded Allergies:  


     No Known Allergy (Unverified , 1/11/19)





Substance Abuse


Substance use:  other


Substance abuse history:  No


Prior substance abuse treatmen:  No





Social History


Marital status:  other


DPA/Conservatorship:  No





Psychiatric Objective Eval


Review of Systems:


Review of Systems:  Not Applicable





Physical Examination:


Physical Examination:  Not Applicable


Appetite:  Adequate


Energy:  Adequate


Interest:  Adequate





Mental Status Examination:


Appearance:  Groomed


Eye Contact:  Good


Psychomotor Activity:  Normal


Behavior:  Cooperative


Speech:  Clear


AFFECT:  Appropriate


Mood:  Appropriate/Full


Though Process:  Linear


Orientation:  x3


Cognition:  Alert


Insight:  Mild


Judgement:  Mild


Attention Span:  Distractible


Laboratory Results





Laboratory Tests


       Test
                                1/15/19
04:36   1/15/19
04:40


       Alpha Fetoprotein                       1.97 IU/L


       Carcinoembryonic Antigen                7.0 ng/ml


       CA 19-9 Antigen                      < 1.4 U/ml


       White Blood Count                                     5.8 10^3/ul


       Red Blood Count                                      2.60 10^6/ul


       Hemoglobin                                               7.6 g/dl


       Hematocrit                                                 23.8 %


       Mean Corpuscular Volume                                   91.5 fl


       Mean Corpuscular Hemoglobin                               29.2 pg


       Mean Corpuscular Hemoglobin
Concent  
                 31.9 g/dl 



       Red Cell Distribution Width                                15.9 %


       Platelet Count                                        208 10^3/UL


       Mean Platelet Volume                                      10.3 fl


       Immature Granulocytes %                                   1.900 %


       Neutrophils %                                              59.2 %


       Lymphocytes %                                              27.7 %


       Monocytes %                                                 9.0 %


       Eosinophils %                                               1.9 %


       Basophils %                                                 0.3 %


       Nucleated Red Blood Cells %                           0.0 /100WBC


       Immature Granulocytes #                             0.110 10^3/ul


       Neutrophils #                                         3.4 10^3/ul


       Lymphocytes #                                         1.6 10^3/ul


       Monocytes #                                           0.5 10^3/ul


       Eosinophils #                                         0.1 10^3/ul


       Basophils #                                           0.0 10^3/ul


       Nucleated Red Blood Cells #                           0.0 10^3/ul


       Sodium Level                                           138 mmol/L


       Potassium Level                                        4.2 mmol/L


       Chloride Level                                         108 mmol/L


       Carbon Dioxide Level                                    22 mmol/L


       Anion Gap                                                       8


       Blood Urea Nitrogen                                      26 mg/dl


       Creatinine                                             1.46 mg/dl


       Est Glomerular Filtrat Rate
mL/min   
                 48 mL/min 



       Glucose Level                                           101 mg/dl


       Calcium Level                                           8.8 mg/dl


       Phosphorus Level                                        4.3 mg/dl


       Magnesium Level                                         1.9 mg/dl








Assessment and Plan


Recommendation/Plan


Medication Management


No medication


Multiple antipsychotics:  No


Discharge Disposition:  Other


Legal Status:  Voluntary (Does not meets criteria for 5150 hold)











ALEXUS PAREDES NP          Andrey 15, 2019 18:32

## 2019-01-15 NOTE — PN
Date/Time of Note


Date/Time of Note


DATE: 1/15/19 


TIME: 09:44





Assessment/Plan


VTE Prophylaxis


Risk score (from Ns)>0 risk:  5


SCD applied (from Ns):  Yes


Pharmacological prophylaxis:  NA/contraindicated


Pharm contraindication:  low risk/ambulating





Lines/Catheters


IV Catheter Type (from [facility]):  Peripheral IV


Urinary Cath still in place:  No





Assessment/Plan


Hospital Course


67 yo male





1.  Anemia, which is a combination of metastasis to the bone and also 


gastrointestinal bleed.


2.  Renal insufficiency.


3.  Malnutrition.


4.  Metastatic to the bone, most probably from prostate given the high PSA of 


greater than 1000.


5.  Hypertension.


6.  Hepatitis C.


7.  Alcoholism.


8.  Gastritis


9.  S/P EGD


10.  Transaminitis


11.  Weight loss of 80 lbs over 3 months





Plan


Continue PPI


Bone scan pending


Monitor LFTs


CEA, CA 19-9, AFP


US of upper abd


Pending psych eval





Pt examined and plan of care discussed with Dr. Valentin


Result Diagram:  


1/15/19 0440                                                                    


           1/15/19 0440





Results 24hrs





Laboratory Tests


               Test
                                1/15/19
04:40


               White Blood Count                            5.8


               Red Blood Count                            2.60  L


               Hemoglobin                                  7.6  L


               Hematocrit                                 23.8  L


               Mean Corpuscular Volume                     91.5


               Mean Corpuscular Hemoglobin                 29.2


               Mean Corpuscular Hemoglobin
Concent       31.9  L



               Red Cell Distribution Width                15.9  H


               Platelet Count                               208


               Mean Platelet Volume                        10.3


               Immature Granulocytes %                   1.900  H


               Neutrophils %                               59.2


               Lymphocytes %                               27.7


               Monocytes %                                  9.0


               Eosinophils %                                1.9


               Basophils %                                  0.3


               Nucleated Red Blood Cells %                  0.0


               Immature Granulocytes #                   0.110  H


               Neutrophils #                                3.4


               Lymphocytes #                                1.6


               Monocytes #                                  0.5


               Eosinophils #                                0.1


               Basophils #                                  0.0


               Nucleated Red Blood Cells #                  0.0


               Sodium Level                                 138


               Potassium Level                              4.2


               Chloride Level                               108


               Carbon Dioxide Level                          22


               Anion Gap                                      8


               Blood Urea Nitrogen                          26  H


               Creatinine                                 1.46  H


               Est Glomerular Filtrat Rate
mL/min          48  L



               Glucose Level                               101  #


               Calcium Level                                8.8


               Phosphorus Level                             4.3


               Magnesium Level                              1.9








Subjective


24 Hr Interval Summary


Free Text/Dictation


Pt c/o bone aches in lower back and BLE.  No N/V.  No abd pain.  NO evidence of 


GI bleeding noted by RN or patient.  HGb dropped from 8.1->7.6





Exam/Review of Systems


Vital Signs


Vitals





Vital Signs


  Date      Temp  Pulse  Resp  B/P (MAP)   Pulse Ox  O2          O2 Flow    FiO2


Time                                                 Delivery    Rate


   1/15/19  97.8     60    18      113/67        99  Room Air


     08:02                           (82)


   1/11/19                                                             2.0


     08:30








Intake and Output





1/14/19


1/14/19


1/15/19





1515:00


23:00


07:00





IntakeIntake Total


240 ml


640 ml


830 ml





OutputOutput Total


350 ml


350 ml


600 ml





BalanceBalance


-110 ml


290 ml


230 ml














Exam


Constitutional:  alert, oriented


Psych:  no complaints


Head:  normocephalic


Eyes:  nl sclera, PERRL


ENMT:  mucosa pink and moist


Respiratory:  clear to auscultation


Cardiovascular:  regular rate and rhythm


Gastrointestinal:  soft, non-tender


Musculoskeletal:  nl extremities to inspection


Extremities:  normal pulses


Neurological:  nl mental status





Medications


Medications





Current Medications


Pantoprazole (Protonix Tab) 40 mg DAILY@06 PO  Last administered on 1/15/19at 


05:28; Admin Dose 40 MG;  Start 1/11/19 at 06:00


Morphine Sulfate (morphine SULFATE (PF)) 2 mg Q4H  PRN IV SEVERE PAIN LEVEL 7-10


Last administered on 1/11/19at 01:18; Admin Dose 2 MG;  Start 1/11/19 at 00:40


Sodium Chloride 1,000 ml @  40 mls/hr Q24H IV  Last administered on 1/14/19at 


23:36; Admin Dose 40 MLS/HR;  Start 1/11/19 at 00:50


Oxycodone/ Acetaminophen (Percocet (5/ 325)) 2 tab Q6H  PRN PO PAIN LEVEL 6-10 


Last administered on 1/14/19at 23:35; Admin Dose 2 TAB;  Start 1/11/19 at 01:00


Oxycodone/ Acetaminophen (Percocet (5/ 325)) 1 tab Q6H  PRN PO MODERATE PAIN 


LEVEL 4-6;  Start 1/11/19 at 01:00


Folic Acid (Folic Acid) 1 mg DAILY PO  Last administered on 1/15/19at 08:50; 


Admin Dose 1 MG;  Start 1/11/19 at 09:00


Multivitamins/ Minerals (Theragran-M) 1 tab DAILY PO  Last administered on 


1/15/19at 08:50; Admin Dose 1 TAB;  Start 1/11/19 at 09:00


Thiamine HCl (Vitamin B1) 100 mg DAILY PO  Last administered on 1/15/19at 08:50;


Admin Dose 100 MG;  Start 1/11/19 at 09:00


Polyethylene Glycol (Miralax) 17 gm DAILY  PRN PO CONSTIPATION;  Start 1/11/19 


at 01:00


Metoprolol Tartrate (Lopressor) 50 mg BID PO  Last administered on 1/15/19at 


08:51; Admin Dose 50 MG;  Start 1/11/19 at 09:00


Ferrous Sulfate (Ferrous Sulfate (Ec)) 325 mg TID PO  Last administered on 


1/15/19at 08:50; Admin Dose 325 MG;  Start 1/11/19 at 09:00


Hydralazine HCl (Apresoline) 25 mg TID PO  Last administered on 1/15/19at 08:51;


Admin Dose 25 MG;  Start 1/11/19 at 09:00


Tamsulosin HCl (Flomax) 0.4 mg HS PO  Last administered on 1/14/19at 21:49; 


Admin Dose 0.4 MG;  Start 1/11/19 at 21:00


Famotidine (Pepcid) 20 mg DAILY PO  Last administered on 1/15/19at 08:50; Admin 


Dose 20 MG;  Start 1/11/19 at 09:00


Magnesium Hydroxide (Milk Of Mag) 30 ml DAILY  PRN PO CONSTIPATION;  Start 


1/11/19 at 01:00


Acetaminophen (Tylenol Tab) 650 mg Q6H  PRN PO MILD PAIN(1-3)OR ELEVATED TEMP;  


Start 1/11/19 at 01:00


Ondansetron HCl (Zofran Inj) 4 mg Q8  PRN IV NAUSEA AND/OR VOMITING;  Start 


1/11/19 at 01:00


Senna (Senokot) 2 tab BID  PRN PO CONSTIPATION;  Start 1/11/19 at 01:00


Zolpidem Tartrate (Ambien) 5 mg HS  PRN PO INSOMNIA Last administered on 


1/14/19at 21:49; Admin Dose 5 MG;  Start 1/11/19 at 01:00


Tramadol HCl (Ultram) 50 mg TID  PRN PO PAIN LEVEL 1-5 Last administered on 


1/13/19at 04:40; Admin Dose 50 MG;  Start 1/11/19 at 01:00


Miscellaneous Information Patients own medicat... BID@1000,1600 XX ;  Start 


1/11/19 at 10:00


Bicalutamide (Casodex) 50 mg DAILY PO  Last administered on 1/15/19at 08:52; 


Admin Dose 50 MG;  Start 1/12/19 at 16:30











ALEKS BOWEN           Andrey 15, 2019 09:46

## 2019-01-16 VITALS — SYSTOLIC BLOOD PRESSURE: 99 MMHG | RESPIRATION RATE: 18 BRPM | DIASTOLIC BLOOD PRESSURE: 56 MMHG | HEART RATE: 70 BPM

## 2019-01-16 VITALS — DIASTOLIC BLOOD PRESSURE: 66 MMHG | SYSTOLIC BLOOD PRESSURE: 108 MMHG | RESPIRATION RATE: 18 BRPM | HEART RATE: 64 BPM

## 2019-01-16 VITALS — DIASTOLIC BLOOD PRESSURE: 58 MMHG | SYSTOLIC BLOOD PRESSURE: 122 MMHG | HEART RATE: 60 BPM | RESPIRATION RATE: 18 BRPM

## 2019-01-16 VITALS — DIASTOLIC BLOOD PRESSURE: 64 MMHG | SYSTOLIC BLOOD PRESSURE: 120 MMHG | HEART RATE: 77 BPM | RESPIRATION RATE: 18 BRPM

## 2019-01-16 VITALS — HEART RATE: 75 BPM | SYSTOLIC BLOOD PRESSURE: 102 MMHG | DIASTOLIC BLOOD PRESSURE: 57 MMHG | RESPIRATION RATE: 18 BRPM

## 2019-01-16 LAB
ALANINE AMINOTRANSFERASE: 15 IU/L (ref 13–69)
ALBUMIN: 3.1 G/DL (ref 3.3–4.9)
ALKALINE PHOSPHATASE: 242 IU/L (ref 42–121)
ASPARTATE AMINO TRANSFERASE: 24 IU/L (ref 15–46)
BILIRUBIN,DIRECT: 0 MG/DL (ref 0–0.2)
BILIRUBIN,TOTAL: 0 MG/DL (ref 0.2–1.3)
TOTAL PROTEIN: 6.2 G/DL (ref 6.1–8.1)

## 2019-01-16 RX ADMIN — METOPROLOL TARTRATE 1 MG: 50 TABLET, FILM COATED ORAL at 20:32

## 2019-01-16 RX ADMIN — TAMSULOSIN HYDROCHLORIDE 1 MG: 0.4 CAPSULE ORAL at 20:30

## 2019-01-16 RX ADMIN — FOLIC ACID SCH MG: 1 TABLET ORAL at 08:47

## 2019-01-16 RX ADMIN — MULTIPLE VITAMINS W/ MINERALS TAB SCH TAB: TAB at 08:47

## 2019-01-16 RX ADMIN — METOPROLOL TARTRATE SCH MG: 50 TABLET, FILM COATED ORAL at 20:32

## 2019-01-16 RX ADMIN — MULTIPLE VITAMINS W/ MINERALS TAB 1 TAB: TAB at 08:47

## 2019-01-16 RX ADMIN — MORPHINE SULFATE 1 MG: 10 SOLUTION ORAL at 22:16

## 2019-01-16 RX ADMIN — OXYCODONE HYDROCHLORIDE AND ACETAMINOPHEN 1 TAB: 5; 325 TABLET ORAL at 02:01

## 2019-01-16 RX ADMIN — THIAMINE HYDROCHLORIDE 1 MLS/HR: 100 INJECTION, SOLUTION INTRAMUSCULAR; INTRAVENOUS at 08:57

## 2019-01-16 RX ADMIN — BICALUTAMIDE SCH MG: 50 TABLET, FILM COATED ORAL at 08:48

## 2019-01-16 RX ADMIN — METOPROLOL TARTRATE SCH MG: 50 TABLET, FILM COATED ORAL at 08:48

## 2019-01-16 RX ADMIN — PANTOPRAZOLE SODIUM 1 MG: 40 TABLET, DELAYED RELEASE ORAL at 06:21

## 2019-01-16 RX ADMIN — FERROUS SULFATE TAB 325 MG (65 MG ELEMENTAL FE) 1 MG: 325 (65 FE) TAB at 08:47

## 2019-01-16 RX ADMIN — FERROUS SULFATE TAB 325 MG (65 MG ELEMENTAL FE) 1 MG: 325 (65 FE) TAB at 20:34

## 2019-01-16 RX ADMIN — FOLIC ACID SCH MLS/HR: 5 INJECTION, SOLUTION INTRAMUSCULAR; INTRAVENOUS; SUBCUTANEOUS at 08:57

## 2019-01-16 RX ADMIN — METOPROLOL TARTRATE 1 MG: 50 TABLET, FILM COATED ORAL at 08:48

## 2019-01-16 RX ADMIN — FOLIC ACID 1 MG: 1 TABLET ORAL at 08:47

## 2019-01-16 RX ADMIN — FAMOTIDINE SCH MG: 20 TABLET ORAL at 08:47

## 2019-01-16 RX ADMIN — THIAMINE HCL TAB 100 MG SCH MG: 100 TAB at 08:47

## 2019-01-16 RX ADMIN — FAMOTIDINE 1 MG: 20 TABLET ORAL at 08:47

## 2019-01-16 RX ADMIN — PANTOPRAZOLE SODIUM SCH MG: 40 TABLET, DELAYED RELEASE ORAL at 06:21

## 2019-01-16 RX ADMIN — FERROUS SULFATE TAB 325 MG (65 MG ELEMENTAL FE) SCH MG: 325 (65 FE) TAB at 13:44

## 2019-01-16 RX ADMIN — MORPHINE SULFATE PRN MG: 10 SOLUTION ORAL at 22:16

## 2019-01-16 RX ADMIN — THIAMINE HCL TAB 100 MG 1 MG: 100 TAB at 08:47

## 2019-01-16 RX ADMIN — FERROUS SULFATE TAB 325 MG (65 MG ELEMENTAL FE) SCH MG: 325 (65 FE) TAB at 08:47

## 2019-01-16 RX ADMIN — BICALUTAMIDE 1 MG: 50 TABLET, FILM COATED ORAL at 08:48

## 2019-01-16 RX ADMIN — FERROUS SULFATE TAB 325 MG (65 MG ELEMENTAL FE) 1 MG: 325 (65 FE) TAB at 13:44

## 2019-01-16 RX ADMIN — FERROUS SULFATE TAB 325 MG (65 MG ELEMENTAL FE) SCH MG: 325 (65 FE) TAB at 20:34

## 2019-01-16 RX ADMIN — OXYCODONE HYDROCHLORIDE AND ACETAMINOPHEN 1 TAB: 5; 325 TABLET ORAL at 20:31

## 2019-01-16 RX ADMIN — TAMSULOSIN HYDROCHLORIDE SCH MG: 0.4 CAPSULE ORAL at 20:30

## 2019-01-16 NOTE — NUR
rn notes



patient remains afebrile, no s/s of pain and discomfort, all due medicine given, ultrasound 
and MRI abdomen done. hours for ultrasound. urine strained and no stoned noted. all due 
medicine given and all needs attended to. chemo precautions observed well. pending stool OB 
sample, will endorse next shift. call light placed within reach and fall precautions 
observed well, will continue to monitor.

## 2019-01-16 NOTE — NUR
1915 Pt received aaox4, vitals stable, no s/s of distress. Pt oriented to night shift nurse 
and [facility] safety protocol including hourly rounding and an active bed alarm. Pt verbalized 
understanding.



2100 Medications administered w/o difficulty. Pt educated on medications and their purpose 
and frequency. Pt verbalized understanding. 



0000 Pt NPO after midnight, pt aware. Nursing will continue to monitor.



0300 Pt resting comfortably, denies needs at this time. 



0600 Pt had uneventful night. Nursing will continue to monitor and endorse to morning shift. 
Pt remains NPO for abdominal ultrasound this morning. 

-------------------------------------------------------------------------------

Addendum: 01/16/19 at 0753 by ARNOLD LOPEZ RN

-------------------------------------------------------------------------------

0600 No significant changes occurred throughout the shift, nursing report endorsed to [facility] 
RN to ensure continued quality of care.

## 2019-01-16 NOTE — PN
Date/Time of Note


Date/Time of Note


DATE: 1/16/19 


TIME: 12:16





Assessment/Plan


VTE Prophylaxis


Risk score (from Ns)>0 risk:  5


SCD applied (from Ns):  Yes


Pharmacological prophylaxis:  NA/contraindicated


Pharm contraindication:  low risk/ambulating





Lines/Catheters


IV Catheter Type (from [facility]):  Peripheral IV


Urinary Cath still in place:  No





Assessment/Plan


Hospital Course


67 yo male





1.  Anemia, which is a combination of metastasis to the bone and also 


gastrointestinal bleed.


2.  Renal insufficiency.


3.  Malnutrition.


4.  Prostate cancer with mets to the bone noted in bone scan


5.  Hypertension.


6.  Hepatitis C.


7.  Alcoholism.


8.  Gastritis


9.  S/P EGD


10.  Transaminitis


11.  Weight loss of 80 lbs over 3 months


12.  Elevated alk phos secondary to bone mets


13.  Fatty liver


14.  Cholelithiasis


15.  Dilated CBD at 10mm


16.  Elevated CEA (AFP and CA 19-9 wnl)





US of abd:


Fatty infiltration of the liver.. 


Cholelithiasis.


Dilated CBD. 10mm


Multiple simple cysts in the right kidne





Plan


FOB


MRCP


Continue PPI


Bone scan shows multiple skeletal metastasis


Monitor LFTs


Pending psych eval





Pt examined and plan of care discussed with Dr. Valentin


Result Diagram:  


1/15/19 0440                                                                    


           1/15/19 0440





Results 24hrs





Laboratory Tests


               Test
                                1/16/19
05:30


               Total Bilirubin                             0.0  L


               Direct Bilirubin                            0.00


               Indirect Bilirubin                           0.0


               Aspartate Amino Transf
(AST/SGOT)            24  



               Alanine Aminotransferase
(ALT/SGPT)          15  



               Alkaline Phosphatase                        242  H


               Total Protein                                6.2


               Albumin                                     3.1  L








Subjective


24 Hr Interval Summary


Free Text/Dictation


No evidence of GI bleeding per pt and RN.  No N/V.  Denies abd pain.





Exam/Review of Systems


Vital Signs


Vitals





Vital Signs


  Date      Temp  Pulse  Resp  B/P (MAP)   Pulse Ox  O2          O2 Flow    FiO2


Time                                                 Delivery    Rate


   1/16/19  97.9     64    18      108/66       100  Room Air


     08:34                           (80)








Intake and Output





1/15/19


1/15/19


1/16/19





1515:00


23:00


07:00





IntakeIntake Total


920 ml


720 ml





OutputOutput Total


1300 ml


300 ml


400 ml





BalanceBalance


-380 ml


420 ml


-400 ml














Medications


Medications





Current Medications


Pantoprazole (Protonix Tab) 40 mg DAILY@06 PO  Last administered on 1/16/19at 


06:21; Admin Dose 40 MG;  Start 1/11/19 at 06:00


Sodium Chloride 1,000 ml @  40 mls/hr Q24H IV  Last administered on 1/16/19at 


08:57; Admin Dose 40 MLS/HR;  Start 1/11/19 at 00:50


Oxycodone/ Acetaminophen (Percocet (5/ 325)) 2 tab Q6H  PRN PO PAIN LEVEL 6-10 


Last administered on 1/16/19at 02:01; Admin Dose 2 TAB;  Start 1/11/19 at 01:00


Oxycodone/ Acetaminophen (Percocet (5/ 325)) 1 tab Q6H  PRN PO MODERATE PAIN 


LEVEL 4-6;  Start 1/11/19 at 01:00


Folic Acid (Folic Acid) 1 mg DAILY PO  Last administered on 1/16/19at 08:47; 


Admin Dose 1 MG;  Start 1/11/19 at 09:00


Multivitamins/ Minerals (Theragran-M) 1 tab DAILY PO  Last administered on 


1/16/19at 08:47; Admin Dose 1 TAB;  Start 1/11/19 at 09:00


Thiamine HCl (Vitamin B1) 100 mg DAILY PO  Last administered on 1/16/19at 08:47;


Admin Dose 100 MG;  Start 1/11/19 at 09:00


Polyethylene Glycol (Miralax) 17 gm DAILY  PRN PO CONSTIPATION;  Start 1/11/19 


at 01:00


Metoprolol Tartrate (Lopressor) 50 mg BID PO  Last administered on 1/16/19at 


08:48; Admin Dose 50 MG;  Start 1/11/19 at 09:00


Ferrous Sulfate (Ferrous Sulfate (Ec)) 325 mg TID PO  Last administered on 


1/16/19at 08:47; Admin Dose 325 MG;  Start 1/11/19 at 09:00


Hydralazine HCl (Apresoline) 25 mg TID PO  Last administered on 1/16/19at 08:51;


Admin Dose 25 MG;  Start 1/11/19 at 09:00


Tamsulosin HCl (Flomax) 0.4 mg HS PO  Last administered on 1/15/19at 20:31; 


Admin Dose 0.4 MG;  Start 1/11/19 at 21:00


Famotidine (Pepcid) 20 mg DAILY PO  Last administered on 1/16/19at 08:47; Admin 


Dose 20 MG;  Start 1/11/19 at 09:00


Magnesium Hydroxide (Milk Of Mag) 30 ml DAILY  PRN PO CONSTIPATION;  Start 


1/11/19 at 01:00


Acetaminophen (Tylenol Tab) 650 mg Q6H  PRN PO MILD PAIN(1-3)OR ELEVATED TEMP;  


Start 1/11/19 at 01:00


Ondansetron HCl (Zofran Inj) 4 mg Q8  PRN IV NAUSEA AND/OR VOMITING;  Start 


1/11/19 at 01:00


Senna (Senokot) 2 tab BID  PRN PO CONSTIPATION;  Start 1/11/19 at 01:00


Zolpidem Tartrate (Ambien) 5 mg HS  PRN PO INSOMNIA Last administered on 


1/14/19at 21:49; Admin Dose 5 MG;  Start 1/11/19 at 01:00


Tramadol HCl (Ultram) 50 mg TID  PRN PO PAIN LEVEL 1-5 Last administered on 


1/13/19at 04:40; Admin Dose 50 MG;  Start 1/11/19 at 01:00


Miscellaneous Information Patients own medicat... BID@1000,1600 XX ;  Start 


1/11/19 at 10:00


Bicalutamide (Casodex) 50 mg DAILY PO  Last administered on 1/16/19at 08:48; 


Admin Dose 50 MG;  Start 1/12/19 at 16:30


Morphine Sulfate (morphine) 6 mg Q4H  PRN PO SEVERE PAIN LEVEL 7-10;  Start 


1/15/19 at 17:30











ALEKS BOWEN           Jan 16, 2019 12:26

## 2019-01-16 NOTE — PN
Date/Time of Note


Date/Time of Note


DATE: 1/16/19 


TIME: 15:23





Assessment/Plan


VTE Prophylaxis


Risk score (from Nsg)>0 risk:  5


SCD applied (from Ns):  Yes


Pharmacological prophylaxis:  NA/contraindicated


Pharm contraindication:  low risk/ambulating





Lines/Catheters


IV Catheter Type (from Nrsg):  Peripheral IV


Urinary Cath still in place:  No





Assessment/Plan


Hospital Course


67 y/o with 





1. AMS,  metabolic encephalopathy ? per pscy has capcity 


2.  Lymphadenopathy concerning for malignancy unknown primary source, more 


likely prostate cancer, PSA 1000.  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


3.  Acute kidney injury, better


4.  Severe anemia


5.  Recent weight loss


6.  Severe malnourishment


7.  Thrombocytopenia


8.  History of subdural hematoma


9.  History of hepatitis C


10.  History of alcoholism


11.  Hypertension


12.  Mild left hydronephrosis with obstructing distal left ureteral calculus


13  Chronic gastritis s/p EGD





Plan


-pt has capacity per pscy


- dc fluids


- MRCP today


- cw casodex/flomax


- spoke to Dr Potts will do biopsy as OPD in office


- cw PPI


- Bone scan +multiple mets


- PT eval


Result Diagram:  


1/15/19 0440                                                                    


           1/15/19 0440





Results 24hrs





Laboratory Tests


               Test
                                1/16/19
05:30


               Total Bilirubin                             0.0  L


               Direct Bilirubin                            0.00


               Indirect Bilirubin                           0.0


               Aspartate Amino Transf
(AST/SGOT)            24  



               Alanine Aminotransferase
(ALT/SGPT)          15  



               Alkaline Phosphatase                        242  H


               Total Protein                                6.2


               Albumin                                     3.1  L








Subjective


24 Hr Interval Summary


Free Text/Dictation


Pt is going for MRCP today due to dilated CBD


Spoke to patient at bedside, says he knows about his disease





Exam/Review of Systems


Vital Signs


Vitals





Vital Signs


  Date      Temp  Pulse  Resp  B/P (MAP)   Pulse Ox  O2          O2 Flow    FiO2


Time                                                 Delivery    Rate


   1/16/19  98.0     70    18  99/56 (70)        99  Room Air


     14:40








Intake and Output





1/15/19


1/15/19


1/16/19





1414:59


22:59


06:59





IntakeIntake Total


920 ml


720 ml





OutputOutput Total


1300 ml


300 ml


400 ml





BalanceBalance


-380 ml


420 ml


-400 ml














Exam


Neck:  supple


Respiratory:  clear to auscultation


Cardiovascular:  regular rate and rhythm


Gastrointestinal:  soft, bowel sounds


Extremities:  calf tenderness





Medications


Medications





Current Medications


Pantoprazole (Protonix Tab) 40 mg DAILY@06 PO  Last administered on 1/16/19at 


06:21; Admin Dose 40 MG;  Start 1/11/19 at 06:00


Sodium Chloride 1,000 ml @  40 mls/hr Q24H IV  Last administered on 1/16/19at 


08:57; Admin Dose 40 MLS/HR;  Start 1/11/19 at 00:50


Oxycodone/ Acetaminophen (Percocet (5/ 325)) 2 tab Q6H  PRN PO PAIN LEVEL 6-10 


Last administered on 1/16/19at 02:01; Admin Dose 2 TAB;  Start 1/11/19 at 01:00


Oxycodone/ Acetaminophen (Percocet (5/ 325)) 1 tab Q6H  PRN PO MODERATE PAIN 


LEVEL 4-6;  Start 1/11/19 at 01:00


Folic Acid (Folic Acid) 1 mg DAILY PO  Last administered on 1/16/19at 08:47; 


Admin Dose 1 MG;  Start 1/11/19 at 09:00


Multivitamins/ Minerals (Theragran-M) 1 tab DAILY PO  Last administered on 


1/16/19at 08:47; Admin Dose 1 TAB;  Start 1/11/19 at 09:00


Thiamine HCl (Vitamin B1) 100 mg DAILY PO  Last administered on 1/16/19at 08:47;


Admin Dose 100 MG;  Start 1/11/19 at 09:00


Polyethylene Glycol (Miralax) 17 gm DAILY  PRN PO CONSTIPATION;  Start 1/11/19 


at 01:00


Metoprolol Tartrate (Lopressor) 50 mg BID PO  Last administered on 1/16/19at 


08:48; Admin Dose 50 MG;  Start 1/11/19 at 09:00


Ferrous Sulfate (Ferrous Sulfate (Ec)) 325 mg TID PO  Last administered on 


1/16/19at 13:44; Admin Dose 325 MG;  Start 1/11/19 at 09:00


Hydralazine HCl (Apresoline) 25 mg TID PO  Last administered on 1/16/19at 08:51;


Admin Dose 25 MG;  Start 1/11/19 at 09:00


Tamsulosin HCl (Flomax) 0.4 mg HS PO  Last administered on 1/15/19at 20:31; 


Admin Dose 0.4 MG;  Start 1/11/19 at 21:00


Famotidine (Pepcid) 20 mg DAILY PO  Last administered on 1/16/19at 08:47; Admin 


Dose 20 MG;  Start 1/11/19 at 09:00


Magnesium Hydroxide (Milk Of Mag) 30 ml DAILY  PRN PO CONSTIPATION;  Start 


1/11/19 at 01:00


Acetaminophen (Tylenol Tab) 650 mg Q6H  PRN PO MILD PAIN(1-3)OR ELEVATED TEMP;  


Start 1/11/19 at 01:00


Ondansetron HCl (Zofran Inj) 4 mg Q8  PRN IV NAUSEA AND/OR VOMITING;  Start 


1/11/19 at 01:00


Senna (Senokot) 2 tab BID  PRN PO CONSTIPATION;  Start 1/11/19 at 01:00


Zolpidem Tartrate (Ambien) 5 mg HS  PRN PO INSOMNIA Last administered on 1 /14/19at 21:49; Admin Dose 5 MG;  Start 1/11/19 at 01:00


Tramadol HCl (Ultram) 50 mg TID  PRN PO PAIN LEVEL 1-5 Last administered on 


1/13/19at 04:40; Admin Dose 50 MG;  Start 1/11/19 at 01:00


Miscellaneous Information Patients own medicat... BID@1000,1600 XX ;  Start 


1/11/19 at 10:00


Bicalutamide (Casodex) 50 mg DAILY PO  Last administered on 1/16/19at 08:48; 


Admin Dose 50 MG;  Start 1/12/19 at 16:30


Morphine Sulfate (morphine) 6 mg Q4H  PRN PO SEVERE PAIN LEVEL 7-10;  Start 


1/15/19 at 17:30











TOMAS BRICE MD              Jan 16, 2019 15:25

## 2019-01-17 VITALS — HEART RATE: 73 BPM | RESPIRATION RATE: 16 BRPM | SYSTOLIC BLOOD PRESSURE: 100 MMHG | DIASTOLIC BLOOD PRESSURE: 59 MMHG

## 2019-01-17 VITALS — DIASTOLIC BLOOD PRESSURE: 63 MMHG | SYSTOLIC BLOOD PRESSURE: 110 MMHG | HEART RATE: 72 BPM | RESPIRATION RATE: 18 BRPM

## 2019-01-17 VITALS — HEART RATE: 74 BPM | DIASTOLIC BLOOD PRESSURE: 62 MMHG | SYSTOLIC BLOOD PRESSURE: 123 MMHG | RESPIRATION RATE: 18 BRPM

## 2019-01-17 LAB
ADD MAN DIFF?: NO
ANION GAP: 9 (ref 5–13)
BASOPHIL #: 0 10^3/UL (ref 0–0.1)
BASOPHILS %: 0.2 % (ref 0–2)
BLOOD UREA NITROGEN: 24 MG/DL (ref 7–20)
CALCIUM: 9.2 MG/DL (ref 8.4–10.2)
CARBON DIOXIDE: 22 MMOL/L (ref 21–31)
CHLORIDE: 110 MMOL/L (ref 97–110)
CREATININE: 1.23 MG/DL (ref 0.61–1.24)
EOSINOPHILS #: 0.1 10^3/UL (ref 0–0.5)
EOSINOPHILS %: 1.6 % (ref 0–7)
GLUCOSE: 126 MG/DL (ref 70–220)
HEMATOCRIT: 25.6 % (ref 42–52)
HEMOGLOBIN: 7.8 G/DL (ref 14–18)
IMMATURE GRANS #M: 0.13 10^3/UL (ref 0–0.03)
IMMATURE GRANS % (M): 2.6 % (ref 0–0.43)
LYMPHOCYTES #: 1.4 10^3/UL (ref 0.8–2.9)
LYMPHOCYTES %: 27.8 % (ref 15–51)
MEAN CORPUSCULAR HEMOGLOBIN: 29.1 PG (ref 29–33)
MEAN CORPUSCULAR HGB CONC: 30.5 G/DL (ref 32–37)
MEAN CORPUSCULAR VOLUME: 95.5 FL (ref 82–101)
MEAN PLATELET VOLUME: 10.3 FL (ref 7.4–10.4)
MONOCYTE #: 0.5 10^3/UL (ref 0.3–0.9)
MONOCYTES %: 9.7 % (ref 0–11)
NEUTROPHIL #: 2.9 10^3/UL (ref 1.6–7.5)
NEUTROPHILS %: 58.1 % (ref 39–77)
NUCLEATED RED BLOOD CELLS #: 0 10^3/UL (ref 0–0)
NUCLEATED RED BLOOD CELLS%: 0 /100WBC (ref 0–0)
PLATELET COUNT: 234 10^3/UL (ref 140–415)
POTASSIUM: 3.8 MMOL/L (ref 3.5–5.1)
RED BLOOD COUNT: 2.68 10^6/UL (ref 4.7–6.1)
RED CELL DISTRIBUTION WIDTH: 16.5 % (ref 11.5–14.5)
SODIUM: 141 MMOL/L (ref 135–144)
WHITE BLOOD COUNT: 5 10^3/UL (ref 4.8–10.8)

## 2019-01-17 RX ADMIN — PANTOPRAZOLE SODIUM 1 MG: 40 TABLET, DELAYED RELEASE ORAL at 05:45

## 2019-01-17 RX ADMIN — FERROUS SULFATE TAB 325 MG (65 MG ELEMENTAL FE) SCH MG: 325 (65 FE) TAB at 14:15

## 2019-01-17 RX ADMIN — METOPROLOL TARTRATE 1 MG: 50 TABLET, FILM COATED ORAL at 20:43

## 2019-01-17 RX ADMIN — THIAMINE HCL TAB 100 MG SCH MG: 100 TAB at 09:25

## 2019-01-17 RX ADMIN — OXYCODONE HYDROCHLORIDE AND ACETAMINOPHEN 1 TAB: 5; 325 TABLET ORAL at 17:47

## 2019-01-17 RX ADMIN — OXYCODONE HYDROCHLORIDE AND ACETAMINOPHEN 1 TAB: 5; 325 TABLET ORAL at 05:46

## 2019-01-17 RX ADMIN — FAMOTIDINE SCH MG: 20 TABLET ORAL at 09:25

## 2019-01-17 RX ADMIN — METOPROLOL TARTRATE SCH MG: 50 TABLET, FILM COATED ORAL at 11:07

## 2019-01-17 RX ADMIN — METOPROLOL TARTRATE 1 MG: 50 TABLET, FILM COATED ORAL at 11:07

## 2019-01-17 RX ADMIN — PANTOPRAZOLE SODIUM SCH MG: 40 TABLET, DELAYED RELEASE ORAL at 05:45

## 2019-01-17 RX ADMIN — FOLIC ACID 1 MG: 1 TABLET ORAL at 09:25

## 2019-01-17 RX ADMIN — TAMSULOSIN HYDROCHLORIDE 1 MG: 0.4 CAPSULE ORAL at 20:42

## 2019-01-17 RX ADMIN — MULTIPLE VITAMINS W/ MINERALS TAB SCH TAB: TAB at 09:25

## 2019-01-17 RX ADMIN — TAMSULOSIN HYDROCHLORIDE SCH MG: 0.4 CAPSULE ORAL at 20:42

## 2019-01-17 RX ADMIN — THIAMINE HCL TAB 100 MG 1 MG: 100 TAB at 09:25

## 2019-01-17 RX ADMIN — FOLIC ACID SCH MG: 1 TABLET ORAL at 09:25

## 2019-01-17 RX ADMIN — FERROUS SULFATE TAB 325 MG (65 MG ELEMENTAL FE) SCH MG: 325 (65 FE) TAB at 09:25

## 2019-01-17 RX ADMIN — FERROUS SULFATE TAB 325 MG (65 MG ELEMENTAL FE) 1 MG: 325 (65 FE) TAB at 09:25

## 2019-01-17 RX ADMIN — BICALUTAMIDE SCH MG: 50 TABLET, FILM COATED ORAL at 09:27

## 2019-01-17 RX ADMIN — FERROUS SULFATE TAB 325 MG (65 MG ELEMENTAL FE) SCH MG: 325 (65 FE) TAB at 20:42

## 2019-01-17 RX ADMIN — FERROUS SULFATE TAB 325 MG (65 MG ELEMENTAL FE) 1 MG: 325 (65 FE) TAB at 20:42

## 2019-01-17 RX ADMIN — METOPROLOL TARTRATE SCH MG: 50 TABLET, FILM COATED ORAL at 20:43

## 2019-01-17 RX ADMIN — MULTIPLE VITAMINS W/ MINERALS TAB 1 TAB: TAB at 09:25

## 2019-01-17 RX ADMIN — FERROUS SULFATE TAB 325 MG (65 MG ELEMENTAL FE) 1 MG: 325 (65 FE) TAB at 14:15

## 2019-01-17 RX ADMIN — BICALUTAMIDE 1 MG: 50 TABLET, FILM COATED ORAL at 09:27

## 2019-01-17 RX ADMIN — FAMOTIDINE 1 MG: 20 TABLET ORAL at 09:25

## 2019-01-17 NOTE — PN
Date/Time of Note


Date/Time of Note


DATE: 1/17/19 


TIME: 15:51





Assessment/Plan


VTE Prophylaxis


Risk score (from Ns)>0 risk:  8


SCD applied (from Ns):  Yes


Pharmacological prophylaxis:  NA/contraindicated


Pharm contraindication:  low risk/ambulating





Lines/Catheters


IV Catheter Type (from [facility]):  Peripheral IV


Urinary Cath still in place:  No





Assessment/Plan


Hospital Course


67 y/o with 





1. AMS,  metabolic encephalopathy ? per pscy has capcity 


2.  Lymphadenopathy concerning for malignancy unknown primary source, more 


likely prostate cancer, PSA 1000.  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


3.  Acute kidney injury, better


4.  Severe anemia


5.  Recent weight loss


6.  Severe malnourishment


7.  Thrombocytopenia


8.  History of subdural hematoma


9.  History of hepatitis C


10.  History of alcoholism


11.  Hypertension


12.  Mild left hydronephrosis with obstructing distal left ureteral calculus


13  Chronic gastritis s/p EGD





Plan


-pt has capacity per pscy


- MRCP neg


- decrease BP meds


- cw casodex/flomax


- spoke to Dr Potts will do biopsy as OPD in office


- cw PPI


- Bone scan +multiple mets


- PT eval 





If cleared by PT> WILL dc home tmw> pt refusing SNIF


Result Diagram:  


1/17/19 1013                                                                    


           1/17/19 1013





Results 24hrs





Laboratory Tests


               Test
                                1/17/19
10:13


               White Blood Count                            5.0


               Red Blood Count                            2.68  L


               Hemoglobin                                  7.8  L


               Hematocrit                                 25.6  L


               Mean Corpuscular Volume                     95.5


               Mean Corpuscular Hemoglobin                 29.1


               Mean Corpuscular Hemoglobin
Concent       30.5  L



               Red Cell Distribution Width                16.5  H


               Platelet Count                               234


               Mean Platelet Volume                        10.3


               Immature Granulocytes %                   2.600  H


               Neutrophils %                               58.1


               Lymphocytes %                               27.8


               Monocytes %                                  9.7


               Eosinophils %                                1.6


               Basophils %                                  0.2


               Nucleated Red Blood Cells %                  0.0


               Immature Granulocytes #                   0.130  H


               Neutrophils #                                2.9


               Lymphocytes #                                1.4


               Monocytes #                                  0.5


               Eosinophils #                                0.1


               Basophils #                                  0.0


               Nucleated Red Blood Cells #                  0.0


               Sodium Level                                 141


               Potassium Level                              3.8


               Chloride Level                               110


               Carbon Dioxide Level                          22


               Anion Gap                                      9


               Blood Urea Nitrogen                          24  H


               Creatinine                                  1.23


               Est Glomerular Filtrat Rate
mL/min          58  L



               Glucose Level                                126


               Calcium Level                                9.2








Subjective


24 Hr Interval Summary


Free Text/Dictation


MRCP negative


pain in rt leg


refused PT today





Exam/Review of Systems


Vital Signs


Vitals





Vital Signs


  Date      Temp  Pulse  Resp  B/P (MAP)   Pulse Ox  O2          O2 Flow    FiO2


Time                                                 Delivery    Rate


   1/17/19  98.0     73    16      100/59       100  Room Air


     14:29                           (73)








Intake and Output





1/16/19 1/16/19 1/17/19





1414:59


22:59


06:59





IntakeIntake Total


820 ml


240 ml


300 ml





OutputOutput Total


600 ml


200 ml


800 ml





BalanceBalance


220 ml


40 ml


-500 ml














Exam


Neck:  supple


Respiratory:  clear to auscultation


Cardiovascular:  regular rate and rhythm


Gastrointestinal:  soft, bowel sounds


Extremities:  calf tenderness





Medications


Medications





Current Medications


Pantoprazole (Protonix Tab) 40 mg DAILY@06 PO  Last administered on 1/17/19at 


05:45; Admin Dose 40 MG;  Start 1/11/19 at 06:00


Oxycodone/ Acetaminophen (Percocet (5/ 325)) 2 tab Q6H  PRN PO PAIN LEVEL 6-10 


Last administered on 1/17/19at 05:46; Admin Dose 2 TAB;  Start 1/11/19 at 01:00


Oxycodone/ Acetaminophen (Percocet (5/ 325)) 1 tab Q6H  PRN PO MODERATE PAIN 


LEVEL 4-6;  Start 1/11/19 at 01:00


Folic Acid (Folic Acid) 1 mg DAILY PO  Last administered on 1/17/19at 09:25; 


Admin Dose 1 MG;  Start 1/11/19 at 09:00


Multivitamins/ Minerals (Theragran-M) 1 tab DAILY PO  Last administered on 


1/17/19at 09:25; Admin Dose 1 TAB;  Start 1/11/19 at 09:00


Thiamine HCl (Vitamin B1) 100 mg DAILY PO  Last administered on 1/17/19at 09:25;


Admin Dose 100 MG;  Start 1/11/19 at 09:00


Polyethylene Glycol (Miralax) 17 gm DAILY  PRN PO CONSTIPATION;  Start 1/11/19 


at 01:00


Metoprolol Tartrate (Lopressor) 50 mg BID PO  Last administered on 1/16/19at 


20:32; Admin Dose 50 MG;  Start 1/11/19 at 09:00


Ferrous Sulfate (Ferrous Sulfate (Ec)) 325 mg TID PO  Last administered on 


1/17/19at 14:15; Admin Dose 325 MG;  Start 1/11/19 at 09:00


Tamsulosin HCl (Flomax) 0.4 mg HS PO  Last administered on 1/16/19at 20:30; 


Admin Dose 0.4 MG;  Start 1/11/19 at 21:00


Famotidine (Pepcid) 20 mg DAILY PO  Last administered on 1/17/19at 09:25; Admin 


Dose 20 MG;  Start 1/11/19 at 09:00


Magnesium Hydroxide (Milk Of Mag) 30 ml DAILY  PRN PO CONSTIPATION;  Start 


1/11/19 at 01:00


Acetaminophen (Tylenol Tab) 650 mg Q6H  PRN PO MILD PAIN(1-3)OR ELEVATED TEMP;  


Start 1/11/19 at 01:00


Ondansetron HCl (Zofran Inj) 4 mg Q8  PRN IV NAUSEA AND/OR VOMITING;  Start 


1/11/19 at 01:00


Senna (Senokot) 2 tab BID  PRN PO CONSTIPATION;  Start 1/11/19 at 01:00


Zolpidem Tartrate (Ambien) 5 mg HS  PRN PO INSOMNIA Last administered on 


1/14/19at 21:49; Admin Dose 5 MG;  Start 1/11/19 at 01:00


Tramadol HCl (Ultram) 50 mg TID  PRN PO PAIN LEVEL 1-5 Last administered on 


1/13/19at 04:40; Admin Dose 50 MG;  Start 1/11/19 at 01:00


Miscellaneous Information Patients own medicat... BID@1000,1600 XX ;  Start 


1/11/19 at 10:00


Bicalutamide (Casodex) 50 mg DAILY PO  Last administered on 1/17/19at 09:27; 


Admin Dose 50 MG;  Start 1/12/19 at 16:30


Morphine Sulfate (morphine) 6 mg Q4H  PRN PO SEVERE PAIN LEVEL 7-10 Last a


dministered on 1/16/19at 22:16; Admin Dose 6 MG;  Start 1/15/19 at 17:30











TOMAS BRICE MD              Jan 17, 2019 15:53

## 2019-01-17 NOTE — NUR
PT evaluation received, patient cleared for activity per RN. patient in bed, complains of 
significant pain in BLE with associated numbness and tingling following fall Feb 2018. 
Patient states using a walker at home and has substantial family support however does not 
want to attempt PT evaluation at this time due to "i usually do it EARLY in the morning" PT 
unable to encourage patient, Spoke to RN regarding pt refusal.

## 2019-01-17 NOTE — NUR
1915 1915 Pt received aaox3, vitals stable, respirations regular and even, no s/s of 
distress noted. Pt oriented to night shift nurse and [facility] safety protocol including hourly 
rounding and an active bed alarm. Pt denies questions at this time. 



2100 Scheduled medications administered w/o difficulty. Medication administered for pain. 
Urinal strained & emptied, no products noted. 



2200 Linen change provided.



0000 Pt resting comfortably, nursing will continue to monitor and ensure pt safety. 

-------------------------------------------------------------------------------

Addendum: 01/17/19 at 0645 by ARNOLD LOPEZ RN

-------------------------------------------------------------------------------

0600 No significant change in pt status occurred throughout the shift. Pt remained stable 
and all needs were anticipated and met. Nursing will continue to monitor and endorse to 
morning shift to ensure continuity of care and pt safety.

## 2019-01-17 NOTE — NUR
ON ORAL CASODEX CHEMO PRECAUTIONS FOR PROSTATE CA PPE USED AND NO ASE. NOTED AT TIMES PT. 
NON COMPLIANT OF CARE REGARDING PHYSICAL THERAPY REFUSED 3X. WITH GOOD APPETITE NOTED EXTRA 
FLUIDS ENCOURAGED, NO SOB, NO ACUTE DISTRESS AND VS WITHIN RANGE. POSSIBLE D/C HOME 
TOMORROW, REFUSED SKILLED NURSING FACILITY AND ALL NEEDS MET.

## 2019-01-17 NOTE — NUR
MD ORDER TO FF UP WITH DR. CLIFTON AND HUEY FAXED TO OFFICES OF THE [facility].

-------------------------------------------------------------------------------

Addendum: 01/17/19 at 1548 by KAYE SAMANO CM

-------------------------------------------------------------------------------

Amended: Links added.

## 2019-01-18 VITALS — HEART RATE: 71 BPM | SYSTOLIC BLOOD PRESSURE: 117 MMHG | DIASTOLIC BLOOD PRESSURE: 62 MMHG | RESPIRATION RATE: 18 BRPM

## 2019-01-18 VITALS — SYSTOLIC BLOOD PRESSURE: 96 MMHG | HEART RATE: 62 BPM | DIASTOLIC BLOOD PRESSURE: 65 MMHG | RESPIRATION RATE: 18 BRPM

## 2019-01-18 VITALS — HEART RATE: 62 BPM | DIASTOLIC BLOOD PRESSURE: 56 MMHG | SYSTOLIC BLOOD PRESSURE: 102 MMHG

## 2019-01-18 VITALS — DIASTOLIC BLOOD PRESSURE: 59 MMHG | HEART RATE: 66 BPM | RESPIRATION RATE: 18 BRPM | SYSTOLIC BLOOD PRESSURE: 97 MMHG

## 2019-01-18 VITALS — HEART RATE: 70 BPM | SYSTOLIC BLOOD PRESSURE: 126 MMHG | RESPIRATION RATE: 18 BRPM | DIASTOLIC BLOOD PRESSURE: 70 MMHG

## 2019-01-18 LAB — OCCULT BLOOD STOOL: NEGATIVE

## 2019-01-18 RX ADMIN — FERROUS SULFATE TAB 325 MG (65 MG ELEMENTAL FE) 1 MG: 325 (65 FE) TAB at 13:00

## 2019-01-18 RX ADMIN — METOPROLOL TARTRATE 1 MG: 50 TABLET, FILM COATED ORAL at 09:16

## 2019-01-18 RX ADMIN — METOPROLOL TARTRATE SCH MG: 50 TABLET, FILM COATED ORAL at 09:16

## 2019-01-18 RX ADMIN — SENNOSIDES 1 TAB: 8.6 TABLET, FILM COATED ORAL at 06:03

## 2019-01-18 RX ADMIN — OXYCODONE HYDROCHLORIDE AND ACETAMINOPHEN 1 TAB: 5; 325 TABLET ORAL at 09:14

## 2019-01-18 RX ADMIN — FERROUS SULFATE TAB 325 MG (65 MG ELEMENTAL FE) SCH MG: 325 (65 FE) TAB at 09:15

## 2019-01-18 RX ADMIN — THIAMINE HCL TAB 100 MG SCH MG: 100 TAB at 09:15

## 2019-01-18 RX ADMIN — FERROUS SULFATE TAB 325 MG (65 MG ELEMENTAL FE) 1 MG: 325 (65 FE) TAB at 20:47

## 2019-01-18 RX ADMIN — BICALUTAMIDE SCH MG: 50 TABLET, FILM COATED ORAL at 09:16

## 2019-01-18 RX ADMIN — MULTIPLE VITAMINS W/ MINERALS TAB SCH TAB: TAB at 09:15

## 2019-01-18 RX ADMIN — TAMSULOSIN HYDROCHLORIDE SCH MG: 0.4 CAPSULE ORAL at 20:47

## 2019-01-18 RX ADMIN — BICALUTAMIDE 1 MG: 50 TABLET, FILM COATED ORAL at 09:16

## 2019-01-18 RX ADMIN — FERROUS SULFATE TAB 325 MG (65 MG ELEMENTAL FE) 1 MG: 325 (65 FE) TAB at 09:15

## 2019-01-18 RX ADMIN — METOPROLOL TARTRATE 1 MG: 50 TABLET, FILM COATED ORAL at 20:46

## 2019-01-18 RX ADMIN — TAMSULOSIN HYDROCHLORIDE 1 MG: 0.4 CAPSULE ORAL at 20:47

## 2019-01-18 RX ADMIN — FOLIC ACID SCH MG: 1 TABLET ORAL at 09:15

## 2019-01-18 RX ADMIN — OXYCODONE HYDROCHLORIDE AND ACETAMINOPHEN 1 TAB: 5; 325 TABLET ORAL at 21:06

## 2019-01-18 RX ADMIN — METOPROLOL TARTRATE SCH MG: 50 TABLET, FILM COATED ORAL at 20:46

## 2019-01-18 RX ADMIN — PANTOPRAZOLE SODIUM SCH MG: 40 TABLET, DELAYED RELEASE ORAL at 06:03

## 2019-01-18 RX ADMIN — FERROUS SULFATE TAB 325 MG (65 MG ELEMENTAL FE) SCH MG: 325 (65 FE) TAB at 13:00

## 2019-01-18 RX ADMIN — PANTOPRAZOLE SODIUM 1 MG: 40 TABLET, DELAYED RELEASE ORAL at 06:03

## 2019-01-18 RX ADMIN — FERROUS SULFATE TAB 325 MG (65 MG ELEMENTAL FE) SCH MG: 325 (65 FE) TAB at 20:47

## 2019-01-18 RX ADMIN — THIAMINE HCL TAB 100 MG 1 MG: 100 TAB at 09:15

## 2019-01-18 RX ADMIN — FOLIC ACID 1 MG: 1 TABLET ORAL at 09:15

## 2019-01-18 RX ADMIN — FAMOTIDINE SCH MG: 20 TABLET ORAL at 09:15

## 2019-01-18 RX ADMIN — OXYCODONE HYDROCHLORIDE AND ACETAMINOPHEN 1 TAB: 5; 325 TABLET ORAL at 15:47

## 2019-01-18 RX ADMIN — OXYCODONE HYDROCHLORIDE AND ACETAMINOPHEN 1 TAB: 5; 325 TABLET ORAL at 02:49

## 2019-01-18 RX ADMIN — MULTIPLE VITAMINS W/ MINERALS TAB 1 TAB: TAB at 09:15

## 2019-01-18 RX ADMIN — FAMOTIDINE 1 MG: 20 TABLET ORAL at 09:15

## 2019-01-18 NOTE — NUR
SPOKE TO HUA TELLO, PATIENT NEEDS TO GO TO SNF, ARU EVAL REFUSED BY PATIENT, REFERRED TO 
[facility] PENDING ACCEPTANCE.


-------------------------------------------------------------------------------

Addendum: 01/18/19 at 1213 by KAYE SAMANO 

-------------------------------------------------------------------------------

Amended: Links added.

-------------------------------------------------------------------------------

Addendum: 01/18/19 at 1357 by KAYE SAMANO 

-------------------------------------------------------------------------------

PER WILMA CHRIS WANTS TO GO TO SNF NEAR HIS HOUSE IN [place] AND [place]

## 2019-01-18 NOTE — NUR
kiersten Bains is here and ask her to call patient's brother regarding his reqest.she said''ok i 
will talk to him''

## 2019-01-18 NOTE — NUR
SS Note: Consult 



Pt referred to SW to assess living situation. The patient is a 68-year-old male admitted due 
to kidney failure. SW met with pt at bedside for assessment. Pt awake, alert, and oriented 
x4. Pt irritable and somewhat cooperative with SW. Pt reported he is single and has adult 
children, but they are not involved but has good support from his siblings. Pt verbally 
appointed his brother, Aramis Green Ph: [phone], as surrogate decision maker. Per pt, 
address on facesheet is his ex-wife's address. Pt lives in a different address with his 
brother and sister. Normally, pt is independent with ADL's and ambulates with a FWW or cane 
as needed. 



SW explored pt's wishes regarding rehab placement. Pt refusing SNF and prefers Home with HH. 
Pt provided consent for SW to speak with his brother to find out if they will be able to 
assist pt if d/c home. SW spoke with brother, Aramis, and he did confirm pt lives with them 
at Address: [address] [place] [address]. Per brother, they can help pt with 
basic needs but he will need pt to be able to get up and go to the restroom on his own. 
Brother would like for pt to go to rehabilitation. Per brother, pt was previously d/c from a 
SNF Nov 2018. Per brother, it is common behavior for pt to refuse SNF. Brother willing to 
speak with pt and encourage him to accept rehab.



MIESHA spoke with CM and recommended ARU. Possibility pt will be more accepting if rehab can be 
provided at the hospital. 

-------------------------------------------------------------------------------

Addendum: 01/18/19 at 1607 by CHRIS DILLON

-------------------------------------------------------------------------------

Attending not recommending ARU and stated pt will be better qualified for SNF. SW had a 
meeting with pt and pt's brother who participated over the phone. SW encouraged SNF 
placement and explained [facility] contracted SNFs.  Pt was agreeable to SNF after speaking with 
his brother. Pt and his brother requesting placement closer to [place] so brother can 
easily visit. Brother and pt chose [facility] from list of [facility] contracted SNFs. 
CM aware.

## 2019-01-18 NOTE — NUR
DC PLANS: SNF PLACEMENT; PATIENT IS CAPITATED,: WILL REFER TO [facility] CONTRACTED SNFS; RE [facility]MO 
[facility],. [facility] , [phone] WILL MAKE AN EXCLUSION. 

-------------------------------------------------------------------------------

Addendum: 01/18/19 at 1409 by KAYE SAMANO CM

-------------------------------------------------------------------------------

Amended: Links added.

-------------------------------------------------------------------------------

Addendum: 01/18/19 at 1451 by KAYE SAMANO CM

-------------------------------------------------------------------------------

SNF REFERRAL TO [facility], [facility]

## 2019-01-18 NOTE — NUR
Pt cleared for activity per RN, patient in bed, refused PT evaluation due to just finished 
eating breakfast. Requests PT to follow up after lunch "that will be a good time" To follow 
up as time permits

## 2019-01-18 NOTE — NUR
PT evaluation



Therapy day number 

1

Evaluation Start Time 

11:30

Evaluation End Time 

12:00

Evaluation Total Time 

30 min

Subjective 

Current complaint of pain

Pain Scale

FLACC

Pain Intensity

5 (0-10)

Patient Stated Goal for Pain Relief 

0 (0-10)

Pain Level Comment 

low back pain, unspecified LE pain 

Pre Treatment Vital Signs Stable 

Yes

Supine to Sit 

Modified Independent

Transfer Sit to Stand Ability

Modified Independent

Bed Mobility Sit to Supine

Modified Independent

Bed Transfer Ability

Modified Independent

Chair Transfer Ability

Modified Independent

Gait Assist Levels 

Minimum Assist

Assistive Devices 

None

Ambulation Distance

25 feet

Additional Gait Comments 

HHA min A requires UE support

Static Sitting Balance 

Good

Dynamic Sitting Balance 

Good

Standing Static Balance 

Fair

Dynamic Standing Balance 

Poor

Safety Judgement 

Fair

Activity Tolerance 

Fair

Equipment Present 

A pump

IV pump

Post Treatment Pain Intensity 

5 0-10

Additional Post Treatment Comment 

See note

Total Minutes 

30

Total Units 

2

PT Technical Record Comment 

69 yo male transferred from [facility], found to have 

metastatic disease with metastasis to bone and lungs. Abdominal MRI 

indicates cholelithiasis, Lumbar Xray indicates extensive sclerotic 

metastases, mild anterolisthesis at L4-L5, probable mild anterior height 

loss of L1-L2, mild appearing lower lumbar spine facet hypertrophy



PMH: hypertension



PLOF: Patient previous reported living in [facility] with unknown number of 

stairs to enter, Lives with family who are able to assist as necessary. Pt 

ambulates with FWW and 4WW within home



Precaution: fall risk



S: Pt in bed, agreeable to evaluation with encouragement



O: PT evaluation completed, pt returned back to bed following therapy 

intervention with call light within reach and bed alarm activated. Pt 

agitated throughout PT evaluation, verbally abusive to this PT and 

noncompliant with instruction despite education. Spoke to RN regarding pt 

response to activity and PT plan of care



A: Patient presents with fair mobility throughout and would benefit from 

use of FWW due to poor standing dynamic balance. Patient presents with 

non-compliance throughout with poor receptiveness to education and 

instruction. Patient verbally abusive to this PT with constant expletives 

throughout. Due to poor compliance, receptiveness, and limited potential 

for functional improvement, patient not a candidate for skilled inpatient 

PT at this time. 



P: Discharge physical therapy



Recommend: home with family assistance when medically cleared by MD. No 

DME required as patient reported owning FWW.

## 2019-01-18 NOTE — NUR
Pt alert and oriented, however is forgetful. All needs attended to. No sign of distress 
noted. Fall precautions maintained. Call light within reach

## 2019-01-18 NOTE — PN
Date/Time of Note


Date/Time of Note


DATE: 1/18/19 


TIME: 11:45





Assessment/Plan


VTE Prophylaxis


Risk score (from Ns)>0 risk:  2


SCD applied (from [facility]):  Yes


Pharmacological prophylaxis:  NA/contraindicated


Pharm contraindication:  bleeding





Lines/Catheters


IV Catheter Type (from [facility]):  Saline Lock


Urinary Cath still in place:  No





Assessment/Plan


Hospital Course


1. AMS,  metabolic encephalopathy


2.  Lymphadenopathy concerning for malignancy unknown primary source, more 


likely prostate cancer, PSA 1000.  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


3.  Acute kidney injury, better


4.  Severe anemia


5.  Recent weight loss


6.  Severe malnourishment


7.  Thrombocytopenia


8.  History of subdural hematoma


9.  History of hepatitis C


10.  History of alcoholism


11.  Hypertension


12.  Mild left hydronephrosis with obstructing distal left ureteral calculus


Assessment/Plan


-pt has capacity per pscy


- MRCP neg


- decrease BP meds


- cw casodex/flomax


- spoke to Dr Potts will do biopsy as OPD in office


- cw PPI


- Bone scan +multiple mets


- PT eval pt need SNF


-social. service


-spoke to brother Umm, they cannot help


Result Diagram:  


1/17/19 1013                                                                    


           1/17/19 1013








Subjective


24 Hr Interval Summary


Respiratory:  no complaints


Musculoskeletal:  bone/joint pain, restricted range of motion (lower leg)





Exam/Review of Systems


Vital Signs


Vitals





Vital Signs


  Date      Temp  Pulse  Resp  B/P (MAP)   Pulse Ox  O2          O2 Flow    FiO2


Time                                                 Delivery    Rate


   1/18/19  98.1     70    18      126/70        99


     08:17                           (88)


   1/17/19                                           Room Air


     14:29








Intake and Output





1/17/19 1/17/19 1/18/19





1414:59


22:59


06:59





IntakeIntake Total


600 ml


300 ml





OutputOutput Total


750 ml


150 ml


700 ml





BalanceBalance


-150 ml


150 ml


-700 ml














Exam


Constitutional:  alert


Respiratory:  diminished breath sounds


Cardiovascular:  regular rate and rhythm


Musculoskeletal:  muscle weakness





Medications


Medications





Current Medications


Pantoprazole (Protonix Tab) 40 mg DAILY@06 PO  Last administered on 1/18/19at 


06:03; Admin Dose 40 MG;  Start 1/11/19 at 06:00


Oxycodone/ Acetaminophen (Percocet (5/ 325)) 2 tab Q6H  PRN PO PAIN LEVEL 6-10 


Last administered on 1/18/19at 09:14; Admin Dose 2 TAB;  Start 1/11/19 at 01:00


Oxycodone/ Acetaminophen (Percocet (5/ 325)) 1 tab Q6H  PRN PO MODERATE PAIN 


LEVEL 4-6 Last administered on 1/18/19at 02:49; Admin Dose 1 TAB;  Start 1/11/19


at 01:00


Folic Acid (Folic Acid) 1 mg DAILY PO  Last administered on 1/18/19at 09:15; 


Admin Dose 1 MG;  Start 1/11/19 at 09:00


Multivitamins/ Minerals (Theragran-M) 1 tab DAILY PO  Last administered on 


1/18/19at 09:15; Admin Dose 1 TAB;  Start 1/11/19 at 09:00


Thiamine HCl (Vitamin B1) 100 mg DAILY PO  Last administered on 1/18/19at 09:15;


Admin Dose 100 MG;  Start 1/11/19 at 09:00


Polyethylene Glycol (Miralax) 17 gm DAILY  PRN PO CONSTIPATION;  Start 1/11/19 


at 01:00


Metoprolol Tartrate (Lopressor) 50 mg BID PO  Last administered on 1/18/19at 


09:16; Admin Dose 50 MG;  Start 1/11/19 at 09:00


Ferrous Sulfate (Ferrous Sulfate (Ec)) 325 mg TID PO  Last administered on 


1/18/19at 09:15; Admin Dose 325 MG;  Start 1/11/19 at 09:00


Tamsulosin HCl (Flomax) 0.4 mg HS PO  Last administered on 1/17/19at 20:42; 


Admin Dose 0.4 MG;  Start 1/11/19 at 21:00


Famotidine (Pepcid) 20 mg DAILY PO  Last administered on 1/18/19at 09:15; Admin 


Dose 20 MG;  Start 1/11/19 at 09:00


Magnesium Hydroxide (Milk Of Mag) 30 ml DAILY  PRN PO CONSTIPATION;  Start 


1/11/19 at 01:00


Acetaminophen (Tylenol Tab) 650 mg Q6H  PRN PO MILD PAIN(1-3)OR ELEVATED TEMP;  


Start 1/11/19 at 01:00


Ondansetron HCl (Zofran Inj) 4 mg Q8  PRN IV NAUSEA AND/OR VOMITING;  Start 


1/11/19 at 01:00


Senna (Senokot) 2 tab BID  PRN PO CONSTIPATION Last administered on 1/18/19at 


06:03; Admin Dose 2 TAB;  Start 1/11/19 at 01:00


Zolpidem Tartrate (Ambien) 5 mg HS  PRN PO INSOMNIA Last administered on 


1/14/19at 21:49; Admin Dose 5 MG;  Start 1/11/19 at 01:00


Tramadol HCl (Ultram) 50 mg TID  PRN PO PAIN LEVEL 1-5 Last administered on 


1/13/19at 04:40; Admin Dose 50 MG;  Start 1/11/19 at 01:00


Miscellaneous Information Patients own medicat... BID@1000,1600 XX ;  Start 1/ 11/19 at 10:00


Bicalutamide (Casodex) 50 mg DAILY PO  Last administered on 1/18/19at 09:16; 


Admin Dose 50 MG;  Start 1/12/19 at 16:30


Morphine Sulfate (morphine) 6 mg Q4H  PRN PO SEVERE PAIN LEVEL 7-10 Last 


administered on 1/16/19at 22:16; Admin Dose 6 MG;  Start 1/15/19 at 17:30











ELISHA CHRISTENSEN                Jan 18, 2019 11:45

## 2019-01-18 NOTE — NUR
all needs met.no acute events.gave pain medication as reqested.call light within reach.bed 
alarm on.he is resting comfortably in bed.encouraged him to turn q 2 hours.

## 2019-01-19 VITALS — HEART RATE: 72 BPM | RESPIRATION RATE: 17 BRPM | SYSTOLIC BLOOD PRESSURE: 121 MMHG | DIASTOLIC BLOOD PRESSURE: 74 MMHG

## 2019-01-19 VITALS — RESPIRATION RATE: 18 BRPM | DIASTOLIC BLOOD PRESSURE: 64 MMHG | SYSTOLIC BLOOD PRESSURE: 98 MMHG | HEART RATE: 71 BPM

## 2019-01-19 VITALS — HEART RATE: 72 BPM | DIASTOLIC BLOOD PRESSURE: 67 MMHG | RESPIRATION RATE: 18 BRPM | SYSTOLIC BLOOD PRESSURE: 114 MMHG

## 2019-01-19 VITALS — HEART RATE: 74 BPM | RESPIRATION RATE: 18 BRPM | SYSTOLIC BLOOD PRESSURE: 94 MMHG | DIASTOLIC BLOOD PRESSURE: 51 MMHG

## 2019-01-19 LAB
ADD MAN DIFF?: NO
ANION GAP: 8 (ref 5–13)
BASOPHIL #: 0 10^3/UL (ref 0–0.1)
BASOPHILS %: 0.2 % (ref 0–2)
BLOOD UREA NITROGEN: 23 MG/DL (ref 7–20)
CALCIUM: 8.9 MG/DL (ref 8.4–10.2)
CARBON DIOXIDE: 25 MMOL/L (ref 21–31)
CHLORIDE: 105 MMOL/L (ref 97–110)
CREATININE: 1.22 MG/DL (ref 0.61–1.24)
EOSINOPHILS #: 0.1 10^3/UL (ref 0–0.5)
EOSINOPHILS %: 2.1 % (ref 0–7)
GLUCOSE: 115 MG/DL (ref 70–220)
HEMATOCRIT: 23.9 % (ref 42–52)
HEMOGLOBIN: 7.5 G/DL (ref 14–18)
IMMATURE GRANS #M: 0.07 10^3/UL (ref 0–0.03)
IMMATURE GRANS % (M): 1.6 % (ref 0–0.43)
LYMPHOCYTES #: 1.2 10^3/UL (ref 0.8–2.9)
LYMPHOCYTES %: 27.6 % (ref 15–51)
MEAN CORPUSCULAR HEMOGLOBIN: 29.5 PG (ref 29–33)
MEAN CORPUSCULAR HGB CONC: 31.4 G/DL (ref 32–37)
MEAN CORPUSCULAR VOLUME: 94.1 FL (ref 82–101)
MEAN PLATELET VOLUME: 10.2 FL (ref 7.4–10.4)
MONOCYTE #: 0.4 10^3/UL (ref 0.3–0.9)
MONOCYTES %: 9.6 % (ref 0–11)
NEUTROPHIL #: 2.6 10^3/UL (ref 1.6–7.5)
NEUTROPHILS %: 58.9 % (ref 39–77)
NUCLEATED RED BLOOD CELLS #: 0 10^3/UL (ref 0–0)
NUCLEATED RED BLOOD CELLS%: 0 /100WBC (ref 0–0)
PLATELET COUNT: 218 10^3/UL (ref 140–415)
POTASSIUM: 4.3 MMOL/L (ref 3.5–5.1)
RED BLOOD COUNT: 2.54 10^6/UL (ref 4.7–6.1)
RED CELL DISTRIBUTION WIDTH: 16.3 % (ref 11.5–14.5)
SODIUM: 138 MMOL/L (ref 135–144)
WHITE BLOOD COUNT: 4.4 10^3/UL (ref 4.8–10.8)

## 2019-01-19 RX ADMIN — METOPROLOL TARTRATE 1 MG: 25 TABLET ORAL at 20:33

## 2019-01-19 RX ADMIN — MULTIPLE VITAMINS W/ MINERALS TAB 1 TAB: TAB at 09:16

## 2019-01-19 RX ADMIN — METOPROLOL TARTRATE 1 MG: 50 TABLET, FILM COATED ORAL at 09:16

## 2019-01-19 RX ADMIN — BICALUTAMIDE 1 MG: 50 TABLET, FILM COATED ORAL at 09:16

## 2019-01-19 RX ADMIN — FERROUS SULFATE TAB 325 MG (65 MG ELEMENTAL FE) SCH MG: 325 (65 FE) TAB at 20:34

## 2019-01-19 RX ADMIN — FOLIC ACID 1 MG: 1 TABLET ORAL at 09:15

## 2019-01-19 RX ADMIN — MORPHINE SULFATE PRN MG: 10 SOLUTION ORAL at 23:36

## 2019-01-19 RX ADMIN — MORPHINE SULFATE 1 MG: 10 SOLUTION ORAL at 23:36

## 2019-01-19 RX ADMIN — PANTOPRAZOLE SODIUM SCH MG: 40 TABLET, DELAYED RELEASE ORAL at 06:38

## 2019-01-19 RX ADMIN — TAMSULOSIN HYDROCHLORIDE 1 MG: 0.4 CAPSULE ORAL at 20:33

## 2019-01-19 RX ADMIN — FOLIC ACID SCH MG: 1 TABLET ORAL at 09:15

## 2019-01-19 RX ADMIN — METOPROLOL TARTRATE SCH MG: 50 TABLET, FILM COATED ORAL at 09:16

## 2019-01-19 RX ADMIN — FERROUS SULFATE TAB 325 MG (65 MG ELEMENTAL FE) 1 MG: 325 (65 FE) TAB at 20:34

## 2019-01-19 RX ADMIN — OXYCODONE HYDROCHLORIDE AND ACETAMINOPHEN 1 TAB: 5; 325 TABLET ORAL at 17:49

## 2019-01-19 RX ADMIN — OXYCODONE HYDROCHLORIDE AND ACETAMINOPHEN 1 TAB: 5; 325 TABLET ORAL at 09:15

## 2019-01-19 RX ADMIN — BICALUTAMIDE SCH MG: 50 TABLET, FILM COATED ORAL at 09:16

## 2019-01-19 RX ADMIN — MULTIPLE VITAMINS W/ MINERALS TAB SCH TAB: TAB at 09:16

## 2019-01-19 RX ADMIN — FERROUS SULFATE TAB 325 MG (65 MG ELEMENTAL FE) SCH MG: 325 (65 FE) TAB at 09:16

## 2019-01-19 RX ADMIN — FAMOTIDINE SCH MG: 20 TABLET ORAL at 09:17

## 2019-01-19 RX ADMIN — FERROUS SULFATE TAB 325 MG (65 MG ELEMENTAL FE) 1 MG: 325 (65 FE) TAB at 09:16

## 2019-01-19 RX ADMIN — THIAMINE HCL TAB 100 MG SCH MG: 100 TAB at 09:15

## 2019-01-19 RX ADMIN — METOPROLOL TARTRATE SCH MG: 25 TABLET, FILM COATED ORAL at 20:33

## 2019-01-19 RX ADMIN — FERROUS SULFATE TAB 325 MG (65 MG ELEMENTAL FE) SCH MG: 325 (65 FE) TAB at 12:49

## 2019-01-19 RX ADMIN — TAMSULOSIN HYDROCHLORIDE SCH MG: 0.4 CAPSULE ORAL at 20:33

## 2019-01-19 RX ADMIN — THIAMINE HCL TAB 100 MG 1 MG: 100 TAB at 09:15

## 2019-01-19 RX ADMIN — FERROUS SULFATE TAB 325 MG (65 MG ELEMENTAL FE) 1 MG: 325 (65 FE) TAB at 12:49

## 2019-01-19 RX ADMIN — FAMOTIDINE 1 MG: 20 TABLET ORAL at 09:17

## 2019-01-19 RX ADMIN — PANTOPRAZOLE SODIUM 1 MG: 40 TABLET, DELAYED RELEASE ORAL at 06:38

## 2019-01-19 NOTE — NUR
rn notes



patient is awake, alert and oriented X4, verbally responsive, able to make needs known, 
complains of pain and medicated as ordered. all due medicine given and all needs attended 
to. pending placement. call light placed within reach and fall precautions observed well. 
remains afebrile and will continue to monitor.

## 2019-01-19 NOTE — PN
Date/Time of Note


Date/Time of Note


DATE: 1/19/19 


TIME: 11:31





Assessment/Plan


VTE Prophylaxis


Risk score (from [facility])>0 risk:  8


SCD applied (from [facility]):  Yes


SCD contraindicated:  patient refusal


Pharmacological prophylaxis:  LMWH


Pharm contraindication:  bleeding





Lines/Catheters


IV Catheter Type (from [facility]):  Saline Lock


Urinary Cath still in place:  No





Assessment/Plan


Hospital Course


1. AMS,  metabolic encephalopathy


2.  Lymphadenopathy concerning for malignancy unknown primary source, more 


likely prostate cancer, PSA 1000.  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


3.  Acute kidney injury, better


4.  Severe anemia


5.  Recent weight loss


6.  Severe malnourishment


7.  Thrombocytopenia


8.  History of subdural hematoma


9.  History of hepatitis C


10.  History of alcoholism


11.  Hypertension


12.  Mild left hydronephrosis with obstructing distal left ureteral calculus


Assessment/Plan


-pt nurse reported periodic confusion


-family conference is pending


-pt has capacity per pscy


- MRCP neg


- decrease BP meds


- cw casodex/flomax


- spoke to Dr Potts will do biopsy as OPD in office


- cw PPI


- Bone scan +multiple mets


- PT eval pt need SNF


-social. service


-spoke to brother Umm, they cannot help


Result Diagram:  


1/19/19 0538                                                                    


           1/19/19 0538





Results 24hrs





Laboratory Tests


       Test
                                1/18/19
22:30  1/19/19
05:38


       Stool Occult Blood                   NEGATIVE


       White Blood Count                                          4.4  L


       Red Blood Count                                           2.54  L


       Hemoglobin                                                 7.5  L


       Hematocrit                                                23.9  L


       Mean Corpuscular Volume                                    94.1


       Mean Corpuscular Hemoglobin                                29.5


       Mean Corpuscular Hemoglobin
Concent  
                   31.4  L



       Red Cell Distribution Width                               16.3  H


       Platelet Count                                              218


       Mean Platelet Volume                                       10.2


       Immature Granulocytes %                                  1.600  H


       Neutrophils %                                              58.9


       Lymphocytes %                                              27.6


       Monocytes %                                                 9.6


       Eosinophils %                                               2.1


       Basophils %                                                 0.2


       Nucleated Red Blood Cells %                                 0.0


       Immature Granulocytes #                                  0.070  H


       Neutrophils #                                               2.6


       Lymphocytes #                                               1.2


       Monocytes #                                                 0.4


       Eosinophils #                                               0.1


       Basophils #                                                 0.0


       Nucleated Red Blood Cells #                                 0.0


       Sodium Level                                                138


       Potassium Level                                             4.3


       Chloride Level                                              105


       Carbon Dioxide Level                                         25


       Anion Gap                                                     8


       Blood Urea Nitrogen                                         23  H


       Creatinine                                                 1.22


       Est Glomerular Filtrat Rate
mL/min   
                     59  L



       Glucose Level                                               115


       Calcium Level                                               8.9








Subjective


24 Hr Interval Summary


Musculoskeletal:  restricted range of motion (lower extremities)





Exam/Review of Systems


Vital Signs


Vitals





Vital Signs


  Date      Temp  Pulse  Resp  B/P (MAP)   Pulse Ox  O2          O2 Flow    FiO2


Time                                                 Delivery    Rate


   1/19/19  98.0     72    17      121/74       100  Room Air


     08:43                           (90)








Intake and Output





1/18/19 1/18/19 1/19/19





1515:00


23:00


07:00





IntakeIntake Total


720 ml


360 ml





OutputOutput Total


600 ml


300 ml


800 ml





BalanceBalance


120 ml


60 ml


-800 ml














Exam


Constitutional:  alert, oriented


Respiratory:  diminished breath sounds


Cardiovascular:  regular rate and rhythm


Gastrointestinal:  soft





Medications


Medications





Current Medications


Pantoprazole (Protonix Tab) 40 mg DAILY@06 PO  Last administered on 1/19/19at 


06:38; Admin Dose 40 MG;  Start 1/11/19 at 06:00


Oxycodone/ Acetaminophen (Percocet (5/ 325)) 2 tab Q6H  PRN PO PAIN LEVEL 6-10 


Last administered on 1/18/19at 15:47; Admin Dose 2 TAB;  Start 1/11/19 at 01:00


Oxycodone/ Acetaminophen (Percocet (5/ 325)) 1 tab Q6H  PRN PO MODERATE PAIN 


LEVEL 4-6 Last administered on 1/19/19at 09:15; Admin Dose 1 TAB;  Start 1/11/19


at 01:00


Folic Acid (Folic Acid) 1 mg DAILY PO  Last administered on 1/19/19at 09:15; 


Admin Dose 1 MG;  Start 1/11/19 at 09:00


Multivitamins/ Minerals (Theragran-M) 1 tab DAILY PO  Last administered on 


1/19/19at 09:16; Admin Dose 1 TAB;  Start 1/11/19 at 09:00


Thiamine HCl (Vitamin B1) 100 mg DAILY PO  Last administered on 1/19/19at 09:15;


Admin Dose 100 MG;  Start 1/11/19 at 09:00


Polyethylene Glycol (Miralax) 17 gm DAILY  PRN PO CONSTIPATION;  Start 1/11/19 


at 01:00


Metoprolol Tartrate (Lopressor) 50 mg BID PO  Last administered on 1/19/19at 


09:16; Admin Dose 50 MG;  Start 1/11/19 at 09:00


Ferrous Sulfate (Ferrous Sulfate (Ec)) 325 mg TID PO  Last administered on 


1/19/19at 09:16; Admin Dose 325 MG;  Start 1/11/19 at 09:00


Tamsulosin HCl (Flomax) 0.4 mg HS PO  Last administered on 1/17/19at 20:42; A


dmin Dose 0.4 MG;  Start 1/11/19 at 21:00


Famotidine (Pepcid) 20 mg DAILY PO  Last administered on 1/19/19at 09:17; Admin 


Dose 20 MG;  Start 1/11/19 at 09:00


Magnesium Hydroxide (Milk Of Mag) 30 ml DAILY  PRN PO CONSTIPATION;  Start 


1/11/19 at 01:00


Acetaminophen (Tylenol Tab) 650 mg Q6H  PRN PO MILD PAIN(1-3)OR ELEVATED TEMP;  


Start 1/11/19 at 01:00


Ondansetron HCl (Zofran Inj) 4 mg Q8  PRN IV NAUSEA AND/OR VOMITING;  Start 


1/11/19 at 01:00


Senna (Senokot) 2 tab BID  PRN PO CONSTIPATION Last administered on 1/18/19at 


06:03; Admin Dose 2 TAB;  Start 1/11/19 at 01:00


Zolpidem Tartrate (Ambien) 5 mg HS  PRN PO INSOMNIA Last administered on 


1/14/19at 21:49; Admin Dose 5 MG;  Start 1/11/19 at 01:00


Tramadol HCl (Ultram) 50 mg TID  PRN PO PAIN LEVEL 1-5 Last administered on 


1/13/19at 04:40; Admin Dose 50 MG;  Start 1/11/19 at 01:00


Miscellaneous Information Patients own medicat... BID@1000,1600 XX ;  Start 


1/11/19 at 10:00


Bicalutamide (Casodex) 50 mg DAILY PO  Last administered on 1/19/19at 09:16; 


Admin Dose 50 MG;  Start 1/12/19 at 16:30


Morphine Sulfate (morphine) 6 mg Q4H  PRN PO SEVERE PAIN LEVEL 7-10 Last 


administered on 1/16/19at 22:16; Admin Dose 6 MG;  Start 1/15/19 at 17:30











ELISHA CHRISTENSEN                Jan 19, 2019 11:33

## 2019-01-20 VITALS — HEART RATE: 65 BPM | RESPIRATION RATE: 18 BRPM | DIASTOLIC BLOOD PRESSURE: 65 MMHG | SYSTOLIC BLOOD PRESSURE: 103 MMHG

## 2019-01-20 VITALS — RESPIRATION RATE: 17 BRPM | HEART RATE: 69 BPM | DIASTOLIC BLOOD PRESSURE: 57 MMHG | SYSTOLIC BLOOD PRESSURE: 101 MMHG

## 2019-01-20 VITALS — SYSTOLIC BLOOD PRESSURE: 109 MMHG | HEART RATE: 83 BPM | DIASTOLIC BLOOD PRESSURE: 64 MMHG | RESPIRATION RATE: 18 BRPM

## 2019-01-20 VITALS — RESPIRATION RATE: 18 BRPM | DIASTOLIC BLOOD PRESSURE: 60 MMHG | HEART RATE: 68 BPM | SYSTOLIC BLOOD PRESSURE: 124 MMHG

## 2019-01-20 RX ADMIN — FENTANYL CITRATE 1 MCG: 50 INJECTION, SOLUTION INTRAMUSCULAR; INTRAVENOUS at 19:30

## 2019-01-20 RX ADMIN — MULTIPLE VITAMINS W/ MINERALS TAB 1 TAB: TAB at 08:40

## 2019-01-20 RX ADMIN — FOLIC ACID 1 MG: 1 TABLET ORAL at 08:40

## 2019-01-20 RX ADMIN — FERROUS SULFATE TAB 325 MG (65 MG ELEMENTAL FE) 1 MG: 325 (65 FE) TAB at 08:40

## 2019-01-20 RX ADMIN — PANTOPRAZOLE SODIUM SCH MG: 40 TABLET, DELAYED RELEASE ORAL at 06:17

## 2019-01-20 RX ADMIN — METOPROLOL TARTRATE 1 MG: 25 TABLET ORAL at 09:00

## 2019-01-20 RX ADMIN — FERROUS SULFATE TAB 325 MG (65 MG ELEMENTAL FE) SCH MG: 325 (65 FE) TAB at 08:40

## 2019-01-20 RX ADMIN — OXYCODONE HYDROCHLORIDE AND ACETAMINOPHEN 1 TAB: 5; 325 TABLET ORAL at 23:39

## 2019-01-20 RX ADMIN — FAMOTIDINE SCH MG: 20 TABLET ORAL at 08:40

## 2019-01-20 RX ADMIN — FAMOTIDINE 1 MG: 20 TABLET ORAL at 08:40

## 2019-01-20 RX ADMIN — FOLIC ACID SCH MG: 1 TABLET ORAL at 08:40

## 2019-01-20 RX ADMIN — FERROUS SULFATE TAB 325 MG (65 MG ELEMENTAL FE) SCH MG: 325 (65 FE) TAB at 12:25

## 2019-01-20 RX ADMIN — FERROUS SULFATE TAB 325 MG (65 MG ELEMENTAL FE) 1 MG: 325 (65 FE) TAB at 19:45

## 2019-01-20 RX ADMIN — FERROUS SULFATE TAB 325 MG (65 MG ELEMENTAL FE) 1 MG: 325 (65 FE) TAB at 12:25

## 2019-01-20 RX ADMIN — PROPOFOL 1: 10 INJECTION, EMULSION INTRAVENOUS at 19:30

## 2019-01-20 RX ADMIN — MULTIPLE VITAMINS W/ MINERALS TAB SCH TAB: TAB at 08:40

## 2019-01-20 RX ADMIN — THIAMINE HCL TAB 100 MG 1 MG: 100 TAB at 08:40

## 2019-01-20 RX ADMIN — TAMSULOSIN HYDROCHLORIDE 1 MG: 0.4 CAPSULE ORAL at 19:46

## 2019-01-20 RX ADMIN — METOPROLOL TARTRATE SCH MG: 25 TABLET, FILM COATED ORAL at 09:00

## 2019-01-20 RX ADMIN — TAMSULOSIN HYDROCHLORIDE SCH MG: 0.4 CAPSULE ORAL at 19:46

## 2019-01-20 RX ADMIN — BICALUTAMIDE SCH MG: 50 TABLET, FILM COATED ORAL at 08:41

## 2019-01-20 RX ADMIN — THIAMINE HCL TAB 100 MG SCH MG: 100 TAB at 08:40

## 2019-01-20 RX ADMIN — FERROUS SULFATE TAB 325 MG (65 MG ELEMENTAL FE) SCH MG: 325 (65 FE) TAB at 19:45

## 2019-01-20 RX ADMIN — OXYCODONE HYDROCHLORIDE AND ACETAMINOPHEN 1 TAB: 5; 325 TABLET ORAL at 03:17

## 2019-01-20 RX ADMIN — BICALUTAMIDE 1 MG: 50 TABLET, FILM COATED ORAL at 08:41

## 2019-01-20 RX ADMIN — PANTOPRAZOLE SODIUM 1 MG: 40 TABLET, DELAYED RELEASE ORAL at 06:17

## 2019-01-20 NOTE — NUR
End of Shift RN Note:

No acute changes occurred during my shift. Pts vital signs remained stable through the 
night. Pt verbalized pain relief with medication administration. Pt gets flustered when you 
disagree with him. Pt forgetful. No s/s of acute distress noted, pt denies SOB. Will 
continue to monitor. Chemo precautions remain in place.

## 2019-01-20 NOTE — PN
Date/Time of Note


Date/Time of Note


DATE: 1/20/19 


TIME: 10:33





Assessment/Plan


VTE Prophylaxis


Risk score (from Ns)>0 risk:  8


SCD applied (from [facility]):  Yes


Pharmacological prophylaxis:  NA/contraindicated


Pharm contraindication:  bleeding





Lines/Catheters


IV Catheter Type (from [facility]):  Saline Lock


Urinary Cath still in place:  No





Assessment/Plan


Hospital Course


1. AMS,  metabolic encephalopathy


2.  Lymphadenopathy concerning for malignancy unknown primary source, more 


likely prostate cancer, PSA 1000.  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


3.  Acute kidney injury, better


4.  Severe anemia


5.  Recent weight loss


6.  Severe malnourishment


7.  Thrombocytopenia


8.  History of subdural hematoma


9.  History of hepatitis C


10.  History of alcoholism


11.  Hypertension


12.  Mild left hydronephrosis with obstructing distal left ureteral calculus


Assessment/Plan


-pt nurse reported periodic confusion


-family conference is pending


-pt has capacity per pscy


- MRCP neg


- decrease BP meds


- cw casodex/flomax


- spoke to Dr Potts will do biopsy as OPD in office


- cw PPI


- Bone scan +multiple mets


- PT eval pt need SNF


-social. service


-spoke to brother Umm, they cannot help pt in house


-placement pending


Result Diagram:  


1/19/19 0538                                                                    


           1/19/19 0538








Subjective


24 Hr Interval Summary


Musculoskeletal:  bone/joint pain, restricted range of motion





Exam/Review of Systems


Vital Signs


Vitals





Vital Signs


  Date      Temp  Pulse  Resp  B/P (MAP)   Pulse Ox  O2          O2 Flow    FiO2


Time                                                 Delivery    Rate


   1/20/19  98.0     65    18      103/65        99  Room Air


     08:55                           (78)








Intake and Output





1/19/19 1/19/19 1/20/19





1515:00


23:00


07:00





IntakeIntake Total


1220 ml


720 ml


400 ml





OutputOutput Total


800 ml


400 ml


300 ml





BalanceBalance


420 ml


320 ml


100 ml














Exam


Constitutional:  alert, oriented


ENMT:  nl external ears & nose


Respiratory:  clear to auscultation


Cardiovascular:  regular rate and rhythm


Gastrointestinal:  soft


Musculoskeletal:  muscle weakness





Medications


Medications





Current Medications


Pantoprazole (Protonix Tab) 40 mg DAILY@06 PO  Last administered on 1/20/19at 


06:17; Admin Dose 40 MG;  Start 1/11/19 at 06:00


Oxycodone/ Acetaminophen (Percocet (5/ 325)) 2 tab Q6H  PRN PO PAIN LEVEL 6-10 


Last administered on 1/18/19at 15:47; Admin Dose 2 TAB;  Start 1/11/19 at 01:00


Oxycodone/ Acetaminophen (Percocet (5/ 325)) 1 tab Q6H  PRN PO MODERATE PAIN 


LEVEL 4-6 Last administered on 1/20/19at 03:17; Admin Dose 1 TAB;  Start 1/11/19


at 01:00


Folic Acid (Folic Acid) 1 mg DAILY PO  Last administered on 1/20/19at 08:40; 


Admin Dose 1 MG;  Start 1/11/19 at 09:00


Multivitamins/ Minerals (Theragran-M) 1 tab DAILY PO  Last administered on 1/2 0/19at 08:40; Admin Dose 1 TAB;  Start 1/11/19 at 09:00


Thiamine HCl (Vitamin B1) 100 mg DAILY PO  Last administered on 1/20/19at 08:40;


Admin Dose 100 MG;  Start 1/11/19 at 09:00


Polyethylene Glycol (Miralax) 17 gm DAILY  PRN PO CONSTIPATION;  Start 1/11/19 


at 01:00


Ferrous Sulfate (Ferrous Sulfate (Ec)) 325 mg TID PO  Last administered on 


1/20/19at 08:40; Admin Dose 325 MG;  Start 1/11/19 at 09:00


Tamsulosin HCl (Flomax) 0.4 mg HS PO  Last administered on 1/19/19at 20:33; 


Admin Dose 0.4 MG;  Start 1/11/19 at 21:00


Famotidine (Pepcid) 20 mg DAILY PO  Last administered on 1/20/19at 08:40; Admin 


Dose 20 MG;  Start 1/11/19 at 09:00


Magnesium Hydroxide (Milk Of Mag) 30 ml DAILY  PRN PO CONSTIPATION;  Start 


1/11/19 at 01:00


Acetaminophen (Tylenol Tab) 650 mg Q6H  PRN PO MILD PAIN(1-3)OR ELEVATED TEMP;  


Start 1/11/19 at 01:00


Ondansetron HCl (Zofran Inj) 4 mg Q8  PRN IV NAUSEA AND/OR VOMITING;  Start 


1/11/19 at 01:00


Senna (Senokot) 2 tab BID  PRN PO CONSTIPATION Last administered on 1/18/19at 


06:03; Admin Dose 2 TAB;  Start 1/11/19 at 01:00


Zolpidem Tartrate (Ambien) 5 mg HS  PRN PO INSOMNIA Last administered on 


1/14/19at 21:49; Admin Dose 5 MG;  Start 1/11/19 at 01:00


Tramadol HCl (Ultram) 50 mg TID  PRN PO PAIN LEVEL 1-5 Last administered on 


1/19/19at 20:33; Admin Dose 50 MG;  Start 1/11/19 at 01:00


Miscellaneous Information Patients own medicat... BID@1000,1600 XX ;  Start 


1/11/19 at 10:00


Bicalutamide (Casodex) 50 mg DAILY PO  Last administered on 1/20/19at 08:41; 


Admin Dose 50 MG;  Start 1/12/19 at 16:30


Morphine Sulfate (morphine) 6 mg Q4H  PRN PO SEVERE PAIN LEVEL 7-10 Last 


administered on 1/19/19at 23:36; Admin Dose 6 MG;  Start 1/15/19 at 17:30


Metoprolol Tartrate (Lopressor) 25 mg BID PO ;  Start 1/19/19 at 21:00











ELISHA CHRISTENSEN                Jan 20, 2019 10:33

## 2019-01-20 NOTE — NUR
rn notes



patient is awake, alert and oriented X4, verbally responsive, able to make needs known, 
denies pain and discomfort at this time. all due medicine given and all needs attended to. 
pending placement. call light placed within reach and fall precautions observed well. 
remains afebrile and will continue to monitor.

## 2019-01-21 VITALS — RESPIRATION RATE: 18 BRPM | HEART RATE: 88 BPM | SYSTOLIC BLOOD PRESSURE: 117 MMHG | DIASTOLIC BLOOD PRESSURE: 61 MMHG

## 2019-01-21 VITALS — SYSTOLIC BLOOD PRESSURE: 113 MMHG | HEART RATE: 96 BPM | RESPIRATION RATE: 20 BRPM | DIASTOLIC BLOOD PRESSURE: 68 MMHG

## 2019-01-21 VITALS — DIASTOLIC BLOOD PRESSURE: 66 MMHG | RESPIRATION RATE: 18 BRPM | HEART RATE: 98 BPM | SYSTOLIC BLOOD PRESSURE: 114 MMHG

## 2019-01-21 VITALS — HEART RATE: 83 BPM | RESPIRATION RATE: 18 BRPM | SYSTOLIC BLOOD PRESSURE: 112 MMHG | DIASTOLIC BLOOD PRESSURE: 80 MMHG

## 2019-01-21 LAB
ADD MAN DIFF?: NO
ANION GAP: 9 (ref 5–13)
BASOPHIL #: 0 10^3/UL (ref 0–0.1)
BASOPHILS %: 0.2 % (ref 0–2)
BLOOD UREA NITROGEN: 23 MG/DL (ref 7–20)
CALCIUM: 9.3 MG/DL (ref 8.4–10.2)
CARBON DIOXIDE: 26 MMOL/L (ref 21–31)
CHLORIDE: 103 MMOL/L (ref 97–110)
CREATININE: 1.23 MG/DL (ref 0.61–1.24)
EOSINOPHILS #: 0.1 10^3/UL (ref 0–0.5)
EOSINOPHILS %: 2.2 % (ref 0–7)
GLUCOSE: 117 MG/DL (ref 70–220)
HEMATOCRIT: 24.1 % (ref 42–52)
HEMOGLOBIN: 7.5 G/DL (ref 14–18)
IMMATURE GRANS #M: 0.08 10^3/UL (ref 0–0.03)
IMMATURE GRANS % (M): 1.8 % (ref 0–0.43)
LYMPHOCYTES #: 1.2 10^3/UL (ref 0.8–2.9)
LYMPHOCYTES %: 26.4 % (ref 15–51)
MEAN CORPUSCULAR HEMOGLOBIN: 29.9 PG (ref 29–33)
MEAN CORPUSCULAR HGB CONC: 31.1 G/DL (ref 32–37)
MEAN CORPUSCULAR VOLUME: 96 FL (ref 82–101)
MEAN PLATELET VOLUME: 10.5 FL (ref 7.4–10.4)
MONOCYTE #: 0.4 10^3/UL (ref 0.3–0.9)
MONOCYTES %: 9.4 % (ref 0–11)
NEUTROPHIL #: 2.7 10^3/UL (ref 1.6–7.5)
NEUTROPHILS %: 60 % (ref 39–77)
NUCLEATED RED BLOOD CELLS #: 0 10^3/UL (ref 0–0)
NUCLEATED RED BLOOD CELLS%: 0 /100WBC (ref 0–0)
PLATELET COUNT: 217 10^3/UL (ref 140–415)
POTASSIUM: 4.1 MMOL/L (ref 3.5–5.1)
RED BLOOD COUNT: 2.51 10^6/UL (ref 4.7–6.1)
RED CELL DISTRIBUTION WIDTH: 16.8 % (ref 11.5–14.5)
SODIUM: 138 MMOL/L (ref 135–144)
WHITE BLOOD COUNT: 4.5 10^3/UL (ref 4.8–10.8)

## 2019-01-21 RX ADMIN — MULTIPLE VITAMINS W/ MINERALS TAB SCH TAB: TAB at 08:34

## 2019-01-21 RX ADMIN — FERROUS SULFATE TAB 325 MG (65 MG ELEMENTAL FE) 1 MG: 325 (65 FE) TAB at 21:00

## 2019-01-21 RX ADMIN — THIAMINE HCL TAB 100 MG SCH MG: 100 TAB at 08:34

## 2019-01-21 RX ADMIN — FOLIC ACID SCH MG: 1 TABLET ORAL at 08:34

## 2019-01-21 RX ADMIN — FAMOTIDINE SCH MG: 20 TABLET ORAL at 08:34

## 2019-01-21 RX ADMIN — TAMSULOSIN HYDROCHLORIDE 1 MG: 0.4 CAPSULE ORAL at 21:00

## 2019-01-21 RX ADMIN — FAMOTIDINE 1 MG: 20 TABLET ORAL at 08:34

## 2019-01-21 RX ADMIN — FOLIC ACID 1 MG: 1 TABLET ORAL at 08:34

## 2019-01-21 RX ADMIN — PANTOPRAZOLE SODIUM 1 MG: 40 TABLET, DELAYED RELEASE ORAL at 05:22

## 2019-01-21 RX ADMIN — MULTIPLE VITAMINS W/ MINERALS TAB 1 TAB: TAB at 08:34

## 2019-01-21 RX ADMIN — TAMSULOSIN HYDROCHLORIDE SCH MG: 0.4 CAPSULE ORAL at 21:00

## 2019-01-21 RX ADMIN — BICALUTAMIDE SCH MG: 50 TABLET, FILM COATED ORAL at 08:35

## 2019-01-21 RX ADMIN — FERROUS SULFATE TAB 325 MG (65 MG ELEMENTAL FE) SCH MG: 325 (65 FE) TAB at 21:00

## 2019-01-21 RX ADMIN — FERROUS SULFATE TAB 325 MG (65 MG ELEMENTAL FE) SCH MG: 325 (65 FE) TAB at 13:34

## 2019-01-21 RX ADMIN — FERROUS SULFATE TAB 325 MG (65 MG ELEMENTAL FE) 1 MG: 325 (65 FE) TAB at 08:34

## 2019-01-21 RX ADMIN — PANTOPRAZOLE SODIUM SCH MG: 40 TABLET, DELAYED RELEASE ORAL at 05:22

## 2019-01-21 RX ADMIN — FERROUS SULFATE TAB 325 MG (65 MG ELEMENTAL FE) 1 MG: 325 (65 FE) TAB at 13:34

## 2019-01-21 RX ADMIN — OXYCODONE HYDROCHLORIDE AND ACETAMINOPHEN 1 TAB: 5; 325 TABLET ORAL at 21:01

## 2019-01-21 RX ADMIN — THIAMINE HCL TAB 100 MG 1 MG: 100 TAB at 08:34

## 2019-01-21 RX ADMIN — BICALUTAMIDE 1 MG: 50 TABLET, FILM COATED ORAL at 08:35

## 2019-01-21 RX ADMIN — FERROUS SULFATE TAB 325 MG (65 MG ELEMENTAL FE) SCH MG: 325 (65 FE) TAB at 08:34

## 2019-01-21 RX ADMIN — OXYCODONE HYDROCHLORIDE AND ACETAMINOPHEN 1 TAB: 5; 325 TABLET ORAL at 13:34

## 2019-01-21 NOTE — NUR
SS Note: F/U



Discussed case with Dr. Figueredo. Informed her pt's brother unable to accept pt back home if 
he is unable to get out of bed and walk to restroom on his own. SW also informed her SW had 
conference with pt and his brother over the phone on Friday and pt had agreed to SNF 
placement. Pt also has hx of noncompliance and uncooperative behavior. SW recommended for 
Dr. Figueredo to speak with brother.

## 2019-01-21 NOTE — PN
Date/Time of Note


Date/Time of Note


DATE: 1/21/19 


TIME: 14:11





Assessment/Plan


VTE Prophylaxis


Risk score (from Ns)>0 risk:  5


SCD applied (from Ns):  Yes


Pharmacological prophylaxis:  NA/contraindicated


Pharm contraindication:  low risk/ambulating





Lines/Catheters


IV Catheter Type (from Nrsg):  Saline Lock


Urinary Cath still in place:  No





Assessment/Plan


Hospital Course


69 y/o with 





1. AMS,  metabolic encephalopathy ? per pscy has capcity 


2.  Lymphadenopathy concerning for malignancy unknown primary source, more 


likely prostate cancer, PSA 1000.  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


3.  Acute kidney injury, better


4.  Severe anemia


5.  Recent weight loss


6.  Severe malnourishment


7.  Thrombocytopenia


8.  History of subdural hematoma


9.  History of hepatitis C


10.  History of alcoholism


11.  Hypertension


12.  Mild left hydronephrosis with obstructing distal left ureteral calculus


13  Chronic gastritis s/p EGD





Plan


-pt has capacity per pscy


- MRCP neg


- HOLD BP  MEDS due to hypotension


- cw casodex/flomax


- spoke to Dr Potts will do biopsy as OPD in office


- cw PPI


- Bone scan +multiple mets


- PT eval , refused, will call again





Pt will need fu oncology and urology as OPD





dc to SNIF


Result Diagram:  


1/21/19 0609                                                                    


           1/21/19 0609





Results 24hrs





Laboratory Tests


               Test
                                1/21/19
06:09


               White Blood Count                           4.5  L


               Red Blood Count                            2.51  L


               Hemoglobin                                  7.5  L


               Hematocrit                                 24.1  L


               Mean Corpuscular Volume                     96.0


               Mean Corpuscular Hemoglobin                 29.9


               Mean Corpuscular Hemoglobin
Concent       31.1  L



               Red Cell Distribution Width                16.8  H


               Platelet Count                               217


               Mean Platelet Volume                       10.5  H


               Immature Granulocytes %                   1.800  H


               Neutrophils %                               60.0


               Lymphocytes %                               26.4


               Monocytes %                                  9.4


               Eosinophils %                                2.2


               Basophils %                                  0.2


               Nucleated Red Blood Cells %                  0.0


               Immature Granulocytes #                   0.080  H


               Neutrophils #                                2.7


               Lymphocytes #                                1.2


               Monocytes #                                  0.4


               Eosinophils #                                0.1


               Basophils #                                  0.0


               Nucleated Red Blood Cells #                  0.0


               Sodium Level                                 138


               Potassium Level                              4.1


               Chloride Level                               103


               Carbon Dioxide Level                          26


               Anion Gap                                      9


               Blood Urea Nitrogen                          23  H


               Creatinine                                  1.23


               Est Glomerular Filtrat Rate
mL/min          58  L



               Glucose Level                                117


               Calcium Level                                9.3








Subjective


24 Hr Interval Summary


Free Text/Dictation


Pt is willing to participate in Physical therapy now 


spoke to brother who want ricky to go to [facility]





Exam/Review of Systems


Vital Signs


Vitals





Vital Signs


  Date      Temp  Pulse  Resp  B/P (MAP)   Pulse Ox  O2          O2 Flow    FiO2


Time                                                 Delivery    Rate


   1/21/19  97.9     83    18      112/80        98  Room Air


     08:00                           (91)








Intake and Output





1/20/19 1/20/19 1/21/19





1515:00


23:00


07:00





IntakeIntake Total


900 ml


240 ml


250 ml





OutputOutput Total


850 ml


200 ml


150 ml





BalanceBalance


50 ml


40 ml


100 ml














Exam


Constitutional:  alert, oriented


ENMT:  nl external ears & nose


Respiratory:  clear to auscultation


Cardiovascular:  regular rate and rhythm


Gastrointestinal:  soft


Musculoskeletal:  muscle weakness





Medications


Medications





Current Medications


Pantoprazole (Protonix Tab) 40 mg DAILY@06 PO  Last administered on 1/21/19at 


05:22; Admin Dose 40 MG;  Start 1/11/19 at 06:00


Oxycodone/ Acetaminophen (Percocet (5/ 325)) 2 tab Q6H  PRN PO PAIN LEVEL 6-10 


Last administered on 1/20/19at 23:39; Admin Dose 2 TAB;  Start 1/11/19 at 01:00


Oxycodone/ Acetaminophen (Percocet (5/ 325)) 1 tab Q6H  PRN PO MODERATE PAIN 


LEVEL 4-6 Last administered on 1/21/19at 13:34; Admin Dose 1 TAB;  Start 1/11/19


at 01:00


Folic Acid (Folic Acid) 1 mg DAILY PO  Last administered on 1/21/19at 08:34; 


Admin Dose 1 MG;  Start 1/11/19 at 09:00


Multivitamins/ Minerals (Theragran-M) 1 tab DAILY PO  Last administered on 


1/21/19at 08:34; Admin Dose 1 TAB;  Start 1/11/19 at 09:00


Thiamine HCl (Vitamin B1) 100 mg DAILY PO  Last administered on 1/21/19at 08:34;


Admin Dose 100 MG;  Start 1/11/19 at 09:00


Polyethylene Glycol (Miralax) 17 gm DAILY  PRN PO CONSTIPATION;  Start 1/11/19 


at 01:00


Ferrous Sulfate (Ferrous Sulfate (Ec)) 325 mg TID PO  Last administered on 


1/21/19at 13:34; Admin Dose 325 MG;  Start 1/11/19 at 09:00


Tamsulosin HCl (Flomax) 0.4 mg HS PO  Last administered on 1/20/19at 19:46; 


Admin Dose 0.4 MG;  Start 1/11/19 at 21:00


Famotidine (Pepcid) 20 mg DAILY PO  Last administered on 1/21/19at 08:34; Admin 


Dose 20 MG;  Start 1/11/19 at 09:00


Magnesium Hydroxide (Milk Of Mag) 30 ml DAILY  PRN PO CONSTIPATION;  Start 


1/11/19 at 01:00


Acetaminophen (Tylenol Tab) 650 mg Q6H  PRN PO MILD PAIN(1-3)OR ELEVATED TEMP;  


Start 1/11/19 at 01:00


Ondansetron HCl (Zofran Inj) 4 mg Q8  PRN IV NAUSEA AND/OR VOMITING;  Start 


1/11/19 at 01:00


Senna (Senokot) 2 tab BID  PRN PO CONSTIPATION Last administered on 1/18/19at 


06:03; Admin Dose 2 TAB;  Start 1/11/19 at 01:00


Zolpidem Tartrate (Ambien) 5 mg HS  PRN PO INSOMNIA Last administered on 


1/14/19at 21:49; Admin Dose 5 MG;  Start 1/11/19 at 01:00


Tramadol HCl (Ultram) 50 mg TID  PRN PO PAIN LEVEL 1-5 Last administered on 


1/21/19at 02:01; Admin Dose 50 MG;  Start 1/11/19 at 01:00


Miscellaneous Information Patients own medicat... BID@1000,1600 XX ;  Start 


1/11/19 at 10:00


Bicalutamide (Casodex) 50 mg DAILY PO  Last administered on 1/21/19at 08:35; 


Admin Dose 50 MG;  Start 1/12/19 at 16:30


Morphine Sulfate (morphine) 6 mg Q4H  PRN PO SEVERE PAIN LEVEL 7-10 Last 


administered on 1/19/19at 23:36; Admin Dose 6 MG;  Start 1/15/19 at 17:30











TOMAS BRICE MD              Jan 21, 2019 14:14

## 2019-01-21 NOTE — NUR
RN Notes:

Patient remains alert and oriented with period of forgetful, reoriented as needed. Afebrile, 
no sob noted, breathing even and unlabored, no acute distress noted. Still c/o intermittent 
pain medicated with pain med as ordered with some relief. DR. Figueredo ordered for case 
manager consult and transfer to [facility], Tete  aware and said will inform us once 
[facility] accepted pt, Dr. Figueredo aware that at this time still waiting for SNIF acceptance. PT 
made aware regarding MD's order per PT Chele they discharge pt already. Hourly rounding 
done, call light within reach, bed alarm on. Will continue to monitor until the end of the 
shift.  

-------------------------------------------------------------------------------

Addendum: 01/21/19 at 1732 by VIVIENNE KENT RN

-------------------------------------------------------------------------------

Per Tete  still waiting for acceptance at [facility]. Will 
endorse to next shift for continuity of care.

-------------------------------------------------------------------------------

Addendum: 01/21/19 at 1805 by VIVIENNE KENT RN

-------------------------------------------------------------------------------

pt able to void freely, denies pain when urinating noted with yellow urine output, no 
hematuria noted, strained urine, no visible stone noted. Kept clean and comfortable.

## 2019-01-21 NOTE — NUR
Shift summary:

Patient is stable overnight, leg pain was controlled with Norco and Ultram. urine strained, 
no stones noted. No new skin breakdown noted. continue to monitor.

## 2019-01-21 NOTE — NUR
Pt with DC order today 1-21-19 to transfer pt to SNF, per CM as endorsed by am shift nurse 
still waiting for SNF acceptance, MD aware. Will endorse in am to clarify DC order.

## 2019-01-21 NOTE — PDOCDIS
Discharge Instructions


DIAGNOSIS


Discharge Diagnosis


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





CONDITION


                 [id number]
Patient Condition:  Rthoh1o
Fair








HOME CARE INSTRUCTIONS:


                 [id number]
Special Diet:  [id number]
soft diet








ACTIVITY:


     [id number]
Activity Restrictions:  [id number]
Slowly Increase Activity


                                        Rest between Activity


                                        Avoid heavy lifting








FOLLOW UP/APPOINTMENTS


Follow-up Plan


f/u dr clark in 3-4 days


f/u Dr Hay in 3-4 days











TOMAS BRICE MD              Jan 21, 2019 14:16

## 2019-01-22 VITALS — RESPIRATION RATE: 18 BRPM | HEART RATE: 87 BPM | DIASTOLIC BLOOD PRESSURE: 70 MMHG | SYSTOLIC BLOOD PRESSURE: 113 MMHG

## 2019-01-22 VITALS — HEART RATE: 74 BPM | SYSTOLIC BLOOD PRESSURE: 137 MMHG | RESPIRATION RATE: 20 BRPM | DIASTOLIC BLOOD PRESSURE: 74 MMHG

## 2019-01-22 VITALS — DIASTOLIC BLOOD PRESSURE: 68 MMHG | HEART RATE: 88 BPM | RESPIRATION RATE: 18 BRPM | SYSTOLIC BLOOD PRESSURE: 118 MMHG

## 2019-01-22 VITALS — SYSTOLIC BLOOD PRESSURE: 120 MMHG | RESPIRATION RATE: 18 BRPM | HEART RATE: 76 BPM | DIASTOLIC BLOOD PRESSURE: 77 MMHG

## 2019-01-22 LAB
ALANINE AMINOTRANSFERASE: 18 IU/L (ref 13–69)
ALBUMIN/GLOBULIN RATIO: 1.09
ALBUMIN: 3.6 G/DL (ref 3.3–4.9)
ALKALINE PHOSPHATASE: 356 IU/L (ref 42–121)
ANION GAP: 8 (ref 5–13)
ASPARTATE AMINO TRANSFERASE: 34 IU/L (ref 15–46)
BILIRUBIN,DIRECT: 0 MG/DL (ref 0–0.2)
BILIRUBIN,TOTAL: 0 MG/DL (ref 0.2–1.3)
BLOOD UREA NITROGEN: 26 MG/DL (ref 7–20)
CALCIUM: 9.4 MG/DL (ref 8.4–10.2)
CARBON DIOXIDE: 29 MMOL/L (ref 21–31)
CHLORIDE: 101 MMOL/L (ref 97–110)
CREATININE: 1.3 MG/DL (ref 0.61–1.24)
GLOBULIN: 3.3 G/DL (ref 1.3–3.2)
GLUCOSE: 92 MG/DL (ref 70–220)
POTASSIUM: 4.9 MMOL/L (ref 3.5–5.1)
SODIUM: 138 MMOL/L (ref 135–144)
TOTAL PROTEIN: 6.9 G/DL (ref 6.1–8.1)

## 2019-01-22 RX ADMIN — PANTOPRAZOLE SODIUM 1 MG: 40 TABLET, DELAYED RELEASE ORAL at 05:52

## 2019-01-22 RX ADMIN — OXYCODONE HYDROCHLORIDE AND ACETAMINOPHEN 1 TAB: 5; 325 TABLET ORAL at 13:35

## 2019-01-22 RX ADMIN — MULTIPLE VITAMINS W/ MINERALS TAB 1 TAB: TAB at 08:50

## 2019-01-22 RX ADMIN — OXYCODONE HYDROCHLORIDE AND ACETAMINOPHEN 1 TAB: 5; 325 TABLET ORAL at 21:04

## 2019-01-22 RX ADMIN — FERROUS SULFATE TAB 325 MG (65 MG ELEMENTAL FE) SCH MG: 325 (65 FE) TAB at 21:04

## 2019-01-22 RX ADMIN — THIAMINE HCL TAB 100 MG 1 MG: 100 TAB at 08:50

## 2019-01-22 RX ADMIN — FOLIC ACID 1 MG: 1 TABLET ORAL at 08:50

## 2019-01-22 RX ADMIN — FERROUS SULFATE TAB 325 MG (65 MG ELEMENTAL FE) 1 MG: 325 (65 FE) TAB at 21:04

## 2019-01-22 RX ADMIN — FAMOTIDINE SCH MG: 20 TABLET ORAL at 08:50

## 2019-01-22 RX ADMIN — FERROUS SULFATE TAB 325 MG (65 MG ELEMENTAL FE) 1 MG: 325 (65 FE) TAB at 13:34

## 2019-01-22 RX ADMIN — FAMOTIDINE 1 MG: 20 TABLET ORAL at 08:50

## 2019-01-22 RX ADMIN — MULTIPLE VITAMINS W/ MINERALS TAB SCH TAB: TAB at 08:50

## 2019-01-22 RX ADMIN — THIAMINE HCL TAB 100 MG SCH MG: 100 TAB at 08:50

## 2019-01-22 RX ADMIN — TAMSULOSIN HYDROCHLORIDE SCH MG: 0.4 CAPSULE ORAL at 21:04

## 2019-01-22 RX ADMIN — FERROUS SULFATE TAB 325 MG (65 MG ELEMENTAL FE) SCH MG: 325 (65 FE) TAB at 08:50

## 2019-01-22 RX ADMIN — FERROUS SULFATE TAB 325 MG (65 MG ELEMENTAL FE) 1 MG: 325 (65 FE) TAB at 08:50

## 2019-01-22 RX ADMIN — FOLIC ACID SCH MG: 1 TABLET ORAL at 08:50

## 2019-01-22 RX ADMIN — FERROUS SULFATE TAB 325 MG (65 MG ELEMENTAL FE) SCH MG: 325 (65 FE) TAB at 13:34

## 2019-01-22 RX ADMIN — OXYCODONE HYDROCHLORIDE AND ACETAMINOPHEN 1 TAB: 5; 325 TABLET ORAL at 05:52

## 2019-01-22 RX ADMIN — PANTOPRAZOLE SODIUM SCH MG: 40 TABLET, DELAYED RELEASE ORAL at 05:52

## 2019-01-22 RX ADMIN — BICALUTAMIDE SCH MG: 50 TABLET, FILM COATED ORAL at 08:51

## 2019-01-22 RX ADMIN — TAMSULOSIN HYDROCHLORIDE 1 MG: 0.4 CAPSULE ORAL at 21:04

## 2019-01-22 RX ADMIN — BICALUTAMIDE 1 MG: 50 TABLET, FILM COATED ORAL at 08:51

## 2019-01-22 NOTE — NUR
DC PLANS UPDATE: NO ACCEPTANCE TO 3 PREVIOUS REFERRED SNFS.

[facility] J[facility] CANNOT GET AUTHORIZATION FORM [facility]

REFERRED TO [facility] TODAY.

-------------------------------------------------------------------------------

Addendum: 01/22/19 at 1523 by KAYE SAMANO CM

-------------------------------------------------------------------------------

Amended: Links added.

-------------------------------------------------------------------------------

Addendum: 01/23/19 at 1633 by KAYE [employer] SELWYN CM

-------------------------------------------------------------------------------

ACCEPTED [facility], TRANSFERRED VIA AMBULANCE, TRIP #  [id number]

## 2019-01-22 NOTE — PN
Date/Time of Note


Date/Time of Note


DATE: 1/22/19 


TIME: 15:07





Assessment/Plan


VTE Prophylaxis


Risk score (from Nsg)>0 risk:  5


SCD applied (from Nsg):  Yes


Pharmacological prophylaxis:  NA/contraindicated


Pharm contraindication:  low risk/ambulating





Lines/Catheters


IV Catheter Type (from Nrsg):  Saline Lock


Urinary Cath still in place:  No





Assessment/Plan


Hospital Course


69 y/o with 





1. AMS,  metabolic encephalopathy ? per pscy has capcity 


2.  Lymphadenopathy concerning for malignancy unknown primary source, more 


likely prostate cancer, PSA 1000.  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


3.  Acute kidney injury, better


4.  Severe anemia


5.  Recent weight loss


6.  Severe malnourishment


7.  Thrombocytopenia


8.  History of subdural hematoma


9.  History of hepatitis C


10.  History of alcoholism


11.  Hypertension


12.  Mild left hydronephrosis with obstructing distal left ureteral calculus


13  Chronic gastritis s/p EGD





Plan


-pt has capacity per pscy


- MRCP neg


- HOLD BP  MEDS due to hypotension


- cw casodex/flomax, will need leupron 


- spoke to Dr Potts will do biopsy as OPD in office


- cw PPI


- Bone scan +multiple mets


-f/u PT





Pt will need fu oncology and urology as OPD





dc to SNIF, pending placement


Result Diagram:  


1/21/19 0609                                                                    


           1/22/19 0637





Results 24hrs





Laboratory Tests


               Test
                                1/22/19
06:37


               Sodium Level                                 138


               Potassium Level                              4.9


               Chloride Level                               101


               Carbon Dioxide Level                          29


               Anion Gap                                      8


               Blood Urea Nitrogen                          26  H


               Creatinine                                 1.30  H


               Est Glomerular Filtrat Rate
mL/min          55  L



               Glucose Level                                 92


               Calcium Level                                9.4


               Total Bilirubin                             0.0  L


               Direct Bilirubin                            0.00


               Indirect Bilirubin                           0.0


               Aspartate Amino Transf
(AST/SGOT)            34  



               Alanine Aminotransferase
(ALT/SGPT)          18  



               Alkaline Phosphatase                        356  H


               Total Protein                                6.9


               Albumin                                      3.6


               Globulin                                   3.30  H


               Albumin/Globulin Ratio                      1.09








Subjective


24 Hr Interval Summary


Free Text/Dictation


waiting for SNIF Placement





Exam/Review of Systems


Vital Signs


Vitals





Vital Signs


  Date      Temp  Pulse  Resp  B/P (MAP)   Pulse Ox  O2          O2 Flow    FiO2


Time                                                 Delivery    Rate


   1/22/19  98.8     88    18      118/68        98


     08:00                           (85)


   1/21/19                                           Room Air


     14:09








Intake and Output





1/21/19 1/21/19 1/22/19





1515:00


23:00


07:00





IntakeIntake Total


240 ml


960 ml


700 ml





OutputOutput Total


200 ml


600 ml


1150 ml





BalanceBalance


40 ml


360 ml


-450 ml














Exam


Exam


Constitutional:  alert, oriented


ENMT:  nl external ears & nose


Respiratory:  clear to auscultation


Cardiovascular:  regular rate and rhythm


Gastrointestinal:  soft


Musculoskeletal:  muscle weakness





Medications


Medications





Current Medications


Pantoprazole (Protonix Tab) 40 mg DAILY@06 PO  Last administered on 1/22/19at 


05:52; Admin Dose 40 MG;  Start 1/11/19 at 06:00


Oxycodone/ Acetaminophen (Percocet (5/ 325)) 2 tab Q6H  PRN PO PAIN LEVEL 6-10 


Last administered on 1/21/19at 21:01; Admin Dose 2 TAB;  Start 1/11/19 at 01:00


Oxycodone/ Acetaminophen (Percocet (5/ 325)) 1 tab Q6H  PRN PO MODERATE PAIN 


LEVEL 4-6 Last administered on 1/22/19at 13:35; Admin Dose 1 TAB;  Start 1/11/19


at 01:00


Folic Acid (Folic Acid) 1 mg DAILY PO  Last administered on 1/22/19at 08:50; 


Admin Dose 1 MG;  Start 1/11/19 at 09:00


Multivitamins/ Minerals (Theragran-M) 1 tab DAILY PO  Last administered on 


1/22/19at 08:50; Admin Dose 1 TAB;  Start 1/11/19 at 09:00


Thiamine HCl (Vitamin B1) 100 mg DAILY PO  Last administered on 1/22/19at 08:50;


Admin Dose 100 MG;  Start 1/11/19 at 09:00


Polyethylene Glycol (Miralax) 17 gm DAILY  PRN PO CONSTIPATION;  Start 1/11/19 


at 01:00


Ferrous Sulfate (Ferrous Sulfate (Ec)) 325 mg TID PO  Last administered on 


1/22/19at 13:34; Admin Dose 325 MG;  Start 1/11/19 at 09:00


Tamsulosin HCl (Flomax) 0.4 mg HS PO  Last administered on 1/20/19at 19:46; 


Admin Dose 0.4 MG;  Start 1/11/19 at 21:00


Famotidine (Pepcid) 20 mg DAILY PO  Last administered on 1/22/19at 08:50; Admin 


Dose 20 MG;  Start 1/11/19 at 09:00


Magnesium Hydroxide (Milk Of Mag) 30 ml DAILY  PRN PO CONSTIPATION;  Start 


1/11/19 at 01:00


Acetaminophen (Tylenol Tab) 650 mg Q6H  PRN PO MILD PAIN(1-3)OR ELEVATED TEMP;  


Start 1/11/19 at 01:00


Ondansetron HCl (Zofran Inj) 4 mg Q8  PRN IV NAUSEA AND/OR VOMITING;  Start 


1/11/19 at 01:00


Senna (Senokot) 2 tab BID  PRN PO CONSTIPATION Last administered on 1/18/19at 


06:03; Admin Dose 2 TAB;  Start 1/11/19 at 01:00


Zolpidem Tartrate (Ambien) 5 mg HS  PRN PO INSOMNIA Last administered on 


1/14/19at 21:49; Admin Dose 5 MG;  Start 1/11/19 at 01:00


Tramadol HCl (Ultram) 50 mg TID  PRN PO PAIN LEVEL 1-5 Last administered on 


1/21/19at 02:01; Admin Dose 50 MG;  Start 1/11/19 at 01:00


Miscellaneous Information Patients own medicat... BID@1000,1600 XX ;  Start 


1/11/19 at 10:00


Bicalutamide (Casodex) 50 mg DAILY PO  Last administered on 1/22/19at 08:51; 


Admin Dose 50 MG;  Start 1/12/19 at 16:30


Morphine Sulfate (morphine) 6 mg Q4H  PRN PO SEVERE PAIN LEVEL 7-10 Last 


administered on 1/19/19at 23:36; Admin Dose 6 MG;  Start 1/15/19 at 17:30











TOMAS BRICE MD              Jan 22, 2019 15:09

## 2019-01-22 NOTE — NUR
rn notes



patient is alert and oriented X4, with periods of forgetful. complains of pain and medicated 
as ordered and effective. pending placement. strain urine, no stoned noted. all due medicine 
given and all needs attended to. call light placed within reach. fall precautions and chemo 
precautions observed well. remains afebrile and will continue to monitor.

## 2019-01-22 NOTE — NUR
End of shift Report:

Pt medicated for bilateral leg pain  2x well-ivonne and effective. Refused medication last 
night, explained importance, still pt refused. No changes overnight. Needs attended and 
anticipated. Fall precaution rendered. Needs attended and anticipated. Call light with 
reach. Will continue to monitor pt.Pending SNF placement c/o CM. Will endorse accordingly.

-------------------------------------------------------------------------------

Addendum: 01/22/19 at 0635 by SHIMA HOLLAND RN

-------------------------------------------------------------------------------

strained urine no stones noted

## 2019-01-23 VITALS — DIASTOLIC BLOOD PRESSURE: 74 MMHG | HEART RATE: 82 BPM | SYSTOLIC BLOOD PRESSURE: 125 MMHG | RESPIRATION RATE: 16 BRPM

## 2019-01-23 VITALS — SYSTOLIC BLOOD PRESSURE: 115 MMHG | DIASTOLIC BLOOD PRESSURE: 73 MMHG | HEART RATE: 78 BPM | RESPIRATION RATE: 18 BRPM

## 2019-01-23 VITALS — HEART RATE: 78 BPM | DIASTOLIC BLOOD PRESSURE: 69 MMHG | SYSTOLIC BLOOD PRESSURE: 133 MMHG | RESPIRATION RATE: 16 BRPM

## 2019-01-23 LAB
ALANINE AMINOTRANSFERASE: 24 IU/L (ref 13–69)
ASPARTATE AMINO TRANSFERASE: 27 IU/L (ref 15–46)
BILIRUBIN,TOTAL: 0 MG/DL (ref 0.2–1.3)

## 2019-01-23 RX ADMIN — FAMOTIDINE 1 MG: 20 TABLET ORAL at 08:40

## 2019-01-23 RX ADMIN — FERROUS SULFATE TAB 325 MG (65 MG ELEMENTAL FE) 1 MG: 325 (65 FE) TAB at 14:23

## 2019-01-23 RX ADMIN — BICALUTAMIDE 1 MG: 50 TABLET, FILM COATED ORAL at 08:43

## 2019-01-23 RX ADMIN — MULTIPLE VITAMINS W/ MINERALS TAB 1 TAB: TAB at 08:40

## 2019-01-23 RX ADMIN — FERROUS SULFATE TAB 325 MG (65 MG ELEMENTAL FE) 1 MG: 325 (65 FE) TAB at 08:40

## 2019-01-23 RX ADMIN — THIAMINE HCL TAB 100 MG SCH MG: 100 TAB at 08:40

## 2019-01-23 RX ADMIN — BICALUTAMIDE SCH MG: 50 TABLET, FILM COATED ORAL at 08:43

## 2019-01-23 RX ADMIN — PANTOPRAZOLE SODIUM SCH MG: 40 TABLET, DELAYED RELEASE ORAL at 06:23

## 2019-01-23 RX ADMIN — FAMOTIDINE SCH MG: 20 TABLET ORAL at 08:40

## 2019-01-23 RX ADMIN — OXYCODONE HYDROCHLORIDE AND ACETAMINOPHEN 1 TAB: 5; 325 TABLET ORAL at 06:23

## 2019-01-23 RX ADMIN — PANTOPRAZOLE SODIUM 1 MG: 40 TABLET, DELAYED RELEASE ORAL at 06:23

## 2019-01-23 RX ADMIN — FERROUS SULFATE TAB 325 MG (65 MG ELEMENTAL FE) SCH MG: 325 (65 FE) TAB at 08:40

## 2019-01-23 RX ADMIN — MULTIPLE VITAMINS W/ MINERALS TAB SCH TAB: TAB at 08:40

## 2019-01-23 RX ADMIN — FOLIC ACID 1 MG: 1 TABLET ORAL at 08:40

## 2019-01-23 RX ADMIN — FOLIC ACID SCH MG: 1 TABLET ORAL at 08:40

## 2019-01-23 RX ADMIN — FERROUS SULFATE TAB 325 MG (65 MG ELEMENTAL FE) SCH MG: 325 (65 FE) TAB at 14:23

## 2019-01-23 RX ADMIN — THIAMINE HCL TAB 100 MG 1 MG: 100 TAB at 08:40

## 2019-01-23 NOTE — NUR
End of shift Report:

Awake on bed in comfortable position. No sob. No resp distress. Afebrile. Medicated for 
bilateral leg pain. well-ivonne and effective. Strained urine , no stone. no calculi noted. 
Needs attended and anticipated. No significant changes overnight. Pending SNF placement. 
Call light within reach. Will endorse accordingly.

## 2019-01-23 NOTE — PN
Date/Time of Note


Date/Time of Note


DATE: 1/23/19 


TIME: 10:32





Assessment/Plan


VTE Prophylaxis


Risk score (from [facility])>0 risk:  5


SCD applied (from [facility]):  Yes


SCD contraindicated:  patient refusal


Pharmacological prophylaxis:  fondaparinux


Pharm contraindication:  blood coag disorder





Lines/Catheters


IV Catheter Type (from [facility]):  Saline Lock


Urinary Cath still in place:  No





Assessment/Plan


Hospital Course


1. AMS,  metabolic encephalopathy


2.  Lymphadenopathy concerning for malignancy unknown primary source, more 


likely prostate cancer, PSA 1000.  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


3.  Acute kidney injury, better


4.  Severe anemia


5.  Recent weight loss


6.  Severe malnourishment


7.  Thrombocytopenia


8.  History of subdural hematoma


9.  History of hepatitis C


10.  History of alcoholism


11.  Hypertension


12.  Mild left hydronephrosis with obstructing distal left ureteral calculus


Assessment/Plan


-palliative care consult dr Hua.


-pt has capacity per 


-decides on DNR/DNI


-refused SNF


- MRCP neg


- HOLD BP  MEDS due to hypotension


- cw casodex/flomax, will need Lupron 


- spoke to Dr Potts will do biopsy as OPD in office


- cw PPI


- Bone scan +multiple mets


-f/u PT


-Pt will need fu oncology and urology as OPD


- SNF, pending placement, spoke to case management


Result Diagram:  


1/21/19 0609                                                                    


           1/22/19 0637








Subjective


24 Hr Interval Summary


Gastrointestinal:  no complaints





Exam/Review of Systems


Vital Signs


Vitals





Vital Signs


  Date      Temp  Pulse  Resp  B/P (MAP)   Pulse Ox  O2          O2 Flow    FiO2


Time                                                 Delivery    Rate


   1/23/19  97.7     78    16      133/69       100  Room Air


     07:40                           (90)








Intake and Output





1/22/19 1/22/19 1/23/19





1515:00


23:00


07:00





IntakeIntake Total


360 ml


240 ml


350 ml





OutputOutput Total


400 ml


300 ml


900 ml





BalanceBalance


-40 ml


-60 ml


-550 ml














Exam


Constitutional:  alert, oriented


Neck:  supple


Respiratory:  diminished breath sounds


Cardiovascular:  regular rate and rhythm


Gastrointestinal:  soft


Genitourinary - Male:  CVA tenderness; No nl penis, No nl scrotum, No discharge,


No other





Medications


Medications





Current Medications


Pantoprazole (Protonix Tab) 40 mg DAILY@06 PO  Last administered on 1/23/19at 


06:23; Admin Dose 40 MG;  Start 1/11/19 at 06:00


Oxycodone/ Acetaminophen (Percocet (5/ 325)) 2 tab Q6H  PRN PO PAIN LEVEL 6-10 


Last administered on 1/22/19at 21:04; Admin Dose 2 TAB;  Start 1/11/19 at 01:00


Oxycodone/ Acetaminophen (Percocet (5/ 325)) 1 tab Q6H  PRN PO MODERATE PAIN 


LEVEL 4-6 Last administered on 1/23/19at 06:23; Admin Dose 1 TAB;  Start 1/11/19


at 01:00


Folic Acid (Folic Acid) 1 mg DAILY PO  Last administered on 1/23/19at 08:40; 


Admin Dose 1 MG;  Start 1/11/19 at 09:00


Multivitamins/ Minerals (Theragran-M) 1 tab DAILY PO  Last administered on 1 /23/19at 08:40; Admin Dose 1 TAB;  Start 1/11/19 at 09:00


Thiamine HCl (Vitamin B1) 100 mg DAILY PO  Last administered on 1/23/19at 08:40;


Admin Dose 100 MG;  Start 1/11/19 at 09:00


Polyethylene Glycol (Miralax) 17 gm DAILY  PRN PO CONSTIPATION;  Start 1/11/19 


at 01:00


Ferrous Sulfate (Ferrous Sulfate (Ec)) 325 mg TID PO  Last administered on 


1/23/19at 08:40; Admin Dose 325 MG;  Start 1/11/19 at 09:00


Tamsulosin HCl (Flomax) 0.4 mg HS PO  Last administered on 1/22/19at 21:04; 


Admin Dose 0.4 MG;  Start 1/11/19 at 21:00


Famotidine (Pepcid) 20 mg DAILY PO  Last administered on 1/23/19at 08:40; Admin 


Dose 20 MG;  Start 1/11/19 at 09:00


Magnesium Hydroxide (Milk Of Mag) 30 ml DAILY  PRN PO CONSTIPATION;  Start 


1/11/19 at 01:00


Acetaminophen (Tylenol Tab) 650 mg Q6H  PRN PO MILD PAIN(1-3)OR ELEVATED TEMP;  


Start 1/11/19 at 01:00


Ondansetron HCl (Zofran Inj) 4 mg Q8  PRN IV NAUSEA AND/OR VOMITING;  Start 


1/11/19 at 01:00


Senna (Senokot) 2 tab BID  PRN PO CONSTIPATION Last administered on 1/18/19at 


06:03; Admin Dose 2 TAB;  Start 1/11/19 at 01:00


Zolpidem Tartrate (Ambien) 5 mg HS  PRN PO INSOMNIA Last administered on 


1/14/19at 21:49; Admin Dose 5 MG;  Start 1/11/19 at 01:00


Tramadol HCl (Ultram) 50 mg TID  PRN PO PAIN LEVEL 1-5 Last administered on 


1/21/19at 02:01; Admin Dose 50 MG;  Start 1/11/19 at 01:00


Miscellaneous Information Patients own medicat... BID@1000,1600 XX ;  Start 1/1 1/19 at 10:00


Bicalutamide (Casodex) 50 mg DAILY PO  Last administered on 1/23/19at 08:43; 


Admin Dose 50 MG;  Start 1/12/19 at 16:30


Morphine Sulfate (morphine) 6 mg Q4H  PRN PO SEVERE PAIN LEVEL 7-10 Last 


administered on 1/19/19at 23:36; Admin Dose 6 MG;  Start 1/15/19 at 17:30











ELISHA CHRISTENSEN                Jan 23, 2019 10:34

## 2019-01-23 NOTE — NUR
Patient refusing blood draw for AST/ALT/Bilirubin and alkaline Phosphatase. Patient states 
he does not want to be poked and we cannot be poking him everyday. Asked phlebotomist to 
come back and try after lunch and patient stated not to come back because he is not going to 
be poked today. Informed patient that this test was ordered by his MD and he stated I said 
no.

## 2019-01-23 NOTE — DS
ELISHA CHRISTENSEN 1/23/19 1857:


Date/Time of Note


Date/Time of Note


DATE: 1/23/19 


TIME: 18:56





Discharge Summary


Admission/Discharge Info


Admit Date/Time


Andrey 10, 2019 at 22:30


Discharge Date/Time


Jan 23, 2019 at 15:43


Discharge Diagnosis


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


Patient Condition:  Serious


Consults


DR Clark, oncology, Dr Potts, urology, dr Valentin GI


Procedures


MRCP


Hospital Course


This 70-year-old frail gentleman was brought from [facility] 


with history of hypertension subdural hematoma alcoholism anemia with the co


ncern of metastatic disease with metastasis to bones and lungs.  He is prior to 


source is unknown.  Patient has no capacity to make decisions for himself,  his 


brother is making decision there will need to find for the consult but they did 


not sign.  Dr. Clark oncologist for the patient.  Patient is a poor historian


1. AMS,  metabolic encephalopathy


2.  Lymphadenopathy concerning for malignancy unknown primary source, more 


likely prostate cancer, PSA 1000.  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


3.  Acute kidney injury, better


4.  Severe anemia


5.  Recent weight loss


6.  Severe malnourishment


7.  Thrombocytopenia


8.  History of subdural hematoma


9.  History of hepatitis C


10.  History of alcoholism


11.  Hypertension


12.  Mild left hydronephrosis with obstructing distal left ureteral calculus


Psychiatric consult determined that patient has full capacity. He is diagnosed 


with prostate cancer and placed on Casodex by dr Potts, urology. He opt out 


surgery due to mets to lungs and bones. Dr Clark is maintained oncological 


treatment for the patient. PT determined that he needs to be trained longer, 


there is a bilateral leg weakness present and patient need to c/w PT . His 


brother reported that he is unable to care for patient and requested place pt in


a nursing home.  was called to help in a hard situation. Dr Valentin 


, gastroenterology performed MRCP, it was negative. Pt had adequate pain control


during hospitalization.   He had regular  PT


 sessions. After d/c pt will need fu oncology and urology as OPD.


Home Meds


No Active Prescriptions or Reported Meds


Follow-up Plan


f/u dr clark in 3-4 days


f/u Dr Hay in 3-4 days


Primary Care Provider


Care Physician No Primary


Time spent on discharge:  < 30 minutes


Pending Labs





Laboratory Tests


            Test
                                      1/23/19
09:43


            Total Bilirubin
                     0.0 mg/dl
(0.2-1.3)


            Aspartate Amino Transf
(AST/SGOT)        27 IU/L
(15-46)


            Alanine Aminotransferase
(ALT/SGPT)      24 IU/L
(13-69)








TOMAS BRICE MD 2/7/19 1039:


Discharge Summary


Admission/Discharge Info


Hospital Course


DIFFICULT PATIENT


pscy was called, cleared him


but he had issues with compliance


spoke to brother who could not take care of him at home, pt didnt want to got to


SNIF  then agreed


f/u urology as OPD in 1 week


Home Meds


No Active Prescriptions or Reported Meds











ELISHA CHRISTENSEN                Jan 23, 2019 18:57


TOMAS BRICE MD               Feb 7, 2019 10:39

## 2019-01-23 NOTE — NUR
Patient being discharged and transferred to [facility]. Patient in stable 
condition, no signs of distress, no shortness of breath and denies pain at this time. IV 
removed. Report given to Anna Marie and going to room 62A. Patients own medication Tylenol 
500 mg sent with patient and Anna Marie aware. Patient taken by ambulance with all personal 
belongings and brother at side.

## 2019-01-23 NOTE — CONS
Assessment/Plan


Hospital Course


67 yo male





1.  Anemia, likely due to cancer


-FOB negative


2.  Renal insufficiency.


3.  Malnutrition.


4.  Prostate cancer with mets to the bone noted in bone scan


5.  Hypertension.


6.  Hepatitis C.


7.  Alcoholism.


8.  Gastritis


9.  S/P EGD


10.  Transaminitis


-alk phos elevated likely due to bone mets


11.  Weight loss of 80 lbs over 3 months


12.  Elevated alk phos secondary to bone mets


13.  Fatty liver


14.  Cholelithiasis


15.  Dilated CBD at 10mm with no obstruction noted on imaging, total bili and 


alk wnl, and no abd pain, neg gonzales's


16.  Elevated CEA (AFP and CA 19-9 wnl) CEA level is 7


-FOB negative





US of abd:


Fatty infiltration of the liver.


Cholelithiasis.


Dilated CBD. 10mm


Multiple simple cysts in the right kidney





MRCP 1/16:


1. Limited evaluation due to motion artifact.


2. Cholelithiasis.


3. Common bile duct dilatation up to 10 mm. No large stones seen within the 


common bile duct, however evaluation is severely limited by motion artifact.  


Patient has no abdominal pain


4. Mild left hydronephrosis, as seen on prior ultrasound.


5. Heterogeneous signal of the visualized bones. Correlate with recent nuclear 


medicine bone scan.





Plan


Continue PPI


WE will monitor LFTs and for clinical indication of bile duct obstruction, 


currently total bili wnl and pt denies abd pain and neg Gonzales's sign


Result Diagram:  


1/21/19 0609                                                                    


           1/22/19 0637





Results 24hrs





Laboratory Tests


               Test
                                1/22/19
06:37


               Sodium Level                                 138


               Potassium Level                              4.9


               Chloride Level                               101


               Carbon Dioxide Level                          29


               Anion Gap                                      8


               Blood Urea Nitrogen                          26  H


               Creatinine                                 1.30  H


               Est Glomerular Filtrat Rate
mL/min          55  L



               Glucose Level                                 92


               Calcium Level                                9.4


               Total Bilirubin                             0.0  L


               Direct Bilirubin                            0.00


               Indirect Bilirubin                           0.0


               Aspartate Amino Transf
(AST/SGOT)            34  



               Alanine Aminotransferase
(ALT/SGPT)          18  



               Alkaline Phosphatase                        356  H


               Total Protein                                6.9


               Albumin                                      3.6


               Globulin                                   3.30  H


               Albumin/Globulin Ratio                      1.09








Consultation Date/Type/Reason


Admit Date/Time


Andrey 10, 2019 at 22:30


Initial Consult Date


1/12/19


Requesting Provider:  ANKIT MONAE MD





24 HR Interval Summary


Free Text/Dictation


Pain in the lower extremity





Exam/Review of Systems


Vital Signs


Vitals





Vital Signs


  Date      Temp  Pulse  Resp  B/P (MAP)   Pulse Ox  O2          O2 Flow    FiO2


Time                                                 Delivery    Rate


   1/22/19  98.6     76    18      120/77        96


     14:00                           (91)


   1/21/19                                           Room Air


     14:09








Intake and Output





1/21/19 1/21/19 1/22/19





1515:00


23:00


07:00





IntakeIntake Total


240 ml


960 ml


700 ml





OutputOutput Total


200 ml


600 ml


1150 ml





BalanceBalance


40 ml


360 ml


-450 ml














Exam


Constitutional:  alert, oriented, well developed


Psych:  no complaints, nl mood/affect


Head:  normocephalic, atraumatic


Eyes:  nl conjunctiva, EOMI, nl lids, nl sclera, PERRL


ENMT:  nl external ears & nose, nl lips & teeth, nl nasal mucosa & septum


Neck:  supple, non-tender


Respiratory:  clear to auscultation, normal air movement


Cardiovascular:  regular rate and rhythm, nl pulses


Gastrointestinal:  soft, nl liver, spleen, non-tender


Musculoskeletal:  nl extremities to inspection, nl gait and stance


Extremities:  normal pulses


Neurological:  CNS II-XII intact, nl mental status, nl speech, nl strength


Skin:  nl turgor; 


   No rash or lesions


Lymph:  nl lymph nodes





Medications


Medications





Current Medications


Pantoprazole (Protonix Tab) 40 mg DAILY@06 PO  Last administered on 1/22/19at 


05:52; Admin Dose 40 MG;  Start 1/11/19 at 06:00


Oxycodone/ Acetaminophen (Percocet (5/ 325)) 2 tab Q6H  PRN PO PAIN LEVEL 6-10 


Last administered on 1/21/19at 21:01; Admin Dose 2 TAB;  Start 1/11/19 at 01:00


Oxycodone/ Acetaminophen (Percocet (5/ 325)) 1 tab Q6H  PRN PO MODERATE PAIN 


LEVEL 4-6 Last administered on 1/22/19at 13:35; Admin Dose 1 TAB;  Start 1/11/19


at 01:00


Folic Acid (Folic Acid) 1 mg DAILY PO  Last administered on 1/22/19at 08:50; 


Admin Dose 1 MG;  Start 1/11/19 at 09:00


Multivitamins/ Minerals (Theragran-M) 1 tab DAILY PO  Last administered on 


1/22/19at 08:50; Admin Dose 1 TAB;  Start 1/11/19 at 09:00


Thiamine HCl (Vitamin B1) 100 mg DAILY PO  Last administered on 1/22/19at 08:50;


Admin Dose 100 MG;  Start 1/11/19 at 09:00


Polyethylene Glycol (Miralax) 17 gm DAILY  PRN PO CONSTIPATION;  Start 1/11/19 


at 01:00


Ferrous Sulfate (Ferrous Sulfate (Ec)) 325 mg TID PO  Last administered on 


1/22/19at 13:34; Admin Dose 325 MG;  Start 1/11/19 at 09:00


Tamsulosin HCl (Flomax) 0.4 mg HS PO  Last administered on 1/20/19at 19:46; 


Admin Dose 0.4 MG;  Start 1/11/19 at 21:00


Famotidine (Pepcid) 20 mg DAILY PO  Last administered on 1/22/19at 08:50; Admin 


Dose 20 MG;  Start 1/11/19 at 09:00


Magnesium Hydroxide (Milk Of Mag) 30 ml DAILY  PRN PO CONSTIPATION;  Start 


1/11/19 at 01:00


Acetaminophen (Tylenol Tab) 650 mg Q6H  PRN PO MILD PAIN(1-3)OR ELEVATED TEMP;  


Start 1/11/19 at 01:00


Ondansetron HCl (Zofran Inj) 4 mg Q8  PRN IV NAUSEA AND/OR VOMITING;  Start 


1/11/19 at 01:00


Senna (Senokot) 2 tab BID  PRN PO CONSTIPATION Last administered on 1/18/19at 


06:03; Admin Dose 2 TAB;  Start 1/11/19 at 01:00


Zolpidem Tartrate (Ambien) 5 mg HS  PRN PO INSOMNIA Last administered on 


1/14/19at 21:49; Admin Dose 5 MG;  Start 1/11/19 at 01:00


Tramadol HCl (Ultram) 50 mg TID  PRN PO PAIN LEVEL 1-5 Last administered on 


1/21/19at 02:01; Admin Dose 50 MG;  Start 1/11/19 at 01:00


Miscellaneous Information Patients own medicat... BID@1000,1600 XX ;  Start 


1/11/19 at 10:00


Bicalutamide (Casodex) 50 mg DAILY PO  Last administered on 1/22/19at 08:51; 


Admin Dose 50 MG;  Start 1/12/19 at 16:30


Morphine Sulfate (morphine) 6 mg Q4H  PRN PO SEVERE PAIN LEVEL 7-10 Last 


administered on 1/19/19at 23:36; Admin Dose 6 MG;  Start 1/15/19 at 17:30





Date/Time of Note


Date/Time of Note


DATE: 1/22/19 


TIME: 18:47











BERNARD YUAN MD             Jan 22, 2019 18:49
Assessment/Plan


Assessment/Plan


Assessment/Plan


abd soft nt


multiple tatoos





1.  Anemia, likely due to cancer


-FOB negative


2.  Renal insufficiency.


3.  Malnutrition.


4.  Prostate cancer with mets to the bone noted in bone scan


5.  Hypertension.


6.  Hepatitis C.(multiple risk factors( remote IVDA, cocaine, 


incarceration,tatoos)


7.  Alcoholism.


8.  Gastritis


9.  S/P EGD


10.  Transaminitis


-alk phos elevated likely due to bone mets


11.  Weight loss of 80 lbs over 3 months


12.  Elevated alk phos secondary to bone mets


13.  Fatty liver


14.  Cholelithiasis


15.  Dilated CBD at 10mm with no obstruction noted on imaging, total bili and 


alk wnl, and no abd pain, neg orozco's


16.  Elevated CEA (AFP and CA 19-9 wnl) CEA level is 7


-FOB negative





repeat lfts, alk phos isoenzymes


Result Diagram:  


1/21/19 0609                                                                    


           1/22/19 0637








Consultation Date/Type/Reason


Admit Date/Time


Andrey 10, 2019 at 22:30


Initial Consult Date


1/12/19


Requesting Provider:  ANKIT MONAE MD





Exam/Review of Systems


Vital Signs


Vitals





Vital Signs


  Date      Temp  Pulse  Resp  B/P (MAP)   Pulse Ox  O2          O2 Flow    FiO2


Time                                                 Delivery    Rate


   1/23/19  97.7     78    16      133/69       100  Room Air


     07:40                           (90)








Intake and Output





1/22/19 1/22/19 1/23/19





1515:00


23:00


07:00





IntakeIntake Total


360 ml


240 ml


350 ml





OutputOutput Total


400 ml


300 ml


900 ml





BalanceBalance


-40 ml


-60 ml


-550 ml














Medications


Medications





Current Medications


Pantoprazole (Protonix Tab) 40 mg DAILY@06 PO  Last administered on 1/23/19at 


06:23; Admin Dose 40 MG;  Start 1/11/19 at 06:00


Oxycodone/ Acetaminophen (Percocet (5/ 325)) 2 tab Q6H  PRN PO PAIN LEVEL 6-10 


Last administered on 1/22/19at 21:04; Admin Dose 2 TAB;  Start 1/11/19 at 01:00


Oxycodone/ Acetaminophen (Percocet (5/ 325)) 1 tab Q6H  PRN PO MODERATE PAIN 


LEVEL 4-6 Last administered on 1/23/19at 06:23; Admin Dose 1 TAB;  Start 1/11/19


at 01:00


Folic Acid (Folic Acid) 1 mg DAILY PO  Last administered on 1/23/19at 08:40; 


Admin Dose 1 MG;  Start 1/11/19 at 09:00


Multivitamins/ Minerals (Theragran-M) 1 tab DAILY PO  Last administered on 


1/23/19at 08:40; Admin Dose 1 TAB;  Start 1/11/19 at 09:00


Thiamine HCl (Vitamin B1) 100 mg DAILY PO  Last administered on 1/23/19at 08:40;


Admin Dose 100 MG;  Start 1/11/19 at 09:00


Polyethylene Glycol (Miralax) 17 gm DAILY  PRN PO CONSTIPATION;  Start 1/11/19 


at 01:00


Ferrous Sulfate (Ferrous Sulfate (Ec)) 325 mg TID PO  Last administered on 


1/23/19at 08:40; Admin Dose 325 MG;  Start 1/11/19 at 09:00


Tamsulosin HCl (Flomax) 0.4 mg HS PO  Last administered on 1/22/19at 21:04; 


Admin Dose 0.4 MG;  Start 1/11/19 at 21:00


Famotidine (Pepcid) 20 mg DAILY PO  Last administered on 1/23/19at 08:40; Admin 


Dose 20 MG;  Start 1/11/19 at 09:00


Magnesium Hydroxide (Milk Of Mag) 30 ml DAILY  PRN PO CONSTIPATION;  Start 


1/11/19 at 01:00


Acetaminophen (Tylenol Tab) 650 mg Q6H  PRN PO MILD PAIN(1-3)OR ELEVATED TEMP;  


Start 1/11/19 at 01:00


Ondansetron HCl (Zofran Inj) 4 mg Q8  PRN IV NAUSEA AND/OR VOMITING;  Start 1/11 /19 at 01:00


Senna (Senokot) 2 tab BID  PRN PO CONSTIPATION Last administered on 1/18/19at 


06:03; Admin Dose 2 TAB;  Start 1/11/19 at 01:00


Zolpidem Tartrate (Ambien) 5 mg HS  PRN PO INSOMNIA Last administered on 


1/14/19at 21:49; Admin Dose 5 MG;  Start 1/11/19 at 01:00


Tramadol HCl (Ultram) 50 mg TID  PRN PO PAIN LEVEL 1-5 Last administered on 


1/21/19at 02:01; Admin Dose 50 MG;  Start 1/11/19 at 01:00


Miscellaneous Information Patients own medicat... BID@1000,1600 XX ;  Start 


1/11/19 at 10:00


Bicalutamide (Casodex) 50 mg DAILY PO  Last administered on 1/23/19at 08:43; 


Admin Dose 50 MG;  Start 1/12/19 at 16:30


Morphine Sulfate (morphine) 6 mg Q4H  PRN PO SEVERE PAIN LEVEL 7-10 Last 


administered on 1/19/19at 23:36; Admin Dose 6 MG;  Start 1/15/19 at 17:30





Date/Time of Note


Date/Time of Note


DATE: 1/23/19 


TIME: 09:22











LANCE PATEL MD           Jan 23, 2019 09:27
DATE OF ADMISSION: 01/10/2019

DATE OF CONSULTATION:  

 

 

 

HISTORY OF PRESENT ILLNESS:  The patient is a 68-year-old male who was originally admitted to [facility] for hypertension, subdural hematoma, alcoholism and metastatic bone disease, sub
sequently transferred to this facility for further management.  The patient denies any GI bleeding.  
He said he had black stools two weeks ago.  No nausea, no vomiting, no abdominal pain.  He is hungry,
 he wants solid food.  No chest pain, no shortness of breath, no  or CNS problem.

 

PAST MEDICAL HISTORY:  History of hypertension, history of alcoholism, hepatitis C, subdural hematoma
, weight loss.

 

ALLERGIES:  None.

 

PHYSICAL EXAMINATION:

GENERAL:  Alert, awake, not in distress.

VITAL SIGNS:  Stable.

HEENT:  Unremarkable.

NECK:  Supple, no thyromegaly, no lymphadenopathy.

CARDIOVASCULAR:  No murmur, gallop or click.

LUNGS:  Clear.

EXTREMITIES:  No edema.

CENTRAL NERVOUS SYSTEM:  Grossly within normal limits.

 

LABORATORY DATA:  His PSA is greater than 1000.  Creatinine is 1.27, BUN is 21.  LFTs all within norm
al limits.  Hematocrit was 20 and now 23.2.  Platelet count is 154.

 

He got 1 unit of packed cell RBC transfusion.

 

IMPRESSION:

1.  Anemia, which is a combination of metastasis to the bone and also gastrointestinal bleed.

2.  Renal insufficiency.

3.  Malnutrition.

4.  Metastatic to the bone, most probably from prostate given the high PSA of greater than 1000.

5.  Hypertension.

6.  Hepatitis C.

7.  Alcoholism.

 

PLAN:  The patient at this point has declined EGD.  We will give him a regular diet.  Start him on PP
I.  We will discuss with the family, if he agrees, then will proceed with EGD.  Given the history of 
hepatitis C and alcoholism, esophageal varicose vein needs to be ruled out.

 

 

Dictated By: BERNARD RICO/NTS

DD:    01/12/2019 13:13:44

DT:    01/12/2019 16:29:03

Conf#: [id number]

DID#:  [id number]

CC: ANKIT MONAE MD;*EndCC*
Date/Time of Note


Date/Time of Note


DATE: 1/11/19 


TIME: 13:52





Assessment/Plan


67 yo transferred from [facility] with widely metastatic disease to bones and 


lung as well as RP LAD


at this point highest on my list is that this is met prostate ca


recommend checking PSA


also recommend checking AFP, bhcg and CEA. will ideally need tissue diagnosis, 


recommend IR to biopsy most amenable site





-transfuse to keep hgb >7


Result Diagram:  


1/11/19 0605                                                                    


           1/11/19 0605





Results 24hrs





Laboratory Tests


               Test
                                1/11/19
06:05


               White Blood Count                            5.8


               Red Blood Count                            2.21  L


               Hemoglobin                                 6.4  *L


               Hematocrit                                 20.2  L


               Mean Corpuscular Volume                     91.4


               Mean Corpuscular Hemoglobin                 29.0


               Mean Corpuscular Hemoglobin
Concent       31.7  L



               Red Cell Distribution Width                16.7  H


               Platelet Count                               149


               Mean Platelet Volume                        10.3


               Immature Granulocytes %                   2.200  H


               Neutrophils %                               64.9


               Segmented Neutrophils %
(Manual)             69  



               Band Neutrophils % (Manual)                    2


               Lymphocytes %                               22.5


               Lymphocytes % (Manual)                        25


               Monocytes %                                  9.5


               Monocytes % (Manual)                           3


               Eosinophils %                                0.7


               Basophils %                                  0.2


               Myelocytes % (Manual)                         1  H


               Nucleated Red Blood Cells %                   1  H


               Immature Granulocytes #                   0.130  H


               Neutrophils #                                3.8


               Neutrophils # (Manual)                       4.0


               Band Neutrophils #                           0.1


               Lymphocytes (Manual)                         1.4


               Lymphocytes #                                1.3


               Monocytes #                                  0.6


               Monocytes # (Manual)                        0.1  L


               Eosinophils #                                0.0


               Basophils #                                  0.0


               Myelocytes #                                 0.0


               Nucleated Red Blood Cells #                  0.0


               Pathologist Review
(Hematology)      YES  



               Platelet Estimate                    NORMAL


               Giant Platelets                               1  H


               Polychromasia                                 1+


               Poikilocytosis                                2+


               Anisocytosis                                  2+


               Microcytosis                                  2+


               Ovalocytes                                    1+


               Sodium Level                                 138


               Potassium Level                              4.5


               Chloride Level                               106


               Carbon Dioxide Level                          25


               Anion Gap                                      7


               Blood Urea Nitrogen                          28  H


               Creatinine                                 1.49  H


               Est Glomerular Filtrat Rate
mL/min          47  L



               Glucose Level                                124


               Calcium Level                                8.7


               Total Bilirubin                             0.0  L


               Direct Bilirubin                            0.00


               Indirect Bilirubin                           0.0


               Aspartate Amino Transf
(AST/SGOT)           51  H



               Alanine Aminotransferase
(ALT/SGPT)          9  L



               Alkaline Phosphatase                        268  H


               Total Protein                                6.5


               Albumin                                     3.2  L


               Globulin                                   3.30  H


               Albumin/Globulin Ratio                      0.96








Consultation Date/Type/Reason


Admit Date/Time


Andrey 10, 2019 at 22:30





Hx of Present Illness


67 yo poor historian, multiple med problems including Hep C,  htn, prostate ca. 


Transferred from [facility] to [facility] for further w/u. Presented to [facility] 


with weight loss and fatigue, anemic with hgb 7.5Imaging there thshowed diffuse 


thoracic osseous mes in spine, no cord compression.Ct AP w/o contrast:


pulm mets, mild left hydro, promenent retroperitoneal and bilateral iliac LAD


CT head w/o: JACQUELIN 





Pt is a very poor historian, unable to tell me if he has had PSA checked in the 


past or colonoscopy


Constitutional:  poor po


Eyes:  no complaints


ENT:  no complaints


Respiratory:  no complaints


Cardiovascular:  no complaints


Gastrointestinal:  no complaints


Genitourinary:  no complaints


Musculoskeletal:  back pain, bone/joint pain





Past Medical History


Medications





Current Medications


Pantoprazole (Protonix Tab) 40 mg DAILY@06 PO  Last administered on 1/11/19at 


05:48; Admin Dose 40 MG;  Start 1/11/19 at 06:00


Morphine Sulfate (morphine SULFATE (PF)) 2 mg Q4H  PRN IV SEVERE PAIN LEVEL 7-10


Last administered on 1/11/19at 01:18; Admin Dose 2 MG;  Start 1/11/19 at 00:40


Sodium Chloride 1,000 ml @  75 mls/hr [id number] IV  Last administered on 1/11/19at


01:17; Admin Dose 75 MLS/HR;  Start 1/11/19 at 00:50


Oxycodone/ Acetaminophen (Percocet (5/ 325)) 2 tab Q6H  PRN PO PAIN LEVEL 6-10; 


Start 1/11/19 at 01:00


Oxycodone/ Acetaminophen (Percocet (5/ 325)) 1 tab Q6H  PRN PO MODERATE PAIN 


LEVEL 4-6;  Start 1/11/19 at 01:00


Folic Acid (Folic Acid) 1 mg DAILY PO  Last administered on 1/11/19at 09:51; 


Admin Dose 1 MG;  Start 1/11/19 at 09:00


Multivitamins/ Minerals (Theragran-M) 1 tab DAILY PO  Last administered on 


1/11/19at 09:51; Admin Dose 1 TAB;  Start 1/11/19 at 09:00


Thiamine HCl (Vitamin B1) 100 mg DAILY PO  Last administered on 1/11/19at 09:50;


Admin Dose 100 MG;  Start 1/11/19 at 09:00


Polyethylene Glycol (Miralax) 17 gm DAILY  PRN PO CONSTIPATION;  Start 1/11/19 


at 01:00


Metoprolol Tartrate (Lopressor) 50 mg BID PO  Last administered on 1/11/19at 09:


51; Admin Dose 50 MG;  Start 1/11/19 at 09:00


Ferrous Sulfate (Ferrous Sulfate (Ec)) 325 mg TID PO  Last administered on 


1/11/19at 13:19; Admin Dose 325 MG;  Start 1/11/19 at 09:00


Hydralazine HCl (Apresoline) 25 mg TID PO  Last administered on 1/11/19at 09:51;


Admin Dose 25 MG;  Start 1/11/19 at 09:00


Tamsulosin HCl (Flomax) 0.4 mg HS PO ;  Start 1/11/19 at 21:00


Famotidine (Pepcid) 20 mg DAILY PO  Last administered on 1/11/19at 09:51; Admin 


Dose 20 MG;  Start 1/11/19 at 09:00


Magnesium Hydroxide (Milk Of Mag) 30 ml DAILY  PRN PO CONSTIPATION;  Start 


1/11/19 at 01:00


Acetaminophen (Tylenol Tab) 650 mg Q6H  PRN PO MILD PAIN(1-3)OR ELEVATED TEMP;  


Start 1/11/19 at 01:00


Ondansetron HCl (Zofran Inj) 4 mg Q8  PRN IV NAUSEA AND/OR VOMITING;  Start 


1/11/19 at 01:00


Senna (Senokot) 2 tab BID  PRN PO CONSTIPATION;  Start 1/11/19 at 01:00


Zolpidem Tartrate (Ambien) 5 mg HS  PRN PO INSOMNIA;  Start 1/11/19 at 01:00


Tramadol HCl (Ultram) 50 mg TID  PRN PO PAIN LEVEL 1-5;  Start 1/11/19 at 01:00


Miscellaneous Information Patients own medicat... BID@1000,1600 XX ;  Start 


1/11/19 at 10:00


Allergies:  


Coded Allergies:  


     No Known Allergy (Unverified , 1/11/19)





Social History


Smoking Status:  Never smoker





Exam/Review of Systems


Vital Signs


Vitals





Vital Signs


  Date      Temp  Pulse  Resp  B/P (MAP)   Pulse Ox  O2          O2 Flow    FiO2


Time                                                 Delivery    Rate


   1/11/19           98


     12:02


   1/11/19  98.3           20      120/70       100


     11:16                           (87)


   1/11/19                                           Nasal             2.0


     08:30                                           Cannula








Intake and Output





1/10/19


1/10/19


1/11/19





1515:00


23:00


07:00





IntakeIntake Total


1100 ml





OutputOutput Total


400 ml





BalanceBalance


700 ml














Exam


Constitutional:  alert, oriented, well developed, frail


Head:  normocephalic, atraumatic


Eyes:  nl conjunctiva, EOMI, nl lids, nl sclera, PERRL


Musculoskeletal:  nl extremities to inspection, nl gait and stance





Medications


Medications





Current Medications


Pantoprazole (Protonix Tab) 40 mg DAILY@06 PO  Last administered on 1/11/19at 


05:48; Admin Dose 40 MG;  Start 1/11/19 at 06:00


Morphine Sulfate (morphine SULFATE (PF)) 2 mg Q4H  PRN IV SEVERE PAIN LEVEL 7-10


Last administered on 1/11/19at 01:18; Admin Dose 2 MG;  Start 1/11/19 at 00:40


Sodium Chloride 1,000 ml @  75 mls/hr [id number] IV  Last administered on 1/11/19at


01:17; Admin Dose 75 MLS/HR;  Start 1/11/19 at 00:50


Oxycodone/ Acetaminophen (Percocet (5/ 325)) 2 tab Q6H  PRN PO PAIN LEVEL 6-10; 


Start 1/11/19 at 01:00


Oxycodone/ Acetaminophen (Percocet (5/ 325)) 1 tab Q6H  PRN PO MODERATE PAIN 


LEVEL 4-6;  Start 1/11/19 at 01:00


Folic Acid (Folic Acid) 1 mg DAILY PO  Last administered on 1/11/19at 09:51; 


Admin Dose 1 MG;  Start 1/11/19 at 09:00


Multivitamins/ Minerals (Theragran-M) 1 tab DAILY PO  Last administered on 


1/11/19at 09:51; Admin Dose 1 TAB;  Start 1/11/19 at 09:00


Thiamine HCl (Vitamin B1) 100 mg DAILY PO  Last administered on 1/11/19at 09:50;


Admin Dose 100 MG;  Start 1/11/19 at 09:00


Polyethylene Glycol (Miralax) 17 gm DAILY  PRN PO CONSTIPATION;  Start 1/11/19 


at 01:00


Metoprolol Tartrate (Lopressor) 50 mg BID PO  Last administered on 1/11/19at 


09:51; Admin Dose 50 MG;  Start 1/11/19 at 09:00


Ferrous Sulfate (Ferrous Sulfate (Ec)) 325 mg TID PO  Last administered on 


1/11/19at 13:19; Admin Dose 325 MG;  Start 1/11/19 at 09:00


Hydralazine HCl (Apresoline) 25 mg TID PO  Last administered on 1/11/19at 09:51;


Admin Dose 25 MG;  Start 1/11/19 at 09:00


Tamsulosin HCl (Flomax) 0.4 mg HS PO ;  Start 1/11/19 at 21:00


Famotidine (Pepcid) 20 mg DAILY PO  Last administered on 1/11/19at 09:51; Admin 


Dose 20 MG;  Start 1/11/19 at 09:00


Magnesium Hydroxide (Milk Of Mag) 30 ml DAILY  PRN PO CONSTIPATION;  Start 


1/11/19 at 01:00


Acetaminophen (Tylenol Tab) 650 mg Q6H  PRN PO MILD PAIN(1-3)OR ELEVATED TEMP;  


Start 1/11/19 at 01:00


Ondansetron HCl (Zofran Inj) 4 mg Q8  PRN IV NAUSEA AND/OR VOMITING;  Start 1/1 1/19 at 01:00


Senna (Senokot) 2 tab BID  PRN PO CONSTIPATION;  Start 1/11/19 at 01:00


Zolpidem Tartrate (Ambien) 5 mg HS  PRN PO INSOMNIA;  Start 1/11/19 at 01:00


Tramadol HCl (Ultram) 50 mg TID  PRN PO PAIN LEVEL 1-5;  Start 1/11/19 at 01:00


Miscellaneous Information Patients own medicat... BID@1000,1600 XX ;  Start 


1/11/19 at 10:00











MARIUM ARZATE                Jan 11, 2019 13:57
Date/Time of Note


Date/Time of Note


DATE: 1/12/19 


TIME: 16:02





Assessment/Plan


68-year-old -American male was transferred from [facility] to [facility] patient was found to have metastatic 


cancer to the lungs and his bones and upon admission here at [facility] his 


PSA was over 1000 therefore a urological consultation was requested.  The palmer morales himself is not a good historian he is confused and sometimes agitated at 


the [facility] they attempted to have MRI of the spine to evaluate 


metastatic disease and evaluate for vertebral instability but the patient was 


confused and agitated and they were not able to do the MRI the patient is known 


to have had subdural hematoma status post evacuation at [facility] in 


July 2018 the patient also was found to have lymphadenopathy and bony metastases


he also is known to have a history of hypertension and anemia.  


CT scan of the abdomen and pelvis without contrast at [facility] done on


1/7/2019 showed lung bases: Extensive pleural nodularity and pulmonary nodules 


bilaterally bilateral left greater than right small pleural effusions and 


bibasilar atelectasis liver: Unremarkable, gallbladder and bile ducts: 


Contracted likely containing gallstones ,spleen unremarkable,pancreas 


unremarkable, adrenals mildly thickened left greater than right adrenal glands, 


kidneys and ureters: Bilateral low-density lesions mostly fluid density but 


incompletely characterized measuring up to 6.2 cm on the right kidney, mild left


hydronephrosis likely due to an obstructing 6 mm stone in the distal left 


ureter. Bowels nondilated bowel loops moderate stool burden.  Bladder diffuse 


circumferential wall thickening.  Reproductive organs: Prostatomegaly with 


median lobe hypertrophy.  Lymph node: Likely prominent retroperitoneal and 


bilateral iliac chain lymph nodes although overall poorly delineated due to lack


of intravenous contrast.  Peritoneum: Small volume ascites.  Vessels: Moderate 


atherosclerotic calcification.  Abdominal wall: Mild body wall edema.  Bones: 


Mixed lytic ,sclerotic bone lesions involving the entire axial and appendicular 


skeleton with areas of vertebral body height loss for example in the L3 


vertebral body and marked areas of cortical destruction for example on the left 


inferior pubic ramus.


Impression: Most likely this patient does have metastatic prostate cancer.


Plan: Lumbosacral spine x-rays, pelvic x-rays.  Bone scan and we will start him 


on Casodex 50 mg daily.  He will need to have biopsy to confirm the diagnosis 


and then he will need to be placed on Lupron Depo injections.


Result Diagram:  


1/12/19 0441                                                                    


           1/12/19 0441





Results 24hrs





Laboratory Tests


               Test
                                1/12/19
04:41


               White Blood Count                            6.2


               Red Blood Count                            2.59  L


               Hemoglobin                                  7.5  L


               Hematocrit                                 23.7  L


               Mean Corpuscular Volume                     91.5


               Mean Corpuscular Hemoglobin                 29.0


               Mean Corpuscular Hemoglobin
Concent       31.6  L



               Red Cell Distribution Width                15.9  H


               Platelet Count                               154


               Mean Platelet Volume                         9.7


               Immature Granulocytes %                   2.900  H


               Neutrophils %                               66.1


               Lymphocytes %                               19.7


               Monocytes %                                 10.0


               Eosinophils %                                1.0


               Basophils %                                  0.3


               Nucleated Red Blood Cells %                 0.3  H


               Immature Granulocytes #                   0.180  H


               Neutrophils #                                4.1


               Lymphocytes #                                1.2


               Monocytes #                                  0.6


               Eosinophils #                                0.1


               Basophils #                                  0.0


               Nucleated Red Blood Cells #                  0.0


               Sodium Level                                 139


               Potassium Level                              4.2


               Chloride Level                               104


               Carbon Dioxide Level                          24


               Anion Gap                                     11


               Blood Urea Nitrogen                          21  H


               Creatinine                                 1.27  H


               Est Glomerular Filtrat Rate
mL/min          56  L



               Glucose Level                                107


               Calcium Level                                8.6








Consultation Date/Type/Reason


Admit Date/Time


Andrey 10, 2019 at 22:30


Date of Consultation:  Jan 12, 2019


Type of Consult


Urology


Reason for Consultation


Metastatic prostate cancer


Requesting Provider:  ANKIT MONAE of Present Illness


68-year-old -American male was transferred from [facility] to [facility] patient was found to have metastatic 


cancer to the lungs and his bones and upon admission here at [facility] his P


SA was over 1000 therefore a urological consultation was requested.  The patient


himself is not a good historian he is confused and sometimes agitated at the 


[facility] they attempted to have MRI of the spine to evaluate 


metastatic disease and evaluate for vertebral instability but the patient was 


confused and agitated and they were not able to do the MRI the patient is known 


to have had subdural hematoma status post evacuation at [facility] in 


July 2018 the patient also was found to have lymphadenopathy and bony metastases


he also is known to have a history of hypertension and anemia.  


CT scan of the abdomen and pelvis without contrast at [facility] done on


1/7/2019 showed lung bases: Extensive pleural nodularity and pulmonary nodules 


bilaterally bilateral left greater than right small pleural effusions and 


bibasilar atelectasis liver: Unremarkable, gallbladder and bile ducts: 


Contracted likely containing gallstones ,spleen unremarkable,pancreas 


unremarkable, adrenals mildly thickened left greater than right adrenal glands, 


kidneys and ureters: Bilateral low-density lesions mostly fluid density but 


incompletely characterized measuring up to 6.2 cm on the right kidney, mild left


hydronephrosis likely due to an obstructing 6 mm stone in the distal left ur


eter. Bowels nondilated bowel loops moderate stool burden.  Bladder diffuse 


circumferential wall thickening.  Reproductive organs: Prostatomegaly with 


median lobe hypertrophy.  Lymph node: Likely prominent retroperitoneal and 


bilateral iliac chain lymph nodes although overall poorly delineated due to lack


of intravenous contrast.  Peritoneum: Small volume ascites.  Vessels: Moderate 


atherosclerotic calcification.  Abdominal wall: Mild body wall edema.  Bones: 


Mixed lytic ,sclerotic bone lesions involving the entire axial and appendicular 


skeleton with areas of vertebral body height loss for example in the L3 


vertebral body and marked areas of cortical destruction for example on the left 


inferior pubic ramus.


Constitutional:  other (Frail)


Eyes:  no complaints


ENT:  no complaints


Respiratory:  No shortness of breath


Cardiovascular:  no complaints


Gastrointestinal:  no complaints


Genitourinary:  hematuria (He states he did have hematuria a few weeks ago but 


now his urine is clear.  He denies any dysuria)


Musculoskeletal:  no complaints


Skin:  no complaints


Neurologic:  confusion


Endocrine:  no complaints


Lymphatic:  adenopathy


Psychological:  confusion





Past Medical History


Medical History:  cancer (Prostatic), hypertension, other (Hepatitis C, weight 


loss and anemia, encephalopathy)


Medications





Current Medications


Pantoprazole (Protonix Tab) 40 mg DAILY@06 PO  Last administered on 1/12/19at 


06:39; Admin Dose 40 MG;  Start 1/11/19 at 06:00


Morphine Sulfate (morphine SULFATE (PF)) 2 mg Q4H  PRN IV SEVERE PAIN LEVEL 7-10


Last administered on 1/11/19at 01:18; Admin Dose 2 MG;  Start 1/11/19 at 00:40


Sodium Chloride 1,000 ml @  40 mls/hr Q24H IV  Last administered on 1/12/19at 


10:22; Admin Dose 75 MLS/HR;  Start 1/11/19 at 00:50


Oxycodone/ Acetaminophen (Percocet (5/ 325)) 2 tab Q6H  PRN PO PAIN LEVEL 6-10; 


Start 1/11/19 at 01:00


Oxycodone/ Acetaminophen (Percocet (5/ 325)) 1 tab Q6H  PRN PO MODERATE PAIN 


LEVEL 4-6;  Start 1/11/19 at 01:00


Folic Acid (Folic Acid) 1 mg DAILY PO  Last administered on 1/12/19at 08:38; 


Admin Dose 1 MG;  Start 1/11/19 at 09:00


Multivitamins/ Minerals (Theragran-M) 1 tab DAILY PO  Last administered on 


1/12/19at 08:38; Admin Dose 1 TAB;  Start 1/11/19 at 09:00


Thiamine HCl (Vitamin B1) 100 mg DAILY PO  Last administered on 1/12/19at 08:38;


Admin Dose 100 MG;  Start 1/11/19 at 09:00


Polyethylene Glycol (Miralax) 17 gm DAILY  PRN PO CONSTIPATION;  Start 1/11/19 


at 01:00


Metoprolol Tartrate (Lopressor) 50 mg BID PO  Last administered on 1/12/19at 


08:38; Admin Dose 50 MG;  Start 1/11/19 at 09:00


Ferrous Sulfate (Ferrous Sulfate (Ec)) 325 mg TID PO  Last administered on 


1/12/19at 12:37; Admin Dose 325 MG;  Start 1/11/19 at 09:00


Hydralazine HCl (Apresoline) 25 mg TID PO  Last administered on 1/12/19at 12:37;


Admin Dose 25 MG;  Start 1/11/19 at 09:00


Tamsulosin HCl (Flomax) 0.4 mg HS PO  Last administered on 1/11/19at 21:03; 


Admin Dose 0.4 MG;  Start 1/11/19 at 21:00


Famotidine (Pepcid) 20 mg DAILY PO  Last administered on 1/12/19at 08:38; Admin 


Dose 20 MG;  Start 1/11/19 at 09:00


Magnesium Hydroxide (Milk Of Mag) 30 ml DAILY  PRN PO CONSTIPATION;  Start 


1/11/19 at 01:00


Acetaminophen (Tylenol Tab) 650 mg Q6H  PRN PO MILD PAIN(1-3)OR ELEVATED TEMP;  


Start 1/11/19 at 01:00


Ondansetron HCl (Zofran Inj) 4 mg Q8  PRN IV NAUSEA AND/OR VOMITING;  Start 


1/11/19 at 01:00


Senna (Senokot) 2 tab BID  PRN PO CONSTIPATION;  Start 1/11/19 at 01:00


Zolpidem Tartrate (Ambien) 5 mg HS  PRN PO INSOMNIA;  Start 1/11/19 at 01:00


Tramadol HCl (Ultram) 50 mg TID  PRN PO PAIN LEVEL 1-5 Last administered on 


1/12/19at 02:30; Admin Dose 50 MG;  Start 1/11/19 at 01:00


Miscellaneous Information Patients own medicat... BID@1000,1600 XX ;  Start 


1/11/19 at 10:00


Bicalutamide (Casodex) 50 mg DAILY PO ;  Start 1/12/19 at 16:30


Allergies:  


Coded Allergies:  


     No Known Allergy (Unverified , 1/11/19)





Past Surgical History


Past Surgical Hx:  other (Evacuation of subdural hematoma he had 2 craniotomies)





Social History


Alcohol Use:  other (History of alcoholism)


Smoking Status:  Never smoker





Exam/Review of Systems


Vital Signs


Vitals





Vital Signs


  Date      Temp  Pulse  Resp  B/P (MAP)   Pulse Ox  O2          O2 Flow    FiO2


Time                                                 Delivery    Rate


   1/12/19  98.7     67    16      112/66        99  Room Air


     14:15                           (81)


   1/11/19                                                             2.0


     08:30








Intake and Output





1/11/19 1/11/19 1/12/19





1515:00


23:00


07:00





IntakeIntake Total


1450 ml


815 ml





OutputOutput Total


900 ml


1000 ml





BalanceBalance


550 ml


-185 ml














Exam


Constitutional:  alert, frail


Psych:  confusion


Head:  normocephalic


Eyes:  nl conjunctiva


ENMT:  nl external ears & nose


Neck:  supple


Respiratory:  normal air movement; 


   No wheezing


Cardiovascular:  No jugular venous distention (JVD)


Gastrointestinal:  soft


Genitourinary - Male:  other (Rectal exam: Prostate hard but it is not bulging 


into the rectum)


Extremities:  other (He moves all extremities); 


   No calf tenderness


Neurological:  confused; 


   No numbness


Skin:  nl turgor





Medications


Medications





Current Medications


Pantoprazole (Protonix Tab) 40 mg DAILY@06 PO  Last administered on 1/12/19at 


06:39; Admin Dose 40 MG;  Start 1/11/19 at 06:00


Morphine Sulfate (morphine SULFATE (PF)) 2 mg Q4H  PRN IV SEVERE PAIN LEVEL 7-10


Last administered on 1/11/19at 01:18; Admin Dose 2 MG;  Start 1/11/19 at 00:40


Sodium Chloride 1,000 ml @  40 mls/hr Q24H IV  Last administered on 1/12/19at 


10:22; Admin Dose 75 MLS/HR;  Start 1/11/19 at 00:50


Oxycodone/ Acetaminophen (Percocet (5/ 325)) 2 tab Q6H  PRN PO PAIN LEVEL 6-10; 


Start 1/11/19 at 01:00


Oxycodone/ Acetaminophen (Percocet (5/ 325)) 1 tab Q6H  PRN PO MODERATE PAIN 


LEVEL 4-6;  Start 1/11/19 at 01:00


Folic Acid (Folic Acid) 1 mg DAILY PO  Last administered on 1/12/19at 08:38; 


Admin Dose 1 MG;  Start 1/11/19 at 09:00


Multivitamins/ Minerals (Theragran-M) 1 tab DAILY PO  Last administered on 


1/12/19at 08:38; Admin Dose 1 TAB;  Start 1/11/19 at 09:00


Thiamine HCl (Vitamin B1) 100 mg DAILY PO  Last administered on 1/12/19at 08:38;


Admin Dose 100 MG;  Start 1/11/19 at 09:00


Polyethylene Glycol (Miralax) 17 gm DAILY  PRN PO CONSTIPATION;  Start 1/11/19 


at 01:00


Metoprolol Tartrate (Lopressor) 50 mg BID PO  Last administered on 1/12/19at 


08:38; Admin Dose 50 MG;  Start 1/11/19 at 09:00


Ferrous Sulfate (Ferrous Sulfate (Ec)) 325 mg TID PO  Last administered on 


1/12/19at 12:37; Admin Dose 325 MG;  Start 1/11/19 at 09:00


Hydralazine HCl (Apresoline) 25 mg TID PO  Last administered on 1/12/19at 12:37;


Admin Dose 25 MG;  Start 1/11/19 at 09:00


Tamsulosin HCl (Flomax) 0.4 mg HS PO  Last administered on 1/11/19at 21:03; 


Admin Dose 0.4 MG;  Start 1/11/19 at 21:00


Famotidine (Pepcid) 20 mg DAILY PO  Last administered on 1/12/19at 08:38; Admin 


Dose 20 MG;  Start 1/11/19 at 09:00


Magnesium Hydroxide (Milk Of Mag) 30 ml DAILY  PRN PO CONSTIPATION;  Start 


1/11/19 at 01:00


Acetaminophen (Tylenol Tab) 650 mg Q6H  PRN PO MILD PAIN(1-3)OR ELEVATED TEMP;  


Start 1/11/19 at 01:00


Ondansetron HCl (Zofran Inj) 4 mg Q8  PRN IV NAUSEA AND/OR VOMITING;  Start 


1/11/19 at 01:00


Senna (Senokot) 2 tab BID  PRN PO CONSTIPATION;  Start 1/11/19 at 01:00


Zolpidem Tartrate (Ambien) 5 mg HS  PRN PO INSOMNIA;  Start 1/11/19 at 01:00


Tramadol HCl (Ultram) 50 mg TID  PRN PO PAIN LEVEL 1-5 Last administered on 


1/12/19at 02:30; Admin Dose 50 MG;  Start 1/11/19 at 01:00


Miscellaneous Information Patients own medicat... BID@1000,1600 XX ;  Start 


1/11/19 at 10:00


Bicalutamide (Casodex) 50 mg DAILY PO ;  Start 1/12/19 at 16:30





Imaging


Imaging


Renal ultrasound:


 


The kidneys are well visualized. The right kidney measures 12.4 cm. The left 


kidney measures 9.2 cm. There are several simple cyst seen in the right kidney. 


The largest measures 6.2 x 5.4 x 4.9 cm. The adjacent cyst measures 1.7 x 1.9 x 


2.7 cm. There is no evidence of right-sided hydronephrosis.


 


There is mild left hydronephrosis. Several cysts are seen in the mid left 


kidney. The largest measures 1.7 cm. 


 


There is an isoechoic mass in the central bladder. This mass has some internal 


vascularity. This mass measures 2.4 x 1.7 cm. Bilateral ureteral jets are 


present





I looked at the ultrasound and the reported mass is a median lobe of the 


prostate.











HAILEE CLIFTON MD            Jan 12, 2019 16:12
Date/Time of Note


Date/Time of Note


DATE: 1/13/19 


TIME: 16:53





Consult Date/Type/Reason


Admit Date/Time


Andrey 10, 2019 at 22:30


Initial Consult Date


1/12/19


Type of Consultation:  Urology


Reason for Consultation


Metastatic prostate cancer


Requesting Provider:  ANKIT MONAE MD





Subjective


Patient denies any pain.  He states he is fine and has no problems





Objective





Vital Signs


  Date      Temp  Pulse  Resp  B/P (MAP)   Pulse Ox  O2          O2 Flow    FiO2


Time                                                 Delivery    Rate


   1/13/19  98.3     74    19      111/57        98


     15:24                           (75)


   1/12/19                                           Room Air


     14:15


   1/11/19                                                             2.0


     08:30








Intake and Output





1/12/19 1/12/19 1/13/19





1515:00


23:00


07:00





IntakeIntake Total


1305 ml


735 ml


730 ml





OutputOutput Total


680 ml


600 ml


700 ml





BalanceBalance


625 ml


135 ml


30 ml











Exam


He is voiding clear urine.  X-rays of the lumbosacral spine and pelvis shows 


metastatic osteoblastic lesions most consistent with metastatic prostate cancer





Results/Medications


Result Diagram:  


1/13/19 0513                                                                    


           1/13/19 0513





Results 24 hrs





Laboratory Tests


               Test
                                1/13/19
05:13


               White Blood Count                            5.7


               Red Blood Count                            2.78  L


               Hemoglobin                                  8.1  L


               Hematocrit                                 25.8  L


               Mean Corpuscular Volume                     92.8


               Mean Corpuscular Hemoglobin                 29.1


               Mean Corpuscular Hemoglobin
Concent       31.4  L



               Red Cell Distribution Width                15.8  H


               Platelet Count                               174


               Mean Platelet Volume                       10.5  H


               Immature Granulocytes %                   1.800  H


               Neutrophils %                               68.6


               Lymphocytes %                               19.8


               Monocytes %                                  8.3


               Eosinophils %                                1.1


               Basophils %                                  0.4


               Nucleated Red Blood Cells %                  0.0


               Immature Granulocytes #                   0.100  H


               Neutrophils #                                3.9


               Lymphocytes #                                1.1


               Monocytes #                                  0.5


               Eosinophils #                                0.1


               Basophils #                                  0.0


               Nucleated Red Blood Cells #                  0.0


               Sodium Level                                 141


               Potassium Level                              4.5


               Chloride Level                               106


               Carbon Dioxide Level                          23


               Anion Gap                                     12


               Blood Urea Nitrogen                           20


               Creatinine                                 1.28  H


               Est Glomerular Filtrat Rate
mL/min          56  L



               Glucose Level                                142


               Calcium Level                                8.7





Medications





Current Medications


Pantoprazole (Protonix Tab) 40 mg DAILY@06 PO  Last administered on 1/13/19at 


05:15; Admin Dose 40 MG;  Start 1/11/19 at 06:00


Morphine Sulfate (morphine SULFATE (PF)) 2 mg Q4H  PRN IV SEVERE PAIN LEVEL 7-10


Last administered on 1/11/19at 01:18; Admin Dose 2 MG;  Start 1/11/19 at 00:40


Sodium Chloride 1,000 ml @  40 mls/hr Q24H IV  Last administered on 1/13/19at 


10:28; Admin Dose 40 MLS/HR;  Start 1/11/19 at 00:50


Oxycodone/ Acetaminophen (Percocet (5/ 325)) 2 tab Q6H  PRN PO PAIN LEVEL 6-10; 


Start 1/11/19 at 01:00


Oxycodone/ Acetaminophen (Percocet (5/ 325)) 1 tab Q6H  PRN PO MODERATE PAIN 


LEVEL 4-6;  Start 1/11/19 at 01:00


Folic Acid (Folic Acid) 1 mg DAILY PO  Last administered on 1/13/19at 08:21; 


Admin Dose 1 MG;  Start 1/11/19 at 09:00


Multivitamins/ Minerals (Theragran-M) 1 tab DAILY PO  Last administered on 


1/13/19at 08:20; Admin Dose 1 TAB;  Start 1/11/19 at 09:00


Thiamine HCl (Vitamin B1) 100 mg DAILY PO  Last administered on 1/13/19at 08:20;


Admin Dose 100 MG;  Start 1/11/19 at 09:00


Polyethylene Glycol (Miralax) 17 gm DAILY  PRN PO CONSTIPATION;  Start 1/11/19 


at 01:00


Metoprolol Tartrate (Lopressor) 50 mg BID PO  Last administered on 1/13/19at 


08:21; Admin Dose 50 MG;  Start 1/11/19 at 09:00


Ferrous Sulfate (Ferrous Sulfate (Ec)) 325 mg TID PO  Last administered on 


1/13/19at 14:46; Admin Dose 325 MG;  Start 1/11/19 at 09:00


Hydralazine HCl (Apresoline) 25 mg TID PO  Last administered on 1/13/19at 14:46;


Admin Dose 25 MG;  Start 1/11/19 at 09:00


Tamsulosin HCl (Flomax) 0.4 mg HS PO  Last administered on 1/12/19at 21:29; 


Admin Dose 0.4 MG;  Start 1/11/19 at 21:00


Famotidine (Pepcid) 20 mg DAILY PO  Last administered on 1/13/19at 08:21; Admin 


Dose 20 MG;  Start 1/11/19 at 09:00


Magnesium Hydroxide (Milk Of Mag) 30 ml DAILY  PRN PO CONSTIPATION;  Start 


1/11/19 at 01:00


Acetaminophen (Tylenol Tab) 650 mg Q6H  PRN PO MILD PAIN(1-3)OR ELEVATED TEMP;  


Start 1/11/19 at 01:00


Ondansetron HCl (Zofran Inj) 4 mg Q8  PRN IV NAUSEA AND/OR VOMITING;  Start 


1/11/19 at 01:00


Senna (Senokot) 2 tab BID  PRN PO CONSTIPATION;  Start 1/11/19 at 01:00


Zolpidem Tartrate (Ambien) 5 mg HS  PRN PO INSOMNIA;  Start 1/11/19 at 01:00


Tramadol HCl (Ultram) 50 mg TID  PRN PO PAIN LEVEL 1-5 Last administered on 


1/13/19at 04:40; Admin Dose 50 MG;  Start 1/11/19 at 01:00


Miscellaneous Information Patients own medicat... BID@1000,1600 XX ;  Start 


1/11/19 at 10:00


Bicalutamide (Casodex) 50 mg DAILY PO  Last administered on 1/13/19at 08:23; 


Admin Dose 50 MG;  Start 1/12/19 at 16:30





Assessment/Plan


Chief Complaint/Hosp Course


68-year-old -American male was transferred from [facility] to [facility] patient was found to have metastatic 


cancer to the lungs and his bones and upon admission here at [facility] his 


PSA was over 1000 therefore a urological consultation was requested.  The 


patient himself is not a good historian he is confused and sometimes agitated at


the [facility] they attempted to have MRI of the spine to evaluate 


metastatic disease and evaluate for vertebral instability but the patient was 


confused and agitated and they were not able to do the MRI the patient is known 


to have had subdural hematoma status post evacuation at [facility] in 


July 2018 the patient also was found to have lymphadenopathy and bony metastases


he also is known to have a history of hypertension and anemia.  


CT scan of the abdomen and pelvis without contrast at [facility] done on


1/7/2019 showed lung bases: Extensive pleural nodularity and pulmonary nodules 


bilaterally bilateral left greater than right small pleural effusions and 


bibasilar atelectasis liver: Unremarkable, gallbladder and bile ducts: 


Contracted likely containing gallstones ,spleen unremarkable,pancreas 


unremarkable, adrenals mildly thickened left greater than right adrenal glands, 


kidneys and ureters: Bilateral low-density lesions mostly fluid density but 


incompletely characterized measuring up to 6.2 cm on the right kidney, mild left


hydronephrosis likely due to an obstructing 6 mm stone in the distal left 


ureter. Bowels nondilated bowel loops moderate stool burden.  Bladder diffuse 


circumferential wall thickening.  Reproductive organs: Prostatomegaly with 


median lobe hypertrophy.  Lymph node: Likely prominent retroperitoneal and 


bilateral iliac chain lymph nodes although overall poorly delineated due to lack


of intravenous contrast.  Peritoneum: Small volume ascites.  Vessels: Moderate 


atherosclerotic calcification.  Abdominal wall: Mild body wall edema.  Bones: 


Mixed lytic ,sclerotic bone lesions involving the entire axial and appendicular 


skeleton with areas of vertebral body height loss for example in the L3 


vertebral body and marked areas of cortical destruction for example on the left 


inferior pubic ramus


The lumbosacral spine x-rays and pelvic x-rays showed osteoblastic lesions 


consistent with metastasis most likely from the prostate.  The patient is 


voiding well.  He also does have a distal left ureteral stone but he denies 


having any pain.  We will strain his urine and do a KUB to see if the stone is 


visible and is radiopaque.  Since his PSA is over his thousand I will also 


empirically put him on Lupron Depo injections 7.5 mg IM monthly.











HAILEE CLIFTON MD            Jan 13, 2019 16:58
Date/Time of Note


Date/Time of Note


DATE: 1/13/19 


TIME: 17:03





Assessment/Plan


1.  Anemia, which is a combination of metastasis to the bone and also 


gastrointestinal bleed.


2.  Renal insufficiency.


3.  Malnutrition.


4.  Metastatic to the bone, most probably from prostate given the high PSA of 


greater than 1000.


5.  Hypertension.


6.  Hepatitis C.


7.  Alcoholism.


Plan


Continue PPI


Patient agreed for EGD to determine the cause of his GI bleeding especially 


given the history of hepatitis C and alcoholism


Result Diagram:  


1/13/19 0513                                                                    


           1/13/19 0513





Results 24hrs





Laboratory Tests


               Test
                                1/13/19
05:13


               White Blood Count                            5.7


               Red Blood Count                            2.78  L


               Hemoglobin                                  8.1  L


               Hematocrit                                 25.8  L


               Mean Corpuscular Volume                     92.8


               Mean Corpuscular Hemoglobin                 29.1


               Mean Corpuscular Hemoglobin
Concent       31.4  L



               Red Cell Distribution Width                15.8  H


               Platelet Count                               174


               Mean Platelet Volume                       10.5  H


               Immature Granulocytes %                   1.800  H


               Neutrophils %                               68.6


               Lymphocytes %                               19.8


               Monocytes %                                  8.3


               Eosinophils %                                1.1


               Basophils %                                  0.4


               Nucleated Red Blood Cells %                  0.0


               Immature Granulocytes #                   0.100  H


               Neutrophils #                                3.9


               Lymphocytes #                                1.1


               Monocytes #                                  0.5


               Eosinophils #                                0.1


               Basophils #                                  0.0


               Nucleated Red Blood Cells #                  0.0


               Sodium Level                                 141


               Potassium Level                              4.5


               Chloride Level                               106


               Carbon Dioxide Level                          23


               Anion Gap                                     12


               Blood Urea Nitrogen                           20


               Creatinine                                 1.28  H


               Est Glomerular Filtrat Rate
mL/min          56  L



               Glucose Level                                142


               Calcium Level                                8.7








Consultation Date/Type/Reason


Admit Date/Time


Andrey 10, 2019 at 22:30


Initial Consult Date


1/12/19


Requesting Provider:  ANKIT MONAE MD





24 HR Interval Summary


Constitutional:  no complaints, improved





Exam/Review of Systems


Vital Signs


Vitals





Vital Signs


  Date      Temp  Pulse  Resp  B/P (MAP)   Pulse Ox  O2          O2 Flow    FiO2


Time                                                 Delivery    Rate


   1/13/19  98.3     74    19      111/57        98


     15:24                           (75)


   1/12/19                                           Room Air


     14:15


   1/11/19                                                             2.0


     08:30








Intake and Output





1/12/19 1/12/19 1/13/19





1515:00


23:00


07:00





IntakeIntake Total


1305 ml


735 ml


730 ml





OutputOutput Total


680 ml


600 ml


700 ml





BalanceBalance


625 ml


135 ml


30 ml














Exam


Constitutional:  alert, oriented, well developed


Psych:  no complaints, nl mood/affect


Head:  normocephalic, atraumatic


Eyes:  nl conjunctiva, EOMI, nl lids, nl sclera, PERRL


ENMT:  nl external ears & nose, nl lips & teeth, nl nasal mucosa & septum


Neck:  supple, non-tender


Respiratory:  clear to auscultation, normal air movement


Cardiovascular:  regular rate and rhythm, nl pulses


Gastrointestinal:  soft, nl liver, spleen, non-tender


Musculoskeletal:  nl extremities to inspection, nl gait and stance


Extremities:  normal pulses


Neurological:  CNS II-XII intact, nl mental status, nl speech, nl strength


Skin:  nl turgor; 


   No rash or lesions


Lymph:  nl lymph nodes





Medications


Medications





Current Medications


Pantoprazole (Protonix Tab) 40 mg DAILY@06 PO  Last administered on 1/13/19at 


05:15; Admin Dose 40 MG;  Start 1/11/19 at 06:00


Morphine Sulfate (morphine SULFATE (PF)) 2 mg Q4H  PRN IV SEVERE PAIN LEVEL 7-10


Last administered on 1/11/19at 01:18; Admin Dose 2 MG;  Start 1/11/19 at 00:40


Sodium Chloride 1,000 ml @  40 mls/hr Q24H IV  Last administered on 1/13/19at 


10:28; Admin Dose 40 MLS/HR;  Start 1/11/19 at 00:50


Oxycodone/ Acetaminophen (Percocet (5/ 325)) 2 tab Q6H  PRN PO PAIN LEVEL 6-10; 


Start 1/11/19 at 01:00


Oxycodone/ Acetaminophen (Percocet (5/ 325)) 1 tab Q6H  PRN PO MODERATE PAIN 


LEVEL 4-6;  Start 1/11/19 at 01:00


Folic Acid (Folic Acid) 1 mg DAILY PO  Last administered on 1/13/19at 08:21; 


Admin Dose 1 MG;  Start 1/11/19 at 09:00


Multivitamins/ Minerals (Theragran-M) 1 tab DAILY PO  Last administered on 


1/13/19at 08:20; Admin Dose 1 TAB;  Start 1/11/19 at 09:00


Thiamine HCl (Vitamin B1) 100 mg DAILY PO  Last administered on 1/13/19at 08:20;


Admin Dose 100 MG;  Start 1/11/19 at 09:00


Polyethylene Glycol (Miralax) 17 gm DAILY  PRN PO CONSTIPATION;  Start 1/11/19 


at 01:00


Metoprolol Tartrate (Lopressor) 50 mg BID PO  Last administered on 1/13/19at 


08:21; Admin Dose 50 MG;  Start 1/11/19 at 09:00


Ferrous Sulfate (Ferrous Sulfate (Ec)) 325 mg TID PO  Last administered on 


1/13/19at 14:46; Admin Dose 325 MG;  Start 1/11/19 at 09:00


Hydralazine HCl (Apresoline) 25 mg TID PO  Last administered on 1/13/19at 14:46;


Admin Dose 25 MG;  Start 1/11/19 at 09:00


Tamsulosin HCl (Flomax) 0.4 mg HS PO  Last administered on 1/12/19at 21:29; 


Admin Dose 0.4 MG;  Start 1/11/19 at 21:00


Famotidine (Pepcid) 20 mg DAILY PO  Last administered on 1/13/19at 08:21; Admin 


Dose 20 MG;  Start 1/11/19 at 09:00


Magnesium Hydroxide (Milk Of Mag) 30 ml DAILY  PRN PO CONSTIPATION;  Start 


1/11/19 at 01:00


Acetaminophen (Tylenol Tab) 650 mg Q6H  PRN PO MILD PAIN(1-3)OR ELEVATED TEMP;  


Start 1/11/19 at 01:00


Ondansetron HCl (Zofran Inj) 4 mg Q8  PRN IV NAUSEA AND/OR VOMITING;  Start 


1/11/19 at 01:00


Senna (Senokot) 2 tab BID  PRN PO CONSTIPATION;  Start 1/11/19 at 01:00


Zolpidem Tartrate (Ambien) 5 mg HS  PRN PO INSOMNIA;  Start 1/11/19 at 01:00


Tramadol HCl (Ultram) 50 mg TID  PRN PO PAIN LEVEL 1-5 Last administered on 1/13 /19at 04:40; Admin Dose 50 MG;  Start 1/11/19 at 01:00


Miscellaneous Information Patients own medicat... BID@1000,1600 XX ;  Start 


1/11/19 at 10:00


Bicalutamide (Casodex) 50 mg DAILY PO  Last administered on 1/13/19at 08:23; 


Admin Dose 50 MG;  Start 1/12/19 at 16:30











BERNARD YUAN MD             Jan 13, 2019 17:04
Date/Time of Note


Date/Time of Note


DATE: 1/14/19 


TIME: 13:34





Assessment/Plan


Assessment/Plan


Hospital Course


69 yo transferred from [facility] with widely metastatic disease to bones and 


lung as well as RP LAD


w/u shows PSA >1000


this is very consistent with met prostate ca


at this point highest on my list is that this is met prostate ca


recommend initiation of casodex-


ideally would get tissue biopsy for confirmation of diagnosis, spoke to Dr Figueredo last week to arrange for this 


met prostate ca is treated with androgen deprivation therapy


I recommended to start Casodex 50 mgd aily x 10 days then can start Lupron as 


out pt


also ideally would get Xgeva or zometa for bony mets





I discussed the above with the patient and he denied that he has cancer. he said


" you are saying that so you can do treatments and make money like everyone else


here" I tried to explain to him otherwise. At this point, pt does not seem to 


have mental capacity to makde decisions nor does he have understanding into his 


several serious health issues


recommend Psych eval





await psych eval before initiating any treatment


Result Diagram:  


1/13/19 0513 1/13/19 0513








Consultation Date/Type/Reason


Admit Date/Time


Andrey 10, 2019 at 22:30


Initial Consult Date


1/12/19


Requesting Provider:  ANKIT MONAE MD





24 HR Interval Summary


Free Text/Dictation


for EGD by Dr Valentin to better eval GI bleed





PSA this admission >1000





Exam/Review of Systems


Vital Signs


Vitals





Vital Signs


  Date      Temp  Pulse  Resp  B/P (MAP)   Pulse Ox  O2          O2 Flow    FiO2


Time                                                 Delivery    Rate


   1/14/19                 15      120/71        99  Room Air


     13:00                           (87)


   1/14/19           64


     12:55


   1/14/19  98.6


     12:35


   1/11/19                                                             2.0


     08:30








Intake and Output





1/13/19 1/13/19 1/14/19





1515:00


23:00


07:00





IntakeIntake Total


220 ml


1230 ml


600 ml





OutputOutput Total


500 ml


1100 ml


1600 ml





BalanceBalance


-280 ml


130 ml


-1000 ml














Exam


Constitutional:  frail


Psych:  confusion





Medications


Medications





Current Medications


Pantoprazole (Protonix Tab) 40 mg DAILY@06 PO  Last administered on 1/13/19at 


05:15; Admin Dose 40 MG;  Start 1/11/19 at 06:00


Morphine Sulfate (morphine SULFATE (PF)) 2 mg Q4H  PRN IV SEVERE PAIN LEVEL 7-10


Last administered on 1/11/19at 01:18; Admin Dose 2 MG;  Start 1/11/19 at 00:40


Sodium Chloride 1,000 ml @  40 mls/hr Q24H IV  Last administered on 1/13/19at 10


:28; Admin Dose 40 MLS/HR;  Start 1/11/19 at 00:50


Oxycodone/ Acetaminophen (Percocet (5/ 325)) 2 tab Q6H  PRN PO PAIN LEVEL 6-10 


Last administered on 1/13/19at 18:40; Admin Dose 2 TAB;  Start 1/11/19 at 01:00


Oxycodone/ Acetaminophen (Percocet (5/ 325)) 1 tab Q6H  PRN PO MODERATE PAIN 


LEVEL 4-6;  Start 1/11/19 at 01:00


Folic Acid (Folic Acid) 1 mg DAILY PO  Last administered on 1/14/19at 09:01; A


dmin Dose 1 MG;  Start 1/11/19 at 09:00


Multivitamins/ Minerals (Theragran-M) 1 tab DAILY PO  Last administered on 


1/14/19at 09:01; Admin Dose 1 TAB;  Start 1/11/19 at 09:00


Thiamine HCl (Vitamin B1) 100 mg DAILY PO  Last administered on 1/14/19at 09:02;


Admin Dose 100 MG;  Start 1/11/19 at 09:00


Polyethylene Glycol (Miralax) 17 gm DAILY  PRN PO CONSTIPATION;  Start 1/11/19 


at 01:00


Metoprolol Tartrate (Lopressor) 50 mg BID PO  Last administered on 1/14/19at 


09:02; Admin Dose 50 MG;  Start 1/11/19 at 09:00


Ferrous Sulfate (Ferrous Sulfate (Ec)) 325 mg TID PO  Last administered on 


1/14/19at 09:02; Admin Dose 325 MG;  Start 1/11/19 at 09:00


Hydralazine HCl (Apresoline) 25 mg TID PO  Last administered on 1/14/19at 09:02;


Admin Dose 25 MG;  Start 1/11/19 at 09:00


Tamsulosin HCl (Flomax) 0.4 mg HS PO  Last administered on 1/13/19at 20:59; 


Admin Dose 0.4 MG;  Start 1/11/19 at 21:00


Famotidine (Pepcid) 20 mg DAILY PO  Last administered on 1/14/19at 09:02; Admin 


Dose 20 MG;  Start 1/11/19 at 09:00


Magnesium Hydroxide (Milk Of Mag) 30 ml DAILY  PRN PO CONSTIPATION;  Start 


1/11/19 at 01:00


Acetaminophen (Tylenol Tab) 650 mg Q6H  PRN PO MILD PAIN(1-3)OR ELEVATED TEMP;  


Start 1/11/19 at 01:00


Ondansetron HCl (Zofran Inj) 4 mg Q8  PRN IV NAUSEA AND/OR VOMITING;  Start 


1/11/19 at 01:00


Senna (Senokot) 2 tab BID  PRN PO CONSTIPATION;  Start 1/11/19 at 01:00


Zolpidem Tartrate (Ambien) 5 mg HS  PRN PO INSOMNIA Last administered on 


1/13/19at 21:04; Admin Dose 5 MG;  Start 1/11/19 at 01:00


Tramadol HCl (Ultram) 50 mg TID  PRN PO PAIN LEVEL 1-5 Last administered on 


1/13/19at 04:40; Admin Dose 50 MG;  Start 1/11/19 at 01:00


Miscellaneous Information Patients own medicat... BID@1000,1600 XX ;  Start 


1/11/19 at 10:00


Bicalutamide (Casodex) 50 mg DAILY PO  Last administered on 1/14/19at 09:03; 


Admin Dose 50 MG;  Start 1/12/19 at 16:30


Fentanyl (Sublimaze) 25 mcg PACU ORDER PRN IV MILD PAIN LEVEL 1-3;  Start 


1/14/19 at 12:30;  Stop 1/14/19 at 17:00


Ondansetron HCl (Zofran Inj) 4 mg PACU ORDER PRN IV NAUSEA AND/OR VOMITING;  


Start 1/14/19 at 12:30;  Stop 1/14/19 at 17:00











MARIUM ARZATE                Jan 14, 2019 13:34
Date/Time of Note


Date/Time of Note


DATE: 1/14/19 


TIME: 20:44





Consult Date/Type/Reason


Admit Date/Time


Andrey 10, 2019 at 22:30


Initial Consult Date


1/12/19


Type of Consultation:  Urology


Reason for Consultation


Metastatic prostate cancer


Requesting Provider:  ANKIT MONAE MD





Subjective


Patient refused a bone scan today.





Objective





Vital Signs


  Date      Temp  Pulse  Resp  B/P (MAP)   Pulse Ox  O2          O2 Flow    FiO2


Time                                                 Delivery    Rate


   1/14/19  98.0     75    16      133/80        95


     15:29                           (97)


   1/14/19                                           Room Air


     13:10


   1/11/19                                                             2.0


     08:30








Intake and Output





1/13/19 1/13/19 1/14/19





1515:00


23:00


07:00





IntakeIntake Total


220 ml


1230 ml


600 ml





OutputOutput Total


500 ml


1100 ml


1600 ml





BalanceBalance


-280 ml


130 ml


-1000 ml











Exam


He is resting comfortable in bed.  Complains of not being able to walk.  He is 


able to move his extremities.





Results/Medications


Result Diagram:  


1/13/19 0513 1/13/19 0513





Medications





Current Medications


Pantoprazole (Protonix Tab) 40 mg DAILY@06 PO  Last administered on 1/13/19at 


05:15; Admin Dose 40 MG;  Start 1/11/19 at 06:00


Morphine Sulfate (morphine SULFATE (PF)) 2 mg Q4H  PRN IV SEVERE PAIN LEVEL 7-10


Last administered on 1/11/19at 01:18; Admin Dose 2 MG;  Start 1/11/19 at 00:40


Sodium Chloride 1,000 ml @  40 mls/hr Q24H IV  Last administered on 1/13/19at 


10:28; Admin Dose 40 MLS/HR;  Start 1/11/19 at 00:50


Oxycodone/ Acetaminophen (Percocet (5/ 325)) 2 tab Q6H  PRN PO PAIN LEVEL 6-10 


Last administered on 1/13/19at 18:40; Admin Dose 2 TAB;  Start 1/11/19 at 01:00


Oxycodone/ Acetaminophen (Percocet (5/ 325)) 1 tab Q6H  PRN PO MODERATE PAIN 


LEVEL 4-6;  Start 1/11/19 at 01:00


Folic Acid (Folic Acid) 1 mg DAILY PO  Last administered on 1/14/19at 09:01; 


Admin Dose 1 MG;  Start 1/11/19 at 09:00


Multivitamins/ Minerals (Theragran-M) 1 tab DAILY PO  Last administered on 


1/14/19at 09:01; Admin Dose 1 TAB;  Start 1/11/19 at 09:00


Thiamine HCl (Vitamin B1) 100 mg DAILY PO  Last administered on 1/14/19at 09:02;


Admin Dose 100 MG;  Start 1/11/19 at 09:00


Polyethylene Glycol (Miralax) 17 gm DAILY  PRN PO CONSTIPATION;  Start 1/11/19 


at 01:00


Metoprolol Tartrate (Lopressor) 50 mg BID PO  Last administered on 1/14/19at 


09:02; Admin Dose 50 MG;  Start 1/11/19 at 09:00


Ferrous Sulfate (Ferrous Sulfate (Ec)) 325 mg TID PO  Last administered on 


1/14/19at 14:12; Admin Dose 325 MG;  Start 1/11/19 at 09:00


Hydralazine HCl (Apresoline) 25 mg TID PO  Last administered on 1/14/19at 14:13;


Admin Dose 25 MG;  Start 1/11/19 at 09:00


Tamsulosin HCl (Flomax) 0.4 mg HS PO  Last administered on 1/13/19at 20:59; 


Admin Dose 0.4 MG;  Start 1/11/19 at 21:00


Famotidine (Pepcid) 20 mg DAILY PO  Last administered on 1/14/19at 09:02; Admin 


Dose 20 MG;  Start 1/11/19 at 09:00


Magnesium Hydroxide (Milk Of Mag) 30 ml DAILY  PRN PO CONSTIPATION;  Start 1/1 1/19 at 01:00


Acetaminophen (Tylenol Tab) 650 mg Q6H  PRN PO MILD PAIN(1-3)OR ELEVATED TEMP;  


Start 1/11/19 at 01:00


Ondansetron HCl (Zofran Inj) 4 mg Q8  PRN IV NAUSEA AND/OR VOMITING;  Start 


1/11/19 at 01:00


Senna (Senokot) 2 tab BID  PRN PO CONSTIPATION;  Start 1/11/19 at 01:00


Zolpidem Tartrate (Ambien) 5 mg HS  PRN PO INSOMNIA Last administered on 


1/13/19at 21:04; Admin Dose 5 MG;  Start 1/11/19 at 01:00


Tramadol HCl (Ultram) 50 mg TID  PRN PO PAIN LEVEL 1-5 Last administered on 


1/13/19at 04:40; Admin Dose 50 MG;  Start 1/11/19 at 01:00


Miscellaneous Information Patients own medicat... BID@1000,1600 XX ;  Start 


1/11/19 at 10:00


Bicalutamide (Casodex) 50 mg DAILY PO  Last administered on 1/14/19at 09:03; 


Admin Dose 50 MG;  Start 1/12/19 at 16:30





Assessment/Plan


Chief Complaint/Hosp Course


68-year-old -American male was transferred from [facility] to [facility] patient was found to have metastatic 


cancer to the lungs and his bones and upon admission here at [facility] his 


PSA was over 1000 therefore a urological consultation was requested.  The 


patient himself is not a good historian he is confused and sometimes agitated at


the [facility] they attempted to have MRI of the spine to evaluate m


etastatic disease and evaluate for vertebral instability but the patient was 


confused and agitated and they were not able to do the MRI the patient is known 


to have had subdural hematoma status post evacuation at [facility] in 


July 2018 the patient also was found to have lymphadenopathy and bony metastases


he also is known to have a history of hypertension and anemia.  


CT scan of the abdomen and pelvis without contrast at [facility] done on


1/7/2019 showed lung bases: Extensive pleural nodularity and pulmonary nodules 


bilaterally bilateral left greater than right small pleural effusions and 


bibasilar atelectasis liver: Unremarkable, gallbladder and bile ducts: 


Contracted likely containing gallstones ,spleen unremarkable,pancreas 


unremarkable, adrenals mildly thickened left greater than right adrenal glands, 


kidneys and ureters: Bilateral low-density lesions mostly fluid density but 


incompletely characterized measuring up to 6.2 cm on the right kidney, mild left


hydronephrosis likely due to an obstructing 6 mm stone in the distal left 


ureter. Bowels nondilated bowel loops moderate stool burden.  Bladder diffuse 


circumferential wall thickening.  Reproductive organs: Prostatomegaly with 


median lobe hypertrophy.  Lymph node: Likely prominent retroperitoneal and bila


teral iliac chain lymph nodes although overall poorly delineated due to lack of 


intravenous contrast.  Peritoneum: Small volume ascites.  Vessels: Moderate 


atherosclerotic calcification.  Abdominal wall: Mild body wall edema.  Bones: 


Mixed lytic ,sclerotic bone lesions involving the entire axial and appendicular 


skeleton with areas of vertebral body height loss for example in the L3 


vertebral body and marked areas of cortical destruction for example on the left 


inferior pubic ramus


The lumbosacral spine x-rays and pelvic x-rays showed osteoblastic lesions 


consistent with metastasis most likely from the prostate.  The patient is 


voiding well.  He also does have a distal left ureteral stone but he denies 


having any pain.  We will strain his urine and do a KUB to see if the stone is 


visible and is radiopaque.  Since his PSA is over his thousand I wanted to add 


Lupron Depot to his treatment in addition to the Casodex.  The Lupron is not on 


the formulary of the hospital.  I requested that the pharmacy director did not 


approve it stating that this should be done as an outpatient.  My concern is 


this patient is not the type that we will follow-up as an outpatient and his 


cancer is very advanced and would benefit from at least a 1 month injection.  


Since he refused to have the bone scan today he will have it tomorrow then 


recommendation would be to discharge him and have him come to the office where 


he could be treated and started on Lupron Depot.  He could also follow up with 


the oncologist for additional treatment.











HAILEE CLIFTON MD            Jan 14, 2019 20:48
Date/Time of Note


Date/Time of Note


DATE: 1/15/19 


TIME: 13:06





Assessment/Plan


A 69 yo transferred from [facility] hospital with 





#1 Prostate Cancer 


    - presumed given bPSA >1000


   - scans from [facility] demonstrates widely metastatic disease to bones and lung as 


well as RP LAD


   - continue Casodex 50 mgd aily x 10 days then can start Lupron as out pt


   - met prostate ca is treated with androgen deprivation therapy


   - will start  Xgeva or zometa for bony mets as an outpatient


   -1/14/19- Bone Scan - will fu result and perform biopsy appropriately





# 3 Anemia -- Hg 7.6


   -likely anemia of chronic inflammation. iron studies are ok but elevated 


ferritin noted


   -hold on transfusion for now


   - s/p EGD -  with chronic gastritis





# 4 Acute Kidney injury- trended up today Cr 1.46


   - management per PMD





 A total of 50 minutes of face to face time was spent speaking with the patient 


of which greater than 50% was spent in counseling and coordination of care and 


the detailed question and answer session. Dw staff





Patient seen in collaboration with Dr Mcnulty


Result Diagram:  


1/15/19 0440                                                                    


           1/15/19 0440





Results 24hrs





Laboratory Tests


               Test
                                1/15/19
04:40


               White Blood Count                            5.8


               Red Blood Count                            2.60  L


               Hemoglobin                                  7.6  L


               Hematocrit                                 23.8  L


               Mean Corpuscular Volume                     91.5


               Mean Corpuscular Hemoglobin                 29.2


               Mean Corpuscular Hemoglobin
Concent       31.9  L



               Red Cell Distribution Width                15.9  H


               Platelet Count                               208


               Mean Platelet Volume                        10.3


               Immature Granulocytes %                   1.900  H


               Neutrophils %                               59.2


               Lymphocytes %                               27.7


               Monocytes %                                  9.0


               Eosinophils %                                1.9


               Basophils %                                  0.3


               Nucleated Red Blood Cells %                  0.0


               Immature Granulocytes #                   0.110  H


               Neutrophils #                                3.4


               Lymphocytes #                                1.6


               Monocytes #                                  0.5


               Eosinophils #                                0.1


               Basophils #                                  0.0


               Nucleated Red Blood Cells #                  0.0


               Sodium Level                                 138


               Potassium Level                              4.2


               Chloride Level                               108


               Carbon Dioxide Level                          22


               Anion Gap                                      8


               Blood Urea Nitrogen                          26  H


               Creatinine                                 1.46  H


               Est Glomerular Filtrat Rate
mL/min          48  L



               Glucose Level                               101  #


               Calcium Level                                8.8


               Phosphorus Level                             4.3


               Magnesium Level                              1.9








Consultation Date/Type/Reason


Admit Date/Time


Andrey 10, 2019 at 10:30 pm


Initial Consult Date


1/12/19


Requesting Provider:  ANKIT MONAE MD





24 HR Interval Summary


Free Text/Dictation


Afebrile


1/14/19- Bone Scan - will fu result and perform biopsy appropriately





Exam/Review of Systems


Vital Signs


Vitals





Vital Signs


  Date      Temp  Pulse  Resp  B/P (MAP)   Pulse Ox  O2          O2 Flow    FiO2


Time                                                 Delivery    Rate


   1/15/19  97.8     60    18      113/67        99  Room Air


     08:02                           (82)


   1/11/19                                                             2.0


     08:30








Intake and Output





1/14/19


1/14/19


1/15/19





1515:00


23:00


07:00





IntakeIntake Total


240 ml


640 ml


830 ml





OutputOutput Total


350 ml


350 ml


600 ml





BalanceBalance


-110 ml


290 ml


230 ml














Exam


Constitutional:  alert, well developed


Psych:  nl mood/affect


Head:  atraumatic


Eyes:  nl conjunctiva, EOMI, nl lids


ENMT:  nl external ears & nose


Neck:  non-tender


Respiratory:  diminished breath sounds (at bases bilaterally)


Cardiovascular:  nl pulses


Gastrointestinal:  soft, non-tender


Musculoskeletal:  nl extremities to inspection


Extremities:  normal pulses


Neurological:  nl speech, other (alert/awake)


Skin:  nl turgor





Medications


Medications





Current Medications


Pantoprazole (Protonix Tab) 40 mg DAILY@06 PO  Last administered on 1/15/19at 


05:28; Admin Dose 40 MG;  Start 1/11/19 at 06:00


Morphine Sulfate (morphine SULFATE (PF)) 2 mg Q4H  PRN IV SEVERE PAIN LEVEL 7-10


Last administered on 1/11/19at 01:18; Admin Dose 2 MG;  Start 1/11/19 at 00:40


Sodium Chloride 1,000 ml @  40 mls/hr Q24H IV  Last administered on 1/14/19at 


23:36; Admin Dose 40 MLS/HR;  Start 1/11/19 at 00:50


Oxycodone/ Acetaminophen (Percocet (5/ 325)) 2 tab Q6H  PRN PO PAIN LEVEL 6-10 


Last administered on 1/14/19at 23:35; Admin Dose 2 TAB;  Start 1/11/19 at 01:00


Oxycodone/ Acetaminophen (Percocet (5/ 325)) 1 tab Q6H  PRN PO MODERATE PAIN 


LEVEL 4-6;  Start 1/11/19 at 01:00


Folic Acid (Folic Acid) 1 mg DAILY PO  Last administered on 1/15/19at 08:50; 


Admin Dose 1 MG;  Start 1/11/19 at 09:00


Multivitamins/ Minerals (Theragran-M) 1 tab DAILY PO  Last administered on 


1/15/19at 08:50; Admin Dose 1 TAB;  Start 1/11/19 at 09:00


Thiamine HCl (Vitamin B1) 100 mg DAILY PO  Last administered on 1/15/19at 08:50;


Admin Dose 100 MG;  Start 1/11/19 at 09:00


Polyethylene Glycol (Miralax) 17 gm DAILY  PRN PO CONSTIPATION;  Start 1/11/19 


at 01:00


Metoprolol Tartrate (Lopressor) 50 mg BID PO  Last administered on 1/15/19at 


08:51; Admin Dose 50 MG;  Start 1/11/19 at 09:00


Ferrous Sulfate (Ferrous Sulfate (Ec)) 325 mg TID PO  Last administered on 


1/15/19at 08:50; Admin Dose 325 MG;  Start 1/11/19 at 09:00


Hydralazine HCl (Apresoline) 25 mg TID PO  Last administered on 1/15/19at 08:51;


Admin Dose 25 MG;  Start 1/11/19 at 09:00


Tamsulosin HCl (Flomax) 0.4 mg HS PO  Last administered on 1/14/19at 21:49; 


Admin Dose 0.4 MG;  Start 1/11/19 at 21:00


Famotidine (Pepcid) 20 mg DAILY PO  Last administered on 1/15/19at 08:50; Admin 


Dose 20 MG;  Start 1/11/19 at 09:00


Magnesium Hydroxide (Milk Of Mag) 30 ml DAILY  PRN PO CONSTIPATION;  Start 


1/11/19 at 01:00


Acetaminophen (Tylenol Tab) 650 mg Q6H  PRN PO MILD PAIN(1-3)OR ELEVATED TEMP;  


Start 1/11/19 at 01:00


Ondansetron HCl (Zofran Inj) 4 mg Q8  PRN IV NAUSEA AND/OR VOMITING;  Start 


1/11/19 at 01:00


Senna (Senokot) 2 tab BID  PRN PO CONSTIPATION;  Start 1/11/19 at 01:00


Zolpidem Tartrate (Ambien) 5 mg HS  PRN PO INSOMNIA Last administered on 


1/14/19at 21:49; Admin Dose 5 MG;  Start 1/11/19 at 01:00


Tramadol HCl (Ultram) 50 mg TID  PRN PO PAIN LEVEL 1-5 Last administered on 


1/13/19at 04:40; Admin Dose 50 MG;  Start 1/11/19 at 01:00


Miscellaneous Information Patients own medicat... BID@1000,1600 XX ;  Start 


1/11/19 at 10:00


Bicalutamide (Casodex) 50 mg DAILY PO  Last administered on 1/15/19at 08:52; Ad


min Dose 50 MG;  Start 1/12/19 at 16:30











CHONG PRICE                Andrey 15, 2019 1:20 pm
Date/Time of Note


Date/Time of Note


DATE: 1/15/19 


TIME: 20:07





Consult Date/Type/Reason


Admit Date/Time


Andrey 10, 2019 at 22:30


Initial Consult Date


1/12/19


Type of Consultation:  Urology


Reason for Consultation


Metastatic prostate cancer


Requesting Provider:  ANKIT MONAE MD





Subjective


Patient condition is unchanged.  He denies any pain.  And is voiding well.





Objective





Vital Signs


  Date      Temp  Pulse  Resp  B/P (MAP)   Pulse Ox  O2          O2 Flow    FiO2


Time                                                 Delivery    Rate


   1/15/19           71            126/70


     14:07                           (88)


   1/15/19  98.0           18                    99  Room Air


     14:00


   1/11/19                                                             2.0


     08:30








Intake and Output





1/14/19


1/14/19


1/15/19





1515:00


23:00


07:00





IntakeIntake Total


240 ml


640 ml


830 ml





OutputOutput Total


350 ml


350 ml


600 ml





BalanceBalance


-110 ml


290 ml


230 ml











Exam


Abdomen is soft and there is no abdominal mass palpable.





Results/Medications


Result Diagram:  


1/15/19 0440                                                                    


           1/15/19 0440





Results 24 hrs





Laboratory Tests


       Test
                                1/15/19
04:36  1/15/19
04:40


       Alpha Fetoprotein                           1.97


       Carcinoembryonic Antigen                    7.0  H


       CA 19-9 Antigen                      < 1.4


       White Blood Count                                           5.8


       Red Blood Count                                           2.60  L


       Hemoglobin                                                 7.6  L


       Hematocrit                                                23.8  L


       Mean Corpuscular Volume                                    91.5


       Mean Corpuscular Hemoglobin                                29.2


       Mean Corpuscular Hemoglobin
Concent  
                   31.9  L



       Red Cell Distribution Width                               15.9  H


       Platelet Count                                              208


       Mean Platelet Volume                                       10.3


       Immature Granulocytes %                                  1.900  H


       Neutrophils %                                              59.2


       Lymphocytes %                                              27.7


       Monocytes %                                                 9.0


       Eosinophils %                                               1.9


       Basophils %                                                 0.3


       Nucleated Red Blood Cells %                                 0.0


       Immature Granulocytes #                                  0.110  H


       Neutrophils #                                               3.4


       Lymphocytes #                                               1.6


       Monocytes #                                                 0.5


       Eosinophils #                                               0.1


       Basophils #                                                 0.0


       Nucleated Red Blood Cells #                                 0.0


       Sodium Level                                                138


       Potassium Level                                             4.2


       Chloride Level                                              108


       Carbon Dioxide Level                                         22


       Anion Gap                                                     8


       Blood Urea Nitrogen                                         26  H


       Creatinine                                                1.46  H


       Est Glomerular Filtrat Rate
mL/min   
                     48  L



       Glucose Level                                              101  #


       Calcium Level                                               8.8


       Phosphorus Level                                            4.3


       Magnesium Level                                             1.9





Medications





Current Medications


Pantoprazole (Protonix Tab) 40 mg DAILY@06 PO  Last administered on 1/15/19at 


05:28; Admin Dose 40 MG;  Start 1/11/19 at 06:00


Sodium Chloride 1,000 ml @  40 mls/hr Q24H IV  Last administered on 1/14/19at 


23:36; Admin Dose 40 MLS/HR;  Start 1/11/19 at 00:50


Oxycodone/ Acetaminophen (Percocet (5/ 325)) 2 tab Q6H  PRN PO PAIN LEVEL 6-10 


Last administered on 1/14/19at 23:35; Admin Dose 2 TAB;  Start 1/11/19 at 01:00


Oxycodone/ Acetaminophen (Percocet (5/ 325)) 1 tab Q6H  PRN PO MODERATE PAIN 


LEVEL 4-6;  Start 1/11/19 at 01:00


Folic Acid (Folic Acid) 1 mg DAILY PO  Last administered on 1/15/19at 08:50; 


Admin Dose 1 MG;  Start 1/11/19 at 09:00


Multivitamins/ Minerals (Theragran-M) 1 tab DAILY PO  Last administered on 


1/15/19at 08:50; Admin Dose 1 TAB;  Start 1/11/19 at 09:00


Thiamine HCl (Vitamin B1) 100 mg DAILY PO  Last administered on 1/15/19at 08:50;


Admin Dose 100 MG;  Start 1/11/19 at 09:00


Polyethylene Glycol (Miralax) 17 gm DAILY  PRN PO CONSTIPATION;  Start 1/11/19 


at 01:00


Metoprolol Tartrate (Lopressor) 50 mg BID PO  Last administered on 1/15/19at 


08:51; Admin Dose 50 MG;  Start 1/11/19 at 09:00


Ferrous Sulfate (Ferrous Sulfate (Ec)) 325 mg TID PO  Last administered on 


1/15/19at 14:04; Admin Dose 325 MG;  Start 1/11/19 at 09:00


Hydralazine HCl (Apresoline) 25 mg TID PO  Last administered on 1/15/19at 14:04;


Admin Dose 25 MG;  Start 1/11/19 at 09:00


Tamsulosin HCl (Flomax) 0.4 mg HS PO  Last administered on 1/14/19at 21:49; 


Admin Dose 0.4 MG;  Start 1/11/19 at 21:00


Famotidine (Pepcid) 20 mg DAILY PO  Last administered on 1/15/19at 08:50; Admin 


Dose 20 MG;  Start 1/11/19 at 09:00


Magnesium Hydroxide (Milk Of Mag) 30 ml DAILY  PRN PO CONSTIPATION;  Start 


1/11/19 at 01:00


Acetaminophen (Tylenol Tab) 650 mg Q6H  PRN PO MILD PAIN(1-3)OR ELEVATED TEMP;  


Start 1/11/19 at 01:00


Ondansetron HCl (Zofran Inj) 4 mg Q8  PRN IV NAUSEA AND/OR VOMITING;  Start 


1/11/19 at 01:00


Senna (Senokot) 2 tab BID  PRN PO CONSTIPATION;  Start 1/11/19 at 01:00


Zolpidem Tartrate (Ambien) 5 mg HS  PRN PO INSOMNIA Last administered on 


1/14/19at 21:49; Admin Dose 5 MG;  Start 1/11/19 at 01:00


Tramadol HCl (Ultram) 50 mg TID  PRN PO PAIN LEVEL 1-5 Last administered on 


1/13/19at 04:40; Admin Dose 50 MG;  Start 1/11/19 at 01:00


Miscellaneous Information Patients own medicat... BID@1000,1600 XX ;  Start 


1/11/19 at 10:00


Bicalutamide (Casodex) 50 mg DAILY PO  Last administered on 1/15/19at 08:52; 


Admin Dose 50 MG;  Start 1/12/19 at 16:30


Morphine Sulfate (morphine) 6 mg Q4H  PRN PO SEVERE PAIN LEVEL 7-10;  Start 


1/15/19 at 17:30





Assessment/Plan


Chief Complaint/Hosp Course


68-year-old -American male was transferred from [facility] to [facility] patient was found to have metastatic 


cancer to the lungs and his bones and upon admission here at [facility] his 


PSA was over 1000 therefore a urological consultation was requested.  The 


patient himself is not a good historian he is confused and sometimes agitated at


the [facility] they attempted to have MRI of the spine to evaluate 


metastatic disease and evaluate for vertebral instability but the patient was 


confused and agitated and they were not able to do the MRI the patient is known 


to have had subdural hematoma status post evacuation at [facility] in 


July 2018 the patient also was found to have lymphadenopathy and bony metastases


he also is known to have a history of hypertension and anemia.  


CT scan of the abdomen and pelvis without contrast at [facility] done on


1/7/2019 showed lung bases: Extensive pleural nodularity and pulmonary nodules 


bilaterally bilateral left greater than right small pleural effusions and 


bibasilar atelectasis liver: Unremarkable, gallbladder and bile ducts: 


Contracted likely containing gallstones ,spleen unremarkable,pancreas 


unremarkable, adrenals mildly thickened left greater than right adrenal glands, 


kidneys and ureters: Bilateral low-density lesions mostly fluid density but 


incompletely characterized measuring up to 6.2 cm on the right kidney, mild left


hydronephrosis likely due to an obstructing 6 mm stone in the distal left 


ureter. Bowels nondilated bowel loops moderate stool burden.  Bladder diffuse 


circumferential wall thickening.  Reproductive organs: Prostatomegaly with 


median lobe hypertrophy.  Lymph node: Likely prominent retroperitoneal and 


bilateral iliac chain lymph nodes although overall poorly delineated due to lack


of intravenous contrast.  Peritoneum: Small volume ascites.  Vessels: Moderate 


atherosclerotic calcification.  Abdominal wall: Mild body wall edema.  Bones: 


Mixed lytic ,sclerotic bone lesions involving the entire axial and appendicular 


skeleton with areas of vertebral body height loss for example in the L3 v


ertebral body and marked areas of cortical destruction for example on the left 


inferior pubic ramus


The lumbosacral spine x-rays and pelvic x-rays showed osteoblastic lesions 


consistent with metastasis most likely from the prostate.  The patient is 


voiding well.  He also does have a distal left ureteral stone but he denies 


having any pain.  We will strain his urine and do a KUB to see if the stone is 


visible and is radiopaque.  Since his PSA is over his thousand I wanted to add 


Lupron Depot to his treatment in addition to the Casodex.  The Lupron is not on 


the formulary of the hospital.  I requested that the pharmacy director did not 


approve it stating that this should be done as an outpatient.  My concern is 


this patient is not the type that we will follow-up as an outpatient and his 


cancer is very advanced and would benefit from at least a 1 month injection.  He


did have the bone scan done today and that showed:


 


Multiple foci of increased activity are seen in the calvarium, sternum, rib 


cages bilaterally, sternum, pelvic bones bilaterally and both hips.


 


No other definite abnormal areas of increased activity or asymmetries are 


visualized in the study and distribution of radionuclide is homogeneous in the 


skull, spine, rib cages, sternum, pelvis and visualized portions of the upper 


and lower extremities.


 


Of incidental note, there is a poor visualization of the kidneys.


 


Area of increased activity seen within the right pelvis. The finding may be 


related to physiologic bladder activity; also query right pelvic transplanted 


kidney.





Urologically continue the Casodex and once he is out of the hospital start him 


on Lupron Depot as well











HAILEE CLIFTON MD            Andrey 15, 2019 20:11
Date/Time of Note


Date/Time of Note


DATE: 1/16/19 


TIME: 12:55





Assessment/Plan


Assessment/Plan


Hospital Course


67 yo transferred from [facility] with widely metastatic disease to bones 


(confirmed by bone scan) and lung as well as RP LAD


w/u shows PSA >1000


this is very consistent with met prostate ca


at this point highest on my list is that this is met prostate ca


ideally would get tissue biopsy for confirmation of diagnosis,


cont Casodex 50 mg daily x 10 days then can start Lupron as out pt


also ideally would get Xgeva or zometa for bony mets, given renal failure 


bisphosphonate will not be given





Earlier this week I discussed the above with the patient and he denied that he 


has cancer. he said " you are saying that so you can do treatments and make 


money like everyone else here" I tried to explain to him otherwise. At this 


point, pt does not seem to have mental capacity to make decisions nor does he 


have understanding into his several serious health issues


had Psych eval and felt that pt able to make own decisions. 


I spoke to him again today and discussed that he has met prostate ca to bone and


he seemed to accept this and is willing to continue with androgen deprivation 


therapy


he is ok with continuation of casodex, after 10 days of this will start Lupron


Result Diagram:  


1/15/19 0440                                                                    


           1/15/19 0440





Results 24hrs





Laboratory Tests


               Test
                                1/16/19
05:30


               Total Bilirubin                             0.0  L


               Direct Bilirubin                            0.00


               Indirect Bilirubin                           0.0


               Aspartate Amino Transf
(AST/SGOT)            24  



               Alanine Aminotransferase
(ALT/SGPT)          15  



               Alkaline Phosphatase                        242  H


               Total Protein                                6.2


               Albumin                                     3.1  L








Consultation Date/Type/Reason


Admit Date/Time


Andrey 10, 2019 at 22:30


Initial Consult Date


1/12/19


Requesting Provider:  ANKIT MONAE MD





Exam/Review of Systems


Vital Signs


Vitals





Vital Signs


  Date      Temp  Pulse  Resp  B/P (MAP)   Pulse Ox  O2          O2 Flow    FiO2


Time                                                 Delivery    Rate


   1/16/19  97.9     64    18      108/66       100  Room Air


     08:34                           (80)








Intake and Output





1/15/19


1/15/19


1/16/19





1515:00


23:00


07:00





IntakeIntake Total


920 ml


720 ml





OutputOutput Total


1300 ml


300 ml


400 ml





BalanceBalance


-380 ml


420 ml


-400 ml














Medications


Medications





Current Medications


Pantoprazole (Protonix Tab) 40 mg DAILY@06 PO  Last administered on 1/16/19at 


06:21; Admin Dose 40 MG;  Start 1/11/19 at 06:00


Sodium Chloride 1,000 ml @  40 mls/hr Q24H IV  Last administered on 1/16/19at 


08:57; Admin Dose 40 MLS/HR;  Start 1/11/19 at 00:50


Oxycodone/ Acetaminophen (Percocet (5/ 325)) 2 tab Q6H  PRN PO PAIN LEVEL 6-10 


Last administered on 1/16/19at 02:01; Admin Dose 2 TAB;  Start 1/11/19 at 01:00


Oxycodone/ Acetaminophen (Percocet (5/ 325)) 1 tab Q6H  PRN PO MODERATE PAIN LE


JESUS 4-6;  Start 1/11/19 at 01:00


Folic Acid (Folic Acid) 1 mg DAILY PO  Last administered on 1/16/19at 08:47; 


Admin Dose 1 MG;  Start 1/11/19 at 09:00


Multivitamins/ Minerals (Theragran-M) 1 tab DAILY PO  Last administered on 


1/16/19at 08:47; Admin Dose 1 TAB;  Start 1/11/19 at 09:00


Thiamine HCl (Vitamin B1) 100 mg DAILY PO  Last administered on 1/16/19at 08:47;


Admin Dose 100 MG;  Start 1/11/19 at 09:00


Polyethylene Glycol (Miralax) 17 gm DAILY  PRN PO CONSTIPATION;  Start 1/11/19 


at 01:00


Metoprolol Tartrate (Lopressor) 50 mg BID PO  Last administered on 1/16/19at 


08:48; Admin Dose 50 MG;  Start 1/11/19 at 09:00


Ferrous Sulfate (Ferrous Sulfate (Ec)) 325 mg TID PO  Last administered on 1/ 16/19at 08:47; Admin Dose 325 MG;  Start 1/11/19 at 09:00


Hydralazine HCl (Apresoline) 25 mg TID PO  Last administered on 1/16/19at 08:51;


Admin Dose 25 MG;  Start 1/11/19 at 09:00


Tamsulosin HCl (Flomax) 0.4 mg HS PO  Last administered on 1/15/19at 20:31; 


Admin Dose 0.4 MG;  Start 1/11/19 at 21:00


Famotidine (Pepcid) 20 mg DAILY PO  Last administered on 1/16/19at 08:47; Admin 


Dose 20 MG;  Start 1/11/19 at 09:00


Magnesium Hydroxide (Milk Of Mag) 30 ml DAILY  PRN PO CONSTIPATION;  Start 


1/11/19 at 01:00


Acetaminophen (Tylenol Tab) 650 mg Q6H  PRN PO MILD PAIN(1-3)OR ELEVATED TEMP;  


Start 1/11/19 at 01:00


Ondansetron HCl (Zofran Inj) 4 mg Q8  PRN IV NAUSEA AND/OR VOMITING;  Start 


1/11/19 at 01:00


Senna (Senokot) 2 tab BID  PRN PO CONSTIPATION;  Start 1/11/19 at 01:00


Zolpidem Tartrate (Ambien) 5 mg HS  PRN PO INSOMNIA Last administered on 


1/14/19at 21:49; Admin Dose 5 MG;  Start 1/11/19 at 01:00


Tramadol HCl (Ultram) 50 mg TID  PRN PO PAIN LEVEL 1-5 Last administered on 


1/13/19at 04:40; Admin Dose 50 MG;  Start 1/11/19 at 01:00


Miscellaneous Information Patients own medicat... BID@1000,1600 XX ;  Start 


1/11/19 at 10:00


Bicalutamide (Casodex) 50 mg DAILY PO  Last administered on 1/16/19at 08:48; 


Admin Dose 50 MG;  Start 1/12/19 at 16:30


Morphine Sulfate (morphine) 6 mg Q4H  PRN PO SEVERE PAIN LEVEL 7-10;  Start 


1/15/19 at 17:30











MARIUM ARZATE                Jan 16, 2019 13:02
Date/Time of Note


Date/Time of Note


DATE: 1/16/19 


TIME: 18:56





Consult Date/Type/Reason


Admit Date/Time


Andrey 10, 2019 at 22:30


Initial Consult Date


1/12/19


Type of Consultation:  Urology


Reason for Consultation


Metastatic prostate cancer


Requesting Provider:  ANKIT MONAE MD





Subjective


The patient complains of pain in his legs.  And he is trying to massage his legs


because of the pain.





Objective





Vital Signs


  Date      Temp  Pulse  Resp  B/P (MAP)   Pulse Ox  O2          O2 Flow    FiO2


Time                                                 Delivery    Rate


   1/16/19  98.0     70    18  99/56 (70)        99  Room Air


     14:40








Intake and Output





1/15/19


1/15/19


1/16/19





1515:00


23:00


07:00





IntakeIntake Total


920 ml


720 ml





OutputOutput Total


1300 ml


300 ml


400 ml





BalanceBalance


-380 ml


420 ml


-400 ml











Exam


Patient is voiding clear urine.  He had abdomen MRI today and that showed:


1. Limited evaluation due to motion artifact.


2. Cholelithiasis.


3. Common bile duct dilatation up to 10 mm. No large stones seen within the 


common bile duct, however evaluation is severely limited by motion artifact.


4. Mild left hydronephrosis, as seen on prior ultrasound.


5. Heterogeneous signal of the visualized bones. Correlate with recent nuclear 


medicine bone scan.





Results/Medications


Result Diagram:  


1/15/19 0440                                                                    


           1/15/19 0440





Results 24 hrs





Laboratory Tests


               Test
                                1/16/19
05:30


               Total Bilirubin                             0.0  L


               Direct Bilirubin                            0.00


               Indirect Bilirubin                           0.0


               Aspartate Amino Transf
(AST/SGOT)            24  



               Alanine Aminotransferase
(ALT/SGPT)          15  



               Alkaline Phosphatase                        242  H


               Total Protein                                6.2


               Albumin                                     3.1  L





Medications





Current Medications


Pantoprazole (Protonix Tab) 40 mg DAILY@06 PO  Last administered on 1/16/19at 


06:21; Admin Dose 40 MG;  Start 1/11/19 at 06:00


Oxycodone/ Acetaminophen (Percocet (5/ 325)) 2 tab Q6H  PRN PO PAIN LEVEL 6-10 


Last administered on 1/16/19at 02:01; Admin Dose 2 TAB;  Start 1/11/19 at 01:00


Oxycodone/ Acetaminophen (Percocet (5/ 325)) 1 tab Q6H  PRN PO MODERATE PAIN 


LEVEL 4-6;  Start 1/11/19 at 01:00


Folic Acid (Folic Acid) 1 mg DAILY PO  Last administered on 1/16/19at 08:47; 


Admin Dose 1 MG;  Start 1/11/19 at 09:00


Multivitamins/ Minerals (Theragran-M) 1 tab DAILY PO  Last administered on 


1/16/19at 08:47; Admin Dose 1 TAB;  Start 1/11/19 at 09:00


Thiamine HCl (Vitamin B1) 100 mg DAILY PO  Last administered on 1/16/19at 08:47;


Admin Dose 100 MG;  Start 1/11/19 at 09:00


Polyethylene Glycol (Miralax) 17 gm DAILY  PRN PO CONSTIPATION;  Start 1/11/19 


at 01:00


Metoprolol Tartrate (Lopressor) 50 mg BID PO  Last administered on 1/16/19at 


08:48; Admin Dose 50 MG;  Start 1/11/19 at 09:00


Ferrous Sulfate (Ferrous Sulfate (Ec)) 325 mg TID PO  Last administered on 


1/16/19at 13:44; Admin Dose 325 MG;  Start 1/11/19 at 09:00


Hydralazine HCl (Apresoline) 25 mg TID PO  Last administered on 1/16/19at 08:51;


Admin Dose 25 MG;  Start 1/11/19 at 09:00


Tamsulosin HCl (Flomax) 0.4 mg HS PO  Last administered on 1/15/19at 20:31; 


Admin Dose 0.4 MG;  Start 1/11/19 at 21:00


Famotidine (Pepcid) 20 mg DAILY PO  Last administered on 1/16/19at 08:47; Admin 


Dose 20 MG;  Start 1/11/19 at 09:00


Magnesium Hydroxide (Milk Of Mag) 30 ml DAILY  PRN PO CONSTIPATION;  Start 1/11/ 19 at 01:00


Acetaminophen (Tylenol Tab) 650 mg Q6H  PRN PO MILD PAIN(1-3)OR ELEVATED TEMP;  


Start 1/11/19 at 01:00


Ondansetron HCl (Zofran Inj) 4 mg Q8  PRN IV NAUSEA AND/OR VOMITING;  Start 


1/11/19 at 01:00


Senna (Senokot) 2 tab BID  PRN PO CONSTIPATION;  Start 1/11/19 at 01:00


Zolpidem Tartrate (Ambien) 5 mg HS  PRN PO INSOMNIA Last administered on 


1/14/19at 21:49; Admin Dose 5 MG;  Start 1/11/19 at 01:00


Tramadol HCl (Ultram) 50 mg TID  PRN PO PAIN LEVEL 1-5 Last administered on 


1/13/19at 04:40; Admin Dose 50 MG;  Start 1/11/19 at 01:00


Miscellaneous Information Patients own medicat... BID@1000,1600 XX ;  Start 


1/11/19 at 10:00


Bicalutamide (Casodex) 50 mg DAILY PO  Last administered on 1/16/19at 08:48; 


Admin Dose 50 MG;  Start 1/12/19 at 16:30


Morphine Sulfate (morphine) 6 mg Q4H  PRN PO SEVERE PAIN LEVEL 7-10;  Start 


1/15/19 at 17:30





Assessment/Plan


Chief Complaint/Hosp Course


68-year-old -American male was transferred from [facility] to [facility] patient was found to have metastatic 


cancer to the lungs and his bones and upon admission here at [facility] his 


PSA was over 1000 therefore a urological consultation was requested.  The 


patient himself is not a good historian he is confused and sometimes agitated at


the [facility] they attempted to have MRI of the spine to evaluate 


metastatic disease and evaluate for vertebral instability but the patient was 


confused and agitated and they were not able to do the MRI the patient is known 


to have had subdural hematoma status post evacuation at [facility] in 


July 2018 the patient also was found to have lymphadenopathy and bony metastases


he also is known to have a history of hypertension and anemia.  


CT scan of the abdomen and pelvis without contrast at [facility] done on


1/7/2019 showed lung bases: Extensive pleural nodularity and pulmonary nodules 


bilaterally bilateral left greater than right small pleural effusions and 


bibasilar atelectasis liver: Unremarkable, gallbladder and bile ducts: 


Contracted likely containing gallstones ,spleen unremarkable,pancreas 


unremarkable, adrenals mildly thickened left greater than right adrenal glands, 


kidneys and ureters: Bilateral low-density lesions mostly fluid density but 


incompletely characterized measuring up to 6.2 cm on the right kidney, mild left


hydronephrosis likely due to an obstructing 6 mm stone in the distal left 


ureter. Bowels nondilated bowel loops moderate stool burden.  Bladder diffuse 


circumferential wall thickening.  Reproductive organs: Prostatomegaly with 


median lobe hypertrophy.  Lymph node: Likely prominent retroperitoneal and 


bilateral iliac chain lymph nodes although overall poorly delineated due to lack


of intravenous contrast.  Peritoneum: Small volume ascites.  Vessels: Moderate 


atherosclerotic calcification.  Abdominal wall: Mild body wall edema.  Bones: 


Mixed lytic ,sclerotic bone lesions involving the entire axial and appendicular 


skeleton with areas of vertebral body height loss for example in the L3 


vertebral body and marked areas of cortical destruction for example on the left 


inferior pubic ramus


The lumbosacral spine x-rays and pelvic x-rays showed osteoblastic lesions 


consistent with metastasis most likely from the prostate.  The patient is 


voiding well.  He also does have a distal left ureteral stone but he denies 


having any pain.  We will strain his urine and do a KUB to see if the stone is 


visible and is radiopaque.  Since his PSA is over his thousand I wanted to add 


Lupron Depot to his treatment in addition to the Casodex.  The Lupron is not on 


the formulary of the hospital.  I requested that the pharmacy director did not 


approve it stating that this should be done as an outpatient.  My concern is thi


s patient is not the type that we will follow-up as an outpatient and his cancer


is very advanced and would benefit from at least a 1 month injection.  He did 


have the bone scan done today and that showed:


 


Multiple foci of increased activity are seen in the calvarium, sternum, rib 


cages bilaterally, sternum, pelvic bones bilaterally and both hips.


 


No other definite abnormal areas of increased activity or asymmetries are 


visualized in the study and distribution of radionuclide is homogeneous in the 


skull, spine, rib cages, sternum, pelvis and visualized portions of the upper 


and lower extremities.


 


Of incidental note, there is a poor visualization of the kidneys.


 


Area of increased activity seen within the right pelvis. The finding may be 


related to physiologic bladder activity; also query right pelvic transplanted 


kidney.





Urologically continue the Casodex and once he is out of the hospital start him 


on Lupron Depot as well





I had a discussion with the patient about him coming to the office to undergo 


transrectal ultrasound and ultrasound-guided biopsy of the prostate.  Patient 


lives in [place] near [place].  He uses [employer] for his travel.  He cannot 


walk.  He has pain in his legs and his legs are very weak.  Therefore he will 


not be able to make it to the office [place]  


Therefore it will be difficult to obtain a tissue diagnosis from prostate 


biopsy.  He also may have difficulty coming to the office to give him Lupron 


Depo for his prostate cancer.


If he is sent to a skilled nursing facility then the skilled nursing facility 


will become a responsible for his Lupron injections and also I was to do an 


ultrasound-guided biopsy of his prostate the nursing home has to authorize it 


and be financially responsible for it.





If however there is any lesion that could be biopsied while he is in the 


hospital then that may be a better option to establish a pathological diagnosis.











HAILEE CLIFTON MD            Jan 16, 2019 19:04
Date/Time of Note


Date/Time of Note


DATE: 1/17/19 


TIME: 13:20





Assessment/Plan





A 67 yo transferred from [facility] with 





#1 Prostate Cancer 


    - presumed given PSA >1000


   - scans from [facility] demonstrates widely metastatic disease to bones and lung as 


well as RP LAD


   - continue Casodex 50 mgd aily x 10 days then can start Lupron as out pt


   - met prostate ca is treated with androgen deprivation therapy


   - will start  Xgeva or zometa for bony mets as an outpatient also ideally 


would get Xgeva or zometa for bony mets, given renal failure bisphosphonate will


not be given


   -1/14/19- Bone Scan - will fu result and perform biopsy appropriately





# 3 Anemia -- Hg 7.6


   -likely anemia of chronic inflammation. iron studies are ok but elevated 


ferritin noted


   -hold on transfusion for now


   - s/p EGD -  with chronic gastritis





#  Common  Bile Duct Dilation 


   - 1/16/2019- US abdomen showed Common Bile Duct Dilation


   - 1/16/2019- US MRI showed-  Common bile duct dilatation up to 10 mm. No l


arge stones seen within the common bile duct


   - management per GI 


Acute Transaminitis


   - Management Per GI





#  Acute Kidney injury- trended up today Cr 1.46


   - management per PMD





#  Mild left hydronephrosis - shown on MRI abdomen 


   - managements per PMD





# SP Psych evaluation-  that pt able to make own decisions.





 A total of 50 minutes of face to face time was spent speaking with the patient 


of which greater than 50% was spent in counseling and coordination of care and 


the detailed question and answer session. Dw staff.Patient is willing to 


continue with androgen deprivation therapy; he is ok wit to continue casodex, 


after 10 days of this will start Lupron





Patient seen in collaboration with Dr Mcnulty


Result Diagram:  


1/17/19 1013                                                                    


           1/17/19 1013





Results 24hrs





Laboratory Tests


               Test
                                1/17/19
10:13


               White Blood Count                            5.0


               Red Blood Count                            2.68  L


               Hemoglobin                                  7.8  L


               Hematocrit                                 25.6  L


               Mean Corpuscular Volume                     95.5


               Mean Corpuscular Hemoglobin                 29.1


               Mean Corpuscular Hemoglobin
Concent       30.5  L



               Red Cell Distribution Width                16.5  H


               Platelet Count                               234


               Mean Platelet Volume                        10.3


               Immature Granulocytes %                   2.600  H


               Neutrophils %                               58.1


               Lymphocytes %                               27.8


               Monocytes %                                  9.7


               Eosinophils %                                1.6


               Basophils %                                  0.2


               Nucleated Red Blood Cells %                  0.0


               Immature Granulocytes #                   0.130  H


               Neutrophils #                                2.9


               Lymphocytes #                                1.4


               Monocytes #                                  0.5


               Eosinophils #                                0.1


               Basophils #                                  0.0


               Nucleated Red Blood Cells #                  0.0


               Sodium Level                                 141


               Potassium Level                              3.8


               Chloride Level                               110


               Carbon Dioxide Level                          22


               Anion Gap                                      9


               Blood Urea Nitrogen                          24  H


               Creatinine                                  1.23


               Est Glomerular Filtrat Rate
mL/min          58  L



               Glucose Level                                126


               Calcium Level                                9.2








Consultation Date/Type/Reason


Admit Date/Time


Andrey 10, 2019 at 10:30 pm


Initial Consult Date


1/12/19


Type of Consult


oncology


Reason for Consultation


Prostate Cancer


Requesting Provider:  ANKIT MONAE MD





24 HR Interval Summary


Free Text/Dictation


- denies any pain 


- 1/16/2019- US abdomen showed Common Bile Duct Dilation


- 1/16/2019- US MRI showed-  Common bile duct dilatation up to 10 mm. No large 


stones seen within the common bile duct


No New events reported last night





Detailed Summary


Eyes:  no complaints


ENT:  no complaints


Respiratory:  no complaints


Cardiovascular:  no complaints


Gastrointestinal:  no complaints


Genitourinary:  no complaints


Musculoskeletal:  restricted range of motion


Skin:  no complaints


Neurologic:  no complaints


Endocrine:  no complaints





Exam/Review of Systems


Vital Signs


Vitals





Vital Signs


  Date      Temp  Pulse  Resp  B/P (MAP)   Pulse Ox  O2          O2 Flow    FiO2


Time                                                 Delivery    Rate


   1/17/19  97.6     72    18      110/63        96  Room Air


     07:45                           (79)








Intake and Output





1/16/19 1/16/19 1/17/19





1414:59


22:59


06:59





IntakeIntake Total


820 ml


240 ml


300 ml





OutputOutput Total


600 ml


200 ml


800 ml





BalanceBalance


220 ml


40 ml


-500 ml














Exam


Constitutional:  alert, well developed


Psych:  nl mood/affect


Head:  atraumatic


Eyes:  nl conjunctiva, EOMI, nl lids, nl sclera


ENMT:  nl external ears & nose


Neck:  non-tender


Respiratory:  clear to auscultation (bilaterally)


Cardiovascular:  nl pulses, other


Gastrointestinal:  soft, non-tender


Musculoskeletal:  muscle weakness


Extremities:  normal pulses


Neurological:  nl speech, other (alert/responsive)


Skin:  nl turgor





Medications


Medications





Current Medications


Pantoprazole (Protonix Tab) 40 mg DAILY@06 PO  Last administered on 1/17/19at 


05:45; Admin Dose 40 MG;  Start 1/11/19 at 06:00


Oxycodone/ Acetaminophen (Percocet (5/ 325)) 2 tab Q6H  PRN PO PAIN LEVEL 6-10 


Last administered on 1/17/19at 05:46; Admin Dose 2 TAB;  Start 1/11/19 at 01:00


Oxycodone/ Acetaminophen (Percocet (5/ 325)) 1 tab Q6H  PRN PO MODERATE PAIN 


LEVEL 4-6;  Start 1/11/19 at 01:00


Folic Acid (Folic Acid) 1 mg DAILY PO  Last administered on 1/17/19at 09:25; 


Admin Dose 1 MG;  Start 1/11/19 at 09:00


Multivitamins/ Minerals (Theragran-M) 1 tab DAILY PO  Last administered on 


1/17/19at 09:25; Admin Dose 1 TAB;  Start 1/11/19 at 09:00


Thiamine HCl (Vitamin B1) 100 mg DAILY PO  Last administered on 1/17/19at 09:25;


Admin Dose 100 MG;  Start 1/11/19 at 09:00


Polyethylene Glycol (Miralax) 17 gm DAILY  PRN PO CONSTIPATION;  Start 1/11/19 


at 01:00


Metoprolol Tartrate (Lopressor) 50 mg BID PO  Last administered on 1/16/19at 


20:32; Admin Dose 50 MG;  Start 1/11/19 at 09:00


Ferrous Sulfate (Ferrous Sulfate (Ec)) 325 mg TID PO  Last administered on 


1/17/19at 09:25; Admin Dose 325 MG;  Start 1/11/19 at 09:00


Hydralazine HCl (Apresoline) 25 mg TID PO  Last administered on 1/16/19at 20:32;


Admin Dose 25 MG;  Start 1/11/19 at 09:00


Tamsulosin HCl (Flomax) 0.4 mg HS PO  Last administered on 1/16/19at 20:30; 


Admin Dose 0.4 MG;  Start 1/11/19 at 21:00


Famotidine (Pepcid) 20 mg DAILY PO  Last administered on 1/17/19at 09:25; Admin 


Dose 20 MG;  Start 1/11/19 at 09:00


Magnesium Hydroxide (Milk Of Mag) 30 ml DAILY  PRN PO CONSTIPATION;  Start 


1/11/19 at 01:00


Acetaminophen (Tylenol Tab) 650 mg Q6H  PRN PO MILD PAIN(1-3)OR ELEVATED TEMP;  


Start 1/11/19 at 01:00


Ondansetron HCl (Zofran Inj) 4 mg Q8  PRN IV NAUSEA AND/OR VOMITING;  Start 


1/11/19 at 01:00


Senna (Senokot) 2 tab BID  PRN PO CONSTIPATION;  Start 1/11/19 at 01:00


Zolpidem Tartrate (Ambien) 5 mg HS  PRN PO INSOMNIA Last administered on 1/ 14/19at 21:49; Admin Dose 5 MG;  Start 1/11/19 at 01:00


Tramadol HCl (Ultram) 50 mg TID  PRN PO PAIN LEVEL 1-5 Last administered on 


1/13/19at 04:40; Admin Dose 50 MG;  Start 1/11/19 at 01:00


Miscellaneous Information Patients own medicat... BID@1000,1600 XX ;  Start 


1/11/19 at 10:00


Bicalutamide (Casodex) 50 mg DAILY PO  Last administered on 1/17/19at 09:27; 


Admin Dose 50 MG;  Start 1/12/19 at 16:30


Morphine Sulfate (morphine) 6 mg Q4H  PRN PO SEVERE PAIN LEVEL 7-10 Last 


administered on 1/16/19at 22:16; Admin Dose 6 MG;  Start 1/15/19 at 17:30











CHONG PRICE                Jan 17, 2019 1:27 pm
Date/Time of Note


Date/Time of Note


DATE: 1/18/19 


TIME: 10:20





Assessment/Plan


Assessment/Plan


Hospital Course


67 yo male





1.  Anemia, which is a combination of metastasis to the bone and also 


gastrointestinal bleed.


2.  Renal insufficiency.


3.  Malnutrition.


4.  Prostate cancer with mets to the bone noted in bone scan


5.  Hypertension.


6.  Hepatitis C.


7.  Alcoholism.


8.  Gastritis


9.  S/P EGD


10.  Transaminitis


-alk phos elevated likely due to bone mets


11.  Weight loss of 80 lbs over 3 months


12.  Elevated alk phos secondary to bone mets


13.  Fatty liver


14.  Cholelithiasis


15.  Dilated CBD at 10mm


16.  Elevated CEA (AFP and CA 19-9 wnl)





US of abd:


Fatty infiltration of the liver.. 


Cholelithiasis.


Dilated CBD. 10mm


Multiple simple cysts in the right kidney





MRCP 1/16:


1. Limited evaluation due to motion artifact.


2. Cholelithiasis.


3. Common bile duct dilatation up to 10 mm. No large stones seen within the 


common bile duct, however evaluation is severely limited by motion artifact.


4. Mild left hydronephrosis, as seen on prior ultrasound.


5. Heterogeneous signal of the visualized bones. Correlate with recent nuclear 


medicine bone scan.





Plan


FOB pending


Continue PPI


WE will monitor LFTs and for clinical indication of bile duct obstruction, 


currently total bili wnl and pt denies abd pain and neg Gonzales's sign


Pt examined and plan of care discussed with Dr. Valentin


Result Diagram:  


1/17/19 1013                                                                    


           1/17/19 1013








Consultation Date/Type/Reason


Admit Date/Time


Andrey 10, 2019 at 22:30


Initial Consult Date


1/12/19


Requesting Provider:  ANKIT MONAE MD





24 HR Interval Summary


Free Text/Dictation


Denies abd pain, neg gonzales's sign.  NO n/v.





Exam/Review of Systems


Vital Signs


Vitals





Vital Signs


  Date      Temp  Pulse  Resp  B/P (MAP)   Pulse Ox  O2          O2 Flow    FiO2


Time                                                 Delivery    Rate


   1/18/19  98.1     70    18      126/70        99


     08:17                           (88)


   1/17/19                                           Room Air


     14:29








Intake and Output





1/17/19 1/17/19 1/18/19





1414:59


22:59


06:59





IntakeIntake Total


600 ml


300 ml





OutputOutput Total


750 ml


150 ml


700 ml





BalanceBalance


-150 ml


150 ml


-700 ml














Exam


Constitutional:  alert, oriented


Psych:  no complaints


Head:  normocephalic


Eyes:  PERRL


Respiratory:  clear to auscultation


Gastrointestinal:  soft, non-tender


Musculoskeletal:  muscle weakness


Neurological:  nl mental status





Medications


Medications





Current Medications


Pantoprazole (Protonix Tab) 40 mg DAILY@06 PO  Last administered on 1/18/19at 


06:03; Admin Dose 40 MG;  Start 1/11/19 at 06:00


Oxycodone/ Acetaminophen (Percocet (5/ 325)) 2 tab Q6H  PRN PO PAIN LEVEL 6-10 


Last administered on 1/18/19at 09:14; Admin Dose 2 TAB;  Start 1/11/19 at 01:00


Oxycodone/ Acetaminophen (Percocet (5/ 325)) 1 tab Q6H  PRN PO MODERATE PAIN 


LEVEL 4-6 Last administered on 1/18/19at 02:49; Admin Dose 1 TAB;  Start 1/11/19


at 01:00


Folic Acid (Folic Acid) 1 mg DAILY PO  Last administered on 1/18/19at 09:15; 


Admin Dose 1 MG;  Start 1/11/19 at 09:00


Multivitamins/ Minerals (Theragran-M) 1 tab DAILY PO  Last administered on 


1/18/19at 09:15; Admin Dose 1 TAB;  Start 1/11/19 at 09:00


Thiamine HCl (Vitamin B1) 100 mg DAILY PO  Last administered on 1/18/19at 09:15;


Admin Dose 100 MG;  Start 1/11/19 at 09:00


Polyethylene Glycol (Miralax) 17 gm DAILY  PRN PO CONSTIPATION;  Start 1/11/19 


at 01:00


Metoprolol Tartrate (Lopressor) 50 mg BID PO  Last administered on 1/18/19at 


09:16; Admin Dose 50 MG;  Start 1/11/19 at 09:00


Ferrous Sulfate (Ferrous Sulfate (Ec)) 325 mg TID PO  Last administered on 


1/18/19at 09:15; Admin Dose 325 MG;  Start 1/11/19 at 09:00


Tamsulosin HCl (Flomax) 0.4 mg HS PO  Last administered on 1/17/19at 20:42; 


Admin Dose 0.4 MG;  Start 1/11/19 at 21:00


Famotidine (Pepcid) 20 mg DAILY PO  Last administered on 1/18/19at 09:15; Admin 


Dose 20 MG;  Start 1/11/19 at 09:00


Magnesium Hydroxide (Milk Of Mag) 30 ml DAILY  PRN PO CONSTIPATION;  Start 


1/11/19 at 01:00


Acetaminophen (Tylenol Tab) 650 mg Q6H  PRN PO MILD PAIN(1-3)OR ELEVATED TEMP;  


Start 1/11/19 at 01:00


Ondansetron HCl (Zofran Inj) 4 mg Q8  PRN IV NAUSEA AND/OR VOMITING;  Start 


1/11/19 at 01:00


Senna (Senokot) 2 tab BID  PRN PO CONSTIPATION Last administered on 1/18/19at 


06:03; Admin Dose 2 TAB;  Start 1/11/19 at 01:00


Zolpidem Tartrate (Ambien) 5 mg HS  PRN PO INSOMNIA Last administered on 


1/14/19at 21:49; Admin Dose 5 MG;  Start 1/11/19 at 01:00


Tramadol HCl (Ultram) 50 mg TID  PRN PO PAIN LEVEL 1-5 Last administered on 


1/13/19at 04:40; Admin Dose 50 MG;  Start 1/11/19 at 01:00


Miscellaneous Information Patients own medicat... BID@1000,1600 XX ;  Start 


1/11/19 at 10:00


Bicalutamide (Casodex) 50 mg DAILY PO  Last administered on 1/18/19at 09:16; 


Admin Dose 50 MG;  Start 1/12/19 at 16:30


Morphine Sulfate (morphine) 6 mg Q4H  PRN PO SEVERE PAIN LEVEL 7-10 Last 


administered on 1/16/19at 22:16; Admin Dose 6 MG;  Start 1/15/19 at 17:30











ALEKS BOWEN           Jan 18, 2019 10:23
Date/Time of Note


Date/Time of Note


DATE: 1/18/19 


TIME: 12:36





Assessment/Plan


Assessment/Plan


Hospital Course


69 yo transferred from [facility] with widely metastatic disease to bones 


(confirmed by bone scan) and lung as well as RP LAD





#prostate ca


-w/u shows PSA >1000


-this is very consistent with met prostate ca


at this point highest on my list is that this is met prostate ca


ideally would get tissue biopsy for confirmation of diagnosis, however pt has 


not been very compliant with our recommendations for work-up and at times has 


denied that he has prostate ca, therefore in order not to delay treatment he has


been started on casodex as of 1/12/19


-cont Casodex 50 mg daily x 10 days then can start Lupron as out pt


also ideally would get Xgeva or zometa for bony mets, given renal failure 


bisphosphonate will not be given





Earlier this week I discussed the above with the patient and he denied that he 


has cancer. he said " you are saying that so you can do treatments and make 


money like everyone else here" I tried to explain to him otherwise. At this 


point, pt does not seem to have mental capacity to make decisions nor does he 


have understanding into his several serious health issues


had Psych eval and felt that pt able to make own decisions. 


I spoke to him again earlier this week and discussed that he has met prostate ca


to bone and he seemed to accept this and is willing to continue with androgen 


deprivation therapy


he is ok with continuation of casodex, after 10 days of this will start Lupron, 


if he is still hospitalized will see if we can administer in house





#anemia


multifactorial--CRI, chronic disease from underlying malignancy


Result Diagram:  


1/17/19 1013                                                                    


           1/17/19 1013








Consultation Date/Type/Reason


Admit Date/Time


Andrey 10, 2019 at 22:30


Initial Consult Date


1/12/19


Requesting Provider:  ANKIT MONAE MD





24 HR Interval Summary


Free Text/Dictation


pt resting





Exam/Review of Systems


Vital Signs


Vitals





Vital Signs


  Date      Temp  Pulse  Resp  B/P (MAP)   Pulse Ox  O2          O2 Flow    FiO2


Time                                                 Delivery    Rate


   1/18/19  98.1     70    18      126/70        99


     08:17                           (88)


   1/17/19                                           Room Air


     14:29








Intake and Output





1/17/19 1/17/19 1/18/19





1515:00


23:00


07:00





IntakeIntake Total


600 ml


300 ml





OutputOutput Total


750 ml


150 ml


700 ml





BalanceBalance


-150 ml


150 ml


-700 ml














Exam


Constitutional:  frail


Psych:  no complaints, nl mood/affect


Head:  normocephalic, atraumatic


Eyes:  nl conjunctiva, EOMI, nl lids, nl sclera, PERRL


Respiratory:  clear to auscultation, normal air movement


Musculoskeletal:  nl extremities to inspection, nl gait and stance


Neurological:  CNS II-XII intact, nl mental status, nl speech, nl strength





Medications


Medications





Current Medications


Pantoprazole (Protonix Tab) 40 mg DAILY@06 PO  Last administered on 1/18/19at 


06:03; Admin Dose 40 MG;  Start 1/11/19 at 06:00


Oxycodone/ Acetaminophen (Percocet (5/ 325)) 2 tab Q6H  PRN PO PAIN LEVEL 6-10 


Last administered on 1/18/19at 09:14; Admin Dose 2 TAB;  Start 1/11/19 at 01:00


Oxycodone/ Acetaminophen (Percocet (5/ 325)) 1 tab Q6H  PRN PO MODERATE PAIN 


LEVEL 4-6 Last administered on 1/18/19at 02:49; Admin Dose 1 TAB;  Start 1/11/19


at 01:00


Folic Acid (Folic Acid) 1 mg DAILY PO  Last administered on 1/18/19at 09:15; 


Admin Dose 1 MG;  Start 1/11/19 at 09:00


Multivitamins/ Minerals (Theragran-M) 1 tab DAILY PO  Last administered on 


1/18/19at 09:15; Admin Dose 1 TAB;  Start 1/11/19 at 09:00


Thiamine HCl (Vitamin B1) 100 mg DAILY PO  Last administered on 1/18/19at 09:15;


Admin Dose 100 MG;  Start 1/11/19 at 09:00


Polyethylene Glycol (Miralax) 17 gm DAILY  PRN PO CONSTIPATION;  Start 1/11/19 


at 01:00


Metoprolol Tartrate (Lopressor) 50 mg BID PO  Last administered on 1/18/19at 


09:16; Admin Dose 50 MG;  Start 1/11/19 at 09:00


Ferrous Sulfate (Ferrous Sulfate (Ec)) 325 mg TID PO  Last administered on 


1/18/19at 09:15; Admin Dose 325 MG;  Start 1/11/19 at 09:00


Tamsulosin HCl (Flomax) 0.4 mg HS PO  Last administered on 1/17/19at 20:42; 


Admin Dose 0.4 MG;  Start 1/11/19 at 21:00


Famotidine (Pepcid) 20 mg DAILY PO  Last administered on 1/18/19at 09:15; Admin 


Dose 20 MG;  Start 1/11/19 at 09:00


Magnesium Hydroxide (Milk Of Mag) 30 ml DAILY  PRN PO CONSTIPATION;  Start 


1/11/19 at 01:00


Acetaminophen (Tylenol Tab) 650 mg Q6H  PRN PO MILD PAIN(1-3)OR ELEVATED TEMP;  


Start 1/11/19 at 01:00


Ondansetron HCl (Zofran Inj) 4 mg Q8  PRN IV NAUSEA AND/OR VOMITING;  Start 


1/11/19 at 01:00


Senna (Senokot) 2 tab BID  PRN PO CONSTIPATION Last administered on 1/18/19at 


06:03; Admin Dose 2 TAB;  Start 1/11/19 at 01:00


Zolpidem Tartrate (Ambien) 5 mg HS  PRN PO INSOMNIA Last administered on 


1/14/19at 21:49; Admin Dose 5 MG;  Start 1/11/19 at 01:00


Tramadol HCl (Ultram) 50 mg TID  PRN PO PAIN LEVEL 1-5 Last administered on 


1/13/19at 04:40; Admin Dose 50 MG;  Start 1/11/19 at 01:00


Miscellaneous Information Patients own medicat... BID@1000,1600 XX ;  Start 


1/11/19 at 10:00


Bicalutamide (Casodex) 50 mg DAILY PO  Last administered on 1/18/19at 09:16; 


Admin Dose 50 MG;  Start 1/12/19 at 16:30


Morphine Sulfate (morphine) 6 mg Q4H  PRN PO SEVERE PAIN LEVEL 7-10 Last 


administered on 1/16/19at 22:16; Admin Dose 6 MG;  Start 1/15/19 at 17:30











MARIUM ARZATE                Jan 18, 2019 12:38
Date/Time of Note


Date/Time of Note


DATE: 1/19/19 


TIME: 07:55





Assessment/Plan





1.  Anemia, which is a combination of metastasis to the bone and also 


gastrointestinal bleed.


2.  Renal insufficiency.


3.  Malnutrition.


4.  Prostate cancer with mets to the bone noted in bone scan


5.  Hypertension.


6.  Hepatitis C.


7.  Alcoholism.


8.  Gastritis


9.  S/P EGD


10.  Transaminitis


-alk phos elevated likely due to bone mets


11.  Weight loss of 80 lbs over 3 months


12.  Elevated alk phos secondary to bone mets


13.  Fatty liver


14.  Cholelithiasis


15.  Dilated CBD at 10mm


16.  Elevated CEA (AFP and CA 19-9 wnl)





US of abd:


Fatty infiltration of the liver.. 


Cholelithiasis.


Dilated CBD. 10mm


Multiple simple cysts in the right kidney





MRCP 1/16:


1. Limited evaluation due to motion artifact.


2. Cholelithiasis.


3. Common bile duct dilatation up to 10 mm. No large stones seen within the 


common bile duct, however evaluation is severely limited by motion artifact.


4. Mild left hydronephrosis, as seen on prior ultrasound.


5. Heterogeneous signal of the visualized bones. Correlate with recent nuclear 


medicine bone scan.





Plan


FOB negative


Continue PPI


WE will monitor LFTs and for clinical indication of bile duct obstruction, 


currently total bili wnl and pt denies abd pain and neg Gonzales's sign


Oncology and  follow-up


Result Diagram:  


1/19/19 0538                                                                    


           1/19/19 0538





Results 24hrs





Laboratory Tests


       Test
                                1/18/19
22:30  1/19/19
05:38


       Stool Occult Blood                   NEGATIVE


       White Blood Count                                          4.4  L


       Red Blood Count                                           2.54  L


       Hemoglobin                                                 7.5  L


       Hematocrit                                                23.9  L


       Mean Corpuscular Volume                                    94.1


       Mean Corpuscular Hemoglobin                                29.5


       Mean Corpuscular Hemoglobin
Concent  
                   31.4  L



       Red Cell Distribution Width                               16.3  H


       Platelet Count                                              218


       Mean Platelet Volume                                       10.2


       Immature Granulocytes %                                  1.600  H


       Neutrophils %                                              58.9


       Lymphocytes %                                              27.6


       Monocytes %                                                 9.6


       Eosinophils %                                               2.1


       Basophils %                                                 0.2


       Nucleated Red Blood Cells %                                 0.0


       Immature Granulocytes #                                  0.070  H


       Neutrophils #                                               2.6


       Lymphocytes #                                               1.2


       Monocytes #                                                 0.4


       Eosinophils #                                               0.1


       Basophils #                                                 0.0


       Nucleated Red Blood Cells #                                 0.0


       Sodium Level                                                138


       Potassium Level                                             4.3


       Chloride Level                                              105


       Carbon Dioxide Level                                         25


       Anion Gap                                                     8


       Blood Urea Nitrogen                                         23  H


       Creatinine                                                 1.22


       Est Glomerular Filtrat Rate
mL/min   
                     59  L



       Glucose Level                                               115


       Calcium Level                                               8.9








Consultation Date/Type/Reason


Admit Date/Time


Andrey 10, 2019 at 22:30


Initial Consult Date


1/12/19


Requesting Provider:  ANKIT MONAE MD





24 HR Interval Summary


Free Text/Dictation


Complains of pain in the lower extremity





Exam/Review of Systems


Vital Signs


Vitals





Vital Signs


  Date      Temp  Pulse  Resp  B/P (MAP)   Pulse Ox  O2          O2 Flow    FiO2


Time                                                 Delivery    Rate


   1/19/19  98.2     72    18      114/67       100


     02:00                           (83)


   1/17/19                                           Room Air


     14:29








Intake and Output





1/18/19 1/18/19 1/19/19





1515:00


23:00


07:00





IntakeIntake Total


720 ml


360 ml





OutputOutput Total


600 ml


300 ml


800 ml





BalanceBalance


120 ml


60 ml


-800 ml














Exam


Constitutional:  alert, oriented, well developed


Psych:  no complaints, nl mood/affect


Head:  normocephalic, atraumatic


Eyes:  nl conjunctiva, EOMI, nl lids, nl sclera, PERRL


ENMT:  nl external ears & nose, nl lips & teeth, nl nasal mucosa & septum


Neck:  supple, non-tender


Respiratory:  clear to auscultation, normal air movement


Cardiovascular:  regular rate and rhythm, nl pulses


Gastrointestinal:  soft, nl liver, spleen, non-tender


Musculoskeletal:  nl extremities to inspection, nl gait and stance


Extremities:  normal pulses


Neurological:  CNS II-XII intact, nl mental status, nl speech, nl strength


Skin:  nl turgor; 


   No rash or lesions


Lymph:  nl lymph nodes





Medications


Medications





Current Medications


Pantoprazole (Protonix Tab) 40 mg DAILY@06 PO  Last administered on 1/19/19at 


06:38; Admin Dose 40 MG;  Start 1/11/19 at 06:00


Oxycodone/ Acetaminophen (Percocet (5/ 325)) 2 tab Q6H  PRN PO PAIN LEVEL 6-10 


Last administered on 1/18/19at 15:47; Admin Dose 2 TAB;  Start 1/11/19 at 01:00


Oxycodone/ Acetaminophen (Percocet (5/ 325)) 1 tab Q6H  PRN PO MODERATE PAIN 


LEVEL 4-6 Last administered on 1/18/19at 21:06; Admin Dose 1 TAB;  Start 1/11/19


at 01:00


Folic Acid (Folic Acid) 1 mg DAILY PO  Last administered on 1/18/19at 09:15; 


Admin Dose 1 MG;  Start 1/11/19 at 09:00


Multivitamins/ Minerals (Theragran-M) 1 tab DAILY PO  Last administered on 1/18/ 19at 09:15; Admin Dose 1 TAB;  Start 1/11/19 at 09:00


Thiamine HCl (Vitamin B1) 100 mg DAILY PO  Last administered on 1/18/19at 09:15;


Admin Dose 100 MG;  Start 1/11/19 at 09:00


Polyethylene Glycol (Miralax) 17 gm DAILY  PRN PO CONSTIPATION;  Start 1/11/19 


at 01:00


Metoprolol Tartrate (Lopressor) 50 mg BID PO  Last administered on 1/18/19at 


09:16; Admin Dose 50 MG;  Start 1/11/19 at 09:00


Ferrous Sulfate (Ferrous Sulfate (Ec)) 325 mg TID PO  Last administered on 


1/18/19at 09:15; Admin Dose 325 MG;  Start 1/11/19 at 09:00


Tamsulosin HCl (Flomax) 0.4 mg HS PO  Last administered on 1/17/19at 20:42; 


Admin Dose 0.4 MG;  Start 1/11/19 at 21:00


Famotidine (Pepcid) 20 mg DAILY PO  Last administered on 1/18/19at 09:15; Admin 


Dose 20 MG;  Start 1/11/19 at 09:00


Magnesium Hydroxide (Milk Of Mag) 30 ml DAILY  PRN PO CONSTIPATION;  Start 


1/11/19 at 01:00


Acetaminophen (Tylenol Tab) 650 mg Q6H  PRN PO MILD PAIN(1-3)OR ELEVATED TEMP;  


Start 1/11/19 at 01:00


Ondansetron HCl (Zofran Inj) 4 mg Q8  PRN IV NAUSEA AND/OR VOMITING;  Start 


1/11/19 at 01:00


Senna (Senokot) 2 tab BID  PRN PO CONSTIPATION Last administered on 1/18/19at 


06:03; Admin Dose 2 TAB;  Start 1/11/19 at 01:00


Zolpidem Tartrate (Ambien) 5 mg HS  PRN PO INSOMNIA Last administered on 


1/14/19at 21:49; Admin Dose 5 MG;  Start 1/11/19 at 01:00


Tramadol HCl (Ultram) 50 mg TID  PRN PO PAIN LEVEL 1-5 Last administered on 


1/13/19at 04:40; Admin Dose 50 MG;  Start 1/11/19 at 01:00


Miscellaneous Information Patients own medicat... BID@1000,1600 XX ;  Start 


1/11/19 at 10:00


Bicalutamide (Casodex) 50 mg DAILY PO  Last administered on 1/18/19at 09:16; 


Admin Dose 50 MG;  Start 1/12/19 at 16:30


Morphine Sulfate (morphine) 6 mg Q4H  PRN PO SEVERE PAIN LEVEL 7-10 Last 


administered on 1/16/19at 22:16; Admin Dose 6 MG;  Start 1/15/19 at 17:30











BERNARD YUAN MD             Jan 19, 2019 07:56
Date/Time of Note


Date/Time of Note


DATE: 1/19/19 


TIME: 11:38





Consult Date/Type/Reason


Admit Date/Time


Andrey 10, 2019 at 22:30


Initial Consult Date


1/12/19


Type of Consultation:  Urology


Reason for Consultation


Metastatic prostate cancer


Requesting Provider:  ANKIT MONAE MD





Subjective


Patient complains that he cannot walk too far.  He can move his lower 


extremities without a problem but when he stands to walk he gets tired fast.  He


is very irritable.





Objective





Vital Signs


  Date      Temp  Pulse  Resp  B/P (MAP)   Pulse Ox  O2          O2 Flow    FiO2


Time                                                 Delivery    Rate


   1/19/19  98.0     72    17      121/74       100  Room Air


     08:43                           (90)








Intake and Output





1/18/19 1/18/19 1/19/19





1515:00


23:00


07:00





IntakeIntake Total


720 ml


360 ml





OutputOutput Total


600 ml


300 ml


800 ml





BalanceBalance


120 ml


60 ml


-800 ml











Exam


He is voiding and his urine is clear.





Results/Medications


Result Diagram:  


1/19/19 0538                                                                    


           1/19/19 0538





Results 24 hrs





Laboratory Tests


       Test
                                1/18/19
22:30  1/19/19
05:38


       Stool Occult Blood                   NEGATIVE


       White Blood Count                                          4.4  L


       Red Blood Count                                           2.54  L


       Hemoglobin                                                 7.5  L


       Hematocrit                                                23.9  L


       Mean Corpuscular Volume                                    94.1


       Mean Corpuscular Hemoglobin                                29.5


       Mean Corpuscular Hemoglobin
Concent  
                   31.4  L



       Red Cell Distribution Width                               16.3  H


       Platelet Count                                              218


       Mean Platelet Volume                                       10.2


       Immature Granulocytes %                                  1.600  H


       Neutrophils %                                              58.9


       Lymphocytes %                                              27.6


       Monocytes %                                                 9.6


       Eosinophils %                                               2.1


       Basophils %                                                 0.2


       Nucleated Red Blood Cells %                                 0.0


       Immature Granulocytes #                                  0.070  H


       Neutrophils #                                               2.6


       Lymphocytes #                                               1.2


       Monocytes #                                                 0.4


       Eosinophils #                                               0.1


       Basophils #                                                 0.0


       Nucleated Red Blood Cells #                                 0.0


       Sodium Level                                                138


       Potassium Level                                             4.3


       Chloride Level                                              105


       Carbon Dioxide Level                                         25


       Anion Gap                                                     8


       Blood Urea Nitrogen                                         23  H


       Creatinine                                                 1.22


       Est Glomerular Filtrat Rate
mL/min   
                     59  L



       Glucose Level                                               115


       Calcium Level                                               8.9





Medications





Current Medications


Pantoprazole (Protonix Tab) 40 mg DAILY@06 PO  Last administered on 1/19/19at 


06:38; Admin Dose 40 MG;  Start 1/11/19 at 06:00


Oxycodone/ Acetaminophen (Percocet (5/ 325)) 2 tab Q6H  PRN PO PAIN LEVEL 6-10 


Last administered on 1/18/19at 15:47; Admin Dose 2 TAB;  Start 1/11/19 at 01:00


Oxycodone/ Acetaminophen (Percocet (5/ 325)) 1 tab Q6H  PRN PO MODERATE PAIN 


LEVEL 4-6 Last administered on 1/19/19at 09:15; Admin Dose 1 TAB;  Start 1/11/19


at 01:00


Folic Acid (Folic Acid) 1 mg DAILY PO  Last administered on 1/19/19at 09:15; 


Admin Dose 1 MG;  Start 1/11/19 at 09:00


Multivitamins/ Minerals (Theragran-M) 1 tab DAILY PO  Last administered on 


1/19/19at 09:16; Admin Dose 1 TAB;  Start 1/11/19 at 09:00


Thiamine HCl (Vitamin B1) 100 mg DAILY PO  Last administered on 1/19/19at 09:15;


Admin Dose 100 MG;  Start 1/11/19 at 09:00


Polyethylene Glycol (Miralax) 17 gm DAILY  PRN PO CONSTIPATION;  Start 1/11/19 


at 01:00


Metoprolol Tartrate (Lopressor) 50 mg BID PO  Last administered on 1/19/19at 


09:16; Admin Dose 50 MG;  Start 1/11/19 at 09:00


Ferrous Sulfate (Ferrous Sulfate (Ec)) 325 mg TID PO  Last administered on 


1/19/19at 09:16; Admin Dose 325 MG;  Start 1/11/19 at 09:00


Tamsulosin HCl (Flomax) 0.4 mg HS PO  Last administered on 1/17/19at 20:42; 


Admin Dose 0.4 MG;  Start 1/11/19 at 21:00


Famotidine (Pepcid) 20 mg DAILY PO  Last administered on 1/19/19at 09:17; Admin 


Dose 20 MG;  Start 1/11/19 at 09:00


Magnesium Hydroxide (Milk Of Mag) 30 ml DAILY  PRN PO CONSTIPATION;  Start 


1/11/19 at 01:00


Acetaminophen (Tylenol Tab) 650 mg Q6H  PRN PO MILD PAIN(1-3)OR ELEVATED TEMP;  


Start 1/11/19 at 01:00


Ondansetron HCl (Zofran Inj) 4 mg Q8  PRN IV NAUSEA AND/OR VOMITING;  Start 


1/11/19 at 01:00


Senna (Senokot) 2 tab BID  PRN PO CONSTIPATION Last administered on 1/18/19at 


06:03; Admin Dose 2 TAB;  Start 1/11/19 at 01:00


Zolpidem Tartrate (Ambien) 5 mg HS  PRN PO INSOMNIA Last administered on 


1/14/19at 21:49; Admin Dose 5 MG;  Start 1/11/19 at 01:00


Tramadol HCl (Ultram) 50 mg TID  PRN PO PAIN LEVEL 1-5 Last administered on 


1/13/19at 04:40; Admin Dose 50 MG;  Start 1/11/19 at 01:00


Miscellaneous Information Patients own medicat... BID@1000,1600 XX ;  Start 


1/11/19 at 10:00


Bicalutamide (Casodex) 50 mg DAILY PO  Last administered on 1/19/19at 09:16; 


Admin Dose 50 MG;  Start 1/12/19 at 16:30


Morphine Sulfate (morphine) 6 mg Q4H  PRN PO SEVERE PAIN LEVEL 7-10 Last 


administered on 1/16/19at 22:16; Admin Dose 6 MG;  Start 1/15/19 at 17:30





Assessment/Plan


Chief Complaint/Hosp Course


68-year-old -American male was transferred from [facility] to [facility]. Patient was found to have metastatic 


cancer to the lungs and his bones and upon admission here at [facility] his 


PSA was over 1000 therefore a urological consultation was requested.  The 


patient himself is not a good historian.  He is confused and sometimes agitated.


 At the [facility] they attempted to have MRI of the spine to 


evaluate metastatic disease and evaluate for vertebral instability but the 


patient was confused and agitated and they were not able to do the MRI.  The 


patient is known to have had subdural hematoma status post evacuation at 


[facility] in July 2018.  The patient also was found to have 


lymphadenopathy and bony metastases.  He also is known to have a history of 


hypertension and anemia.  


CT scan of the abdomen and pelvis without contrast at [facility] done on


1/7/2019 showed lung bases: Extensive pleural nodularity and pulmonary nodules 


bilaterally bilateral left greater than right small pleural effusions and 


bibasilar atelectasis liver: Unremarkable, gallbladder and bile ducts: 


Contracted likely containing gallstones ,spleen unremarkable,pancreas 


unremarkable, adrenals mildly thickened left greater than right adrenal glands, 


kidneys and ureters: Bilateral low-density lesions mostly fluid density but 


incompletely characterized measuring up to 6.2 cm on the right kidney, mild left


hydronephrosis likely due to an obstructing 6 mm stone in the distal left 


ureter. Bowels nondilated bowel loops moderate stool burden.  Bladder diffuse c


ircumferential wall thickening.  Reproductive organs: Prostatomegaly with median


lobe hypertrophy.  Lymph node: Likely prominent retroperitoneal and bilateral 


iliac chain lymph nodes although overall poorly delineated due to lack of 


intravenous contrast.  Peritoneum: Small volume ascites.  Vessels: Moderate 


atherosclerotic calcification.  Abdominal wall: Mild body wall edema.  Bones: 


Mixed lytic ,sclerotic bone lesions involving the entire axial and appendicular 


skeleton with areas of vertebral body height loss for example in the L3 


vertebral body and marked areas of cortical destruction for example on the left 


inferior pubic ramus


The lumbosacral spine x-rays and pelvic x-rays showed osteoblastic lesions 


consistent with metastasis most likely from the prostate.  The patient is 


voiding well.  He also does have a distal left ureteral stone but he denies 


having any pain.  We will strain his urine and do a KUB to see if the stone is 


visible and is radiopaque.  Since his PSA is over thousand I wanted to add 


Lupron Depot to his treatment in addition to the Casodex.  The Lupron is not on 


the formulary of the hospital.  I requested that but the pharmacy director did 


not approve it stating that this should be done as an outpatient.  My concern is


this patient is not the type that we will follow-up as an outpatient and his 


cancer is very advanced and would benefit from at least a 1 month injection.  He


did have the bone scan done today and that showed:


 


Multiple foci of increased activity are seen in the calvarium, sternum, rib 


cages bilaterally, sternum, pelvic bones bilaterally and both hips.


 


No other definite abnormal areas of increased activity or asymmetries are 


visualized in the study and distribution of radionuclide is homogeneous in the 


skull, spine, rib cages, sternum, pelvis and visualized portions of the upper 


and lower extremities.


 


Of incidental note, there is a poor visualization of the kidneys.


 


Area of increased activity seen within the right pelvis. The finding may be 


related to physiologic bladder activity; also query right pelvic transplanted 


kidney.





Urologically continue the Casodex and once he is out of the hospital start him 


on Lupron Depot as well





I had a discussion with the patient about him coming to the office to undergo 


transrectal ultrasound and ultrasound-guided biopsy of the prostate.  He did not


seem to be able to do it.  Patient lives in [place] .  His address is [address] [place] [address].. He uses [employer] for his travel.  He cannot 


walk.  He has pain in his legs and his legs are very weak.  Therefore he will 


not be able to make it to the office especially that he lives in [facility].  


Therefore it will be difficult to obtain a tissue diagnosis from prostate 


biopsy.  He also may have difficulty coming to the office to give him Lupron 


Depo for his prostate cancer.


If he is sent to a skilled nursing facility then the skilled nursing facility 


will become a responsible for his Lupron injections and also I was to do an 


ultrasound-guided biopsy of his prostate the nursing home has to authorize it 


and be financially responsible for it.





If however there is any lesion that could be biopsied while he is in the 


hospital then that may be a better option to establish a pathological diagnosis.











HAILEE CLIFTON MD            Jan 19, 2019 11:48
Date/Time of Note


Date/Time of Note


DATE: 1/20/19 


TIME: 11:59





Assessment/Plan


Assessment/Plan


Hospital Course


67 yo male





1.  Anemia, which is a combination of metastasis to the bone and also 


gastrointestinal bleed.


2.  Renal insufficiency.


3.  Malnutrition.


4.  Prostate cancer with mets to the bone noted in bone scan


5.  Hypertension.


6.  Hepatitis C.


7.  Alcoholism.


8.  Gastritis


9.  S/P EGD


10.  Transaminitis


-alk phos elevated likely due to bone mets


11.  Weight loss of 80 lbs over 3 months


12.  Elevated alk phos secondary to bone mets


13.  Fatty liver


14.  Cholelithiasis


15.  Dilated CBD at 10mm


16.  Elevated CEA (AFP and CA 19-9 wnl)





US of abd:


Fatty infiltration of the liver.


Cholelithiasis.


Dilated CBD. 10mm


Multiple simple cysts in the right kidney





MRCP 1/16:


1. Limited evaluation due to motion artifact.


2. Cholelithiasis.


3. Common bile duct dilatation up to 10 mm. No large stones seen within the 


common bile duct, however evaluation is severely limited by motion artifact.


4. Mild left hydronephrosis, as seen on prior ultrasound.


5. Heterogeneous signal of the visualized bones. Correlate with recent nuclear 


medicine bone scan.





Plan


FOB negative


Continue PPI


WE will monitor LFTs and for clinical indication of bile duct obstruction, 


currently total bili wnl and pt denies abd pain and neg Gonzales's sign


Pt examined and plan of care discussed with Dr. Valentin


Result Diagram:  


1/19/19 0538                                                                    


           1/19/19 0538








Consultation Date/Type/Reason


Admit Date/Time


Andrey 10, 2019 at 22:30


Initial Consult Date


1/12/19


Requesting Provider:  ANKIT MONAE MD





24 HR Interval Summary


Free Text/Dictation


No N/V, denies abd pain.





Exam/Review of Systems


Vital Signs


Vitals





Vital Signs


  Date      Temp  Pulse  Resp  B/P (MAP)   Pulse Ox  O2          O2 Flow    FiO2


Time                                                 Delivery    Rate


   1/20/19  98.0     65    18      103/65        99  Room Air


     08:55                           (78)








Intake and Output





1/19/19 1/19/19 1/20/19





1515:00


23:00


07:00





IntakeIntake Total


1220 ml


720 ml


400 ml





OutputOutput Total


800 ml


400 ml


300 ml





BalanceBalance


420 ml


320 ml


100 ml














Exam


Constitutional:  alert, oriented


Eyes:  PERRL


Respiratory:  clear to auscultation


Cardiovascular:  regular rate and rhythm


Gastrointestinal:  soft, non-tender, other (neg muprhy's)


Musculoskeletal:  muscle weakness


Neurological:  nl mental status, nl speech





Medications


Medications





Current Medications


Pantoprazole (Protonix Tab) 40 mg DAILY@06 PO  Last administered on 1/20/19at 


06:17; Admin Dose 40 MG;  Start 1/11/19 at 06:00


Oxycodone/ Acetaminophen (Percocet (5/ 325)) 2 tab Q6H  PRN PO PAIN LEVEL 6-10 


Last administered on 1/18/19at 15:47; Admin Dose 2 TAB;  Start 1/11/19 at 01:00


Oxycodone/ Acetaminophen (Percocet (5/ 325)) 1 tab Q6H  PRN PO MODERATE PAIN 


LEVEL 4-6 Last administered on 1/20/19at 03:17; Admin Dose 1 TAB;  Start 1/11/19


at 01:00


Folic Acid (Folic Acid) 1 mg DAILY PO  Last administered on 1/20/19at 08:40; 


Admin Dose 1 MG;  Start 1/11/19 at 09:00


Multivitamins/ Minerals (Theragran-M) 1 tab DAILY PO  Last administered on 


1/20/19at 08:40; Admin Dose 1 TAB;  Start 1/11/19 at 09:00


Thiamine HCl (Vitamin B1) 100 mg DAILY PO  Last administered on 1/20/19at 08:40;


Admin Dose 100 MG;  Start 1/11/19 at 09:00


Polyethylene Glycol (Miralax) 17 gm DAILY  PRN PO CONSTIPATION;  Start 1/11/19 


at 01:00


Ferrous Sulfate (Ferrous Sulfate (Ec)) 325 mg TID PO  Last administered on 1 /20/19at 08:40; Admin Dose 325 MG;  Start 1/11/19 at 09:00


Tamsulosin HCl (Flomax) 0.4 mg HS PO  Last administered on 1/19/19at 20:33; 


Admin Dose 0.4 MG;  Start 1/11/19 at 21:00


Famotidine (Pepcid) 20 mg DAILY PO  Last administered on 1/20/19at 08:40; Admin 


Dose 20 MG;  Start 1/11/19 at 09:00


Magnesium Hydroxide (Milk Of Mag) 30 ml DAILY  PRN PO CONSTIPATION;  Start 


1/11/19 at 01:00


Acetaminophen (Tylenol Tab) 650 mg Q6H  PRN PO MILD PAIN(1-3)OR ELEVATED TEMP;  


Start 1/11/19 at 01:00


Ondansetron HCl (Zofran Inj) 4 mg Q8  PRN IV NAUSEA AND/OR VOMITING;  Start 


1/11/19 at 01:00


Senna (Senokot) 2 tab BID  PRN PO CONSTIPATION Last administered on 1/18/19at 


06:03; Admin Dose 2 TAB;  Start 1/11/19 at 01:00


Zolpidem Tartrate (Ambien) 5 mg HS  PRN PO INSOMNIA Last administered on 


1/14/19at 21:49; Admin Dose 5 MG;  Start 1/11/19 at 01:00


Tramadol HCl (Ultram) 50 mg TID  PRN PO PAIN LEVEL 1-5 Last administered on 


1/19/19at 20:33; Admin Dose 50 MG;  Start 1/11/19 at 01:00


Miscellaneous Information Patients own medicat... BID@1000,1600 XX ;  Start 


1/11/19 at 10:00


Bicalutamide (Casodex) 50 mg DAILY PO  Last administered on 1/20/19at 08:41; 


Admin Dose 50 MG;  Start 1/12/19 at 16:30


Morphine Sulfate (morphine) 6 mg Q4H  PRN PO SEVERE PAIN LEVEL 7-10 Last 


administered on 1/19/19at 23:36; Admin Dose 6 MG;  Start 1/15/19 at 17:30


Metoprolol Tartrate (Lopressor) 25 mg BID PO ;  Start 1/19/19 at 21:00











ALEKS BOWEN           Jan 20, 2019 12:00
Date/Time of Note


Date/Time of Note


DATE: 1/20/19 


TIME: 20:14





Consult Date/Type/Reason


Admit Date/Time


Andrey 10, 2019 at 22:30


Initial Consult Date


1/12/19


Type of Consultation:  Urology


Reason for Consultation


Metastatic prostate cancer


Requesting Provider:  ANKIT MONAE MD





Subjective


Patient keeps stating he should not be having cancer as he does not feel 


anything and nobody in his family has it.  He wants it out of his body.  When I 


explained to him that it is metastatic and we hope to slow it down but we cannot


cure it he does not seem to accept that stating he wants it out of his body and 


that he knows people who had cancer like that and were cured.





Objective





Vital Signs


  Date      Temp  Pulse  Resp  B/P (MAP)   Pulse Ox  O2          O2 Flow    FiO2


Time                                                 Delivery    Rate


   1/20/19  98.2     68    18      124/60        96  Room Air


     14:27                           (81)








Intake and Output





1/19/19 1/19/19 1/20/19





1515:00


23:00


07:00





IntakeIntake Total


1220 ml


720 ml


400 ml





OutputOutput Total


800 ml


400 ml


300 ml





BalanceBalance


420 ml


320 ml


100 ml











Exam


He moves his extremities well.  Denies any dysuria.  His urine is clear





Results/Medications


Result Diagram:  


1/19/19 0538                                                                    


           1/19/19 0538





Medications





Current Medications


Pantoprazole (Protonix Tab) 40 mg DAILY@06 PO  Last administered on 1/20/19at 


06:17; Admin Dose 40 MG;  Start 1/11/19 at 06:00


Oxycodone/ Acetaminophen (Percocet (5/ 325)) 2 tab Q6H  PRN PO PAIN LEVEL 6-10 


Last administered on 1/18/19at 15:47; Admin Dose 2 TAB;  Start 1/11/19 at 01:00


Oxycodone/ Acetaminophen (Percocet (5/ 325)) 1 tab Q6H  PRN PO MODERATE PAIN 


LEVEL 4-6 Last administered on 1/20/19at 03:17; Admin Dose 1 TAB;  Start 1/11/19


at 01:00


Folic Acid (Folic Acid) 1 mg DAILY PO  Last administered on 1/20/19at 08:40; 


Admin Dose 1 MG;  Start 1/11/19 at 09:00


Multivitamins/ Minerals (Theragran-M) 1 tab DAILY PO  Last administered on 


1/20/19at 08:40; Admin Dose 1 TAB;  Start 1/11/19 at 09:00


Thiamine HCl (Vitamin B1) 100 mg DAILY PO  Last administered on 1/20/19at 08:40;


Admin Dose 100 MG;  Start 1/11/19 at 09:00


Polyethylene Glycol (Miralax) 17 gm DAILY  PRN PO CONSTIPATION;  Start 1/11/19 


at 01:00


Ferrous Sulfate (Ferrous Sulfate (Ec)) 325 mg TID PO  Last administered on 


1/20/19at 19:45; Admin Dose 325 MG;  Start 1/11/19 at 09:00


Tamsulosin HCl (Flomax) 0.4 mg HS PO  Last administered on 1/20/19at 19:46; 


Admin Dose 0.4 MG;  Start 1/11/19 at 21:00


Famotidine (Pepcid) 20 mg DAILY PO  Last administered on 1/20/19at 08:40; Admin 


Dose 20 MG;  Start 1/11/19 at 09:00


Magnesium Hydroxide (Milk Of Mag) 30 ml DAILY  PRN PO CONSTIPATION;  Start 


1/11/19 at 01:00


Acetaminophen (Tylenol Tab) 650 mg Q6H  PRN PO MILD PAIN(1-3)OR ELEVATED TEMP;  


Start 1/11/19 at 01:00


Ondansetron HCl (Zofran Inj) 4 mg Q8  PRN IV NAUSEA AND/OR VOMITING;  Start 


1/11/19 at 01:00


Senna (Senokot) 2 tab BID  PRN PO CONSTIPATION Last administered on 1/18/19at 


06:03; Admin Dose 2 TAB;  Start 1/11/19 at 01:00


Zolpidem Tartrate (Ambien) 5 mg HS  PRN PO INSOMNIA Last administered on 


1/14/19at 21:49; Admin Dose 5 MG;  Start 1/11/19 at 01:00


Tramadol HCl (Ultram) 50 mg TID  PRN PO PAIN LEVEL 1-5 Last administered on 


1/19/19at 20:33; Admin Dose 50 MG;  Start 1/11/19 at 01:00


Miscellaneous Information Patients own medicat... BID@1000,1600 XX ;  Start 


1/11/19 at 10:00


Bicalutamide (Casodex) 50 mg DAILY PO  Last administered on 1/20/19at 08:41; 


Admin Dose 50 MG;  Start 1/12/19 at 16:30


Morphine Sulfate (morphine) 6 mg Q4H  PRN PO SEVERE PAIN LEVEL 7-10 Last 


administered on 1/19/19at 23:36; Admin Dose 6 MG;  Start 1/15/19 at 17:30





Assessment/Plan


Chief Complaint/Hosp Course


68-year-old -American male was transferred from [facility] to [facility]. Patient was found to have metastatic 


cancer to the lungs and his bones and upon admission here at [facility] his 


PSA was over 1000 therefore a urological consultation was requested.  The 


patient himself is not a good historian.  He is confused and sometimes agitated.


 At the [facility] they attempted to have MRI of the spine to 


evaluate metastatic disease and evaluate for vertebral instability but the 


patient was confused and agitated and they were not able to do the MRI.  The 


patient is known to have had subdural hematoma status post evacuation at [facility] in July 2018.  The patient also was found to have lymphadenopathy 


and bony metastases.  He also is known to have a history of hypertension and 


anemia.  


CT scan of the abdomen and pelvis without contrast at [facility] done on


1/7/2019 showed lung bases: Extensive pleural nodularity and pulmonary nodules 


bilaterally bilateral left greater than right small pleural effusions and 


bibasilar atelectasis liver: Unremarkable, gallbladder and bile ducts: 


Contracted likely containing gallstones ,spleen unremarkable,pancreas 


unremarkable, adrenals mildly thickened left greater than right adrenal glands, 


kidneys and ureters: Bilateral low-density lesions mostly fluid density but in


completely characterized measuring up to 6.2 cm on the right kidney, mild left 


hydronephrosis likely due to an obstructing 6 mm stone in the distal left 


ureter. Bowels nondilated bowel loops moderate stool burden.  Bladder diffuse 


circumferential wall thickening.  Reproductive organs: Prostatomegaly with 


median lobe hypertrophy.  Lymph node: Likely prominent retroperitoneal and 


bilateral iliac chain lymph nodes although overall poorly delineated due to lack


of intravenous contrast.  Peritoneum: Small volume ascites.  Vessels: Moderate 


atherosclerotic calcification.  Abdominal wall: Mild body wall edema.  Bones: 


Mixed lytic ,sclerotic bone lesions involving the entire axial and appendicular 


skeleton with areas of vertebral body height loss for example in the L3 


vertebral body and marked areas of cortical destruction for example on the left 


inferior pubic ramus


The lumbosacral spine x-rays and pelvic x-rays showed osteoblastic lesions 


consistent with metastasis most likely from the prostate.  The patient is 


voiding well.  He also does have a distal left ureteral stone but he denies hav


ing any pain.  We will strain his urine and do a KUB to see if the stone is 


visible and is radiopaque.  Since his PSA is over thousand I wanted to add 


Lupron Depot to his treatment in addition to the Casodex.  The Lupron is not on 


the formulary of the hospital.  I requested that but the pharmacy director did 


not approve it stating that this should be done as an outpatient.  My concern is


this patient is not the type that  will follow-up as an outpatient because of 


where he lives and lack of transportation.  And his cancer is very advanced and 


would benefit from at least a 1 month injection.  





He did have the bone scan done today and that showed:


Multiple foci of increased activity are seen in the calvarium, sternum, rib 


cages bilaterally, sternum, pelvic bones bilaterally and both hips.


No other definite abnormal areas of increased activity or asymmetries are 


visualized in the study and distribution of radionuclide is homogeneous in the 


skull, spine, rib cages, sternum, pelvis and visualized portions of the upper 


and lower extremities.


Of incidental note, there is a poor visualization of the kidneys.


Area of increased activity seen within the right pelvis. The finding may be 


related to physiologic bladder activity; also query right pelvic transplanted 


kidney.





Urologically continue the Casodex and once he is out of the hospital start him 


on Lupron Depot as well





I had a discussion with the patient about him coming to the office to undergo 


transrectal ultrasound and ultrasound-guided biopsy of the prostate.  He did not


seem to be able to do it.  Patient lives in [place] .  His address is [address] [place] [id number] He uses [employer] for his travel.  He cannot 


walk.  He has pain in his legs and his legs are very weak.  Therefore he will 


not be able to make it to the office especially [place]  


Therefore it will be difficult to obtain a tissue diagnosis from prostate 


biopsy.  He also may have difficulty coming to the office to give him Lupron 


Depo for his prostate cancer.


If he is sent to a skilled nursing facility then the skilled nursing facility 


will become  responsible for his Lupron injections and also if I am to do an 


ultrasound-guided biopsy of his prostate the nursing home has to authorize it 


and be financially responsible for it.





If however there is any lesion that could be biopsied while he is in the 


hospital then that may be a better option to establish a pathological diagnosis.











HAILEE CLIFTON MD            Jan 20, 2019 20:20
Date/Time of Note


Date/Time of Note


DATE: 1/21/19 


TIME: 07:59





Assessment/Plan


Assessment/Plan


Hospital Course


67 yo male





1.  Anemia, likely due to cancer


-FOB negative


2.  Renal insufficiency.


3.  Malnutrition.


4.  Prostate cancer with mets to the bone noted in bone scan


5.  Hypertension.


6.  Hepatitis C.


7.  Alcoholism.


8.  Gastritis


9.  S/P EGD


10.  Transaminitis


-alk phos elevated likely due to bone mets


11.  Weight loss of 80 lbs over 3 months


12.  Elevated alk phos secondary to bone mets


13.  Fatty liver


14.  Cholelithiasis


15.  Dilated CBD at 10mm with no obstruction noted on imaging, total bili and 


alk wnl, and no abd pain, neg gonzales's


16.  Elevated CEA (AFP and CA 19-9 wnl)


-FOB negative





US of abd:


Fatty infiltration of the liver.


Cholelithiasis.


Dilated CBD. 10mm


Multiple simple cysts in the right kidney





MRCP 1/16:


1. Limited evaluation due to motion artifact.


2. Cholelithiasis.


3. Common bile duct dilatation up to 10 mm. No large stones seen within the 


common bile duct, however evaluation is severely limited by motion artifact.


4. Mild left hydronephrosis, as seen on prior ultrasound.


5. Heterogeneous signal of the visualized bones. Correlate with recent nuclear 


medicine bone scan.





Plan


Continue PPI


WE will monitor LFTs and for clinical indication of bile duct obstruction, 


currently total bili wnl and pt denies abd pain and neg Gonzales's sign





Pt examined and plan of care discussed with Dr. Valentin


Result Diagram:  


1/21/19 0609                                                                    


           1/21/19 0609





Results 24hrs





Laboratory Tests


               Test
                                1/21/19
06:09


               White Blood Count                           4.5  L


               Red Blood Count                            2.51  L


               Hemoglobin                                  7.5  L


               Hematocrit                                 24.1  L


               Mean Corpuscular Volume                     96.0


               Mean Corpuscular Hemoglobin                 29.9


               Mean Corpuscular Hemoglobin
Concent       31.1  L



               Red Cell Distribution Width                16.8  H


               Platelet Count                               217


               Mean Platelet Volume                       10.5  H


               Immature Granulocytes %                   1.800  H


               Neutrophils %                               60.0


               Lymphocytes %                               26.4


               Monocytes %                                  9.4


               Eosinophils %                                2.2


               Basophils %                                  0.2


               Nucleated Red Blood Cells %                  0.0


               Immature Granulocytes #                   0.080  H


               Neutrophils #                                2.7


               Lymphocytes #                                1.2


               Monocytes #                                  0.4


               Eosinophils #                                0.1


               Basophils #                                  0.0


               Nucleated Red Blood Cells #                  0.0


               Sodium Level                                 138


               Potassium Level                              4.1


               Chloride Level                               103


               Carbon Dioxide Level                          26


               Anion Gap                                      9


               Blood Urea Nitrogen                          23  H


               Creatinine                                  1.23


               Est Glomerular Filtrat Rate
mL/min          58  L



               Glucose Level                                117


               Calcium Level                                9.3








Consultation Date/Type/Reason


Admit Date/Time


Andrey 10, 2019 at 22:30


Initial Consult Date


1/12/19


Requesting Provider:  ANKIT MONAE MD





24 HR Interval Summary


Free Text/Dictation


Denies abd pain, neg gonzales's.  Denies N/V.  BM yesterday, no evidence of GI 


bleeding.





Exam/Review of Systems


Vital Signs


Vitals





Vital Signs


  Date      Temp  Pulse  Resp  B/P (MAP)   Pulse Ox  O2          O2 Flow    FiO2


Time                                                 Delivery    Rate


   1/21/19  98.1     88    18      117/61        98


     01:58                           (79)


   1/20/19                                           Room Air


     14:27








Intake and Output





1/20/19 1/20/19 1/21/19





1515:00


23:00


07:00





IntakeIntake Total


900 ml


240 ml


250 ml





OutputOutput Total


850 ml


200 ml


150 ml





BalanceBalance


50 ml


40 ml


100 ml














Exam


Constitutional:  alert, oriented


Psych:  no complaints


Head:  normocephalic


Eyes:  PERRL


Respiratory:  other (non labored)


Gastrointestinal:  soft, non-tender, bowel sounds


Extremities:  normal pulses


Neurological:  nl mental status





Medications


Medications





Current Medications


Pantoprazole (Protonix Tab) 40 mg DAILY@06 PO  Last administered on 1/21/19at 


05:22; Admin Dose 40 MG;  Start 1/11/19 at 06:00


Oxycodone/ Acetaminophen (Percocet (5/ 325)) 2 tab Q6H  PRN PO PAIN LEVEL 6-10 


Last administered on 1/20/19at 23:39; Admin Dose 2 TAB;  Start 1/11/19 at 01:00


Oxycodone/ Acetaminophen (Percocet (5/ 325)) 1 tab Q6H  PRN PO MODERATE PAIN 


LEVEL 4-6 Last administered on 1/20/19at 03:17; Admin Dose 1 TAB;  Start 1/11/19


at 01:00


Folic Acid (Folic Acid) 1 mg DAILY PO  Last administered on 1/20/19at 08:40; 


Admin Dose 1 MG;  Start 1/11/19 at 09:00


Multivitamins/ Minerals (Theragran-M) 1 tab DAILY PO  Last administered on 


1/20/19at 08:40; Admin Dose 1 TAB;  Start 1/11/19 at 09:00


Thiamine HCl (Vitamin B1) 100 mg DAILY PO  Last administered on 1/20/19at 08:40;


Admin Dose 100 MG;  Start 1/11/19 at 09:00


Polyethylene Glycol (Miralax) 17 gm DAILY  PRN PO CONSTIPATION;  Start 1/11/19 


at 01:00


Ferrous Sulfate (Ferrous Sulfate (Ec)) 325 mg TID PO  Last administered on 


1/20/19at 19:45; Admin Dose 325 MG;  Start 1/11/19 at 09:00


Tamsulosin HCl (Flomax) 0.4 mg HS PO  Last administered on 1/20/19at 19:46; 


Admin Dose 0.4 MG;  Start 1/11/19 at 21:00


Famotidine (Pepcid) 20 mg DAILY PO  Last administered on 1/20/19at 08:40; Admin 


Dose 20 MG;  Start 1/11/19 at 09:00


Magnesium Hydroxide (Milk Of Mag) 30 ml DAILY  PRN PO CONSTIPATION;  Start 


1/11/19 at 01:00


Acetaminophen (Tylenol Tab) 650 mg Q6H  PRN PO MILD PAIN(1-3)OR ELEVATED TEMP;  


Start 1/11/19 at 01:00


Ondansetron HCl (Zofran Inj) 4 mg Q8  PRN IV NAUSEA AND/OR VOMITING;  Start 1/11 /19 at 01:00


Senna (Senokot) 2 tab BID  PRN PO CONSTIPATION Last administered on 1/18/19at 


06:03; Admin Dose 2 TAB;  Start 1/11/19 at 01:00


Zolpidem Tartrate (Ambien) 5 mg HS  PRN PO INSOMNIA Last administered on 


1/14/19at 21:49; Admin Dose 5 MG;  Start 1/11/19 at 01:00


Tramadol HCl (Ultram) 50 mg TID  PRN PO PAIN LEVEL 1-5 Last administered on 


1/21/19at 02:01; Admin Dose 50 MG;  Start 1/11/19 at 01:00


Miscellaneous Information Patients own medicat... BID@1000,1600 XX ;  Start 


1/11/19 at 10:00


Bicalutamide (Casodex) 50 mg DAILY PO  Last administered on 1/20/19at 08:41; 


Admin Dose 50 MG;  Start 1/12/19 at 16:30


Morphine Sulfate (morphine) 6 mg Q4H  PRN PO SEVERE PAIN LEVEL 7-10 Last 


administered on 1/19/19at 23:36; Admin Dose 6 MG;  Start 1/15/19 at 17:30











ALEKS BOWEN           Jan 21, 2019 08:03
Date/Time of Note


Date/Time of Note


DATE: 1/21/19 


TIME: 09:46





Assessment/Plan


Assessment/Plan


Hospital Course


69 yo transferred from [facility] with widely metastatic disease to bones 


(confirmed by bone scan) and lung as well as RP LAD





#prostate ca


-w/u shows PSA >1000


-this is very consistent with met prostate ca


at this point highest on my list is that this is met prostate ca


ideally would get tissue biopsy for confirmation of diagnosis, ar score, 


etc however pt has not been very compliant with our recommendations for work-up 


and at times has denied that he has prostate ca, therefore in order not to delay


treatment he has been started on casodex as of 1/12/19


-cont Casodex 50 mg daily x 10 days then can start Lupron as out pt


also ideally would get Xgeva or zometa for bony mets, given renal failure 


bisphosphonate will not be given





Earlier this week I discussed the above with the patient and he denied that he 


has cancer. he said " you are saying that so you can do treatments and make 


money like everyone else here" I tried to explain to him otherwise. At this 


point, pt does not seem to have mental capacity to make decisions nor does he 


have understanding into his several serious health issues


had Psych eval and felt that pt able to make own decisions. 


I spoke to him again earlier this week and discussed that he has met prostate ca


to bone and he seemed to accept this and is willing to continue with androgen 


deprivation therapy


he is ok with continuation of casodex, after 10 days of this will start Lupron, 


if he is still hospitalized will see if we can administer in house





#anemia


multifactorial--CRI, chronic disease from underlying malignancy


can start procrit


monitor iron studies periodically


Result Diagram:  


1/21/19 0609                                                                    


           1/21/19 0609





Results 24hrs





Laboratory Tests


               Test
                                1/21/19
06:09


               White Blood Count                           4.5  L


               Red Blood Count                            2.51  L


               Hemoglobin                                  7.5  L


               Hematocrit                                 24.1  L


               Mean Corpuscular Volume                     96.0


               Mean Corpuscular Hemoglobin                 29.9


               Mean Corpuscular Hemoglobin
Concent       31.1  L



               Red Cell Distribution Width                16.8  H


               Platelet Count                               217


               Mean Platelet Volume                       10.5  H


               Immature Granulocytes %                   1.800  H


               Neutrophils %                               60.0


               Lymphocytes %                               26.4


               Monocytes %                                  9.4


               Eosinophils %                                2.2


               Basophils %                                  0.2


               Nucleated Red Blood Cells %                  0.0


               Immature Granulocytes #                   0.080  H


               Neutrophils #                                2.7


               Lymphocytes #                                1.2


               Monocytes #                                  0.4


               Eosinophils #                                0.1


               Basophils #                                  0.0


               Nucleated Red Blood Cells #                  0.0


               Sodium Level                                 138


               Potassium Level                              4.1


               Chloride Level                               103


               Carbon Dioxide Level                          26


               Anion Gap                                      9


               Blood Urea Nitrogen                          23  H


               Creatinine                                  1.23


               Est Glomerular Filtrat Rate
mL/min          58  L



               Glucose Level                                117


               Calcium Level                                9.3








Consultation Date/Type/Reason


Admit Date/Time


Andrey 10, 2019 at 22:30


Initial Consult Date


1/12/19


Requesting Provider:  ANKIT MONAE MD





24 HR Interval Summary


Free Text/Dictation


notes indicated intermittent confusion


family meeting pending





Exam/Review of Systems


Vital Signs


Vitals





Vital Signs


  Date      Temp  Pulse  Resp  B/P (MAP)   Pulse Ox  O2          O2 Flow    FiO2


Time                                                 Delivery    Rate


   1/21/19  97.9     83    18      112/80        98  Room Air


     08:00                           (91)








Intake and Output





1/20/19 1/20/19 1/21/19





1515:00


23:00


07:00





IntakeIntake Total


900 ml


240 ml


250 ml





OutputOutput Total


850 ml


200 ml


150 ml





BalanceBalance


50 ml


40 ml


100 ml














Medications


Medications





Current Medications


Pantoprazole (Protonix Tab) 40 mg DAILY@06 PO  Last administered on 1/21/19at 


05:22; Admin Dose 40 MG;  Start 1/11/19 at 06:00


Oxycodone/ Acetaminophen (Percocet (5/ 325)) 2 tab Q6H  PRN PO PAIN LEVEL 6-10 


Last administered on 1/20/19at 23:39; Admin Dose 2 TAB;  Start 1/11/19 at 01:00


Oxycodone/ Acetaminophen (Percocet (5/ 325)) 1 tab Q6H  PRN PO MODERATE PAIN 


LEVEL 4-6 Last administered on 1/20/19at 03:17; Admin Dose 1 TAB;  Start 1/11/19


at 01:00


Folic Acid (Folic Acid) 1 mg DAILY PO  Last administered on 1/21/19at 08:34; A


dmin Dose 1 MG;  Start 1/11/19 at 09:00


Multivitamins/ Minerals (Theragran-M) 1 tab DAILY PO  Last administered on 


1/21/19at 08:34; Admin Dose 1 TAB;  Start 1/11/19 at 09:00


Thiamine HCl (Vitamin B1) 100 mg DAILY PO  Last administered on 1/21/19at 08:34;


Admin Dose 100 MG;  Start 1/11/19 at 09:00


Polyethylene Glycol (Miralax) 17 gm DAILY  PRN PO CONSTIPATION;  Start 1/11/19 


at 01:00


Ferrous Sulfate (Ferrous Sulfate (Ec)) 325 mg TID PO  Last administered on 


1/21/19at 08:34; Admin Dose 325 MG;  Start 1/11/19 at 09:00


Tamsulosin HCl (Flomax) 0.4 mg HS PO  Last administered on 1/20/19at 19:46; Adm


in Dose 0.4 MG;  Start 1/11/19 at 21:00


Famotidine (Pepcid) 20 mg DAILY PO  Last administered on 1/21/19at 08:34; Admin 


Dose 20 MG;  Start 1/11/19 at 09:00


Magnesium Hydroxide (Milk Of Mag) 30 ml DAILY  PRN PO CONSTIPATION;  Start 


1/11/19 at 01:00


Acetaminophen (Tylenol Tab) 650 mg Q6H  PRN PO MILD PAIN(1-3)OR ELEVATED TEMP;  


Start 1/11/19 at 01:00


Ondansetron HCl (Zofran Inj) 4 mg Q8  PRN IV NAUSEA AND/OR VOMITING;  Start 


1/11/19 at 01:00


Senna (Senokot) 2 tab BID  PRN PO CONSTIPATION Last administered on 1/18/19at 


06:03; Admin Dose 2 TAB;  Start 1/11/19 at 01:00


Zolpidem Tartrate (Ambien) 5 mg HS  PRN PO INSOMNIA Last administered on 


1/14/19at 21:49; Admin Dose 5 MG;  Start 1/11/19 at 01:00


Tramadol HCl (Ultram) 50 mg TID  PRN PO PAIN LEVEL 1-5 Last administered on 


1/21/19at 02:01; Admin Dose 50 MG;  Start 1/11/19 at 01:00


Miscellaneous Information Patients own medicat... BID@1000,1600 XX ;  Start 


1/11/19 at 10:00


Bicalutamide (Casodex) 50 mg DAILY PO  Last administered on 1/21/19at 08:35; 


Admin Dose 50 MG;  Start 1/12/19 at 16:30


Morphine Sulfate (morphine) 6 mg Q4H  PRN PO SEVERE PAIN LEVEL 7-10 Last 


administered on 1/19/19at 23:36; Admin Dose 6 MG;  Start 1/15/19 at 17:30











MARIUM ARZATE                Jan 21, 2019 09:48
Date/Time of Note


Date/Time of Note


DATE: 1/21/19 


TIME: 18:13





Consult Date/Type/Reason


Admit Date/Time


Andrey 10, 2019 at 22:30


Initial Consult Date


1/12/19


Type of Consultation:  Urology


Reason for Consultation


Metastatic prostate cancer


Requesting Provider:  ANKIT MONAE MD





Subjective


This patient is comfortable, he denies any pain at the present time





Objective





Vital Signs


  Date      Temp  Pulse  Resp  B/P (MAP)   Pulse Ox  O2          O2 Flow    FiO2


Time                                                 Delivery    Rate


   1/21/19  97.9     98    18      114/66       100  Room Air


     14:09                           (82)








Intake and Output





1/20/19 1/20/19 1/21/19





1515:00


23:00


07:00





IntakeIntake Total


900 ml


240 ml


250 ml





OutputOutput Total


850 ml


200 ml


150 ml





BalanceBalance


50 ml


40 ml


100 ml











Exam


Abdomen is soft, there is no abdominal mass palpable.  He is capable of moving 


all extremities.





Results/Medications


Result Diagram:  


1/21/19 0609                                                                    


           1/21/19 0609





Results 24 hrs





Laboratory Tests


               Test
                                1/21/19
06:09


               White Blood Count                           4.5  L


               Red Blood Count                            2.51  L


               Hemoglobin                                  7.5  L


               Hematocrit                                 24.1  L


               Mean Corpuscular Volume                     96.0


               Mean Corpuscular Hemoglobin                 29.9


               Mean Corpuscular Hemoglobin
Concent       31.1  L



               Red Cell Distribution Width                16.8  H


               Platelet Count                               217


               Mean Platelet Volume                       10.5  H


               Immature Granulocytes %                   1.800  H


               Neutrophils %                               60.0


               Lymphocytes %                               26.4


               Monocytes %                                  9.4


               Eosinophils %                                2.2


               Basophils %                                  0.2


               Nucleated Red Blood Cells %                  0.0


               Immature Granulocytes #                   0.080  H


               Neutrophils #                                2.7


               Lymphocytes #                                1.2


               Monocytes #                                  0.4


               Eosinophils #                                0.1


               Basophils #                                  0.0


               Nucleated Red Blood Cells #                  0.0


               Sodium Level                                 138


               Potassium Level                              4.1


               Chloride Level                               103


               Carbon Dioxide Level                          26


               Anion Gap                                      9


               Blood Urea Nitrogen                          23  H


               Creatinine                                  1.23


               Est Glomerular Filtrat Rate
mL/min          58  L



               Glucose Level                                117


               Calcium Level                                9.3





Medications





Current Medications


Pantoprazole (Protonix Tab) 40 mg DAILY@06 PO  Last administered on 1/21/19at 


05:22; Admin Dose 40 MG;  Start 1/11/19 at 06:00


Oxycodone/ Acetaminophen (Percocet (5/ 325)) 2 tab Q6H  PRN PO PAIN LEVEL 6-10 


Last administered on 1/20/19at 23:39; Admin Dose 2 TAB;  Start 1/11/19 at 01:00


Oxycodone/ Acetaminophen (Percocet (5/ 325)) 1 tab Q6H  PRN PO MODERATE PAIN 


LEVEL 4-6 Last administered on 1/21/19at 13:34; Admin Dose 1 TAB;  Start 1/11/19


at 01:00


Folic Acid (Folic Acid) 1 mg DAILY PO  Last administered on 1/21/19at 08:34; 


Admin Dose 1 MG;  Start 1/11/19 at 09:00


Multivitamins/ Minerals (Theragran-M) 1 tab DAILY PO  Last administered on 


1/21/19at 08:34; Admin Dose 1 TAB;  Start 1/11/19 at 09:00


Thiamine HCl (Vitamin B1) 100 mg DAILY PO  Last administered on 1/21/19at 08:34;


Admin Dose 100 MG;  Start 1/11/19 at 09:00


Polyethylene Glycol (Miralax) 17 gm DAILY  PRN PO CONSTIPATION;  Start 1/11/19 


at 01:00


Ferrous Sulfate (Ferrous Sulfate (Ec)) 325 mg TID PO  Last administered on 


1/21/19at 13:34; Admin Dose 325 MG;  Start 1/11/19 at 09:00


Tamsulosin HCl (Flomax) 0.4 mg HS PO  Last administered on 1/20/19at 19:46; 


Admin Dose 0.4 MG;  Start 1/11/19 at 21:00


Famotidine (Pepcid) 20 mg DAILY PO  Last administered on 1/21/19at 08:34; Admin 


Dose 20 MG;  Start 1/11/19 at 09:00


Magnesium Hydroxide (Milk Of Mag) 30 ml DAILY  PRN PO CONSTIPATION;  Start 


1/11/19 at 01:00


Acetaminophen (Tylenol Tab) 650 mg Q6H  PRN PO MILD PAIN(1-3)OR ELEVATED TEMP;  


Start 1/11/19 at 01:00


Ondansetron HCl (Zofran Inj) 4 mg Q8  PRN IV NAUSEA AND/OR VOMITING;  Start 


1/11/19 at 01:00


Senna (Senokot) 2 tab BID  PRN PO CONSTIPATION Last administered on 1/18/19at 


06:03; Admin Dose 2 TAB;  Start 1/11/19 at 01:00


Zolpidem Tartrate (Ambien) 5 mg HS  PRN PO INSOMNIA Last administered on 1/14/19


at 21:49; Admin Dose 5 MG;  Start 1/11/19 at 01:00


Tramadol HCl (Ultram) 50 mg TID  PRN PO PAIN LEVEL 1-5 Last administered on 


1/21/19at 02:01; Admin Dose 50 MG;  Start 1/11/19 at 01:00


Miscellaneous Information Patients own medicat... BID@1000,1600 XX ;  Start 


1/11/19 at 10:00


Bicalutamide (Casodex) 50 mg DAILY PO  Last administered on 1/21/19at 08:35; 


Admin Dose 50 MG;  Start 1/12/19 at 16:30


Morphine Sulfate (morphine) 6 mg Q4H  PRN PO SEVERE PAIN LEVEL 7-10 Last 


administered on 1/19/19at 23:36; Admin Dose 6 MG;  Start 1/15/19 at 17:30





Assessment/Plan


Chief Complaint/Hosp Course


68-year-old -American male was transferred from [facility] to [facility]. Patient was found to have metastatic 


cancer to the lungs and his bones and upon admission here at [facility] his 


PSA was over 1000 therefore a urological consultation was requested.  The 


patient himself is not a good historian.  He is confused and sometimes agitated.


 At the [facility] they attempted to have MRI of the spine to 


evaluate metastatic disease and evaluate for vertebral instability but the 


patient was confused and agitated and they were not able to do the MRI.  The 


patient is known to have had subdural hematoma status post evacuation at 


[facility] in July 2018.  The patient also was found to have 


lymphadenopathy and bony metastases.  He also is known to have a history of 


hypertension and anemia.  


CT scan of the abdomen and pelvis without contrast at [facility] done on


1/7/2019 showed lung bases: Extensive pleural nodularity and pulmonary nodules 


bilaterally bilateral left greater than right small pleural effusions and 


bibasilar atelectasis liver: Unremarkable, gallbladder and bile ducts: Contract


ed likely containing gallstones ,spleen unremarkable,pancreas unremarkable, 


adrenals mildly thickened left greater than right adrenal glands, kidneys and 


ureters: Bilateral low-density lesions mostly fluid density but incompletely 


characterized measuring up to 6.2 cm on the right kidney, mild left h


ydronephrosis likely due to an obstructing 6 mm stone in the distal left ureter.


Bowels nondilated bowel loops moderate stool burden.  Bladder diffuse 


circumferential wall thickening.  Reproductive organs: Prostatomegaly with 


median lobe hypertrophy.  Lymph node: Likely prominent retroperitoneal and 


bilateral iliac chain lymph nodes although overall poorly delineated due to lack


of intravenous contrast.  Peritoneum: Small volume ascites.  Vessels: Moderate 


atherosclerotic calcification.  Abdominal wall: Mild body wall edema.  Bones: 


Mixed lytic ,sclerotic bone lesions involving the entire axial and appendicular 


skeleton with areas of vertebral body height loss for example in the L3 


vertebral body and marked areas of cortical destruction for example on the left 


inferior pubic ramus


The lumbosacral spine x-rays and pelvic x-rays showed osteoblastic lesions 


consistent with metastasis most likely from the prostate.  The patient is 


voiding well.  He also does have a distal left ureteral stone but he denies 


having any pain.  We will strain his urine and do a KUB to see if the stone is 


visible and is radiopaque.  Since his PSA is over thousand I wanted to add 


Lupron Depot to his treatment in addition to the Casodex.  The Lupron is not on 


the formulary of the hospital.  I requested that but the pharmacy director did 


not approve it stating that this should be done as an outpatient.  My concern is


this patient is not the type that  will follow-up as an outpatient because of 


where he lives and lack of transportation.  And his cancer is very advanced and 


would benefit from at least a 1 month injection.  





He did have the bone scan done today and that showed:


Multiple foci of increased activity are seen in the calvarium, sternum, rib 


cages bilaterally, sternum, pelvic bones bilaterally and both hips.


No other definite abnormal areas of increased activity or asymmetries are 


visualized in the study and distribution of radionuclide is homogeneous in the 


skull, spine, rib cages, sternum, pelvis and visualized portions of the upper 


and lower extremities.


Of incidental note, there is a poor visualization of the kidneys.


Area of increased activity seen within the right pelvis. The finding may be 


related to physiologic bladder activity; also query right pelvic transplanted 


kidney.





Urologically continue the Casodex and once he is out of the hospital start him 


on Lupron Depot as well





I had a discussion with the patient about him coming to the office to undergo 


transrectal ultrasound and ultrasound-guided biopsy of the prostate.  He did not


seem to be able to do it.  Patient lives in [place] .  His address is [address] [place] [address], [place] [address].. He uses [employer] for his travel.  He cannot 


walk.  He has pain in his legs and his legs are very weak.  Therefore he will 


not be able to make it to the office especially that he lives in [place].  


Therefore it will be difficult to obtain a tissue diagnosis from prostate 


biopsy.  He also may have difficulty coming to the office to give him Lupron 


Depo for his prostate cancer.


If he is sent to a skilled nursing facility then the skilled nursing facility 


will become  responsible for his Lupron injections and also if I am to do an 


ultrasound-guided biopsy of his prostate the nursing home has to authorize it 


and be financially responsible for it.





If however there is any lesion that could be biopsied while he is in the 


hospital then that may be a better option to establish a pathological diagnosis.


For now continue present treatment











HAILEE CLIFTON MD            Jan 21, 2019 18:15
Date/Time of Note


Date/Time of Note


DATE: 1/22/19 


TIME: 10:11





Assessment/Plan


A 69 yo [facility] with 





#1 Prostate Cancer 


    - presumed given PSA >1000


   - scans from [facility] demonstrates widely metastatic disease to bones and lung as 


well as RP LAD


   - continue Casodex 50 mgd aily x 10 days then can start Lupron as out pt


   - met prostate ca is treated with androgen deprivation therapy


   - will start  Xgeva or zometa for bony mets as an outpatient also ideally 


would get Xgeva or zometa for bony mets, given renal failure bisphosphonate will


not be given


   -1/14/19- Bone Scan - will fu result and perform biopsy appropriately





# 3 Anemia -- Hg 7.5


   - chronic disease from underlying malignancy


   -likely anemia of chronic inflammation. iron studies are ok but elevated fe


rritin noted


   -hold on transfusion for now


   - s/p EGD -  with chronic gastritis


   - cont  procrit


   - monitor iron studies periodically





#  Common  Bile Duct Dilation 


   - 1/16/2019- US abdomen showed Common Bile Duct Dilation


   - 1/16/2019- US MRI showed-  Common bile duct dilatation up to 10 mm. No la


rge stones seen within the common bile duct


   - management per GI 


# Acute Transaminitis


   - Management Per GI





#  Acute Kidney injury- trended up today Cr 1.46


   - management per PMD





#  Mild left hydronephrosis - shown on MRI abdomen 


   - managements per PMD





# SP Psych evaluation-  that pt able to make own decisions.





 A total of 50 minutes of face to face time was spent speaking with the patient 


of which greater than 50% was spent in counseling and coordination of care and 


the detailed question and answer session. Dw staff.Patient is willing to 


continue with androgen deprivation therapy; he is ok wit to continue casodex, 


after 10 days of this will start Lupron





Patient seen in collaboration with Dr Mcnulty


Result Diagram:  


1/21/19 0609                                                                    


           1/22/19 0637





Results 24hrs





Laboratory Tests


               Test
                                1/22/19
06:37


               Sodium Level                                 138


               Potassium Level                              4.9


               Chloride Level                               101


               Carbon Dioxide Level                          29


               Anion Gap                                      8


               Blood Urea Nitrogen                          26  H


               Creatinine                                 1.30  H


               Est Glomerular Filtrat Rate
mL/min          55  L



               Glucose Level                                 92


               Calcium Level                                9.4


               Total Bilirubin                             0.0  L


               Direct Bilirubin                            0.00


               Indirect Bilirubin                           0.0


               Aspartate Amino Transf
(AST/SGOT)            34  



               Alanine Aminotransferase
(ALT/SGPT)          18  



               Alkaline Phosphatase                        356  H


               Total Protein                                6.9


               Albumin                                      3.6


               Globulin                                   3.30  H


               Albumin/Globulin Ratio                      1.09








Consultation Date/Type/Reason


Admit Date/Time


Andrey 10, 2019 at 10:30 pm


Initial Consult Date


1/12/19


Type of Consult


oncology


Requesting Provider:  ANKIT MONAE MD





Exam/Review of Systems


Vital Signs


Vitals





Vital Signs


  Date      Temp  Pulse  Resp  B/P (MAP)   Pulse Ox  O2          O2 Flow    FiO2


Time                                                 Delivery    Rate


   1/22/19  98.8     88    18      118/68        98


     08:00                           (85)


   1/21/19                                           Room Air


     14:09








Intake and Output





1/21/19 1/21/19 1/22/19





1515:00


23:00


07:00





IntakeIntake Total


240 ml


960 ml


700 ml





OutputOutput Total


200 ml


600 ml


1150 ml





BalanceBalance


40 ml


360 ml


-450 ml














Medications


Medications





Current Medications


Pantoprazole (Protonix Tab) 40 mg DAILY@06 PO  Last administered on 1/22/19at 


05:52; Admin Dose 40 MG;  Start 1/11/19 at 06:00


Oxycodone/ Acetaminophen (Percocet (5/ 325)) 2 tab Q6H  PRN PO PAIN LEVEL 6-10 


Last administered on 1/21/19at 21:01; Admin Dose 2 TAB;  Start 1/11/19 at 01:00


Oxycodone/ Acetaminophen (Percocet (5/ 325)) 1 tab Q6H  PRN PO MODERATE PAIN 


LEVEL 4-6 Last administered on 1/22/19at 05:52; Admin Dose 1 TAB;  Start 1/11/19


at 01:00


Folic Acid (Folic Acid) 1 mg DAILY PO  Last administered on 1/22/19at 08:50; 


Admin Dose 1 MG;  Start 1/11/19 at 09:00


Multivitamins/ Minerals (Theragran-M) 1 tab DAILY PO  Last administered on 


1/22/19at 08:50; Admin Dose 1 TAB;  Start 1/11/19 at 09:00


Thiamine HCl (Vitamin B1) 100 mg DAILY PO  Last administered on 1/22/19at 08:50;


Admin Dose 100 MG;  Start 1/11/19 at 09:00


Polyethylene Glycol (Miralax) 17 gm DAILY  PRN PO CONSTIPATION;  Start 1/11/19 


at 01:00


Ferrous Sulfate (Ferrous Sulfate (Ec)) 325 mg TID PO  Last administered on 


1/22/19at 08:50; Admin Dose 325 MG;  Start 1/11/19 at 09:00


Tamsulosin HCl (Flomax) 0.4 mg HS PO  Last administered on 1/20/19at 19:46; 


Admin Dose 0.4 MG;  Start 1/11/19 at 21:00


Famotidine (Pepcid) 20 mg DAILY PO  Last administered on 1/22/19at 08:50; Admin 


Dose 20 MG;  Start 1/11/19 at 09:00


Magnesium Hydroxide (Milk Of Mag) 30 ml DAILY  PRN PO CONSTIPATION;  Start 


1/11/19 at 01:00


Acetaminophen (Tylenol Tab) 650 mg Q6H  PRN PO MILD PAIN(1-3)OR ELEVATED TEMP;  


Start 1/11/19 at 01:00


Ondansetron HCl (Zofran Inj) 4 mg Q8  PRN IV NAUSEA AND/OR VOMITING;  Start 


1/11/19 at 01:00


Senna (Senokot) 2 tab BID  PRN PO CONSTIPATION Last administered on 1/18/19at 


06:03; Admin Dose 2 TAB;  Start 1/11/19 at 01:00


Zolpidem Tartrate (Ambien) 5 mg HS  PRN PO INSOMNIA Last administered on 


1/14/19at 21:49; Admin Dose 5 MG;  Start 1/11/19 at 01:00


Tramadol HCl (Ultram) 50 mg TID  PRN PO PAIN LEVEL 1-5 Last administered on 


1/21/19at 02:01; Admin Dose 50 MG;  Start 1/11/19 at 01:00


Miscellaneous Information Patients own medicat... BID@1000,1600 XX ;  Start 


1/11/19 at 10:00


Bicalutamide (Casodex) 50 mg DAILY PO  Last administered on 1/22/19at 08:51; 


Admin Dose 50 MG;  Start 1/12/19 at 16:30


Morphine Sulfate (morphine) 6 mg Q4H  PRN PO SEVERE PAIN LEVEL 7-10 Last 


administered on 1/19/19at 23:36; Admin Dose 6 MG;  Start 1/15/19 at 17:30











CHONG PRICE                Jan 22, 2019 10:33 am
Date/Time of Note


Date/Time of Note


DATE: 1/22/19 


TIME: 13:46





Consult Date/Type/Reason


Admit Date/Time


Andrey 10, 2019 at 22:30


Initial Consult Date


1/12/19


Type of Consultation:  Urology


Reason for Consultation


Metastatic prostate cancer


Requesting Provider:  ANKIT MONAE MD





Subjective


Patient states that he is feeling better and is ambulating more.  He denies any 


dysuria and he is voiding well and the urine is clear





Objective





Vital Signs


  Date      Temp  Pulse  Resp  B/P (MAP)   Pulse Ox  O2          O2 Flow    FiO2


Time                                                 Delivery    Rate


   1/22/19  98.8     88    18      118/68        98


     08:00                           (85)


   1/21/19                                           Room Air


     14:09








Intake and Output





1/21/19 1/21/19 1/22/19





1414:59


22:59


06:59





IntakeIntake Total


240 ml


960 ml


700 ml





OutputOutput Total


200 ml


600 ml


1150 ml





BalanceBalance


40 ml


360 ml


-450 ml











Exam


The urine is clear.  The abdomen is soft and he indeed does move his lower 


extremities better





Results/Medications


Result Diagram:  


1/21/19 0609                                                                    


           1/22/19 0637





Results 24 hrs





Laboratory Tests


               Test
                                1/22/19
06:37


               Sodium Level                                 138


               Potassium Level                              4.9


               Chloride Level                               101


               Carbon Dioxide Level                          29


               Anion Gap                                      8


               Blood Urea Nitrogen                          26  H


               Creatinine                                 1.30  H


               Est Glomerular Filtrat Rate
mL/min          55  L



               Glucose Level                                 92


               Calcium Level                                9.4


               Total Bilirubin                             0.0  L


               Direct Bilirubin                            0.00


               Indirect Bilirubin                           0.0


               Aspartate Amino Transf
(AST/SGOT)            34  



               Alanine Aminotransferase
(ALT/SGPT)          18  



               Alkaline Phosphatase                        356  H


               Total Protein                                6.9


               Albumin                                      3.6


               Globulin                                   3.30  H


               Albumin/Globulin Ratio                      1.09





Medications





Current Medications


Pantoprazole (Protonix Tab) 40 mg DAILY@06 PO  Last administered on 1/22/19at 


05:52; Admin Dose 40 MG;  Start 1/11/19 at 06:00


Oxycodone/ Acetaminophen (Percocet (5/ 325)) 2 tab Q6H  PRN PO PAIN LEVEL 6-10 


Last administered on 1/21/19at 21:01; Admin Dose 2 TAB;  Start 1/11/19 at 01:00


Oxycodone/ Acetaminophen (Percocet (5/ 325)) 1 tab Q6H  PRN PO MODERATE PAIN 


LEVEL 4-6 Last administered on 1/22/19at 13:35; Admin Dose 1 TAB;  Start 1/11/19


at 01:00


Folic Acid (Folic Acid) 1 mg DAILY PO  Last administered on 1/22/19at 08:50; 


Admin Dose 1 MG;  Start 1/11/19 at 09:00


Multivitamins/ Minerals (Theragran-M) 1 tab DAILY PO  Last administered on 


1/22/19at 08:50; Admin Dose 1 TAB;  Start 1/11/19 at 09:00


Thiamine HCl (Vitamin B1) 100 mg DAILY PO  Last administered on 1/22/19at 08:50;


Admin Dose 100 MG;  Start 1/11/19 at 09:00


Polyethylene Glycol (Miralax) 17 gm DAILY  PRN PO CONSTIPATION;  Start 1/11/19 


at 01:00


Ferrous Sulfate (Ferrous Sulfate (Ec)) 325 mg TID PO  Last administered on 


1/22/19at 13:34; Admin Dose 325 MG;  Start 1/11/19 at 09:00


Tamsulosin HCl (Flomax) 0.4 mg HS PO  Last administered on 1/20/19at 19:46; 


Admin Dose 0.4 MG;  Start 1/11/19 at 21:00


Famotidine (Pepcid) 20 mg DAILY PO  Last administered on 1/22/19at 08:50; Admin 


Dose 20 MG;  Start 1/11/19 at 09:00


Magnesium Hydroxide (Milk Of Mag) 30 ml DAILY  PRN PO CONSTIPATION;  Start 


1/11/19 at 01:00


Acetaminophen (Tylenol Tab) 650 mg Q6H  PRN PO MILD PAIN(1-3)OR ELEVATED TEMP;  


Start 1/11/19 at 01:00


Ondansetron HCl (Zofran Inj) 4 mg Q8  PRN IV NAUSEA AND/OR VOMITING;  Start 


1/11/19 at 01:00


Senna (Senokot) 2 tab BID  PRN PO CONSTIPATION Last administered on 1/18/19at 


06:03; Admin Dose 2 TAB;  Start 1/11/19 at 01:00


Zolpidem Tartrate (Ambien) 5 mg HS  PRN PO INSOMNIA Last administered on 


1/14/19at 21:49; Admin Dose 5 MG;  Start 1/11/19 at 01:00


Tramadol HCl (Ultram) 50 mg TID  PRN PO PAIN LEVEL 1-5 Last administered on 


1/21/19at 02:01; Admin Dose 50 MG;  Start 1/11/19 at 01:00


Miscellaneous Information Patients own medicat... BID@1000,1600 XX ;  Start 


1/11/19 at 10:00


Bicalutamide (Casodex) 50 mg DAILY PO  Last administered on 1/22/19at 08:51; 


Admin Dose 50 MG;  Start 1/12/19 at 16:30


Morphine Sulfate (morphine) 6 mg Q4H  PRN PO SEVERE PAIN LEVEL 7-10 Last 


administered on 1/19/19at 23:36; Admin Dose 6 MG;  Start 1/15/19 at 17:30





Assessment/Plan


Chief Complaint/Hosp Course


68-year-old -American male was transferred from [facility] to [facility]. Patient was found to have metastatic 


cancer to the lungs and his bones and upon admission here at [facility] his 


PSA was over 1000 therefore a urological consultation was requested.  The 


patient himself is not a good historian.  He is confused and sometimes agitated.


 At the [facility] they attempted to have MRI of the spine to 


evaluate metastatic disease and evaluate for vertebral instability but the 


patient was confused and agitated and they were not able to do the MRI.  The 


patient is known to have had subdural hematoma status post evacuation at 


[facility] in July 2018.  The patient also was found to have ly


mphadenopathy and bony metastases.  He also is known to have a history of 


hypertension and anemia.  


CT scan of the abdomen and pelvis without contrast at [facility] done on


1/7/2019 showed lung bases: Extensive pleural nodularity and pulmonary nodules 


bilaterally bilateral left greater than right small pleural effusions and 


bibasilar atelectasis liver: Unremarkable, gallbladder and bile ducts: 


Contracted likely containing gallstones ,spleen unremarkable,pancreas 


unremarkable, adrenals mildly thickened left greater than right adrenal glands, 


kidneys and ureters: Bilateral low-density lesions mostly fluid density but 


incompletely characterized measuring up to 6.2 cm on the right kidney, mild left


hydronephrosis likely due to an obstructing 6 mm stone in the distal left 


ureter. Bowels nondilated bowel loops moderate stool burden.  Bladder diffuse 


circumferential wall thickening.  Reproductive organs: Prostatomegaly with 


median lobe hypertrophy.  Lymph node: Likely prominent retroperitoneal and 


bilateral iliac chain lymph nodes although overall poorly delineated due to lack


of intravenous contrast.  Peritoneum: Small volume ascites.  Vessels: Moderate 


atherosclerotic calcification.  Abdominal wall: Mild body wall edema.  Bones: 


Mixed lytic ,sclerotic bone lesions involving the entire axial and appendicular 


skeleton with areas of vertebral body height loss for example in the L3 


vertebral body and marked areas of cortical destruction for example on the left 


inferior pubic ramus


The lumbosacral spine x-rays and pelvic x-rays showed osteoblastic lesions 


consistent with metastasis most likely from the prostate.  The patient is 


voiding well.  He also does have a distal left ureteral stone but he denies 


having any pain.  We will strain his urine and do a KUB to see if the stone is 


visible and is radiopaque.  Since his PSA is over thousand I wanted to add Estephania


pron Depot to his treatment in addition to the Casodex.  The Lupron is not on 


the formulary of the hospital.  I requested that but the pharmacy director did 


not approve it stating that this should be done as an outpatient.  My concern is


this patient is not the type that  will follow-up as an outpatient because of 


where he lives and lack of transportation.  And his cancer is very advanced and 


would benefit from at least a 1 month injection.  





He did have the bone scan done and that showed:


Multiple foci of increased activity are seen in the calvarium, sternum, rib 


cages bilaterally, sternum, pelvic bones bilaterally and both hips.


No other definite abnormal areas of increased activity or asymmetries are 


visualized in the study and distribution of radionuclide is homogeneous in the 


skull, spine, rib cages, sternum, pelvis and visualized portions of the upper 


and lower extremities.


Of incidental note, there is a poor visualization of the kidneys.


Area of increased activity seen within the right pelvis. The finding may be 


related to physiologic bladder activity; also query right pelvic transplanted 


kidney.





Urologically continue the Casodex and once he is out of the hospital start him 


on Lupron Depot as well














HAILEE CLIFTON MD            Jan 22, 2019 13:48
normal performance

## 2019-01-25 LAB
ALKALINE PHOSPHATASE: 456 U/L (ref 40–115)
BONE ISOENZYMES: 76 % (ref 28–66)
INTESTINAL ISOENZYMES: 5 % (ref 1–24)
LIVER ISOENZYMES: 19 % (ref 25–69)
PLACENTAL ISOENZYMES: 0 %

## 2019-07-25 ENCOUNTER — HOSPITAL ENCOUNTER (INPATIENT)
Dept: HOSPITAL 10 - 6WM | Age: 69
LOS: 9 days | Discharge: NURSING, REHAB OR LONG-TERM CARE | DRG: 723 | End: 2019-08-03
Attending: INTERNAL MEDICINE | Admitting: INTERNAL MEDICINE
Payer: MEDICARE

## 2019-07-25 ENCOUNTER — HOSPITAL ENCOUNTER (INPATIENT)
Dept: HOSPITAL 91 - 6WM | Age: 69
LOS: 9 days | Discharge: NURSING, REHAB OR LONG-TERM CARE | DRG: 723 | End: 2019-08-03
Payer: MEDICARE

## 2019-07-25 VITALS — SYSTOLIC BLOOD PRESSURE: 124 MMHG | HEART RATE: 77 BPM | RESPIRATION RATE: 20 BRPM | DIASTOLIC BLOOD PRESSURE: 64 MMHG

## 2019-07-25 VITALS
WEIGHT: 93.92 LBS | HEIGHT: 71 IN | WEIGHT: 93.92 LBS | BODY MASS INDEX: 13.15 KG/M2 | BODY MASS INDEX: 13.15 KG/M2 | HEIGHT: 71 IN

## 2019-07-25 VITALS — SYSTOLIC BLOOD PRESSURE: 112 MMHG | DIASTOLIC BLOOD PRESSURE: 69 MMHG | HEART RATE: 88 BPM | RESPIRATION RATE: 22 BRPM

## 2019-07-25 VITALS — RESPIRATION RATE: 18 BRPM | HEART RATE: 107 BPM | DIASTOLIC BLOOD PRESSURE: 81 MMHG | SYSTOLIC BLOOD PRESSURE: 142 MMHG

## 2019-07-25 VITALS — DIASTOLIC BLOOD PRESSURE: 70 MMHG | RESPIRATION RATE: 20 BRPM | HEART RATE: 87 BPM | SYSTOLIC BLOOD PRESSURE: 109 MMHG

## 2019-07-25 VITALS — SYSTOLIC BLOOD PRESSURE: 116 MMHG | RESPIRATION RATE: 16 BRPM | HEART RATE: 89 BPM | DIASTOLIC BLOOD PRESSURE: 69 MMHG

## 2019-07-25 VITALS — HEART RATE: 71 BPM | RESPIRATION RATE: 18 BRPM | SYSTOLIC BLOOD PRESSURE: 120 MMHG | DIASTOLIC BLOOD PRESSURE: 71 MMHG

## 2019-07-25 DIAGNOSIS — N13.30: ICD-10-CM

## 2019-07-25 DIAGNOSIS — E46: ICD-10-CM

## 2019-07-25 DIAGNOSIS — Z66: ICD-10-CM

## 2019-07-25 DIAGNOSIS — C79.51: ICD-10-CM

## 2019-07-25 DIAGNOSIS — G89.3: ICD-10-CM

## 2019-07-25 DIAGNOSIS — N28.1: ICD-10-CM

## 2019-07-25 DIAGNOSIS — Z91.19: ICD-10-CM

## 2019-07-25 DIAGNOSIS — Z86.19: ICD-10-CM

## 2019-07-25 DIAGNOSIS — C77.2: ICD-10-CM

## 2019-07-25 DIAGNOSIS — Z91.81: ICD-10-CM

## 2019-07-25 DIAGNOSIS — C78.7: ICD-10-CM

## 2019-07-25 DIAGNOSIS — F10.20: ICD-10-CM

## 2019-07-25 DIAGNOSIS — F41.9: ICD-10-CM

## 2019-07-25 DIAGNOSIS — R64: ICD-10-CM

## 2019-07-25 DIAGNOSIS — D61.818: ICD-10-CM

## 2019-07-25 DIAGNOSIS — F17.200: ICD-10-CM

## 2019-07-25 DIAGNOSIS — R62.7: ICD-10-CM

## 2019-07-25 DIAGNOSIS — N39.0: ICD-10-CM

## 2019-07-25 DIAGNOSIS — C61: Primary | ICD-10-CM

## 2019-07-25 LAB
ABNORMAL IP MESSAGE: 1
ADD MAN DIFF?: NO
ALANINE AMINOTRANSFERASE: 13 IU/L (ref 13–69)
ALBUMIN/GLOBULIN RATIO: 0.72
ALBUMIN: 2.4 G/DL (ref 3.3–4.9)
ALKALINE PHOSPHATASE: 442 IU/L (ref 42–121)
ANION GAP: 7 (ref 5–13)
ASPARTATE AMINO TRANSFERASE: 49 IU/L (ref 15–46)
BASOPHIL #: 0 10^3/UL (ref 0–0.1)
BASOPHILS %: 0.2 % (ref 0–2)
BILIRUBIN,DIRECT: 0 MG/DL (ref 0–0.2)
BILIRUBIN,TOTAL: 0.2 MG/DL (ref 0.2–1.3)
BLOOD UREA NITROGEN: 25 MG/DL (ref 7–20)
CALCIUM: 7.1 MG/DL (ref 8.4–10.2)
CARBON DIOXIDE: 20 MMOL/L (ref 21–31)
CHLORIDE: 109 MMOL/L (ref 97–110)
CREATININE: 1.21 MG/DL (ref 0.61–1.24)
EOSINOPHILS #: 0 10^3/UL (ref 0–0.5)
EOSINOPHILS %: 0.5 % (ref 0–7)
GLOBULIN: 3.3 G/DL (ref 1.3–3.2)
GLUCOSE: 104 MG/DL (ref 70–220)
HEMATOCRIT: 26.1 % (ref 42–52)
HEMOGLOBIN: 7.5 G/DL (ref 14–18)
IMMATURE GRANS #M: 0.02 10^3/UL (ref 0–0.03)
IMMATURE GRANS % (M): 0.5 % (ref 0–0.43)
LYMPHOCYTES #: 0.7 10^3/UL (ref 0.8–2.9)
LYMPHOCYTES %: 17 % (ref 15–51)
MEAN CORPUSCULAR HEMOGLOBIN: 26 PG (ref 29–33)
MEAN CORPUSCULAR HGB CONC: 28.7 G/DL (ref 32–37)
MEAN CORPUSCULAR VOLUME: 90.6 FL (ref 82–101)
MEAN PLATELET VOLUME: 10.2 FL (ref 7.4–10.4)
MONOCYTE #: 0.3 10^3/UL (ref 0.3–0.9)
MONOCYTES %: 6.3 % (ref 0–11)
NEUTROPHIL #: 3.1 10^3/UL (ref 1.6–7.5)
NEUTROPHILS %: 75.5 % (ref 39–77)
NUCLEATED RED BLOOD CELLS #: 0 10^3/UL (ref 0–0)
NUCLEATED RED BLOOD CELLS%: 0 /100WBC (ref 0–0)
PLATELET COUNT: 104 10^3/UL (ref 140–415)
POSITIVE DIFF: (no result)
POTASSIUM: 3.8 MMOL/L (ref 3.5–5.1)
PROSTATE SPECIFIC ANTIGEN: 652 NG/ML (ref 0–4)
RED BLOOD COUNT: 2.88 10^6/UL (ref 4.7–6.1)
RED CELL DISTRIBUTION WIDTH: 16.6 % (ref 11.5–14.5)
SODIUM: 136 MMOL/L (ref 135–144)
TOTAL PROTEIN: 5.7 G/DL (ref 6.1–8.1)
WHITE BLOOD COUNT: 4.1 10^3/UL (ref 4.8–10.8)

## 2019-07-25 PROCEDURE — 85018 HEMOGLOBIN: CPT

## 2019-07-25 PROCEDURE — 83735 ASSAY OF MAGNESIUM: CPT

## 2019-07-25 PROCEDURE — 36430 TRANSFUSION BLD/BLD COMPNT: CPT

## 2019-07-25 PROCEDURE — 86901 BLOOD TYPING SEROLOGIC RH(D): CPT

## 2019-07-25 PROCEDURE — 84153 ASSAY OF PSA TOTAL: CPT

## 2019-07-25 PROCEDURE — 80053 COMPREHEN METABOLIC PANEL: CPT

## 2019-07-25 PROCEDURE — P9016 RBC LEUKOCYTES REDUCED: HCPCS

## 2019-07-25 PROCEDURE — 74176 CT ABD & PELVIS W/O CONTRAST: CPT

## 2019-07-25 PROCEDURE — 97530 THERAPEUTIC ACTIVITIES: CPT

## 2019-07-25 PROCEDURE — 85025 COMPLETE CBC W/AUTO DIFF WBC: CPT

## 2019-07-25 PROCEDURE — 85610 PROTHROMBIN TIME: CPT

## 2019-07-25 PROCEDURE — 97162 PT EVAL MOD COMPLEX 30 MIN: CPT

## 2019-07-25 PROCEDURE — 86900 BLOOD TYPING SEROLOGIC ABO: CPT

## 2019-07-25 PROCEDURE — 84100 ASSAY OF PHOSPHORUS: CPT

## 2019-07-25 PROCEDURE — 87086 URINE CULTURE/COLONY COUNT: CPT

## 2019-07-25 PROCEDURE — 80048 BASIC METABOLIC PNL TOTAL CA: CPT

## 2019-07-25 PROCEDURE — 84154 ASSAY OF PSA FREE: CPT

## 2019-07-25 PROCEDURE — 85014 HEMATOCRIT: CPT

## 2019-07-25 PROCEDURE — 87081 CULTURE SCREEN ONLY: CPT

## 2019-07-25 PROCEDURE — 81001 URINALYSIS AUTO W/SCOPE: CPT

## 2019-07-25 PROCEDURE — 86850 RBC ANTIBODY SCREEN: CPT

## 2019-07-25 PROCEDURE — 86920 COMPATIBILITY TEST SPIN: CPT

## 2019-07-25 RX ADMIN — CEFTRIAXONE SCH MLS/HR: 1 INJECTION, SOLUTION INTRAVENOUS at 16:00

## 2019-07-25 RX ADMIN — ACETAMINOPHEN 1 MG: 325 TABLET, FILM COATED ORAL at 06:35

## 2019-07-25 RX ADMIN — THIAMINE HYDROCHLORIDE 1 MLS/HR: 100 INJECTION, SOLUTION INTRAMUSCULAR; INTRAVENOUS at 06:36

## 2019-07-25 RX ADMIN — ACETAMINOPHEN 1 MG: 325 TABLET, FILM COATED ORAL at 10:45

## 2019-07-25 RX ADMIN — CALCIUM CARBONATE 1 MG: 1250 SUSPENSION ORAL at 20:48

## 2019-07-25 RX ADMIN — CEFTRIAXONE 1 MLS/HR: 1 INJECTION, SOLUTION INTRAVENOUS at 16:00

## 2019-07-25 RX ADMIN — CALCIUM CARBONATE SCH MG: 1250 SUSPENSION ORAL at 20:48

## 2019-07-25 RX ADMIN — PANTOPRAZOLE SODIUM 1 MG: 40 TABLET, DELAYED RELEASE ORAL at 06:35

## 2019-07-25 RX ADMIN — PANTOPRAZOLE SODIUM SCH MG: 40 TABLET, DELAYED RELEASE ORAL at 06:35

## 2019-07-25 NOTE — HP
DATE OF ADMISSION: 07/25/2019

 

REASON FOR ADMISSION:  The patient is transferred from [facility] with  left leg mau
n, falls and weakness.

 

HISTORY OF PRESENTING ILLNESS:  This is a 69-year-old male who was initially admitted here in January 2019 secondary to AMS, lymphadenopathy with elevated PSA.  The patient also had severe anemia, recen
t weight loss.  Also had history of subdural hematoma and history of hepatitis C.  The patient was, a
t that time, seen by urology consultation by Dr. Potts and was thought to have prostate cancer and 
also was seen by Dr. Hall at that time.  Urologically the plan was initially to send to [facility]; however, the patient refused.  Urologically, plan was to continue with Casodex and once out of [facility] to start him on Lupron.  The patient was also seen by Dr. Hall and was recommended to
 follow up as an outpatient; however, the patient said that after he went home and he never really fo
llowed up with anybody.  According to him, he got a new PCP.  He does not believe that he had cancer.
  According to the patient, he has been living with his brother.  He has been feeling increasingly we
ak, has been having weight loss.  He has fallen also a couple of times.  When the patient went to [facility], temperature was 36.9, blood pressure was 99/73, afebrile, heart rate 86.  W
to count was 4.9, hemoglobin 9.0, platelet count was 212, albumin was 3, ALT 10, total bilirubin 0.
7.  Had a CT of the brain that showed left frontotemporal and left frontal isodense subdural collecti
ons are new since the prior study, likely represents subacute subdural hematomas.  Also had a chest x
-ray that showed normal, slight appearance of cardiomegaly, ____.  Redemonstrated nodular densities i
n the bilateral lungs consistent with metastatic disease.  The patient also had a CT of the cervical,
 thoracic, and lumbar spine that showed patient had diffuse permeative process of the visualized osse
ous structures concerning for metastatic disease and the patient was transferred here due to insuranc
e reasons.

 

PAST MEDICAL HISTORY:

1.  Likely metastatic prostate cancer.

2.  Severe anemia.

3.  History of subdural hematoma.

4.  History of hepatitis C.

5.  History of alcoholism.

6.  Hypertension.

7.  History of chronic gastritis.

 

PAST SURGICAL HISTORY:  Status post EGD.

 

ALLERGIES:  NONE.

 

MEDICATIONS TAKING AT HOME:  None.

 

SOCIAL HISTORY:  Occasionally smokes cigarettes and also uses alcohol.  Lives with his brother and si
ster at home.

 

FAMILY HISTORY:  Significant for no history of cancer.

 

REVIEW OF SYSTEMS:  The patient complains of generalized body ache pain.  Also, some dysuria, history
 of falls, weight loss.  Denies any abdominal pain, nausea, vomiting, diarrhea, sometimes feels short
 of breath.  Denies any chest pain.

 

PHYSICAL EXAMINATION:

VITAL SIGNS:  Currently temperature 97.7, pulse 89, respirations 16, blood pressure 116/69.

GENERAL:  The patient is awake, alert, oriented, follows all commands.

HEENT:  The patient is a very thin, frail male.

NECK:  Supple, no JVD.

HEART:  Regular rate and rhythm.

LUNGS:  Clear to auscultate bilaterally.

ABDOMEN:  Scaphoid.  Positive bowel sounds.

EXTREMITIES:  Thin extremities, thin long legs, 2+ edema at the ankles.

 

DIAGNOSTIC DATA:  Shows BUN of 25, creatinine 1.21.  AST 49, ALT 13, alkaline phosphatase 442.  Album
in is 2.4.  White count 4.9, hemoglobin 7.5, platelet count 104.

 

IMAGING:  CT of the head there shows left frontotemporal and left frontal isodense subdural collectio
ns as above, new since the prior studies.  CT of the thoracic, lumbar spine shows patient has diffuse
 lesions permeative sclerosis of the visualized osseous structure concerning for metastatic disease, 
lucency through the spinous process of C4, represents mildly displaced section, compression deformity
 of T6, mild anterior wedging of the vertebral body, degenerative; a 2 mm nodule in the right lung, m
ay represent metastasis; nodular opacities and prominence of interstitium in the right upper lobe, ma
y represent pneumonia versus lymphangitic carcinomatosis.

 

ASSESSMENT AND PLAN:  This is a 69-year-old male who presented with:

1.  Recurrent falls, failure to thrive, weight loss, generalized body aches, likely secondary to meta
static prostate cancer.  The patient was here in January 2019.  The patient was seen by multiple cons
ultants including urology and oncology.  The patient had a prostate specific antigen over 1000.  The 
patient was deemed that he had capacity to make decisions.  The patient was offered nursing home; how
ever, he left and went home.  He never followed up with any appointments and likely has progression o
f disease.

2.  History of falls with subdural hematomas.

3.  Severe anemia.

4.  Pancytopenia with a history of hepatitis C.

5.  Alcoholism.

6.  History of smoking.

7.  Elevated alkaline phosphatase could be secondary to bony metastases.  

8.  Edema with hypoalbuminemia.

9.  Likely urinary tract infection.

 

The patient is admitted to tele.  We will give the patient gentle IV fluids, nutrition.  Will also ch
jose for PSA.  Pain control.  Calcium is within normal limits.  Oncology consultation has been request
ed.  We will also get PT eval.  Will likely have discussion with the patient regarding the terms of h
is disease.  Rest of the treatment will depend on the patient's hospitalization course.

 

 

Dictated By: TOMAS MORGAN/JASMINA

DD:    07/25/2019 15:55:05

DT:    07/25/2019 17:31:13

Conf#: [id number]

DID#:  [id number]

CC: ANKIT MONAE MD;*EndCC*

## 2019-07-25 NOTE — QN
Documentation


Comment


pt seen and examined


h and p TOMAS Schroeder MD              Jul 25, 2019 15:44

## 2019-07-25 NOTE — CONS
Assessment/Plan


Assessment/Plan


Hospital Course (Demo Recall)


69-year-old -American male was admitted here in January 2019.  At that 


time his PSA was over at thousand and patient was assumed to have prostate 


cancer he was started on bicalutamide and at the time of discharge he was 


instructed to follow-up in the office for biopsy of the prostate to confirm the 


diagnosis of prostate cancer.  However the patient did not follow up with any 


doctor and he went to [facility] because of increased back pain


and weakness in addition to lower extremity pain and was transferred to [facility].  Patient underwent CT of the cervical thoracic and lumbar


spine and was found to have metastatic disease.  Patient states he does urinate 


5-6 times at night and same during the day.  He voids a small amount.  He denies


any gross hematuria.


The impression is metastatic prostate cancer.  However this is not proven by 


biopsy.  It is assumed that this is prostate cancer as his PSA was over a 


thousand in January of this year.  An order for repeat PSA was done.  I will 


order also to repeat the CT scan of the abdomen and pelvis to check for 


lymphadenopathy and bony mets.  I will also restart him on bicalutamide.





Consultation Date/Type/Reason


Admit Date/Time


Jul 25, 2019 at 05:11


Date of Consultation:  Jul 25, 2019


Type of Consult


Urology


Reason for Consultation


Metastatic prostate cancer


Requesting Provider:  TOMAS BRICE MD


Date/Time of Note


DATE: 7/25/19 


TIME: 18:44





Hx of Present Illness


69-year-old -American male was admitted here in January 2019.  At that 


time his PSA was over at thousand and patient was assumed to have prostate 


cancer he was started on bicalutamide and at the time of discharge he was in


structed to follow-up in the office for biopsy of the prostate to confirm the 


diagnosis of prostate cancer.  However the patient did not follow up with any 


doctor and he went to [facility] because of increased back pain


and weakness in addition to lower extremity pain and was transferred to [facility].  Patient underwent CT of the cervical thoracic and lumbar


spine and was found to have metastatic disease.  Patient states he does urinate 


5-6 times at night and same during the day.  He voids a small amount.  He denies


any gross hematuria.


Constitutional:  other (Weight loss and weakness)


Eyes:  no complaints


ENT:  no complaints


Respiratory:  No shortness of breath


Cardiovascular:  No chest pain


Gastrointestinal:  No nausea, No vomiting


Genitourinary:  other (Urinary frequency, nocturia and slow urinary stream)


Musculoskeletal:  back pain


Skin:  other (Decubitus ulcer in the sacral area)


Neurologic:  no complaints


Endocrine:  no complaints


Lymphatic:  no complaints


Psychological:  no complaints


Immunologic:  no complaints





Past Medical History


Medical History:  cancer (Of prostate based on the PSA.  Has not been confirmed 


by biopsy), hepatitis (C), hypertension, other (Anemia, hepatitis C, weight 


loss, history of falls and gastritis, history of subdural hematoma)


Home Meds


No Active Prescriptions or Reported Meds


Medications





Current Medications


Sodium Chloride 1,000 ml @  40 mls/hr Q24H ONCE IV  Last administered on 


7/25/19at 06:36; Admin Dose 40 MLS/HR;  Start 7/25/19 at 07:00;  Stop 7/26/19 at


06:59


Pantoprazole (Protonix Tab) 40 mg DAILY@06 PO  Last administered on 7/25/19at 


06:35; Admin Dose 40 MG;  Start 7/25/19 at 06:00


Albuterol/ Ipratropium (Duoneb) 3 ml Q6H RESP THERAPY  PRN HHN SHORTNESS OF 


BREATH;  Start 7/25/19 at 06:00


Acetaminophen (Tylenol Tab) 650 mg Q6H  PRN PO MILD PAIN(1-3)OR ELEVATED TEMP 


Last administered on 7/25/19at 10:45; Admin Dose 650 MG;  Start 7/25/19 at 06:00


Bisacodyl (Dulcolax) 10 mg DAILY  PRN PO CONSTIPATION;  Start 7/25/19 at 06:00


Acetaminophen/ Hydrocodone Bitart (Norco (5/325)) 1 tab Q6H  PRN PO MODERATE 


PAIN LEVEL 4-6;  Start 7/25/19 at 06:00


Miscellaneous Information (Pending Santyl Order For Wound Care) This patient 


ha... PRN  PRN XX WOUND CARE;  Start 7/25/19 at 06:00


Calcium Carbonate (Ca Carbonate) 1,250 mg BID PO ;  Start 7/25/19 at 21:00


Ceftriaxone Sodium 50 ml @  100 mls/hr Q24H IVPB ;  Start 7/25/19 at 16:00


Allergies:  


Coded Allergies:  


     No Known Allergy (Unverified , 1/11/19)





Social History


Alcohol Use:  occasionally


Smoking Status:  Former smoker





Exam/Review of Systems


Exam


Vitals





Vital Signs


  Date      Temp  Pulse  Resp  B/P (MAP)   Pulse Ox  O2          O2 Flow    FiO2


Time                                                 Delivery    Rate


   7/25/19  97.7    107    18      142/81        98  Room Air


     15:03                          (101)








Intake and Output





7/24/19 7/24/19 7/25/19





1515:00


23:00


07:00





IntakeIntake Total


250 ml





BalanceBalance


250 ml











Constitutional:  alert


Psych:  confusion


Head:  normocephalic


Eyes:  nl conjunctiva


ENMT:  nl external ears & nose


Neck:  supple, non-tender


Respiratory:  normal air movement; 


   No wheezing


Cardiovascular:  regular rate and rhythm; 


   No jugular venous distention (JVD)


Gastrointestinal:  soft


Genitourinary - Male:  nl penis, nl scrotum, other (Flat and firm prostate)


Musculoskeletal:  muscle weakness


Extremities:  No calf tenderness


Neurological:  nl mental status


Skin:  nl turgor





Results


Result Diagram:  


7/25/19 0915                                                                    


           7/25/19 0915





Results 24hrs





Laboratory Tests


               Test
                                7/25/19
09:15


               White Blood Count                           4.1  L


               Red Blood Count                            2.88  L


               Hemoglobin                                  7.5  L


               Hematocrit                                 26.1  L


               Mean Corpuscular Volume                     90.6


               Mean Corpuscular Hemoglobin                26.0  L


               Mean Corpuscular Hemoglobin
Concent       28.7  L



               Red Cell Distribution Width                16.6  H


               Platelet Count                             104  #L


               Mean Platelet Volume                        10.2


               Immature Granulocytes %                   0.500  H


               Neutrophils %                               75.5


               Lymphocytes %                               17.0


               Monocytes %                                  6.3


               Eosinophils %                                0.5


               Basophils %                                  0.2


               Nucleated Red Blood Cells %                  0.0


               Immature Granulocytes #                    0.020


               Neutrophils #                                3.1


               Lymphocytes #                               0.7  L


               Monocytes #                                  0.3


               Eosinophils #                                0.0


               Basophils #                                  0.0


               Nucleated Red Blood Cells #                  0.0


               Sodium Level                                 136


               Potassium Level                              3.8


               Chloride Level                               109


               Carbon Dioxide Level                         20  L


               Anion Gap                                      7


               Blood Urea Nitrogen                          25  H


               Creatinine                                  1.21


               Est Glomerular Filtrat Rate
mL/min     



               Glucose Level                                104


               Calcium Level                               7.1  L


               Total Bilirubin                              0.2


               Direct Bilirubin                            0.00


               Indirect Bilirubin                           0.2


               Aspartate Amino Transf
(AST/SGOT)           49  H



               Alanine Aminotransferase
(ALT/SGPT)          13  



               Alkaline Phosphatase                        442  H


               Total Protein                               5.7  L


               Albumin                                     2.4  L


               Globulin                                   3.30  H


               Albumin/Globulin Ratio                      0.72


               Prostate Specific Antigen                 652.0  H








Medications


Medication





Current Medications


Sodium Chloride 1,000 ml @  40 mls/hr Q24H ONCE IV  Last administered on 


7/25/19at 06:36; Admin Dose 40 MLS/HR;  Start 7/25/19 at 07:00;  Stop 7/26/19 at


06:59


Pantoprazole (Protonix Tab) 40 mg DAILY@06 PO  Last administered on 7/25/19at 


06:35; Admin Dose 40 MG;  Start 7/25/19 at 06:00


Albuterol/ Ipratropium (Duoneb) 3 ml Q6H RESP THERAPY  PRN HHN SHORTNESS OF 


BREATH;  Start 7/25/19 at 06:00


Acetaminophen (Tylenol Tab) 650 mg Q6H  PRN PO MILD PAIN(1-3)OR ELEVATED TEMP 


Last administered on 7/25/19at 10:45; Admin Dose 650 MG;  Start 7/25/19 at 06:00


Bisacodyl (Dulcolax) 10 mg DAILY  PRN PO CONSTIPATION;  Start 7/25/19 at 06:00


Acetaminophen/ Hydrocodone Bitart (Norco (5/325)) 1 tab Q6H  PRN PO MODERATE 


PAIN LEVEL 4-6;  Start 7/25/19 at 06:00


Miscellaneous Information (Pending Santyl Order For Wound Care) This patient 


ha... PRN  PRN XX WOUND CARE;  Start 7/25/19 at 06:00


Calcium Carbonate (Ca Carbonate) 1,250 mg BID PO ;  Start 7/25/19 at 21:00


Ceftriaxone Sodium 50 ml @  100 mls/hr Q24H IVPB ;  Start 7/25/19 at 16:00











HAILEE CLIFTON MD            Jul 25, 2019 18:54

## 2019-07-26 VITALS — RESPIRATION RATE: 18 BRPM | HEART RATE: 64 BPM | DIASTOLIC BLOOD PRESSURE: 70 MMHG | SYSTOLIC BLOOD PRESSURE: 124 MMHG

## 2019-07-26 VITALS — SYSTOLIC BLOOD PRESSURE: 127 MMHG | RESPIRATION RATE: 17 BRPM | DIASTOLIC BLOOD PRESSURE: 60 MMHG | HEART RATE: 78 BPM

## 2019-07-26 LAB
ADD MAN DIFF?: NO
ANION GAP: 5 (ref 5–13)
BASOPHIL #: 0 10^3/UL (ref 0–0.1)
BASOPHILS %: 0 % (ref 0–2)
BLOOD UREA NITROGEN: 25 MG/DL (ref 7–20)
CALCIUM: 8.1 MG/DL (ref 8.4–10.2)
CARBON DIOXIDE: 27 MMOL/L (ref 21–31)
CHLORIDE: 105 MMOL/L (ref 97–110)
CREATININE: 1.19 MG/DL (ref 0.61–1.24)
EOSINOPHILS #: 0 10^3/UL (ref 0–0.5)
EOSINOPHILS %: 0.2 % (ref 0–7)
GLUCOSE: 143 MG/DL (ref 70–220)
HEMATOCRIT: 27.2 % (ref 42–52)
HEMOGLOBIN: 8.4 G/DL (ref 14–18)
IMMATURE GRANS #M: 0.05 10^3/UL (ref 0–0.03)
IMMATURE GRANS % (M): 0.8 % (ref 0–0.43)
LYMPHOCYTES #: 0.9 10^3/UL (ref 0.8–2.9)
LYMPHOCYTES %: 14.6 % (ref 15–51)
MAGNESIUM: 1.8 MG/DL (ref 1.7–2.5)
MEAN CORPUSCULAR HEMOGLOBIN: 26.3 PG (ref 29–33)
MEAN CORPUSCULAR HGB CONC: 30.9 G/DL (ref 32–37)
MEAN CORPUSCULAR VOLUME: 85 FL (ref 82–101)
MEAN PLATELET VOLUME: 8.7 FL (ref 7.4–10.4)
MONOCYTE #: 0.3 10^3/UL (ref 0.3–0.9)
MONOCYTES %: 4.4 % (ref 0–11)
NEUTROPHIL #: 4.9 10^3/UL (ref 1.6–7.5)
NEUTROPHILS %: 80 % (ref 39–77)
NUCLEATED RED BLOOD CELLS #: 0 10^3/UL (ref 0–0)
NUCLEATED RED BLOOD CELLS%: 0 /100WBC (ref 0–0)
PHOSPHORUS: 2.5 MG/DL (ref 2.5–4.9)
PLATELET COUNT: 136 10^3/UL (ref 140–415)
POTASSIUM: 4.6 MMOL/L (ref 3.5–5.1)
RED BLOOD COUNT: 3.2 10^6/UL (ref 4.7–6.1)
RED CELL DISTRIBUTION WIDTH: 16.5 % (ref 11.5–14.5)
SODIUM: 137 MMOL/L (ref 135–144)
WHITE BLOOD COUNT: 6.1 10^3/UL (ref 4.8–10.8)

## 2019-07-26 RX ADMIN — HYDROCODONE BITARTRATE AND ACETAMINOPHEN PRN TAB: 5; 325 TABLET ORAL at 10:11

## 2019-07-26 RX ADMIN — CEFTRIAXONE 1 MLS/HR: 1 INJECTION, SOLUTION INTRAVENOUS at 16:00

## 2019-07-26 RX ADMIN — PANTOPRAZOLE SODIUM SCH MG: 40 TABLET, DELAYED RELEASE ORAL at 08:51

## 2019-07-26 RX ADMIN — KETOROLAC TROMETHAMINE SCH MG: 30 INJECTION, SOLUTION INTRAMUSCULAR at 15:30

## 2019-07-26 RX ADMIN — CALCIUM CARBONATE 1 MG: 1250 SUSPENSION ORAL at 09:00

## 2019-07-26 RX ADMIN — HYDROCODONE BITARTRATE AND ACETAMINOPHEN 1 TAB: 5; 325 TABLET ORAL at 03:46

## 2019-07-26 RX ADMIN — LORAZEPAM 1 MG: 2 INJECTION, SOLUTION INTRAMUSCULAR; INTRAVENOUS at 23:04

## 2019-07-26 RX ADMIN — CALCIUM CARBONATE SCH MG: 1250 SUSPENSION ORAL at 20:39

## 2019-07-26 RX ADMIN — CEFTRIAXONE SCH MLS/HR: 1 INJECTION, SOLUTION INTRAVENOUS at 16:00

## 2019-07-26 RX ADMIN — BICALUTAMIDE SCH MG: 50 TABLET, FILM COATED ORAL at 08:52

## 2019-07-26 RX ADMIN — METHADONE HYDROCHLORIDE SCH MG: 5 SOLUTION ORAL at 21:40

## 2019-07-26 RX ADMIN — HYDROMORPHONE HYDROCHLORIDE 1 MG: 2 INJECTION, SOLUTION INTRAMUSCULAR; INTRAVENOUS; SUBCUTANEOUS at 20:40

## 2019-07-26 RX ADMIN — HYDROMORPHONE HYDROCHLORIDE PRN MG: 2 INJECTION, SOLUTION INTRAMUSCULAR; INTRAVENOUS; SUBCUTANEOUS at 20:40

## 2019-07-26 RX ADMIN — METHADONE HYDROCHLORIDE SCH MG: 5 SOLUTION ORAL at 18:31

## 2019-07-26 RX ADMIN — PANTOPRAZOLE SODIUM 1 MG: 40 TABLET, DELAYED RELEASE ORAL at 08:51

## 2019-07-26 RX ADMIN — METHADONE HYDROCHLORIDE 1 MG: 5 SOLUTION ORAL at 21:40

## 2019-07-26 RX ADMIN — HYDROCODONE BITARTRATE AND ACETAMINOPHEN 1 TAB: 5; 325 TABLET ORAL at 10:11

## 2019-07-26 RX ADMIN — HYDROCODONE BITARTRATE AND ACETAMINOPHEN PRN TAB: 5; 325 TABLET ORAL at 03:46

## 2019-07-26 RX ADMIN — KETOROLAC TROMETHAMINE SCH MG: 30 INJECTION, SOLUTION INTRAMUSCULAR at 21:30

## 2019-07-26 RX ADMIN — METHADONE HYDROCHLORIDE 1 MG: 5 SOLUTION ORAL at 18:31

## 2019-07-26 RX ADMIN — LORAZEPAM PRN MG: 2 INJECTION, SOLUTION INTRAMUSCULAR; INTRAVENOUS at 23:04

## 2019-07-26 RX ADMIN — KETOROLAC TROMETHAMINE 1 MG: 30 INJECTION, SOLUTION INTRAMUSCULAR at 15:30

## 2019-07-26 RX ADMIN — CALCIUM CARBONATE SCH MG: 1250 SUSPENSION ORAL at 09:00

## 2019-07-26 RX ADMIN — HYDROMORPHONE HYDROCHLORIDE PRN MG: 2 INJECTION, SOLUTION INTRAMUSCULAR; INTRAVENOUS; SUBCUTANEOUS at 16:25

## 2019-07-26 RX ADMIN — KETOROLAC TROMETHAMINE 1 MG: 30 INJECTION, SOLUTION INTRAMUSCULAR at 21:30

## 2019-07-26 RX ADMIN — HYDROMORPHONE HYDROCHLORIDE 1 MG: 2 INJECTION, SOLUTION INTRAMUSCULAR; INTRAVENOUS; SUBCUTANEOUS at 16:25

## 2019-07-26 RX ADMIN — BICALUTAMIDE 1 MG: 50 TABLET, FILM COATED ORAL at 08:52

## 2019-07-26 RX ADMIN — CALCIUM CARBONATE 1 MG: 1250 SUSPENSION ORAL at 20:39

## 2019-07-26 NOTE — PN
Date/Time of Note


Date/Time of Note


DATE: 7/26/19 


TIME: 18:07





Assessment/Plan


VTE Prophylaxis


Risk score (from Ns)>0 risk:  5


SCD applied [place]from [facility] [place]  No


SCD contraindicated:  other


Pharmacological prophylaxis:  NA/contraindicated


Pharm contraindication:  patient refusal





Lines/Catheters


IV Catheter Type (from [facility]):  Peripheral IV


Urinary Cath still in place:  No





Assessment/Plan


Hospital Course


1.  Recurrent falls, failure to thrive, weight loss, generalized body aches, 


likely secondary to metastatic prostate cancer.  The patient was here in January 2019.  The patient was seen by multiple consultants including urology and 


oncology.  The patient had a prostate specific antigen over 1000.  The patient 


was deemed that he had capacity to make decisions.  The patient was offered 


nursing home; however, he left and went home.  He never followed up with any 


appointments and likely has progression of disease.


2.  History of falls with subdural hematomas.


3.  Severe anemia.


4.  Pancytopenia with a history of hepatitis C.


5.  Alcoholism.


6.  History of smoking.


7.  Elevated alkaline phosphatase could be secondary to bony metastases.  Metast


atic disease. He is on Casodex


8.  Edema with hypoalbuminemia.


9.  Likely urinary tract infection.


10. cachexia. Malnourishment


11.Chronic pain and anxiety


Assessment/Plan


- gentle IV fluids, nutrition.


-GI prophylaxis Protonix 


-code status is DNR/DNI, pt expressed idea to be comfortable. Agreed for 


facility. "All in one hospice", ok with Dr Figueredo


-confirmed by nursing


- PSA is above 600.  


-Pain control. 


-  Oncology consultation was seen. pt refused needle biopsy. He screamed , he 


does not want any "Schit to be given to him, only pain medication


- consultation, seen  


-ordered PT eval.


Result Diagram:  


7/26/19 0945                                                                    


           7/26/19 0945





Results 24hrs





Laboratory Tests


               Test
                                7/26/19
09:45


               White Blood Count                           6.1  #


               Red Blood Count                            3.20  L


               Hemoglobin                                  8.4  L


               Hematocrit                                 27.2  L


               Mean Corpuscular Volume                     85.0


               Mean Corpuscular Hemoglobin                26.3  L


               Mean Corpuscular Hemoglobin
Concent       30.9  L



               Red Cell Distribution Width                16.5  H


               Platelet Count                             136  #L


               Mean Platelet Volume                         8.7


               Immature Granulocytes %                   0.800  H


               Neutrophils %                              80.0  H


               Lymphocytes %                              14.6  L


               Monocytes %                                  4.4


               Eosinophils %                                0.2


               Basophils %                                  0.0


               Nucleated Red Blood Cells %                  0.0


               Immature Granulocytes #                   0.050  H


               Neutrophils #                                4.9


               Lymphocytes #                                0.9


               Monocytes #                                  0.3


               Eosinophils #                                0.0


               Basophils #                                  0.0


               Nucleated Red Blood Cells #                  0.0


               Sodium Level                                 137


               Potassium Level                              4.6


               Chloride Level                               105


               Carbon Dioxide Level                          27


               Anion Gap                                      5


               Blood Urea Nitrogen                          25  H


               Creatinine                                  1.19


               Est Glomerular Filtrat Rate
mL/min     



               Glucose Level                                143


               Calcium Level                               8.1  L


               Phosphorus Level                             2.5


               Magnesium Level                              1.8








Subjective


24 Hr Interval Summary


Free Text/Dictation


Constitutional: No fever, has cough , no chills. does not feel well


EYE: No eye disease. Visual problems.


CARDIOVASCULAR:  No chest pain. No tachycardia. No Palpitation.


RESPIRATORY: No breathing problems. No COPD or disease of respiration.


GASTROINTESTINAL: No Nausea. No Vomiting. No constipation.


Endocrine: No excessive thirst, No polyuria, No hot intolerance. No  cold 


intolerance.


MUSCULO-SKELETAL:pain in all bones, all joints


NEUROLOGICAL: Alert oriented  in person, place, time and situation.





Exam/Review of Systems


Exam


Vitals





Vital Signs


  Date      Temp  Pulse  Resp  B/P (MAP)   Pulse Ox  O2          O2 Flow    FiO2


Time                                                 Delivery    Rate


   7/26/19  98.0     78    17      127/60        95  Room Air


     02:48                           (82)








Intake and Output





7/25/19 7/25/19 7/26/19





1515:00


23:00


07:00





IntakeIntake Total


480 ml


1180 ml


200 ml





OutputOutput Total


200 ml


1300 ml


330 ml





BalanceBalance


280 ml


-120 ml


-130 ml











Exam


cahectic


Constitutional:  alert, oriented, frail


Psych:  anxiety, depression


Head:  normocephalic, atraumatic


Eyes:  nl conjunctiva, EOMI


Neck:  supple, non-tender, jvd; 


   No bruits, No masses, No thyromegaly, No nuchal rigidity, No other


Respiratory:  clear to auscultation, normal air movement, congested cough, 


crackles/rales, diminished breath sounds, intercostal retraction, labored 


breathing, respirations, tactile fremitus, wheezing, other


Cardiovascular:  regular rate and rhythm, jugular venous distention (JVD); 


   No nl pulses, No bruits, No diastolic murmur, No edema, No gallop, No 


irregular rhythm, No murmurs/extra sounds, No rub, No systolic murmur, No S3, No


S4, No other


Gastrointestinal:  bowel sounds; 


   No nl liver, spleen, No non-tender, No ascites, No distended, No firm, No 


hepatomegaly, No mass, No splenomegaly, No surgical scars, No tender, No other


Genitourinary - Male:  nl penis, nl scrotum, CVA tenderness; No discharge, No 


other


Musculoskeletal:  swelling (BLE)


Extremities:  edema; 


   No normal pulses, No calf tenderness, No cyanosis, No clubbing, No pitting 


pedal edema, No palpable cord, No tenderness, No other


Neurological:  nl speech


Skin:  other (poor )





Results


Results 24hrs





Laboratory Tests


               Test
                                7/26/19
09:45


               White Blood Count                           6.1  #


               Red Blood Count                            3.20  L


               Hemoglobin                                  8.4  L


               Hematocrit                                 27.2  L


               Mean Corpuscular Volume                     85.0


               Mean Corpuscular Hemoglobin                26.3  L


               Mean Corpuscular Hemoglobin
Concent       30.9  L



               Red Cell Distribution Width                16.5  H


               Platelet Count                             136  #L


               Mean Platelet Volume                         8.7


               Immature Granulocytes %                   0.800  H


               Neutrophils %                              80.0  H


               Lymphocytes %                              14.6  L


               Monocytes %                                  4.4


               Eosinophils %                                0.2


               Basophils %                                  0.0


               Nucleated Red Blood Cells %                  0.0


               Immature Granulocytes #                   0.050  H


               Neutrophils #                                4.9


               Lymphocytes #                                0.9


               Monocytes #                                  0.3


               Eosinophils #                                0.0


               Basophils #                                  0.0


               Nucleated Red Blood Cells #                  0.0


               Sodium Level                                 137


               Potassium Level                              4.6


               Chloride Level                               105


               Carbon Dioxide Level                          27


               Anion Gap                                      5


               Blood Urea Nitrogen                          25  H


               Creatinine                                  1.19


               Est Glomerular Filtrat Rate
mL/min     



               Glucose Level                                143


               Calcium Level                               8.1  L


               Phosphorus Level                             2.5


               Magnesium Level                              1.8








Medications


Medication





Current Medications


Pantoprazole (Protonix Tab) 40 mg DAILY@06 PO  Last administered on 7/26/19at 


08:51; Admin Dose 40 MG;  Start 7/25/19 at 06:00


Albuterol/ Ipratropium (Duoneb) 3 ml Q6H RESP THERAPY  PRN HHN SHORTNESS OF 


BREATH;  Start 7/25/19 at 06:00


Acetaminophen (Tylenol Tab) 650 mg Q6H  PRN PO MILD PAIN(1-3)OR ELEVATED TEMP 


Last administered on 7/25/19at 10:45; Admin Dose 650 MG;  Start 7/25/19 at 06:00


Bisacodyl (Dulcolax) 10 mg DAILY  PRN PO CONSTIPATION;  Start 7/25/19 at 06:00


Miscellaneous Information (Pending Santyl Order For Wound Care) This patient 


ha... PRN  PRN XX WOUND CARE;  Start 7/25/19 at 06:00


Calcium Carbonate (Ca Carbonate) 1,250 mg BID PO  Last administered on 7/25/19at


20:48; Admin Dose 1,250 MG;  Start 7/25/19 at 21:00


Ceftriaxone Sodium 50 ml @  100 mls/hr Q24H IVPB ;  Start 7/25/19 at 16:00


Bicalutamide (Casodex) 50 mg DAILY PO  Last administered on 7/26/19at 08:52; 


Admin Dose 50 MG;  Start 7/26/19 at 09:00


Hydromorphone HCl (Dilaudid) 1.5 mg Q4H  PRN IV SEVERE PAIN LEVEL 7-10 Last 


administered on 7/26/19at 16:25; Admin Dose 1.5 MG;  Start 7/26/19 at 15:30


Methadone HCl (Methadone Liq) 2 mg Q4 PO ;  Start 7/26/19 at 17:00


Ketorolac Tromethamine (Toradol) 30 mg Q6H IV ;  Start 7/26/19 at 15:30;  Stop 


7/29/19 at 15:29











ELISHA CHRISTENSEN NP             Jul 26, 2019 18:11

## 2019-07-27 VITALS — HEART RATE: 64 BPM | DIASTOLIC BLOOD PRESSURE: 60 MMHG | SYSTOLIC BLOOD PRESSURE: 120 MMHG | RESPIRATION RATE: 18 BRPM

## 2019-07-27 VITALS — RESPIRATION RATE: 18 BRPM | HEART RATE: 71 BPM | DIASTOLIC BLOOD PRESSURE: 66 MMHG | SYSTOLIC BLOOD PRESSURE: 124 MMHG

## 2019-07-27 VITALS — SYSTOLIC BLOOD PRESSURE: 101 MMHG | RESPIRATION RATE: 18 BRPM | HEART RATE: 78 BPM | DIASTOLIC BLOOD PRESSURE: 65 MMHG

## 2019-07-27 LAB
ABNORMAL IP MESSAGE: 1
ADD MAN DIFF?: NO
ANION GAP: 12 (ref 5–13)
BASOPHIL #: 0 10^3/UL (ref 0–0.1)
BASOPHILS %: 0.2 % (ref 0–2)
BLOOD UREA NITROGEN: 24 MG/DL (ref 7–20)
CALCIUM: 8.5 MG/DL (ref 8.4–10.2)
CARBON DIOXIDE: 22 MMOL/L (ref 21–31)
CHLORIDE: 106 MMOL/L (ref 97–110)
CREATININE: 0.95 MG/DL (ref 0.61–1.24)
EOSINOPHILS #: 0 10^3/UL (ref 0–0.5)
EOSINOPHILS %: 0 % (ref 0–7)
GLUCOSE: 68 MG/DL (ref 70–220)
HEMATOCRIT: 35 % (ref 42–52)
HEMOGLOBIN: 10.8 G/DL (ref 14–18)
IMMATURE GRANS #M: 0.05 10^3/UL (ref 0–0.03)
IMMATURE GRANS % (M): 1.2 % (ref 0–0.43)
LYMPHOCYTES #: 0.9 10^3/UL (ref 0.8–2.9)
LYMPHOCYTES %: 20.9 % (ref 15–51)
MEAN CORPUSCULAR HEMOGLOBIN: 26 PG (ref 29–33)
MEAN CORPUSCULAR HGB CONC: 30.9 G/DL (ref 32–37)
MEAN CORPUSCULAR VOLUME: 84.3 FL (ref 82–101)
MEAN PLATELET VOLUME: 11.7 FL (ref 7.4–10.4)
MONOCYTE #: 0.2 10^3/UL (ref 0.3–0.9)
MONOCYTES %: 5.2 % (ref 0–11)
NEUTROPHIL #: 3.1 10^3/UL (ref 1.6–7.5)
NEUTROPHILS %: 72.5 % (ref 39–77)
NUCLEATED RED BLOOD CELLS #: 0 10^3/UL (ref 0–0)
NUCLEATED RED BLOOD CELLS%: 0 /100WBC (ref 0–0)
PLATELET COUNT: 95 10^3/UL (ref 140–415)
POSITIVE DIFF: (no result)
POTASSIUM: 4.7 MMOL/L (ref 3.5–5.1)
RED BLOOD COUNT: 4.15 10^6/UL (ref 4.7–6.1)
RED CELL DISTRIBUTION WIDTH: 16.5 % (ref 11.5–14.5)
SODIUM: 140 MMOL/L (ref 135–144)
WHITE BLOOD COUNT: 4.3 10^3/UL (ref 4.8–10.8)

## 2019-07-27 RX ADMIN — KETOROLAC TROMETHAMINE 1 MG: 30 INJECTION, SOLUTION INTRAMUSCULAR at 21:01

## 2019-07-27 RX ADMIN — METHADONE HYDROCHLORIDE 1 MG: 5 SOLUTION ORAL at 05:00

## 2019-07-27 RX ADMIN — CALCIUM CARBONATE SCH MG: 1250 SUSPENSION ORAL at 08:55

## 2019-07-27 RX ADMIN — KETOROLAC TROMETHAMINE 1 MG: 30 INJECTION, SOLUTION INTRAMUSCULAR at 08:57

## 2019-07-27 RX ADMIN — TAMSULOSIN HYDROCHLORIDE 1 MG: 0.4 CAPSULE ORAL at 20:59

## 2019-07-27 RX ADMIN — KETOROLAC TROMETHAMINE SCH MG: 30 INJECTION, SOLUTION INTRAMUSCULAR at 21:01

## 2019-07-27 RX ADMIN — KETOROLAC TROMETHAMINE SCH MG: 30 INJECTION, SOLUTION INTRAMUSCULAR at 08:57

## 2019-07-27 RX ADMIN — LORAZEPAM PRN MG: 2 INJECTION, SOLUTION INTRAMUSCULAR; INTRAVENOUS at 23:29

## 2019-07-27 RX ADMIN — METHADONE HYDROCHLORIDE SCH MG: 5 SOLUTION ORAL at 23:28

## 2019-07-27 RX ADMIN — BICALUTAMIDE 1 MG: 50 TABLET, FILM COATED ORAL at 08:55

## 2019-07-27 RX ADMIN — KETOROLAC TROMETHAMINE 1 MG: 30 INJECTION, SOLUTION INTRAMUSCULAR at 15:48

## 2019-07-27 RX ADMIN — METHADONE HYDROCHLORIDE SCH MG: 5 SOLUTION ORAL at 01:00

## 2019-07-27 RX ADMIN — KETOROLAC TROMETHAMINE 1 MG: 30 INJECTION, SOLUTION INTRAMUSCULAR at 03:30

## 2019-07-27 RX ADMIN — TAMSULOSIN HYDROCHLORIDE SCH MG: 0.4 CAPSULE ORAL at 20:59

## 2019-07-27 RX ADMIN — CALCIUM CARBONATE 1 MG: 1250 SUSPENSION ORAL at 20:59

## 2019-07-27 RX ADMIN — KETOROLAC TROMETHAMINE SCH MG: 30 INJECTION, SOLUTION INTRAMUSCULAR at 03:30

## 2019-07-27 RX ADMIN — CEFTRIAXONE SCH MLS/HR: 1 INJECTION, SOLUTION INTRAVENOUS at 15:54

## 2019-07-27 RX ADMIN — METHADONE HYDROCHLORIDE SCH MG: 5 SOLUTION ORAL at 13:00

## 2019-07-27 RX ADMIN — METHADONE HYDROCHLORIDE 1 MG: 5 SOLUTION ORAL at 23:28

## 2019-07-27 RX ADMIN — CALCIUM CARBONATE SCH MG: 1250 SUSPENSION ORAL at 20:59

## 2019-07-27 RX ADMIN — BICALUTAMIDE SCH MG: 50 TABLET, FILM COATED ORAL at 08:55

## 2019-07-27 RX ADMIN — LORAZEPAM 1 MG: 2 INJECTION, SOLUTION INTRAMUSCULAR; INTRAVENOUS at 23:29

## 2019-07-27 RX ADMIN — METHADONE HYDROCHLORIDE SCH MG: 5 SOLUTION ORAL at 06:22

## 2019-07-27 RX ADMIN — METHADONE HYDROCHLORIDE 1 MG: 5 SOLUTION ORAL at 13:00

## 2019-07-27 RX ADMIN — CALCIUM CARBONATE 1 MG: 1250 SUSPENSION ORAL at 08:55

## 2019-07-27 RX ADMIN — METHADONE HYDROCHLORIDE 1 MG: 5 SOLUTION ORAL at 16:29

## 2019-07-27 RX ADMIN — KETOROLAC TROMETHAMINE SCH MG: 30 INJECTION, SOLUTION INTRAMUSCULAR at 15:48

## 2019-07-27 RX ADMIN — METHADONE HYDROCHLORIDE SCH MG: 5 SOLUTION ORAL at 05:00

## 2019-07-27 RX ADMIN — METHADONE HYDROCHLORIDE 1 MG: 5 SOLUTION ORAL at 06:22

## 2019-07-27 RX ADMIN — METHADONE HYDROCHLORIDE SCH MG: 5 SOLUTION ORAL at 10:24

## 2019-07-27 RX ADMIN — METHADONE HYDROCHLORIDE SCH MG: 5 SOLUTION ORAL at 16:29

## 2019-07-27 RX ADMIN — PANTOPRAZOLE SODIUM SCH MG: 40 TABLET, DELAYED RELEASE ORAL at 05:33

## 2019-07-27 RX ADMIN — CEFTRIAXONE 1 MLS/HR: 1 INJECTION, SOLUTION INTRAVENOUS at 15:54

## 2019-07-27 RX ADMIN — METHADONE HYDROCHLORIDE 1 MG: 5 SOLUTION ORAL at 01:00

## 2019-07-27 RX ADMIN — PANTOPRAZOLE SODIUM 1 MG: 40 TABLET, DELAYED RELEASE ORAL at 05:33

## 2019-07-27 RX ADMIN — METHADONE HYDROCHLORIDE 1 MG: 5 SOLUTION ORAL at 10:24

## 2019-07-27 NOTE — CONS
Assessment/Plan


Assessment/Plan


Assessment/Plan (Daily)


Metastatic prostate cancer 


Metastasis to bones


excruciating pain secondary to the above


Lumbosacral spine pain secondary to metastasis


Noncompliant with prior recommendations for treatment


Multiple falls at home secondary to weakness weight loss


Failure to thrive


Malnutrition


Hypertension





At this time I would suggest a IV Dilaudid and methadone until is more stable 


and pain is controlled. I do not believe that patient will be compliant with 


ongoing level of care and he does not have the support at home to watch him 24 


hours seven days a week nor transport him to physicians appointments. Therefore 


I believe the only viable recommendation is skilled nursing unit placement with 


adequate pain control and hospice care. Initially patient is in agreement with 


this plan.





Consultation Date/Type/Reason


Admit Date/Time


Jul 25, 2019 at 05:11


Date/Time of Note


DATE: 7/27/19 


TIME: 08:32





Hx of Present Illness


This is a 69-year-old gentleman who is very pleasant into able to make of his 


own decision for ongoing level of care. Essentially this time was diagnosed with


metastatic prostate cancer with a recent history of major weight loss anemia 


failure to thrive who was recently discharged and was seen by neurology and 


oncology consultations was given recommendation for ongoing treatment but never 


followed up after discharge. At home patient lives with brother and sister but 


they are not available 24 hours seven days a week, and he is taken multiple 


falls at home. He represents to [facility] in extreme pain out


of control. Patient states he was not taking pain control medications at home 


however I believe this story is suspect based upon review of medical records 


workup during the last hospitalization showed CT scan consistent with cervical, 


thoracic and lumbar spine diffuse osseous metastatic disease. Other major 


medical problems include severe anemia history of subdural hematoma, history of 


hepatitis C, history of alcoholism. At this time patient states he has diffuse 


pain worse in his lumbar sacral spine not responding to current pain control 


medications. I am asked to speak to patient and family members concerning 


ongoing level of care. I've reviewed medical records from our hospitalization


Constitutional:  no complaints, improved


Eyes:  no complaints


ENT:  no complaints


Respiratory:  no complaints


Cardiovascular:  no complaints


Gastrointestinal:  no complaints


Genitourinary:  no complaints, bleeding, dysuria, discharge, flank pain, 


hematuria, other (refer to HPI)


Musculoskeletal:  no complaints


Skin:  no complaints


Neurologic:  no complaints


Endocrine:  no complaints


Lymphatic:  no complaints


Psychological:  no complaints, nl mood/affect


Immunologic:  no complaints





Past Medical History


Medical History:  cancer (Of prostate based on the PSA.  Has not been confirmed 


by biopsy), hepatitis (C), hypertension, other (Anemia, hepatitis C, weight 


loss, history of falls and gastritis, history of subdural hematoma)


Home Meds


No Active Prescriptions or Reported Meds


Medications





Current Medications


Pantoprazole (Protonix Tab) 40 mg DAILY@06 PO  Last administered on 7/26/19at 


08:51; Admin Dose 40 MG;  Start 7/25/19 at 06:00


Albuterol/ Ipratropium (Duoneb) 3 ml Q6H RESP THERAPY  PRN HHN SHORTNESS OF 


BREATH;  Start 7/25/19 at 06:00


Acetaminophen (Tylenol Tab) 650 mg Q6H  PRN PO MILD PAIN(1-3)OR ELEVATED TEMP 


Last administered on 7/25/19at 10:45; Admin Dose 650 MG;  Start 7/25/19 at 06:00


Bisacodyl (Dulcolax) 10 mg DAILY  PRN PO CONSTIPATION;  Start 7/25/19 at 06:00


Miscellaneous Information (Pending Santyl Order For Wound Care) This patient 


ha... PRN  PRN XX WOUND CARE;  Start 7/25/19 at 06:00


Calcium Carbonate (Ca Carbonate) 1,250 mg BID PO  Last administered on 7/26/19at


20:39; Admin Dose 1,250 MG;  Start 7/25/19 at 21:00


Ceftriaxone Sodium 50 ml @  100 mls/hr Q24H IVPB ;  Start 7/25/19 at 16:00


Bicalutamide (Casodex) 50 mg DAILY PO  Last administered on 7/26/19at 08:52; A


dmin Dose 50 MG;  Start 7/26/19 at 09:00


Hydromorphone HCl (Dilaudid) 1.5 mg Q4H  PRN IV SEVERE PAIN LEVEL 7-10 Last 


administered on 7/26/19at 20:40; Admin Dose 1.5 MG;  Start 7/26/19 at 15:30


Methadone HCl (Methadone Liq) 2 mg Q4 PO  Last administered on 7/27/19at 06:22; 


Admin Dose 2 MG;  Start 7/26/19 at 17:00


Ketorolac Tromethamine (Toradol) 30 mg Q6H IV ;  Start 7/26/19 at 15:30;  Stop 


7/29/19 at 15:29


Lorazepam (Ativan) 0.5 mg Q6H  PRN IV ANXIETY Last administered on 7/26/19at 


23:04; Admin Dose 0.5 MG;  Start 7/26/19 at 19:00


Allergies:  


Coded Allergies:  


     No Known Allergy (Unverified , 1/11/19)





Past Surgical History


Past Surgical Hx:  no surgical history





Social History


Alcohol Use:  occasionally


Smoking Status:  Former smoker


Drug Use:  none, other (unknown)





Exam/Review of Systems


Exam


Vitals





Vital Signs


  Date      Temp  Pulse  Resp  B/P (MAP)   Pulse Ox  O2          O2 Flow    FiO2


Time                                                 Delivery    Rate


   7/27/19  98.4     71    18      124/66        95  Room Air


     08:22                           (85)








Intake and Output





7/26/19 7/26/19 7/27/19





1515:00


23:00


07:00





IntakeIntake Total


480 ml


100 ml


100 ml





OutputOutput Total


50 ml





BalanceBalance


430 ml


100 ml


100 ml











Constitutional:  alert, distress, frail


Psych:  anxiety


Head:  normocephalic, atraumatic


Eyes:  nl conjunctiva, EOMI, nl lids, nl sclera, PERRL


ENMT:  nl external ears & nose, nl lips & teeth, nl nasal mucosa & septum


Neck:  supple, non-tender


Respiratory:  clear to auscultation, normal air movement; 


   No congested cough, No crackles/rales, No diminished breath sounds, No 


intercostal retraction, No labored breathing, No respirations, No tactile 


fremitus, No wheezing, No other


Cardiovascular:  regular rate and rhythm, nl pulses; 


   No bruits, No diastolic murmur, No edema, No gallop, No irregular rhythm, No 


jugular venous distention (JVD), No murmurs/extra sounds, No rub, No systolic 


murmur, No S3, No S4, No other


Gastrointestinal:  soft, nl liver, spleen, non-tender; 


   No ascites, No bowel sounds, No distended, No firm, No hepatomegaly, No mass,


No rebound or guarding, No splenomegaly, No surgical scars, No tender, No other


Musculoskeletal:  nl extremities to inspection, nl gait and stance


Neurological:  CNS II-XII intact, nl mental status, nl speech, nl strength





Results


Result Diagram:  


7/26/19 0945                                                                    


           7/26/19 0945





Results 24hrs





Laboratory Tests


               Test
                                7/26/19
09:45


               White Blood Count                           6.1  #


               Red Blood Count                            3.20  L


               Hemoglobin                                  8.4  L


               Hematocrit                                 27.2  L


               Mean Corpuscular Volume                     85.0


               Mean Corpuscular Hemoglobin                26.3  L


               Mean Corpuscular Hemoglobin
Concent       30.9  L



               Red Cell Distribution Width                16.5  H


               Platelet Count                             136  #L


               Mean Platelet Volume                         8.7


               Immature Granulocytes %                   0.800  H


               Neutrophils %                              80.0  H


               Lymphocytes %                              14.6  L


               Monocytes %                                  4.4


               Eosinophils %                                0.2


               Basophils %                                  0.0


               Nucleated Red Blood Cells %                  0.0


               Immature Granulocytes #                   0.050  H


               Neutrophils #                                4.9


               Lymphocytes #                                0.9


               Monocytes #                                  0.3


               Eosinophils #                                0.0


               Basophils #                                  0.0


               Nucleated Red Blood Cells #                  0.0


               Sodium Level                                 137


               Potassium Level                              4.6


               Chloride Level                               105


               Carbon Dioxide Level                          27


               Anion Gap                                      5


               Blood Urea Nitrogen                          25  H


               Creatinine                                  1.19


               Est Glomerular Filtrat Rate
mL/min     



               Glucose Level                                143


               Calcium Level                               8.1  L


               Phosphorus Level                             2.5


               Magnesium Level                              1.8








Medications


Medication





Current Medications


Pantoprazole (Protonix Tab) 40 mg DAILY@06 PO  Last administered on 7/26/19at 


08:51; Admin Dose 40 MG;  Start 7/25/19 at 06:00


Albuterol/ Ipratropium (Duoneb) 3 ml Q6H RESP THERAPY  PRN HHN SHORTNESS OF 


BREATH;  Start 7/25/19 at 06:00


Acetaminophen (Tylenol Tab) 650 mg Q6H  PRN PO MILD PAIN(1-3)OR ELEVATED TEMP 


Last administered on 7/25/19at 10:45; Admin Dose 650 MG;  Start 7/25/19 at 06:00


Bisacodyl (Dulcolax) 10 mg DAILY  PRN PO CONSTIPATION;  Start 7/25/19 at 06:00


Miscellaneous Information (Pending Santyl Order For Wound Care) This patient 


ha... PRN  PRN XX WOUND CARE;  Start 7/25/19 at 06:00


Calcium Carbonate (Ca Carbonate) 1,250 mg BID PO  Last administered on 7/26/19at


20:39; Admin Dose 1,250 MG;  Start 7/25/19 at 21:00


Ceftriaxone Sodium 50 ml @  100 mls/hr Q24H IVPB ;  Start 7/25/19 at 16:00


Bicalutamide (Casodex) 50 mg DAILY PO  Last administered on 7/26/19at 08:52; 


Admin Dose 50 MG;  Start 7/26/19 at 09:00


Hydromorphone HCl (Dilaudid) 1.5 mg Q4H  PRN IV SEVERE PAIN LEVEL 7-10 Last 


administered on 7/26/19at 20:40; Admin Dose 1.5 MG;  Start 7/26/19 at 15:30


Methadone HCl (Methadone Liq) 2 mg Q4 PO  Last administered on 7/27/19at 06:22; 


Admin Dose 2 MG;  Start 7/26/19 at 17:00


Ketorolac Tromethamine (Toradol) 30 mg Q6H IV ;  Start 7/26/19 at 15:30;  Stop 


7/29/19 at 15:29


Lorazepam (Ativan) 0.5 mg Q6H  PRN IV ANXIETY Last administered on 7/26/19at 


23:04; Admin Dose 0.5 MG;  Start 7/26/19 at 19:00











SLOAN CASTRO             Jul 27, 2019 08:38

## 2019-07-27 NOTE — CONS
Assessment/Plan


Assessment/Plan


Assessment/Plan (Daily)


#Metastatic prostate cancer


-pt has disease diffusely throughout the liver, bones and lymph nodes


-PSA is currently> 600


- Per Dr Mcnulty - pt should have a confirmatory prostate bx but given the high l


ikelihood of prostate cancer and heavy disease burden, ideally patient 


  should be started on upfront Taxotere chemotherapy with Casodex and Lupron


-This was discussed with the patient who states he needs to "think about" 


starting any type of therapy


-he could also be evaluated by radiation oncology as an out patient for his back


pain as palliative XRT may help this issue


-at the very least he should continue casodex for now


-if he refuses all treatment, he should be referred for hospice evaluation





Patient seen in collaboration with Dr Mcnulty.  staff.











Consultation Date/Type/Reason


Admit Date/Time


Jul 25, 2019 at 05:11


Initial Consult Date


7/26/19


Type of Consult


ONCOLOGY/HEMATOLOGY


Reason for Consultation


Metastatic prostate cancer


Requesting Provider:  ANKIT MONAE MD


Date/Time of Note


DATE: 7/27/19 


TIME: 15:52





24 HR Interval Summary


Free Text/Dictation


Patient agreed for  biopsy- will fu 


no new events last night





Detailed Summary


Eyes:  no complaints


ENT:  no complaints


Respiratory:  no complaints


Cardiovascular:  no complaints


Gastrointestinal:  no complaints


Genitourinary:  dysuria


Musculoskeletal:  no complaints


Skin:  no complaints


Neurologic:  no complaints


Psychological:  nl mood/affect





Exam/Review of Systems


Exam


Vitals





Vital Signs


  Date      Temp  Pulse  Resp  B/P (MAP)   Pulse Ox  O2          O2 Flow    FiO2


Time                                                 Delivery    Rate


   7/27/19  98.4     71    18      124/66        95  Room Air


     08:22                           (85)








Intake and Output





7/26/19 7/26/19 7/27/19





1515:00


23:00


07:00





IntakeIntake Total


480 ml


100 ml


100 ml





OutputOutput Total


50 ml





BalanceBalance


430 ml


100 ml


100 ml











Constitutional:  alert, well developed, frail


Psych:  nl mood/affect


Head:  atraumatic


Eyes:  nl lids, nl sclera


ENMT:  nl external ears & nose


Neck:  non-tender


Respiratory:  clear to auscultation


Cardiovascular:  nl pulses, other (S1S2)


Gastrointestinal:  soft, non-tender


Musculoskeletal:  joint tenderness, muscle weakness


Extremities:  normal pulses


Neurological:  nl speech, other (alert/reponsive)





Results


Result Diagram:  


7/27/19 0837                                                                    


           7/27/19 0837





Results 24hrs





Laboratory Tests


               Test
                                7/27/19
08:37


               White Blood Count                          4.3  #L


               Red Blood Count                           4.15  #L


               Hemoglobin                                10.8  #L


               Hematocrit                                35.0  #L


               Mean Corpuscular Volume                     84.3


               Mean Corpuscular Hemoglobin                26.0  L


               Mean Corpuscular Hemoglobin
Concent       30.9  L



               Red Cell Distribution Width                16.5  H


               Platelet Count                              95  #L


               Mean Platelet Volume                      11.7  #H


               Immature Granulocytes %                   1.200  H


               Neutrophils %                               72.5


               Lymphocytes %                               20.9


               Monocytes %                                  5.2


               Eosinophils %                                0.0


               Basophils %                                  0.2


               Nucleated Red Blood Cells %                  0.0


               Immature Granulocytes #                   0.050  H


               Neutrophils #                                3.1


               Lymphocytes #                                0.9


               Monocytes #                                 0.2  L


               Eosinophils #                                0.0


               Basophils #                                  0.0


               Nucleated Red Blood Cells #                  0.0


               Sodium Level                                 140


               Potassium Level                              4.7


               Chloride Level                               106


               Carbon Dioxide Level                          22


               Anion Gap                                    12  #


               Blood Urea Nitrogen                          24  H


               Creatinine                                  0.95


               Est Glomerular Filtrat Rate
mL/min     



               Glucose Level                               68  #L


               Calcium Level                                8.5








Medications


Medication





Current Medications


Pantoprazole (Protonix Tab) 40 mg DAILY@06 PO  Last administered on 7/26/19at 


08:51; Admin Dose 40 MG;  Start 7/25/19 at 06:00


Albuterol/ Ipratropium (Duoneb) 3 ml Q6H RESP THERAPY  PRN HHN SHORTNESS OF BR


EATH;  Start 7/25/19 at 06:00


Acetaminophen (Tylenol Tab) 650 mg Q6H  PRN PO MILD PAIN(1-3)OR ELEVATED TEMP 


Last administered on 7/25/19at 10:45; Admin Dose 650 MG;  Start 7/25/19 at 06:00


Bisacodyl (Dulcolax) 10 mg DAILY  PRN PO CONSTIPATION;  Start 7/25/19 at 06:00


Miscellaneous Information (Pending Santyl Order For Wound Care) This patient 


ha... PRN  PRN XX WOUND CARE;  Start 7/25/19 at 06:00


Calcium Carbonate (Ca Carbonate) 1,250 mg BID PO  Last administered on 7/27/19at


08:55; Admin Dose 1,250 MG;  Start 7/25/19 at 21:00


Ceftriaxone Sodium 50 ml @  100 mls/hr Q24H IVPB ;  Start 7/25/19 at 16:00


Bicalutamide (Casodex) 50 mg DAILY PO  Last administered on 7/27/19at 08:55; 


Admin Dose 50 MG;  Start 7/26/19 at 09:00


Hydromorphone HCl (Dilaudid) 1.5 mg Q4H  PRN IV SEVERE PAIN LEVEL 7-10 Last 


administered on 7/26/19at 20:40; Admin Dose 1.5 MG;  Start 7/26/19 at 15:30


Methadone HCl (Methadone Liq) 2 mg Q4 PO  Last administered on 7/27/19at 10:24; 


Admin Dose 2 MG;  Start 7/26/19 at 17:00


Ketorolac Tromethamine (Toradol) 30 mg Q6H IV  Last administered on 7/27/19at 


08:57; Admin Dose 30 MG;  Start 7/26/19 at 15:30;  Stop 7/29/19 at 15:29


Lorazepam (Ativan) 0.5 mg Q6H  PRN IV ANXIETY Last administered on 7/26/19at 


23:04; Admin Dose 0.5 MG;  Start 7/26/19 at 19:00











CHONG PRICE                 Jul 27, 2019 15:54

## 2019-07-27 NOTE — PN
Date/Time of Note


Date/Time of Note


DATE: 7/27/19 


TIME: 14:38





Assessment/Plan


VTE Prophylaxis


Risk score (from Ns)>0 risk:  5


SCD applied (from Ns):  Yes


Pharmacological prophylaxis:  NA/contraindicated


Pharm contraindication:  blood coag disorder





Lines/Catheters


IV Catheter Type (from [facility]):  Saline Lock


Urinary Cath still in place:  No





Assessment/Plan


Hospital Course


1.  Recurrent falls, failure to thrive, weight loss, generalized body aches, 


likely secondary to metastatic prostate cancer.  The patient was here in January 2019.  The patient was seen by multiple consultants including urology and 


oncology.  The patient had a prostate specific antigen over 1000.  The patient 


was deemed that he had capacity to make decisions.  The patient was offered 


nursing home; however, he left and went home.  He never followed up with any 


appointments and likely has progression of disease.


2.  History of falls with subdural hematomas.


3.  Severe anemia.


4.  Pancytopenia with a history of hepatitis C.


5.  Alcoholism.


6.  History of smoking.


7.  Elevated alkaline phosphatase could be secondary to bony metastases.  


Metastatic disease. He is on Casodex


8.  Edema with hypoalbuminemia.


9.  Likely urinary tract infection.


10. cachexia. Malnourishment


11.Chronic pain and anxiety


Assessment/Plan


- gentle IV fluids, nutrition.


-resume methadone


-today is lethargic. later alert oriented 3


-consent prostate biopsy prepared


-social service sen


-communicated Brother, he requested full treatment, keep DNR and DNI


-GI prophylaxis Protonix 


-code status is DNR/DNI, 


-Pain control. 


-  Oncology consultation was seen. palliative care


- consultation, seen  


-ordered PT eval.


Result Diagram:  


7/27/19 0837                                                                    


           7/27/19 0837





Results 24hrs





Laboratory Tests


               Test
                                7/27/19
08:37


               White Blood Count                          4.3  #L


               Red Blood Count                           4.15  #L


               Hemoglobin                                10.8  #L


               Hematocrit                                35.0  #L


               Mean Corpuscular Volume                     84.3


               Mean Corpuscular Hemoglobin                26.0  L


               Mean Corpuscular Hemoglobin
Concent       30.9  L



               Red Cell Distribution Width                16.5  H


               Platelet Count                              95  #L


               Mean Platelet Volume                      11.7  #H


               Immature Granulocytes %                   1.200  H


               Neutrophils %                               72.5


               Lymphocytes %                               20.9


               Monocytes %                                  5.2


               Eosinophils %                                0.0


               Basophils %                                  0.2


               Nucleated Red Blood Cells %                  0.0


               Immature Granulocytes #                   0.050  H


               Neutrophils #                                3.1


               Lymphocytes #                                0.9


               Monocytes #                                 0.2  L


               Eosinophils #                                0.0


               Basophils #                                  0.0


               Nucleated Red Blood Cells #                  0.0


               Sodium Level                                 140


               Potassium Level                              4.7


               Chloride Level                               106


               Carbon Dioxide Level                          22


               Anion Gap                                    12  #


               Blood Urea Nitrogen                          24  H


               Creatinine                                  0.95


               Est Glomerular Filtrat Rate
mL/min     



               Glucose Level                               68  #L


               Calcium Level                                8.5








Subjective


24 Hr Interval Summary


Free Text/Dictation


severe pain, pt is lethargic


ENT:  no complaints, bleeding, pain, congestion, discharge, dysphagia, sore 


throat, other


Gastrointestinal:  no complaints, constipation, nausea; 


   No pain, No blood, No decreased appetite, No diarrhea, No flatus, No passing 


stool, No vomiting, No other


Musculoskeletal:  back pain, bone/joint pain, neck pain, restricted range of 


motion





Exam/Review of Systems


Exam


Vitals





Vital Signs


  Date      Temp  Pulse  Resp  B/P (MAP)   Pulse Ox  O2          O2 Flow    FiO2


Time                                                 Delivery    Rate


   7/27/19  98.4     71    18      124/66        95  Room Air


     08:22                           (85)








Intake and Output





7/26/19 7/26/19 7/27/19





1515:00


23:00


07:00





IntakeIntake Total


480 ml


100 ml


100 ml





OutputOutput Total


50 ml





BalanceBalance


430 ml


100 ml


100 ml











Constitutional:  alert, oriented (x3)


Eyes:  nl conjunctiva


ENMT:  nl external ears & nose, mucosa pink and moist


Neck:  supple


Respiratory:  crackles/rales, diminished breath sounds, wheezing; 


   No clear to auscultation, No normal air movement, No congested cough, No 


intercostal retraction, No labored breathing, No respirations, No tactile 


fremitus, No other


Cardiovascular:  regular rate and rhythm, jugular venous distention (JVD); 


   No nl pulses, No bruits, No diastolic murmur, No edema, No gallop, No 


irregular rhythm, No murmurs/extra sounds, No rub, No systolic murmur, No S3, No


S4, No other


Gastrointestinal:  soft


Genitourinary - Male:  CVA tenderness; No nl penis, No nl scrotum, No discharge,


No other


Extremities:  normal pulses





Results


Results 24hrs





Laboratory Tests


               Test
                                7/27/19
08:37


               White Blood Count                          4.3  #L


               Red Blood Count                           4.15  #L


               Hemoglobin                                10.8  #L


               Hematocrit                                35.0  #L


               Mean Corpuscular Volume                     84.3


               Mean Corpuscular Hemoglobin                26.0  L


               Mean Corpuscular Hemoglobin
Concent       30.9  L



               Red Cell Distribution Width                16.5  H


               Platelet Count                              95  #L


               Mean Platelet Volume                      11.7  #H


               Immature Granulocytes %                   1.200  H


               Neutrophils %                               72.5


               Lymphocytes %                               20.9


               Monocytes %                                  5.2


               Eosinophils %                                0.0


               Basophils %                                  0.2


               Nucleated Red Blood Cells %                  0.0


               Immature Granulocytes #                   0.050  H


               Neutrophils #                                3.1


               Lymphocytes #                                0.9


               Monocytes #                                 0.2  L


               Eosinophils #                                0.0


               Basophils #                                  0.0


               Nucleated Red Blood Cells #                  0.0


               Sodium Level                                 140


               Potassium Level                              4.7


               Chloride Level                               106


               Carbon Dioxide Level                          22


               Anion Gap                                    12  #


               Blood Urea Nitrogen                          24  H


               Creatinine                                  0.95


               Est Glomerular Filtrat Rate
mL/min     



               Glucose Level                               68  #L


               Calcium Level                                8.5








Medications


Medication





Current Medications


Pantoprazole (Protonix Tab) 40 mg DAILY@06 PO  Last administered on 7/26/19at 


08:51; Admin Dose 40 MG;  Start 7/25/19 at 06:00


Albuterol/ Ipratropium (Duoneb) 3 ml Q6H RESP THERAPY  PRN HHN SHORTNESS OF 


BREATH;  Start 7/25/19 at 06:00


Acetaminophen (Tylenol Tab) 650 mg Q6H  PRN PO MILD PAIN(1-3)OR ELEVATED TEMP 


Last administered on 7/25/19at 10:45; Admin Dose 650 MG;  Start 7/25/19 at 06:00


Bisacodyl (Dulcolax) 10 mg DAILY  PRN PO CONSTIPATION;  Start 7/25/19 at 06:00


Miscellaneous Information (Pending Santyl Order For Wound Care) This patient 


ha... PRN  PRN XX WOUND CARE;  Start 7/25/19 at 06:00


Calcium Carbonate (Ca Carbonate) 1,250 mg BID PO  Last administered on 7/27/19at


08:55; Admin Dose 1,250 MG;  Start 7/25/19 at 21:00


Ceftriaxone Sodium 50 ml @  100 mls/hr Q24H IVPB ;  Start 7/25/19 at 16:00


Bicalutamide (Casodex) 50 mg DAILY PO  Last administered on 7/27/19at 08:55; 


Admin Dose 50 MG;  Start 7/26/19 at 09:00


Hydromorphone HCl (Dilaudid) 1.5 mg Q4H  PRN IV SEVERE PAIN LEVEL 7-10 Last 


administered on 7/26/19at 20:40; Admin Dose 1.5 MG;  Start 7/26/19 at 15:30


Methadone HCl (Methadone Liq) 2 mg Q4 PO  Last administered on 7/27/19at 10:24; 


Admin Dose 2 MG;  Start 7/26/19 at 17:00


Ketorolac Tromethamine (Toradol) 30 mg Q6H IV  Last administered on 7/27/19at 


08:57; Admin Dose 30 MG;  Start 7/26/19 at 15:30;  Stop 7/29/19 at 15:29


Lorazepam (Ativan) 0.5 mg Q6H  PRN IV ANXIETY Last administered on 7/26/19at 


23:04; Admin Dose 0.5 MG;  Start 7/26/19 at 19:00











ELISHA CHRISTENSEN NP             Jul 27, 2019 14:47

## 2019-07-27 NOTE — CONS
Consult Date/Type/Reason


Admit Date/Time


Jul 25, 2019 at 05:11


Initial Consult Date


7/26/19


Type of Consultation:  Urology


Reason for Consultation


Metastatic prostate cancer


Requesting Provider:  ANKIT MONAE MD


Date/Time of Note


DATE: 7/27/19 


TIME: 12:22





Subjective


Patient is very weak and cachectic.  States that he has to push to urinate.





Objective


Vitals





Vital Signs


  Date      Temp  Pulse  Resp  B/P (MAP)   Pulse Ox  O2          O2 Flow    FiO2


Time                                                 Delivery    Rate


   7/27/19  98.4     71    18      124/66        95  Room Air


     08:22                           (85)








Intake and Output





7/26/19 7/26/19 7/27/19





1515:00


23:00


07:00





IntakeIntake Total


480 ml


100 ml


100 ml





OutputOutput Total


50 ml





BalanceBalance


430 ml


100 ml


100 ml











Exam


The abdomen is soft and the bladder is not distended.  He does have back pain, 


he is able to move all of his extremities





Results/Medications


Result Diagram:  


7/27/19 0837                                                                    


           7/27/19 0837





Results 24 hrs





Laboratory Tests


               Test
                                7/27/19
08:37


               White Blood Count                          4.3  #L


               Red Blood Count                           4.15  #L


               Hemoglobin                                10.8  #L


               Hematocrit                                35.0  #L


               Mean Corpuscular Volume                     84.3


               Mean Corpuscular Hemoglobin                26.0  L


               Mean Corpuscular Hemoglobin
Concent       30.9  L



               Red Cell Distribution Width                16.5  H


               Platelet Count                              95  #L


               Mean Platelet Volume                      11.7  #H


               Immature Granulocytes %                   1.200  H


               Neutrophils %                               72.5


               Lymphocytes %                               20.9


               Monocytes %                                  5.2


               Eosinophils %                                0.0


               Basophils %                                  0.2


               Nucleated Red Blood Cells %                  0.0


               Immature Granulocytes #                   0.050  H


               Neutrophils #                                3.1


               Lymphocytes #                                0.9


               Monocytes #                                 0.2  L


               Eosinophils #                                0.0


               Basophils #                                  0.0


               Nucleated Red Blood Cells #                  0.0


               Sodium Level                                 140


               Potassium Level                              4.7


               Chloride Level                               106


               Carbon Dioxide Level                          22


               Anion Gap                                    12  #


               Blood Urea Nitrogen                          24  H


               Creatinine                                  0.95


               Est Glomerular Filtrat Rate
mL/min     



               Glucose Level                               68  #L


               Calcium Level                                8.5





Home Meds


No Active Prescriptions or Reported Meds


Medications





Current Medications


Pantoprazole (Protonix Tab) 40 mg DAILY@06 PO  Last administered on 7/26/19at 


08:51; Admin Dose 40 MG;  Start 7/25/19 at 06:00


Albuterol/ Ipratropium (Duoneb) 3 ml Q6H RESP THERAPY  PRN HHN SHORTNESS OF 


BREATH;  Start 7/25/19 at 06:00


Acetaminophen (Tylenol Tab) 650 mg Q6H  PRN PO MILD PAIN(1-3)OR ELEVATED TEMP 


Last administered on 7/25/19at 10:45; Admin Dose 650 MG;  Start 7/25/19 at 06:00


Bisacodyl (Dulcolax) 10 mg DAILY  PRN PO CONSTIPATION;  Start 7/25/19 at 06:00


Miscellaneous Information (Pending Santyl Order For Wound Care) This patient 


ha... PRN  PRN XX WOUND CARE;  Start 7/25/19 at 06:00


Calcium Carbonate (Ca Carbonate) 1,250 mg BID PO  Last administered on 7/27/19at


08:55; Admin Dose 1,250 MG;  Start 7/25/19 at 21:00


Ceftriaxone Sodium 50 ml @  100 mls/hr Q24H IVPB ;  Start 7/25/19 at 16:00


Bicalutamide (Casodex) 50 mg DAILY PO  Last administered on 7/27/19at 08:55; 


Admin Dose 50 MG;  Start 7/26/19 at 09:00


Hydromorphone HCl (Dilaudid) 1.5 mg Q4H  PRN IV SEVERE PAIN LEVEL 7-10 Last 


administered on 7/26/19at 20:40; Admin Dose 1.5 MG;  Start 7/26/19 at 15:30


Methadone HCl (Methadone Liq) 2 mg Q4 PO  Last administered on 7/27/19at 10:24; 


Admin Dose 2 MG;  Start 7/26/19 at 17:00


Ketorolac Tromethamine (Toradol) 30 mg Q6H IV  Last administered on 7/27/19at 


08:57; Admin Dose 30 MG;  Start 7/26/19 at 15:30;  Stop 7/29/19 at 15:29


Lorazepam (Ativan) 0.5 mg Q6H  PRN IV ANXIETY Last administered on 7/26/19at 


23:04; Admin Dose 0.5 MG;  Start 7/26/19 at 19:00


Imaging


1.  Diffuse metastasis to the liver as described above. Recommend contrast CT or


MR to further evaluate.


2.  Pathologic lymphadenopathy is noted in the retroperitoneum, the 


gastrohepatic gastrolienal and the portal triad with retroperitoneal 


lymphadenopathy noted at the aortic bifurcation. Incomplete evaluation without 


contrast on this study is noted.


3.  Anterior mid abdominal mass measuring 5.8 cm transverse by 3.2 cm in AP 


dimensions.


4.  Bilateral renal cortical cysts with persistent mild left hydroureter 


nephrosis


5. Diffuse blastic and lytic skeletal metastasis and recommend MRI with contrast


of the spine to evaluate for epidural extension is clinically indicated.


6.  Mild abdominal and pelvic ascites


7.  Mild gallbladder wall calcifications





Assessment/Plan


Hospital Course (Demo Recall)


69-year-old -American male was admitted here in January 2019.  At that 


time his PSA was over at thousand and patient was assumed to have prostate 


cancer he was started on bicalutamide and at the time of discharge he was 


instructed to follow-up in the office for biopsy of the prostate to confirm the 


diagnosis of prostate cancer.  However the patient did not follow up with any 


doctor and he went to [facility] because of increased back pain


and weakness in addition to lower extremity pain and was transferred to [facility].  Patient underwent CT of the cervical thoracic and lumbar


spine and was found to have metastatic disease.  Patient states he does urinate 


5-6 times at night and same during the day.  He voids a small amount.  He denies


any gross hematuria.


Presently the patient is very weak and the CT scan of the abdomen and pelvis 


showed:


1.  Diffuse metastasis to the liver as described above. Recommend contrast CT or


MR to further evaluate.


2.  Pathologic lymphadenopathy is noted in the retroperitoneum, the 


gastrohepatic gastrolienal and the portal triad with retroperitoneal 


lymphadenopathy noted at the aortic bifurcation. Incomplete evaluation without 


contrast on this study is noted.


3.  Anterior mid abdominal mass measuring 5.8 cm transverse by 3.2 cm in AP 


dimensions.


4.  Bilateral renal cortical cysts with persistent mild left hydroureter 


nephrosis


5. Diffuse blastic and lytic skeletal metastasis and recommend MRI with contrast


of the spine to evaluate for epidural extension is clinically indicated.


6.  Mild abdominal and pelvic ascites


7.  Mild gallbladder wall calcifications





The patient is on bicalutamide and pain medications.  His cancer is very 


advanced.  Consideration have been made for nursing home and or hospice.  The pa


tient is still undecided.











HAILEE CLIFTON MD            Jul 27, 2019 12:28

## 2019-07-28 VITALS — RESPIRATION RATE: 20 BRPM | HEART RATE: 101 BPM | DIASTOLIC BLOOD PRESSURE: 62 MMHG | SYSTOLIC BLOOD PRESSURE: 95 MMHG

## 2019-07-28 VITALS — DIASTOLIC BLOOD PRESSURE: 68 MMHG | RESPIRATION RATE: 18 BRPM | SYSTOLIC BLOOD PRESSURE: 119 MMHG | HEART RATE: 94 BPM

## 2019-07-28 VITALS — DIASTOLIC BLOOD PRESSURE: 72 MMHG | RESPIRATION RATE: 18 BRPM | HEART RATE: 80 BPM | SYSTOLIC BLOOD PRESSURE: 125 MMHG

## 2019-07-28 LAB
ADD MAN DIFF?: NO
ADD UMIC: YES
ANION GAP: 7 (ref 5–13)
BASOPHIL #: 0 10^3/UL (ref 0–0.1)
BASOPHILS %: 0 % (ref 0–2)
BLOOD UREA NITROGEN: 26 MG/DL (ref 7–20)
CALCIUM: 8.3 MG/DL (ref 8.4–10.2)
CARBON DIOXIDE: 26 MMOL/L (ref 21–31)
CHLORIDE: 105 MMOL/L (ref 97–110)
CREATININE: 1.02 MG/DL (ref 0.61–1.24)
EOSINOPHILS #: 0 10^3/UL (ref 0–0.5)
EOSINOPHILS %: 0.6 % (ref 0–7)
GLUCOSE: 122 MG/DL (ref 70–220)
HEMATOCRIT: 26.2 % (ref 42–52)
HEMOGLOBIN: 8 G/DL (ref 14–18)
IMMATURE GRANS #M: 0.04 10^3/UL (ref 0–0.03)
IMMATURE GRANS % (M): 0.9 % (ref 0–0.43)
INR: 1.16
LYMPHOCYTES #: 0.8 10^3/UL (ref 0.8–2.9)
LYMPHOCYTES %: 18.1 % (ref 15–51)
MEAN CORPUSCULAR HEMOGLOBIN: 26.1 PG (ref 29–33)
MEAN CORPUSCULAR HGB CONC: 30.5 G/DL (ref 32–37)
MEAN CORPUSCULAR VOLUME: 85.6 FL (ref 82–101)
MEAN PLATELET VOLUME: 10 FL (ref 7.4–10.4)
MONOCYTE #: 0.2 10^3/UL (ref 0.3–0.9)
MONOCYTES %: 4.7 % (ref 0–11)
NEUTROPHIL #: 3.5 10^3/UL (ref 1.6–7.5)
NEUTROPHILS %: 75.7 % (ref 39–77)
NUCLEATED RED BLOOD CELLS #: 0 10^3/UL (ref 0–0)
NUCLEATED RED BLOOD CELLS%: 0 /100WBC (ref 0–0)
PLATELET COUNT: 111 10^3/UL (ref 140–415)
POTASSIUM: 4.2 MMOL/L (ref 3.5–5.1)
PROTIME: 14.9 SEC (ref 11.9–14.9)
PT RATIO: 1.2
RED BLOOD COUNT: 3.06 10^6/UL (ref 4.7–6.1)
RED CELL DISTRIBUTION WIDTH: 16.5 % (ref 11.5–14.5)
SODIUM: 138 MMOL/L (ref 135–144)
UR AMORPHOUS CRYSTAL: (no result) /HPF
UR ASCORBIC ACID: NEGATIVE MG/DL
UR BACTERIA: (no result) /HPF
UR BILIRUBIN (DIP): NEGATIVE MG/DL
UR BLOOD (DIP): (no result) MG/DL
UR CLARITY: (no result)
UR COLOR: (no result)
UR GLUCOSE (DIP): NEGATIVE MG/DL
UR KETONES (DIP): NEGATIVE MG/DL
UR LEUKOCYTE ESTERASE (DIP): (no result) LEU/UL
UR NITRITE (DIP): NEGATIVE MG/DL
UR NONSQUAMOUS EPITHELIAL CELL: 2 /HPF
UR PH (DIP): 6 (ref 5–9)
UR RBC: 17 /HPF (ref 0–5)
UR SPECIFIC GRAVITY (DIP): 1.01 (ref 1–1.03)
UR SQUAMOUS EPITHELIAL CELL: (no result) /HPF
UR TOTAL PROTEIN (DIP): (no result) MG/DL
UR UROBILINOGEN (DIP): NEGATIVE MG/DL
UR WBC: > 182 /HPF (ref 0–5)
WHITE BLOOD COUNT: 4.7 10^3/UL (ref 4.8–10.8)

## 2019-07-28 RX ADMIN — METHADONE HYDROCHLORIDE SCH MG: 5 SOLUTION ORAL at 18:46

## 2019-07-28 RX ADMIN — CEFTRIAXONE 1 MLS/HR: 1 INJECTION, SOLUTION INTRAVENOUS at 16:00

## 2019-07-28 RX ADMIN — METHADONE HYDROCHLORIDE SCH MG: 5 SOLUTION ORAL at 23:30

## 2019-07-28 RX ADMIN — METHADONE HYDROCHLORIDE 1 MG: 5 SOLUTION ORAL at 12:42

## 2019-07-28 RX ADMIN — PANTOPRAZOLE SODIUM SCH MG: 40 TABLET, DELAYED RELEASE ORAL at 05:43

## 2019-07-28 RX ADMIN — PANTOPRAZOLE SODIUM 1 MG: 40 TABLET, DELAYED RELEASE ORAL at 05:43

## 2019-07-28 RX ADMIN — METHADONE HYDROCHLORIDE SCH MG: 5 SOLUTION ORAL at 05:43

## 2019-07-28 RX ADMIN — KETOROLAC TROMETHAMINE SCH MG: 30 INJECTION, SOLUTION INTRAMUSCULAR at 15:14

## 2019-07-28 RX ADMIN — METHADONE HYDROCHLORIDE 1 MG: 5 SOLUTION ORAL at 23:30

## 2019-07-28 RX ADMIN — HYDROMORPHONE HYDROCHLORIDE 1 MG: 2 INJECTION, SOLUTION INTRAMUSCULAR; INTRAVENOUS; SUBCUTANEOUS at 16:43

## 2019-07-28 RX ADMIN — KETOROLAC TROMETHAMINE SCH MG: 30 INJECTION, SOLUTION INTRAMUSCULAR at 21:08

## 2019-07-28 RX ADMIN — METHADONE HYDROCHLORIDE 1 MG: 5 SOLUTION ORAL at 05:43

## 2019-07-28 RX ADMIN — CEFTRIAXONE SCH MLS/HR: 1 INJECTION, SOLUTION INTRAVENOUS at 16:00

## 2019-07-28 RX ADMIN — CALCIUM CARBONATE SCH MG: 1250 SUSPENSION ORAL at 21:08

## 2019-07-28 RX ADMIN — KETOROLAC TROMETHAMINE 1 MG: 30 INJECTION, SOLUTION INTRAMUSCULAR at 09:25

## 2019-07-28 RX ADMIN — HYDROMORPHONE HYDROCHLORIDE PRN MG: 2 INJECTION, SOLUTION INTRAMUSCULAR; INTRAVENOUS; SUBCUTANEOUS at 16:43

## 2019-07-28 RX ADMIN — KETOROLAC TROMETHAMINE SCH MG: 30 INJECTION, SOLUTION INTRAMUSCULAR at 03:27

## 2019-07-28 RX ADMIN — KETOROLAC TROMETHAMINE 1 MG: 30 INJECTION, SOLUTION INTRAMUSCULAR at 15:14

## 2019-07-28 RX ADMIN — CALCIUM CARBONATE 1 MG: 1250 SUSPENSION ORAL at 21:08

## 2019-07-28 RX ADMIN — BICALUTAMIDE 1 MG: 50 TABLET, FILM COATED ORAL at 09:26

## 2019-07-28 RX ADMIN — CALCIUM CARBONATE SCH MG: 1250 SUSPENSION ORAL at 09:25

## 2019-07-28 RX ADMIN — KETOROLAC TROMETHAMINE 1 MG: 30 INJECTION, SOLUTION INTRAMUSCULAR at 21:08

## 2019-07-28 RX ADMIN — CALCIUM CARBONATE 1 MG: 1250 SUSPENSION ORAL at 09:25

## 2019-07-28 RX ADMIN — KETOROLAC TROMETHAMINE SCH MG: 30 INJECTION, SOLUTION INTRAMUSCULAR at 09:25

## 2019-07-28 RX ADMIN — KETOROLAC TROMETHAMINE 1 MG: 30 INJECTION, SOLUTION INTRAMUSCULAR at 03:27

## 2019-07-28 RX ADMIN — METHADONE HYDROCHLORIDE SCH MG: 5 SOLUTION ORAL at 12:42

## 2019-07-28 RX ADMIN — TAMSULOSIN HYDROCHLORIDE SCH MG: 0.4 CAPSULE ORAL at 21:08

## 2019-07-28 RX ADMIN — TAMSULOSIN HYDROCHLORIDE 1 MG: 0.4 CAPSULE ORAL at 21:08

## 2019-07-28 RX ADMIN — BICALUTAMIDE SCH MG: 50 TABLET, FILM COATED ORAL at 09:26

## 2019-07-28 RX ADMIN — METHADONE HYDROCHLORIDE 1 MG: 5 SOLUTION ORAL at 18:46

## 2019-07-28 NOTE — PN
Date/Time of Note


Date/Time of Note


DATE: 7/28/19 


TIME: 15:13





Assessment/Plan


VTE Prophylaxis


Risk score (from Ns)>0 risk:  5


SCD applied (from Ns):  Yes


Pharmacological prophylaxis:  NA/contraindicated


Pharm contraindication:  anticoag not tolerated





Lines/Catheters


IV Catheter Type (from [facility]):  Saline Lock


Urinary Cath still in place:  No





Assessment/Plan


Hospital Course


1.  Recurrent falls, failure to thrive, weight loss, generalized body aches, 


likely secondary to metastatic prostate cancer.  The patient was here in January 2019.  The patient was seen by multiple consultants including urology and 


oncology.  The patient had a prostate specific antigen over 1000.  The patient 


was deemed that he had capacity to make decisions.  The patient was offered 


nursing home; however, he left and went home.  He never followed up with any 


appointments and likely has progression of disease.


2.  History of falls with subdural hematomas.


3.  Severe anemia.


4.  Pancytopenia with a history of hepatitis C.


5.  Alcoholism.


6.  History of smoking.


7.  Elevated alkaline phosphatase could be secondary to bony metastases.  


Metastatic disease. He is on Casodex


8.  Edema with hypoalbuminemia.


9.  Likely urinary tract infection.


10. cachexia. Malnourishment


11.Chronic pain and anxiety


Assessment/Plan


- IR requested surgeon to perform prostate biopsy


-gentle  nutrition.


-c/w methadone


= find a SNF


-consent prostate biopsy signed


-social service sen


-GI prophylaxis Protonix 


-Pain control. 


-  Oncology consultation was seen. palliative care


- consultation, seen  


-ordered PT eval.


Result Diagram:  


7/28/19 0443                                                                    


           7/28/19 0443





Results 24hrs





Laboratory Tests


               Test
                                7/28/19
04:43


               White Blood Count                           4.7  L


               Red Blood Count                           3.06  #L


               Hemoglobin                                 8.0  #L


               Hematocrit                                26.2  #L


               Mean Corpuscular Volume                     85.6


               Mean Corpuscular Hemoglobin                26.1  L


               Mean Corpuscular Hemoglobin
Concent       30.5  L



               Red Cell Distribution Width                16.5  H


               Platelet Count                              111  L


               Mean Platelet Volume                        10.0


               Immature Granulocytes %                   0.900  H


               Neutrophils %                               75.7


               Lymphocytes %                               18.1


               Monocytes %                                  4.7


               Eosinophils %                                0.6


               Basophils %                                  0.0


               Nucleated Red Blood Cells %                  0.0


               Immature Granulocytes #                   0.040  H


               Neutrophils #                                3.5


               Lymphocytes #                                0.8


               Monocytes #                                 0.2  L


               Eosinophils #                                0.0


               Basophils #                                  0.0


               Nucleated Red Blood Cells #                  0.0


               Prothrombin Time                            14.9


               Prothrombin Time Ratio                       1.2


               INR International Normalized
Ratio         1.16  



               Sodium Level                                 138


               Potassium Level                              4.2


               Chloride Level                               105


               Carbon Dioxide Level                          26


               Anion Gap                                      7


               Blood Urea Nitrogen                          26  H


               Creatinine                                  1.02


               Est Glomerular Filtrat Rate
mL/min     



               Glucose Level                               122  #


               Calcium Level                               8.3  L








Subjective


24 Hr Interval Summary


Free Text/Dictation


tired


Eyes:  no complaints


ENT:  no complaints


Respiratory:  no complaints


Musculoskeletal:  back pain, bone/joint pain, restricted range of motion


Neurologic:  headache





Exam/Review of Systems


Exam


Vitals





Vital Signs


  Date      Temp  Pulse  Resp  B/P (MAP)   Pulse Ox  O2          O2 Flow    FiO2


Time                                                 Delivery    Rate


   7/28/19  97.7     94    18      119/68        98


     07:55                           (85)


   7/27/19                                           Room Air


     15:58








Intake and Output





7/27/19 7/27/19 7/28/19





1515:00


23:00


07:00





IntakeIntake Total


200 ml


340 ml





OutputOutput Total


150 ml


100 ml


600 ml





BalanceBalance


50 ml


240 ml


-600 ml











Exam


cachectic


No acute distress, no events overnight.


Eyes: anicteric, EOM's intact, no pallor


Nose: no rhinorrhea


Neck: supple, no thyromegaly, no carotid bruits


Lungs: clear bilaterally, decreased.


CVS: regular rate and rhythm, no murmurs


Abdomen: soft, bowel sounds present, scaphoid.


Rectal: differed.


External genitalia: no lesions.


Extremities: no edema, DP pulses are palpable


Neuro: alert and oriented x 3


Gait: unable to assess


Motor strenght:weak


Sensory exam: normal


Deep tendon reflexes: normal, Babisky reflexes are absent bilaterally


Skin: no lesions





Results


Results 24hrs





Laboratory Tests


               Test
                                7/28/19
04:43


               White Blood Count                           4.7  L


               Red Blood Count                           3.06  #L


               Hemoglobin                                 8.0  #L


               Hematocrit                                26.2  #L


               Mean Corpuscular Volume                     85.6


               Mean Corpuscular Hemoglobin                26.1  L


               Mean Corpuscular Hemoglobin
Concent       30.5  L



               Red Cell Distribution Width                16.5  H


               Platelet Count                              111  L


               Mean Platelet Volume                        10.0


               Immature Granulocytes %                   0.900  H


               Neutrophils %                               75.7


               Lymphocytes %                               18.1


               Monocytes %                                  4.7


               Eosinophils %                                0.6


               Basophils %                                  0.0


               Nucleated Red Blood Cells %                  0.0


               Immature Granulocytes #                   0.040  H


               Neutrophils #                                3.5


               Lymphocytes #                                0.8


               Monocytes #                                 0.2  L


               Eosinophils #                                0.0


               Basophils #                                  0.0


               Nucleated Red Blood Cells #                  0.0


               Prothrombin Time                            14.9


               Prothrombin Time Ratio                       1.2


               INR International Normalized
Ratio         1.16  



               Sodium Level                                 138


               Potassium Level                              4.2


               Chloride Level                               105


               Carbon Dioxide Level                          26


               Anion Gap                                      7


               Blood Urea Nitrogen                          26  H


               Creatinine                                  1.02


               Est Glomerular Filtrat Rate
mL/min     



               Glucose Level                               122  #


               Calcium Level                               8.3  L








Medications


Medication





Current Medications


Pantoprazole (Protonix Tab) 40 mg DAILY@06 PO  Last administered on 7/28/19at 


05:43; Admin Dose 40 MG;  Start 7/25/19 at 06:00


Albuterol/ Ipratropium (Duoneb) 3 ml Q6H RESP THERAPY  PRN HHN SHORTNESS OF 


BREATH;  Start 7/25/19 at 06:00


Acetaminophen (Tylenol Tab) 650 mg Q6H  PRN PO MILD PAIN(1-3)OR ELEVATED TEMP 


Last administered on 7/25/19at 10:45; Admin Dose 650 MG;  Start 7/25/19 at 06:00


Bisacodyl (Dulcolax) 10 mg DAILY  PRN PO CONSTIPATION;  Start 7/25/19 at 06:00


Miscellaneous Information (Pending [facility] Order For Wound Care) This patient 


ha... PRN  PRN XX WOUND CARE;  Start 7/25/19 at 06:00


Calcium Carbonate (Ca Carbonate) 1,250 mg BID PO  Last administered on 7/28/19at


09:25; Admin Dose 1,250 MG;  Start 7/25/19 at 21:00


Ceftriaxone Sodium 50 ml @  100 mls/hr Q24H IVPB  Last administered on 7/27/19at


15:54; Admin Dose 100 MLS/HR;  Start 7/25/19 at 16:00


Bicalutamide (Casodex) 50 mg DAILY PO  Last administered on 7/28/19at 09:26; 


Admin Dose 50 MG;  Start 7/26/19 at 09:00


Hydromorphone HCl (Dilaudid) 1.5 mg Q4H  PRN IV SEVERE PAIN LEVEL 7-10 Last 


administered on 7/26/19at 20:40; Admin Dose 1.5 MG;  Start 7/26/19 at 15:30


Ketorolac Tromethamine (Toradol) 30 mg Q6H IV  Last administered on 7/28/19at 


09:25; Admin Dose 30 MG;  Start 7/26/19 at 15:30;  Stop 7/29/19 at 15:29


Lorazepam (Ativan) 0.5 mg Q6H  PRN IV ANXIETY Last administered on 7/27/19at 


23:29; Admin Dose 0.5 MG;  Start 7/26/19 at 19:00


Methadone HCl (Methadone Liq) 2 mg Q6 PO  Last administered on 7/28/19at 12:42; 


Admin Dose 2 MG;  Start 7/27/19 at 23:00


Tamsulosin HCl (Flomax) 0.4 mg HS PO  Last administered on 7/27/19at 20:59; 


Admin Dose 0.4 MG;  Start 7/27/19 at 21:00











ELISHA CHRISTENSEN NP             Jul 28, 2019 15:18

## 2019-07-28 NOTE — CONS
Consult Date/Type/Reason


Admit Date/Time


Jul 25, 2019 at 05:11


Initial Consult Date


7/26/19


Type of Consultation:  Urology


Reason for Consultation


Metastatic prostate cancer


Requesting Provider:  ANKIT MONAE MD


Date/Time of Note


DATE: 7/28/19 


TIME: 20:51





Subjective


Patient complains of pain in his extremities and also when he urinates.





Objective


Vitals





Vital Signs


  Date      Temp  Pulse  Resp  B/P (MAP)   Pulse Ox  O2          O2 Flow    FiO2


Time                                                 Delivery    Rate


   7/28/19  98.8     80    18      125/72        99


     15:36                           (89)


   7/27/19                                           Room Air


     15:58








Intake and Output





7/27/19 7/27/19 7/28/19





1515:00


23:00


07:00





IntakeIntake Total


200 ml


340 ml





OutputOutput Total


150 ml


100 ml


600 ml





BalanceBalance


50 ml


240 ml


-600 ml











Exam


He does have suprapubic tenderness.  The bladder is not distended but on the CT 


scan that he had the bladder was full





Results/Medications


Result Diagram:  


7/28/19 0443                                                                    


           7/28/19 0443





Results 24 hrs





Laboratory Tests


               Test
                                7/28/19
04:43


               White Blood Count                           4.7  L


               Red Blood Count                           3.06  #L


               Hemoglobin                                 8.0  #L


               Hematocrit                                26.2  #L


               Mean Corpuscular Volume                     85.6


               Mean Corpuscular Hemoglobin                26.1  L


               Mean Corpuscular Hemoglobin
Concent       30.5  L



               Red Cell Distribution Width                16.5  H


               Platelet Count                              111  L


               Mean Platelet Volume                        10.0


               Immature Granulocytes %                   0.900  H


               Neutrophils %                               75.7


               Lymphocytes %                               18.1


               Monocytes %                                  4.7


               Eosinophils %                                0.6


               Basophils %                                  0.0


               Nucleated Red Blood Cells %                  0.0


               Immature Granulocytes #                   0.040  H


               Neutrophils #                                3.5


               Lymphocytes #                                0.8


               Monocytes #                                 0.2  L


               Eosinophils #                                0.0


               Basophils #                                  0.0


               Nucleated Red Blood Cells #                  0.0


               Prothrombin Time                            14.9


               Prothrombin Time Ratio                       1.2


               INR International Normalized
Ratio         1.16  



               Sodium Level                                 138


               Potassium Level                              4.2


               Chloride Level                               105


               Carbon Dioxide Level                          26


               Anion Gap                                      7


               Blood Urea Nitrogen                          26  H


               Creatinine                                  1.02


               Est Glomerular Filtrat Rate
mL/min     



               Glucose Level                               122  #


               Calcium Level                               8.3  L





Home Meds


No Active Prescriptions or Reported Meds


Medications





Current Medications


Pantoprazole (Protonix Tab) 40 mg DAILY@06 PO  Last administered on 7/28/19at 


05:43; Admin Dose 40 MG;  Start 7/25/19 at 06:00


Albuterol/ Ipratropium (Duoneb) 3 ml Q6H RESP THERAPY  PRN HHN SHORTNESS OF 


BREATH;  Start 7/25/19 at 06:00


Acetaminophen (Tylenol Tab) 650 mg Q6H  PRN PO MILD PAIN(1-3)OR ELEVATED TEMP 


Last administered on 7/25/19at 10:45; Admin Dose 650 MG;  Start 7/25/19 at 06:00


Bisacodyl (Dulcolax) 10 mg DAILY  PRN PO CONSTIPATION;  Start 7/25/19 at 06:00


Miscellaneous Information (Pending Santyl Order For Wound Care) This patient h


a... PRN  PRN XX WOUND CARE;  Start 7/25/19 at 06:00


Calcium Carbonate (Ca Carbonate) 1,250 mg BID PO  Last administered on 7/28/19at


09:25; Admin Dose 1,250 MG;  Start 7/25/19 at 21:00


Ceftriaxone Sodium 50 ml @  100 mls/hr Q24H IVPB  Last administered on 7/27/19at


15:54; Admin Dose 100 MLS/HR;  Start 7/25/19 at 16:00


Bicalutamide (Casodex) 50 mg DAILY PO  Last administered on 7/28/19at 09:26; Ad


min Dose 50 MG;  Start 7/26/19 at 09:00


Hydromorphone HCl (Dilaudid) 1.5 mg Q4H  PRN IV SEVERE PAIN LEVEL 7-10 Last 


administered on 7/28/19at 16:43; Admin Dose 1.5 MG;  Start 7/26/19 at 15:30


Ketorolac Tromethamine (Toradol) 30 mg Q6H IV  Last administered on 7/28/19at 


15:14; Admin Dose 30 MG;  Start 7/26/19 at 15:30;  Stop 7/29/19 at 15:29


Lorazepam (Ativan) 0.5 mg Q6H  PRN IV ANXIETY Last administered on 7/27/19at 


23:29; Admin Dose 0.5 MG;  Start 7/26/19 at 19:00


Methadone HCl (Methadone Liq) 2 mg Q6 PO  Last administered on 7/28/19at 18:46; 


Admin Dose 2 MG;  Start 7/27/19 at 23:00


Tamsulosin HCl (Flomax) 0.4 mg HS PO  Last administered on 7/27/19at 20:59; 


Admin Dose 0.4 MG;  Start 7/27/19 at 21:00





Assessment/Plan


Hospital Course (Demo Recall)


69-year-old -American male was admitted here in January 2019.  At that 


time his PSA was over at thousand and patient was assumed to have prostate 


cancer he was started on bicalutamide and at the time of discharge he was 


instructed to follow-up in the office for biopsy of the prostate to confirm the 


diagnosis of prostate cancer.  However the patient did not follow up with any 


doctor and he went to [facility] because of increased back pain


and weakness in addition to lower extremity pain and was transferred to [facility].  Patient underwent CT of the cervical thoracic and lumbar


spine and was found to have metastatic disease.  Patient states he does urinate 


5-6 times at night and same during the day.  He voids a small amount.  He denies


any gross hematuria.


Presently the patient is very weak and the CT scan of the abdomen and pelvis 


showed:


1.  Diffuse metastasis to the liver as described above. Recommend contrast CT or


MR to further evaluate.


2.  Pathologic lymphadenopathy is noted in the retroperitoneum, the 


gastrohepatic gastrolienal and the portal triad with retroperitoneal 


lymphadenopathy noted at the aortic bifurcation. Incomplete evaluation without 


contrast on this study is noted.


3.  Anterior mid abdominal mass measuring 5.8 cm transverse by 3.2 cm in AP 


dimensions.


4.  Bilateral renal cortical cysts with persistent mild left hydroureter 


nephrosis


5. Diffuse blastic and lytic skeletal metastasis and recommend MRI with contrast


of the spine to evaluate for epidural extension is clinically indicated.


6.  Mild abdominal and pelvic ascites


7.  Mild gallbladder wall calcifications





The patient is on bicalutamide and pain medications.  His cancer is very 


advanced.  Consideration have been made for nursing home and or hospice.  The 


patient is still undecided.


I inserted a 16 [place] White catheter for him so he will be more comfortable and


does not have to get up to go to the bathroom.  The urine that drained was cloud


y at the end and he may well have a urinary tract infection so a urine was sent 


for culture.











HAILEE CLIFTON MD            Jul 28, 2019 20:53

## 2019-07-28 NOTE — CONS
Assessment/Plan


Assessment/Plan


Assessment/Plan (Daily)


#Metastatic prostate cancer





-pt has disease diffusely throughout the liver, bones and lymph nodes


-PSA is currently> 600


- Per Dr Mcnulty - pt should have a confirmatory prostate bx but given the high 


likelihood of prostate cancer and heavy disease burden, ideally patient 


  should be started on upfront Taxotere chemotherapy with Casodex and Lupron


-This was discussed with the patient who states he needs to "think about" 


starting any type of therapy


-he could also be evaluated by radiation oncology as an out patient for his back


pain as palliative XRT may help this issue


-at the very least he should continue Casodex for now


-if he refuses all treatment, he should be referred for hospice evaluatin.





Patient seen in collaboration with Dr Mcnulty.  staff.





Consultation Date/Type/Reason


Admit Date/Time


Jul 25, 2019 at 05:11


Initial Consult Date


7/26/19


Type of Consult


HEM/ONC


Reason for Consultation


Metastatic Prostate Cancer


Requesting Provider:  ANKIT MONAE MD


Date/Time of Note


DATE: 7/28/19 


TIME: 14:55





24 HR Interval Summary


Free Text/Dictation


Eating lunch, stated he loves to eat


no new events reported last night





Detailed Summary


Eyes:  no complaints


ENT:  no complaints


Respiratory:  no complaints


Cardiovascular:  no complaints


Gastrointestinal:  no complaints


Genitourinary:  no complaints


Musculoskeletal:  no complaints


Skin:  no complaints





Exam/Review of Systems


Exam


Vitals





Vital Signs


  Date      Temp  Pulse  Resp  B/P (MAP)   Pulse Ox  O2          O2 Flow    FiO2


Time                                                 Delivery    Rate


   7/28/19  97.7     94    18      119/68        98


     07:55                           (85)


   7/27/19                                           Room Air


     15:58








Intake and Output





7/27/19 7/27/19 7/28/19





1515:00


23:00


07:00





IntakeIntake Total


200 ml


340 ml





OutputOutput Total


150 ml


100 ml


600 ml





BalanceBalance


50 ml


240 ml


-600 ml











Constitutional:  alert, well developed, frail


Psych:  nl mood/affect


Head:  atraumatic


Eyes:  nl lids, nl sclera


ENMT:  nl external ears & nose


Neck:  non-tender


Respiratory:  clear to auscultation


Cardiovascular:  nl pulses, other (s1s2)


Gastrointestinal:  soft, non-tender


Musculoskeletal:  joint tenderness, muscle weakness


Neurological:  nl speech, other (alert/responsive)





Results


Result Diagram:  


7/28/19 0443                                                                    


           7/28/19 0443





Results 24hrs





Laboratory Tests


               Test
                                7/28/19
04:43


               White Blood Count                           4.7  L


               Red Blood Count                           3.06  #L


               Hemoglobin                                 8.0  #L


               Hematocrit                                26.2  #L


               Mean Corpuscular Volume                     85.6


               Mean Corpuscular Hemoglobin                26.1  L


               Mean Corpuscular Hemoglobin
Concent       30.5  L



               Red Cell Distribution Width                16.5  H


               Platelet Count                              111  L


               Mean Platelet Volume                        10.0


               Immature Granulocytes %                   0.900  H


               Neutrophils %                               75.7


               Lymphocytes %                               18.1


               Monocytes %                                  4.7


               Eosinophils %                                0.6


               Basophils %                                  0.0


               Nucleated Red Blood Cells %                  0.0


               Immature Granulocytes #                   0.040  H


               Neutrophils #                                3.5


               Lymphocytes #                                0.8


               Monocytes #                                 0.2  L


               Eosinophils #                                0.0


               Basophils #                                  0.0


               Nucleated Red Blood Cells #                  0.0


               Prothrombin Time                            14.9


               Prothrombin Time Ratio                       1.2


               INR International Normalized
Ratio         1.16  



               Sodium Level                                 138


               Potassium Level                              4.2


               Chloride Level                               105


               Carbon Dioxide Level                          26


               Anion Gap                                      7


               Blood Urea Nitrogen                          26  H


               Creatinine                                  1.02


               Est Glomerular Filtrat Rate
mL/min     



               Glucose Level                               122  #


               Calcium Level                               8.3  L








Medications


Medication





Current Medications


Pantoprazole (Protonix Tab) 40 mg DAILY@06 PO  Last administered on 7/28/19at 


05:43; Admin Dose 40 MG;  Start 7/25/19 at 06:00


Albuterol/ Ipratropium (Duoneb) 3 ml Q6H RESP THERAPY  PRN HHN SHORTNESS OF 


BREATH;  Start 7/25/19 at 06:00


Acetaminophen (Tylenol Tab) 650 mg Q6H  PRN PO MILD PAIN(1-3)OR ELEVATED TEMP 


Last administered on 7/25/19at 10:45; Admin Dose 650 MG;  Start 7/25/19 at 06:00


Bisacodyl (Dulcolax) 10 mg DAILY  PRN PO CONSTIPATION;  Start 7/25/19 at 06:00


Miscellaneous Information (Pending Santyl Order For Wound Care) This patient 


ha... PRN  PRN XX WOUND CARE;  Start 7/25/19 at 06:00


Calcium Carbonate (Ca Carbonate) 1,250 mg BID PO  Last administered on 7/28/19at


09:25; Admin Dose 1,250 MG;  Start 7/25/19 at 21:00


Ceftriaxone Sodium 50 ml @  100 mls/hr Q24H IVPB  Last administered on 7/27/19at


15:54; Admin Dose 100 MLS/HR;  Start 7/25/19 at 16:00


Bicalutamide (Casodex) 50 mg DAILY PO  Last administered on 7/28/19at 09:26; 


Admin Dose 50 MG;  Start 7/26/19 at 09:00


Hydromorphone HCl (Dilaudid) 1.5 mg Q4H  PRN IV SEVERE PAIN LEVEL 7-10 Last 


administered on 7/26/19at 20:40; Admin Dose 1.5 MG;  Start 7/26/19 at 15:30


Ketorolac Tromethamine (Toradol) 30 mg Q6H IV  Last administered on 7/28/19at 


09:25; Admin Dose 30 MG;  Start 7/26/19 at 15:30;  Stop 7/29/19 at 15:29


Lorazepam (Ativan) 0.5 mg Q6H  PRN IV ANXIETY Last administered on 7/27/19at 


23:29; Admin Dose 0.5 MG;  Start 7/26/19 at 19:00


Methadone HCl (Methadone Liq) 2 mg Q6 PO  Last administered on 7/28/19at 12:42; 


Admin Dose 2 MG;  Start 7/27/19 at 23:00


Tamsulosin HCl (Flomax) 0.4 mg HS PO  Last administered on 7/27/19at 20:59; 


Admin Dose 0.4 MG;  Start 7/27/19 at 21:00











CHONG PRICE                 Jul 28, 2019 14:56

## 2019-07-29 VITALS — RESPIRATION RATE: 18 BRPM | HEART RATE: 111 BPM | SYSTOLIC BLOOD PRESSURE: 116 MMHG | DIASTOLIC BLOOD PRESSURE: 64 MMHG

## 2019-07-29 VITALS — RESPIRATION RATE: 18 BRPM | SYSTOLIC BLOOD PRESSURE: 121 MMHG | HEART RATE: 102 BPM | DIASTOLIC BLOOD PRESSURE: 78 MMHG

## 2019-07-29 VITALS — DIASTOLIC BLOOD PRESSURE: 79 MMHG | HEART RATE: 106 BPM | SYSTOLIC BLOOD PRESSURE: 138 MMHG | RESPIRATION RATE: 16 BRPM

## 2019-07-29 VITALS — SYSTOLIC BLOOD PRESSURE: 117 MMHG | RESPIRATION RATE: 18 BRPM | DIASTOLIC BLOOD PRESSURE: 73 MMHG | HEART RATE: 101 BPM

## 2019-07-29 LAB
ADD MAN DIFF?: NO
ANION GAP: 4 (ref 5–13)
BASOPHIL #: 0 10^3/UL (ref 0–0.1)
BASOPHILS %: 0 % (ref 0–2)
BLOOD UREA NITROGEN: 24 MG/DL (ref 7–20)
CALCIUM: 8.1 MG/DL (ref 8.4–10.2)
CARBON DIOXIDE: 28 MMOL/L (ref 21–31)
CHLORIDE: 104 MMOL/L (ref 97–110)
CREATININE: 1.04 MG/DL (ref 0.61–1.24)
EOSINOPHILS #: 0 10^3/UL (ref 0–0.5)
EOSINOPHILS %: 0.5 % (ref 0–7)
GLUCOSE: 103 MG/DL (ref 70–220)
HEMATOCRIT: 22.8 % (ref 42–52)
HEMOGLOBIN: 7.1 G/DL (ref 14–18)
IMMATURE GRANS #M: 0.07 10^3/UL (ref 0–0.03)
IMMATURE GRANS % (M): 1.6 % (ref 0–0.43)
LYMPHOCYTES #: 0.8 10^3/UL (ref 0.8–2.9)
LYMPHOCYTES %: 18.2 % (ref 15–51)
MEAN CORPUSCULAR HEMOGLOBIN: 26.7 PG (ref 29–33)
MEAN CORPUSCULAR HGB CONC: 31.1 G/DL (ref 32–37)
MEAN CORPUSCULAR VOLUME: 85.7 FL (ref 82–101)
MEAN PLATELET VOLUME: 9.7 FL (ref 7.4–10.4)
MONOCYTE #: 0.2 10^3/UL (ref 0.3–0.9)
MONOCYTES %: 4.3 % (ref 0–11)
NEUTROPHIL #: 3.3 10^3/UL (ref 1.6–7.5)
NEUTROPHILS %: 75.4 % (ref 39–77)
NUCLEATED RED BLOOD CELLS #: 0 10^3/UL (ref 0–0)
NUCLEATED RED BLOOD CELLS%: 0 /100WBC (ref 0–0)
PLATELET COUNT: 120 10^3/UL (ref 140–415)
POTASSIUM: 4.5 MMOL/L (ref 3.5–5.1)
RED BLOOD COUNT: 2.66 10^6/UL (ref 4.7–6.1)
RED CELL DISTRIBUTION WIDTH: 16.6 % (ref 11.5–14.5)
SODIUM: 136 MMOL/L (ref 135–144)
WHITE BLOOD COUNT: 4.4 10^3/UL (ref 4.8–10.8)

## 2019-07-29 RX ADMIN — CALCIUM CARBONATE SCH MG: 1250 SUSPENSION ORAL at 09:00

## 2019-07-29 RX ADMIN — CEFTRIAXONE 1 MLS/HR: 1 INJECTION, SOLUTION INTRAVENOUS at 16:00

## 2019-07-29 RX ADMIN — HYDROMORPHONE HYDROCHLORIDE 1 MG: 2 INJECTION, SOLUTION INTRAMUSCULAR; INTRAVENOUS; SUBCUTANEOUS at 17:09

## 2019-07-29 RX ADMIN — HYDROMORPHONE HYDROCHLORIDE 1 MG: 2 INJECTION, SOLUTION INTRAMUSCULAR; INTRAVENOUS; SUBCUTANEOUS at 11:56

## 2019-07-29 RX ADMIN — LORAZEPAM PRN MG: 2 INJECTION, SOLUTION INTRAMUSCULAR; INTRAVENOUS at 14:33

## 2019-07-29 RX ADMIN — METHADONE HYDROCHLORIDE 1 MG: 5 SOLUTION ORAL at 05:37

## 2019-07-29 RX ADMIN — HYDROMORPHONE HYDROCHLORIDE 1 MG: 2 INJECTION, SOLUTION INTRAMUSCULAR; INTRAVENOUS; SUBCUTANEOUS at 21:56

## 2019-07-29 RX ADMIN — LORAZEPAM PRN MG: 2 INJECTION, SOLUTION INTRAMUSCULAR; INTRAVENOUS at 01:03

## 2019-07-29 RX ADMIN — KETOROLAC TROMETHAMINE SCH MG: 30 INJECTION, SOLUTION INTRAMUSCULAR at 09:30

## 2019-07-29 RX ADMIN — CALCIUM CARBONATE SCH MG: 1250 SUSPENSION ORAL at 21:07

## 2019-07-29 RX ADMIN — PANTOPRAZOLE SODIUM 1 MG: 40 TABLET, DELAYED RELEASE ORAL at 05:37

## 2019-07-29 RX ADMIN — LORAZEPAM 1 MG: 2 INJECTION, SOLUTION INTRAMUSCULAR; INTRAVENOUS at 14:33

## 2019-07-29 RX ADMIN — METHADONE HYDROCHLORIDE 1 MG: 5 SOLUTION ORAL at 12:48

## 2019-07-29 RX ADMIN — PANTOPRAZOLE SODIUM SCH MG: 40 TABLET, DELAYED RELEASE ORAL at 05:37

## 2019-07-29 RX ADMIN — CEFTRIAXONE SCH MLS/HR: 1 INJECTION, SOLUTION INTRAVENOUS at 16:00

## 2019-07-29 RX ADMIN — KETOROLAC TROMETHAMINE 1 MG: 30 INJECTION, SOLUTION INTRAMUSCULAR at 03:45

## 2019-07-29 RX ADMIN — CALCIUM CARBONATE 1 MG: 1250 SUSPENSION ORAL at 09:00

## 2019-07-29 RX ADMIN — HYDROMORPHONE HYDROCHLORIDE PRN MG: 2 INJECTION, SOLUTION INTRAMUSCULAR; INTRAVENOUS; SUBCUTANEOUS at 11:56

## 2019-07-29 RX ADMIN — METHADONE HYDROCHLORIDE 1 MG: 5 SOLUTION ORAL at 19:12

## 2019-07-29 RX ADMIN — KETOROLAC TROMETHAMINE SCH MG: 30 INJECTION, SOLUTION INTRAMUSCULAR at 03:45

## 2019-07-29 RX ADMIN — TAMSULOSIN HYDROCHLORIDE SCH MG: 0.4 CAPSULE ORAL at 21:08

## 2019-07-29 RX ADMIN — METHADONE HYDROCHLORIDE SCH MG: 5 SOLUTION ORAL at 19:12

## 2019-07-29 RX ADMIN — HYDROMORPHONE HYDROCHLORIDE PRN MG: 2 INJECTION, SOLUTION INTRAMUSCULAR; INTRAVENOUS; SUBCUTANEOUS at 21:56

## 2019-07-29 RX ADMIN — KETOROLAC TROMETHAMINE 1 MG: 30 INJECTION, SOLUTION INTRAMUSCULAR at 09:30

## 2019-07-29 RX ADMIN — TAMSULOSIN HYDROCHLORIDE 1 MG: 0.4 CAPSULE ORAL at 21:08

## 2019-07-29 RX ADMIN — BICALUTAMIDE 1 MG: 50 TABLET, FILM COATED ORAL at 09:00

## 2019-07-29 RX ADMIN — LORAZEPAM 1 MG: 2 INJECTION, SOLUTION INTRAMUSCULAR; INTRAVENOUS at 01:03

## 2019-07-29 RX ADMIN — METHADONE HYDROCHLORIDE SCH MG: 5 SOLUTION ORAL at 05:37

## 2019-07-29 RX ADMIN — METHADONE HYDROCHLORIDE SCH MG: 5 SOLUTION ORAL at 12:48

## 2019-07-29 RX ADMIN — HYDROMORPHONE HYDROCHLORIDE PRN MG: 2 INJECTION, SOLUTION INTRAMUSCULAR; INTRAVENOUS; SUBCUTANEOUS at 17:09

## 2019-07-29 RX ADMIN — BICALUTAMIDE SCH MG: 50 TABLET, FILM COATED ORAL at 09:00

## 2019-07-29 RX ADMIN — CALCIUM CARBONATE 1 MG: 1250 SUSPENSION ORAL at 21:07

## 2019-07-29 NOTE — CONS
Consult Date/Type/Reason


Admit Date/Time


Jul 25, 2019 at 05:11


Initial Consult Date


7/26/19


Type of Consultation:  Urology


Reason for Consultation


Metastatic prostate cancer


Requesting Provider:  ANKIT MONAE MD


Date/Time of Note


DATE: 7/29/19 


TIME: 13:49





Subjective


Patient complaining of back pain.  Has discomfort from the White catheter.





Objective


Vitals





Vital Signs


  Date      Temp  Pulse  Resp  B/P (MAP)   Pulse Ox  O2          O2 Flow    FiO2


Time                                                 Delivery    Rate


   7/29/19  98.3    101    18      117/73        96  Room Air


     07:58                           (88)








Intake and Output





7/28/19 7/28/19 7/29/19





1515:00


23:00


07:00





IntakeIntake Total


150 ml





OutputOutput Total


100 ml





BalanceBalance


-100 ml


150 ml











Exam


White catheter draining clear urine.  The urine culture showing 100,000 gram-


negative rods





Results/Medications


Result Diagram:  


7/29/19 0426                                                                    


           7/29/19 0441





Results 24 hrs





Laboratory Tests


Test
                                7/28/19
21:00  7/29/19
04:26  7/29/19
04:41


Urine Color                          MARTY


Urine Clarity                        TURBID  A


Urine pH                                     6.0


Urine Specific Gravity                     1.013


Urine Ketones                        NEGATIVE


Urine Nitrite                        NEGATIVE


Urine Bilirubin                      NEGATIVE


Urine Urobilinogen                   NEGATIVE


Urine Leukocyte Esterase                     2+  H


Urine Microscopic RBC                        17  H


Urine Microscopic WBC                > 182  H


Urine Squamous Epithelial
Cells      FEW  
         
              



Urine Amorphous Crystals             FEW  A


Urine Bacteria                       FEW  A


Urine Hemoglobin                             2+  H


Urine Glucose                        NEGATIVE


Urine Total Protein                          1+  H


White Blood Count                                          4.4  L


Red Blood Count                                           2.66  L


Hemoglobin                                                 7.1  L


Hematocrit                                                22.8  L


Mean Corpuscular Volume                                    85.7


Mean Corpuscular Hemoglobin                               26.7  L


Mean Corpuscular Hemoglobin
Concent  
                   31.1  L
  



Red Cell Distribution Width                               16.6  H


Platelet Count                                             120  L


Mean Platelet Volume                                        9.7


Immature Granulocytes %                                  1.600  H


Neutrophils %                                              75.4


Lymphocytes %                                              18.2


Monocytes %                                                 4.3


Eosinophils %                                               0.5


Basophils %                                                 0.0


Nucleated Red Blood Cells %                                 0.0


Immature Granulocytes #                                  0.070  H


Neutrophils #                                               3.3


Lymphocytes #                                               0.8


Monocytes #                                                0.2  L


Eosinophils #                                               0.0


Basophils #                                                 0.0


Nucleated Red Blood Cells #                                 0.0


Sodium Level                                                               136


Potassium Level                                                            4.5


Chloride Level                                                             104


Carbon Dioxide Level                                                        28


Anion Gap                                                                   4  L


Blood Urea Nitrogen                                                        24  H


Creatinine                                                                1.04


Est Glomerular Filtrat Rate
mL/min   
              
                



Glucose Level                                                              103


Calcium Level                                                             8.1  L





Home Meds


No Active Prescriptions or Reported Meds


Medications





Current Medications


Pantoprazole (Protonix Tab) 40 mg DAILY@06 PO  Last administered on 7/29/19at 


05:37; Admin Dose 40 MG;  Start 7/25/19 at 06:00


Albuterol/ Ipratropium (Duoneb) 3 ml Q6H RESP THERAPY  PRN HHN SHORTNESS OF 


BREATH;  Start 7/25/19 at 06:00


Acetaminophen (Tylenol Tab) 650 mg Q6H  PRN PO MILD PAIN(1-3)OR ELEVATED TEMP 


Last administered on 7/25/19at 10:45; Admin Dose 650 MG;  Start 7/25/19 at 06:00


Bisacodyl (Dulcolax) 10 mg DAILY  PRN PO CONSTIPATION;  Start 7/25/19 at 06:00


Miscellaneous Information (Pending Santyl Order For Wound Care) This patient 


ha... PRN  PRN XX WOUND CARE;  Start 7/25/19 at 06:00


Calcium Carbonate (Ca Carbonate) 1,250 mg BID PO  Last administered on 7/29/19at


09:00; Admin Dose 1,250 MG;  Start 7/25/19 at 21:00


Ceftriaxone Sodium 50 ml @  100 mls/hr Q24H IVPB  Last administered on 7/27/19at


15:54; Admin Dose 100 MLS/HR;  Start 7/25/19 at 16:00


Bicalutamide (Casodex) 50 mg DAILY PO  Last administered on 7/29/19at 09:00; 


Admin Dose 50 MG;  Start 7/26/19 at 09:00


Hydromorphone HCl (Dilaudid) 1.5 mg Q4H  PRN IV SEVERE PAIN LEVEL 7-10 Last 


administered on 7/29/19at 11:56; Admin Dose 1.5 MG;  Start 7/26/19 at 15:30


Ketorolac Tromethamine (Toradol) 30 mg Q6H IV  Last administered on 7/29/19at 


03:45; Admin Dose 30 MG;  Start 7/26/19 at 15:30;  Stop 7/29/19 at 15:29


Lorazepam (Ativan) 0.5 mg Q6H  PRN IV ANXIETY Last administered on 7/29/19at 


01:03; Admin Dose 0.5 MG;  Start 7/26/19 at 19:00


Methadone HCl (Methadone Liq) 2 mg Q6 PO  Last administered on 7/29/19at 12:48; 


Admin Dose 2 MG;  Start 7/27/19 at 23:00


Tamsulosin HCl (Flomax) 0.4 mg HS PO  Last administered on 7/28/19at 21:08; 


Admin Dose 0.4 MG;  Start 7/27/19 at 21:00





Assessment/Plan


Hospital Course (Demo Recall)


69-year-old -American male was admitted here in January 2019.  At that 


time his PSA was over at thousand and patient was assumed to have prostate c


ancer he was started on bicalutamide and at the time of discharge he was 


instructed to follow-up in the office for biopsy of the prostate to confirm the 


diagnosis of prostate cancer.  However the patient did not follow up with any 


doctor and he went to [facility] because of increased back pain


and weakness in addition to lower extremity pain and was transferred to [facility].  Patient underwent CT of the cervical thoracic and lumbar


spine and was found to have metastatic disease.  Patient states he does urinate 


5-6 times at night and same during the day.  He voids a small amount.  He denies


any gross hematuria.


Presently the patient is very weak and the CT scan of the abdomen and pelvis 


showed:


1.  Diffuse metastasis to the liver as described above. Recommend contrast CT or


MR to further evaluate.


2.  Pathologic lymphadenopathy is noted in the retroperitoneum, the 


gastrohepatic gastrolienal and the portal triad with retroperitoneal lymphaden


opathy noted at the aortic bifurcation. Incomplete evaluation without contrast 


on this study is noted.


3.  Anterior mid abdominal mass measuring 5.8 cm transverse by 3.2 cm in AP 


dimensions.


4.  Bilateral renal cortical cysts with persistent mild left hydroureter 


nephrosis


5. Diffuse blastic and lytic skeletal metastasis and recommend MRI with contrast


of the spine to evaluate for epidural extension is clinically indicated.


6.  Mild abdominal and pelvic ascites


7.  Mild gallbladder wall calcifications





The patient is on bicalutamide and pain medications.  His cancer is very 


advanced.  Consideration have been made for nursing home and or hospice.  The 


patient is still undecided.


I inserted a 16 [place] White catheter for him last night and sent urine for 


culture and sensitivity.  It grew 100,000 gram-negative rods.  As there is an 


issue with the patient decision and he may be in the hospital for a while I did 


order 7.5 mg Lupron Depot to be given IM.  And once the patient is discharged t


hen depending on where he goes that may be continued either in an office setting


or nursing home or if he goes on hospice only make him comfortable.











HAILEE CLIFTON MD            Jul 29, 2019 13:51

## 2019-07-29 NOTE — PN
Date/Time of Note


Date/Time of Note


DATE: 7/29/19 


TIME: 13:05





Assessment/Plan


VTE Prophylaxis


Risk score (from Ns)>0 risk:  5


SCD applied (from Ns):  Yes


Pharmacological prophylaxis:  NA/contraindicated


Pharm contraindication:  low risk/ambulating





Lines/Catheters


IV Catheter Type (from [facility]):  Saline Lock


Urinary Cath still in place:  Yes


Reason Cath still needed:  urinary retention





Assessment/Plan


Assessment/Plan


Hospital Course


1.  Recurrent falls, failure to thrive, weight loss, generalized body aches, 


likely secondary to metastatic prostate cancer.  The patient was here in January 2019.  The patient was seen by multiple consultants including urology and 


oncology.  The patient had a prostate specific antigen over 1000.  The patient w


as deemed that he had capacity to make decisions.  The patient was offered 


nursing home; however, he left and went home.  He never followed up with any 


appointments and likely has progression of disease.


Likely has metastatic prostate cancer


2.  History of falls with subdural hematomas.


3.  Severe anemia.


4.  Pancytopenia with a history of hepatitis C.


5.  Alcoholism.


6.  History of smoking.


7.  Elevated alkaline phosphatase could be secondary to bony metastases.  


Metastatic disease. He is on Casodex


8.  Edema with hypoalbuminemia.


9.  Likely urinary tract infection.


10. cachexia. Malnourishment


11.Chronic pain and anxiety


Assessment/Plan


-.  Spoke to brother over the phone, he was concerned that he and patient did 


not know about the diagnosis and he was following for his brother at [facility] he was


supposed to get biopsy tomorrow there.  To him in great detail that patient was 


here in January he was seen by oncologist Dr. Hall and Dr. Chacha Pascal 


multiple discussions were done with the patient and even in the discharge 


instructions that there was mention of metastatic prostate cancer and patient 


was told to follow-up and it was clearly stated in the discharge instruction to 


follow-up with oncology and also with urology as an outpatient


-Talk to the brother who wanted all the treatment to be pursued for for 


however when I spoke to him and told him that if he is refusing treatments then 


want to do, he said to offer him the treatment and if he refuses then he refuses


and then in that case would wait for him to come on August 6, 


-Very talk to the patient about the chemotherapy starting on Casodex/Lupron


-Follow-up with Dr. Potts  and Dr. guy recommendations


-c/w methadone/dilaudid/toradol for pain


- now has chan


- cw iv abx fo UTI


- Nutrition


- montior HB 7 TODAY


- social service consult


Result Diagram:  


7/29/19 0426                                                                    


           7/29/19 0441





Results 24hrs





Laboratory Tests


Test
                                7/28/19
21:00  7/29/19
04:26  7/29/19
04:41


Urine Color                          MARTY


Urine Clarity                        TURBID  A


Urine pH                                     6.0


Urine Specific Gravity                     1.013


Urine Ketones                        NEGATIVE


Urine Nitrite                        NEGATIVE


Urine Bilirubin                      NEGATIVE


Urine Urobilinogen                   NEGATIVE


Urine Leukocyte Esterase                     2+  H


Urine Microscopic RBC                        17  H


Urine Microscopic WBC                > 182  H


Urine Squamous Epithelial
Cells      FEW  
         
              



Urine Amorphous Crystals             FEW  A


Urine Bacteria                       FEW  A


Urine Hemoglobin                             2+  H


Urine Glucose                        NEGATIVE


Urine Total Protein                          1+  H


White Blood Count                                          4.4  L


Red Blood Count                                           2.66  L


Hemoglobin                                                 7.1  L


Hematocrit                                                22.8  L


Mean Corpuscular Volume                                    85.7


Mean Corpuscular Hemoglobin                               26.7  L


Mean Corpuscular Hemoglobin
Concent  
                   31.1  L
  



Red Cell Distribution Width                               16.6  H


Platelet Count                                             120  L


Mean Platelet Volume                                        9.7


Immature Granulocytes %                                  1.600  H


Neutrophils %                                              75.4


Lymphocytes %                                              18.2


Monocytes %                                                 4.3


Eosinophils %                                               0.5


Basophils %                                                 0.0


Nucleated Red Blood Cells %                                 0.0


Immature Granulocytes #                                  0.070  H


Neutrophils #                                               3.3


Lymphocytes #                                               0.8


Monocytes #                                                0.2  L


Eosinophils #                                               0.0


Basophils #                                                 0.0


Nucleated Red Blood Cells #                                 0.0


Sodium Level                                                               136


Potassium Level                                                            4.5


Chloride Level                                                             104


Carbon Dioxide Level                                                        28


Anion Gap                                                                   4  L


Blood Urea Nitrogen                                                        24  H


Creatinine                                                                1.04


Est Glomerular Filtrat Rate
mL/min   
              
                



Glucose Level                                                              103


Calcium Level                                                             8.1  L








Subjective


24 Hr Interval Summary


Free Text/Dictation


chan in place and is patient is a lot of pain is not controlled





Exam/Review of Systems


Exam


Vitals





Vital Signs


  Date      Temp  Pulse  Resp  B/P (MAP)   Pulse Ox  O2          O2 Flow    FiO2


Time                                                 Delivery    Rate


   7/29/19  98.3    101    18      117/73        96  Room Air


     07:58                           (88)








Intake and Output





7/28/19 7/28/19 7/29/19





1515:00


23:00


07:00





IntakeIntake Total


150 ml





OutputOutput Total


100 ml





BalanceBalance


-100 ml


150 ml











Exam


No acute distress, no events overnight.


Eyes: anicteric, EOM's intact, no pallor


Nose: no rhinorrhea


Neck: supple, no thyromegaly, no carotid bruits


Lungs: clear bilaterally, decreased.


CVS: regular rate and rhythm, no murmurs


Abdomen: soft, bowel sounds present, scaphoid.


Rectal: differed.


External genitalia: no lesions.


Extremities: no edema, DP pulses are palpable


Neuro: alert and oriented x 3


Gait: unable to assess


Motor strenght:weak


Sensory exam: normal


Deep tendon reflexes: normal, Babisky reflexes are absent bilaterally


Skin: no lesions





Results


Results 24hrs





Laboratory Tests


Test
                                7/28/19
21:00  7/29/19
04:26  7/29/19
04:41


Urine Color                          MARTY


Urine Clarity                        TURBID  A


Urine pH                                     6.0


Urine Specific Gravity                     1.013


Urine Ketones                        NEGATIVE


Urine Nitrite                        NEGATIVE


Urine Bilirubin                      NEGATIVE


Urine Urobilinogen                   NEGATIVE


Urine Leukocyte Esterase                     2+  H


Urine Microscopic RBC                        17  H


Urine Microscopic WBC                > 182  H


Urine Squamous Epithelial
Cells      FEW  
         
              



Urine Amorphous Crystals             FEW  A


Urine Bacteria                       FEW  A


Urine Hemoglobin                             2+  H


Urine Glucose                        NEGATIVE


Urine Total Protein                          1+  H


White Blood Count                                          4.4  L


Red Blood Count                                           2.66  L


Hemoglobin                                                 7.1  L


Hematocrit                                                22.8  L


Mean Corpuscular Volume                                    85.7


Mean Corpuscular Hemoglobin                               26.7  L


Mean Corpuscular Hemoglobin
Concent  
                   31.1  L
  



Red Cell Distribution Width                               16.6  H


Platelet Count                                             120  L


Mean Platelet Volume                                        9.7


Immature Granulocytes %                                  1.600  H


Neutrophils %                                              75.4


Lymphocytes %                                              18.2


Monocytes %                                                 4.3


Eosinophils %                                               0.5


Basophils %                                                 0.0


Nucleated Red Blood Cells %                                 0.0


Immature Granulocytes #                                  0.070  H


Neutrophils #                                               3.3


Lymphocytes #                                               0.8


Monocytes #                                                0.2  L


Eosinophils #                                               0.0


Basophils #                                                 0.0


Nucleated Red Blood Cells #                                 0.0


Sodium Level                                                               136


Potassium Level                                                            4.5


Chloride Level                                                             104


Carbon Dioxide Level                                                        28


Anion Gap                                                                   4  L


Blood Urea Nitrogen                                                        24  H


Creatinine                                                                1.04


Est Glomerular Filtrat Rate
mL/min   
              
                



Glucose Level                                                              103


Calcium Level                                                             8.1  L








Medications


Medication





Current Medications


Pantoprazole (Protonix Tab) 40 mg DAILY@06 PO  Last administered on 7/29/19at 


05:37; Admin Dose 40 MG;  Start 7/25/19 at 06:00


Albuterol/ Ipratropium (Duoneb) 3 ml Q6H RESP THERAPY  PRN HHN SHORTNESS OF 


BREATH;  Start 7/25/19 at 06:00


Acetaminophen (Tylenol Tab) 650 mg Q6H  PRN PO MILD PAIN(1-3)OR ELEVATED TEMP 


Last administered on 7/25/19at 10:45; Admin Dose 650 MG;  Start 7/25/19 at 06:00


Bisacodyl (Dulcolax) 10 mg DAILY  PRN PO CONSTIPATION;  Start 7/25/19 at 06:00


Miscellaneous Information (Pending Santyl Order For Wound Care) This patient 


ha... PRN  PRN XX WOUND CARE;  Start 7/25/19 at 06:00


Calcium Carbonate (Ca Carbonate) 1,250 mg BID PO  Last administered on 7/29/19at


09:00; Admin Dose 1,250 MG;  Start 7/25/19 at 21:00


Ceftriaxone Sodium 50 ml @  100 mls/hr Q24H IVPB  Last administered on 7/27/19at


15:54; Admin Dose 100 MLS/HR;  Start 7/25/19 at 16:00


Bicalutamide (Casodex) 50 mg DAILY PO  Last administered on 7/29/19at 09:00; 


Admin Dose 50 MG;  Start 7/26/19 at 09:00


Hydromorphone HCl (Dilaudid) 1.5 mg Q4H  PRN IV SEVERE PAIN LEVEL 7-10 Last 


administered on 7/29/19at 11:56; Admin Dose 1.5 MG;  Start 7/26/19 at 15:30


Ketorolac Tromethamine (Toradol) 30 mg Q6H IV  Last administered on 7/29/19at 


03:45; Admin Dose 30 MG;  Start 7/26/19 at 15:30;  Stop 7/29/19 at 15:29


Lorazepam (Ativan) 0.5 mg Q6H  PRN IV ANXIETY Last administered on 7/29/19at 


01:03; Admin Dose 0.5 MG;  Start 7/26/19 at 19:00


Methadone HCl (Methadone Liq) 2 mg Q6 PO  Last administered on 7/29/19at 12:48; 


Admin Dose 2 MG;  Start 7/27/19 at 23:00


Tamsulosin HCl (Flomax) 0.4 mg HS PO  Last administered on 7/28/19at 21:08; 


Admin Dose 0.4 MG;  Start 7/27/19 at 21:00











TOMAS RBICE MD              Jul 29, 2019 13:06

## 2019-07-29 NOTE — CONS
Assessment/Plan


Assessment/Plan


Hospital Course (Demo Recall)


#Metastatic prostate cancer


-pt has disease diffusely throughout the liver, bones and lymph nodes


-PSA is currently> 600


- Per Dr Mcnulty - pt should have a confirmatory prostate bx but given the high 


likelihood of prostate cancer and heavy disease burden, ideally patient should 


be started on upfront Taxotere chemotherapy with Casodex and Lupron


-This was discussed with the patient who states he needs to "think about" 


starting any type of therapy


-he could also be evaluated by radiation oncology as an out patient for his back


pain as palliative XRT may help this issue


-at the very least he should continue Casodex for now


-if he refuses all treatment, he should be referred for hospice eval





Thank you for the opportunity to participate in this patients care


A total of 40 minutes of face to face time was spent


speaking with the patient, of which greater than 50% was spent in counseling


and coordination of care and the detailed question and answer session.





Consultation Date/Type/Reason


Admit Date/Time


Jul 25, 2019 at 05:11


Initial Consult Date


7/26/19


Type of Consult


oncology


Reason for Consultation


advanced prostate cancer


Requesting Provider:  ANKIT MONAE MD


Date/Time of Note


DATE: 7/29/19 


TIME: 11:42





24 HR Interval Summary


Free Text/Dictation


chan in place. refusing treatments





Exam/Review of Systems


Exam


Vitals





Vital Signs


  Date      Temp  Pulse  Resp  B/P (MAP)   Pulse Ox  O2          O2 Flow    FiO2


Time                                                 Delivery    Rate


   7/29/19  98.3    101    18      117/73        96  Room Air


     07:58                           (88)








Intake and Output





7/28/19 7/28/19 7/29/19





1515:00


23:00


07:00





IntakeIntake Total


150 ml





OutputOutput Total


100 ml





BalanceBalance


-100 ml


150 ml











Constitutional:  alert, oriented


Psych:  no complaints, anxiety, depression


Head:  normocephalic


Eyes:  nl conjunctiva


ENMT:  nl external ears & nose


Neck:  supple


Respiratory:  clear to auscultation


Cardiovascular:  regular rate and rhythm


Gastrointestinal:  soft


Musculoskeletal:  nl extremities to inspection





Results


Result Diagram:  


7/29/19 0426                                                                    


           7/29/19 0441





Results 24hrs





Laboratory Tests


Test
                                7/28/19
21:00  7/29/19
04:26  7/29/19
04:41


Urine Color                          MARTY


Urine Clarity                        TURBID  A


Urine pH                                     6.0


Urine Specific Gravity                     1.013


Urine Ketones                        NEGATIVE


Urine Nitrite                        NEGATIVE


Urine Bilirubin                      NEGATIVE


Urine Urobilinogen                   NEGATIVE


Urine Leukocyte Esterase                     2+  H


Urine Microscopic RBC                        17  H


Urine Microscopic WBC                > 182  H


Urine Squamous Epithelial
Cells      FEW  
         
              



Urine Amorphous Crystals             FEW  A


Urine Bacteria                       FEW  A


Urine Hemoglobin                             2+  H


Urine Glucose                        NEGATIVE


Urine Total Protein                          1+  H


White Blood Count                                          4.4  L


Red Blood Count                                           2.66  L


Hemoglobin                                                 7.1  L


Hematocrit                                                22.8  L


Mean Corpuscular Volume                                    85.7


Mean Corpuscular Hemoglobin                               26.7  L


Mean Corpuscular Hemoglobin
Concent  
                   31.1  L
  



Red Cell Distribution Width                               16.6  H


Platelet Count                                             120  L


Mean Platelet Volume                                        9.7


Immature Granulocytes %                                  1.600  H


Neutrophils %                                              75.4


Lymphocytes %                                              18.2


Monocytes %                                                 4.3


Eosinophils %                                               0.5


Basophils %                                                 0.0


Nucleated Red Blood Cells %                                 0.0


Immature Granulocytes #                                  0.070  H


Neutrophils #                                               3.3


Lymphocytes #                                               0.8


Monocytes #                                                0.2  L


Eosinophils #                                               0.0


Basophils #                                                 0.0


Nucleated Red Blood Cells #                                 0.0


Sodium Level                                                               136


Potassium Level                                                            4.5


Chloride Level                                                             104


Carbon Dioxide Level                                                        28


Anion Gap                                                                   4  L


Blood Urea Nitrogen                                                        24  H


Creatinine                                                                1.04


Est Glomerular Filtrat Rate
mL/min   
              
                



Glucose Level                                                              103


Calcium Level                                                             8.1  L








Medications


Medication





Current Medications


Pantoprazole (Protonix Tab) 40 mg DAILY@06 PO  Last administered on 7/29/19at 


05:37; Admin Dose 40 MG;  Start 7/25/19 at 06:00


Albuterol/ Ipratropium (Duoneb) 3 ml Q6H RESP THERAPY  PRN HHN SHORTNESS OF 


BREATH;  Start 7/25/19 at 06:00


Acetaminophen (Tylenol Tab) 650 mg Q6H  PRN PO MILD PAIN(1-3)OR ELEVATED TEMP 


Last administered on 7/25/19at 10:45; Admin Dose 650 MG;  Start 7/25/19 at 06:00


Bisacodyl (Dulcolax) 10 mg DAILY  PRN PO CONSTIPATION;  Start 7/25/19 at 06:00


Miscellaneous Information (Pending Santyl Order For Wound Care) This patient 


ha... PRN  PRN XX WOUND CARE;  Start 7/25/19 at 06:00


Calcium Carbonate (Ca Carbonate) 1,250 mg BID PO  Last administered on 7/29/19at


09:00; Admin Dose 1,250 MG;  Start 7/25/19 at 21:00


Ceftriaxone Sodium 50 ml @  100 mls/hr Q24H IVPB  Last administered on 7/27/19at


15:54; Admin Dose 100 MLS/HR;  Start 7/25/19 at 16:00


Bicalutamide (Casodex) 50 mg DAILY PO  Last administered on 7/29/19at 09:00; 


Admin Dose 50 MG;  Start 7/26/19 at 09:00


Hydromorphone HCl (Dilaudid) 1.5 mg Q4H  PRN IV SEVERE PAIN LEVEL 7-10 Last 


administered on 7/28/19at 16:43; Admin Dose 1.5 MG;  Start 7/26/19 at 15:30


Ketorolac Tromethamine (Toradol) 30 mg Q6H IV  Last administered on 7/29/19at 


03:45; Admin Dose 30 MG;  Start 7/26/19 at 15:30;  Stop 7/29/19 at 15:29


Lorazepam (Ativan) 0.5 mg Q6H  PRN IV ANXIETY Last administered on 7/29/19at 


01:03; Admin Dose 0.5 MG;  Start 7/26/19 at 19:00


Methadone HCl (Methadone Liq) 2 mg Q6 PO  Last administered on 7/29/19at 05:37; 


Admin Dose 2 MG;  Start 7/27/19 at 23:00


Tamsulosin HCl (Flomax) 0.4 mg HS PO  Last administered on 7/28/19at 21:08; 


Admin Dose 0.4 MG;  Start 7/27/19 at 21:00











TAE MCNULTY M.D.              Jul 29, 2019 11:45

## 2019-07-29 NOTE — PN
Date/Time of Note


Date/Time of Note


DATE: 7/29/19 


TIME: 13:33





Assessment/Plan


VTE Prophylaxis


[place] [facility] risk:  5


SCD applied ([place] [facility]  Yes


SCD contraindicated:  low risk/ambulating


Pharmacological prophylaxis:  NA/contraindicated


Pharm contraindication:  low risk/ambulating





Lines/Catheters


IV Catheter Type (from [facility]):  Saline Lock


Central line still needed:  Yes


Urinary Cath still in place:  Yes


Reason Cath still needed:  urinary retention





Assessment/Plan


Assessment/Plan


[id number]


Result Diagram:  


7/29/19 0426                                                                    


           7/29/19 0441





Results 24hrs





Laboratory Tests


Test
                                7/28/19
21:00  7/29/19
04:26  7/29/19
04:41


Urine Color                          MARTY


Urine Clarity                        TURBID  A


Urine pH                                     6.0


Urine Specific Gravity                     1.013


Urine Ketones                        NEGATIVE


Urine Nitrite                        NEGATIVE


Urine Bilirubin                      NEGATIVE


Urine Urobilinogen                   NEGATIVE


Urine Leukocyte Esterase                     2+  H


Urine Microscopic RBC                        17  H


Urine Microscopic WBC                > 182  H


Urine Squamous Epithelial
Cells      FEW  
         
              



Urine Amorphous Crystals             FEW  A


Urine Bacteria                       FEW  A


Urine Hemoglobin                             2+  H


Urine Glucose                        NEGATIVE


Urine Total Protein                          1+  H


White Blood Count                                          4.4  L


Red Blood Count                                           2.66  L


Hemoglobin                                                 7.1  L


Hematocrit                                                22.8  L


Mean Corpuscular Volume                                    85.7


Mean Corpuscular Hemoglobin                               26.7  L


Mean Corpuscular Hemoglobin
Concent  
                   31.1  L
  



Red Cell Distribution Width                               16.6  H


Platelet Count                                             120  L


Mean Platelet Volume                                        9.7


Immature Granulocytes %                                  1.600  H


Neutrophils %                                              75.4


Lymphocytes %                                              18.2


Monocytes %                                                 4.3


Eosinophils %                                               0.5


Basophils %                                                 0.0


Nucleated Red Blood Cells %                                 0.0


Immature Granulocytes #                                  0.070  H


Neutrophils #                                               3.3


Lymphocytes #                                               0.8


Monocytes #                                                0.2  L


Eosinophils #                                               0.0


Basophils #                                                 0.0


Nucleated Red Blood Cells #                                 0.0


Sodium Level                                                               136


Potassium Level                                                            4.5


Chloride Level                                                             104


Carbon Dioxide Level                                                        28


Anion Gap                                                                   4  L


Blood Urea Nitrogen                                                        24  H


Creatinine                                                                1.04


Est Glomerular Filtrat Rate
mL/min   
              
                



Glucose Level                                                              103


Calcium Level                                                             8.1  L








Subjective


24 Hr Interval Summary


Free Text/Dictation


dupplicate





Exam/Review of Systems


Exam


Vitals





Vital Signs


  Date      Temp  Pulse  Resp  B/P (MAP)   Pulse Ox  O2          O2 Flow    FiO2


Time                                                 Delivery    Rate


   7/29/19  98.3    101    18      117/73        96  Room Air


     07:58                           (88)








Intake and Output





7/28/19 7/28/19 7/29/19





1515:00


23:00


07:00





IntakeIntake Total


150 ml





OutputOutput Total


100 ml





BalanceBalance


-100 ml


150 ml











Exam


duplicate





Results


Results 24hrs





Laboratory Tests


Test
                                7/28/19
21:00  7/29/19
04:26  7/29/19
04:41


Urine Color                          MARTY


Urine Clarity                        TURBID  A


Urine pH                                     6.0


Urine Specific Gravity                     1.013


Urine Ketones                        NEGATIVE


Urine Nitrite                        NEGATIVE


Urine Bilirubin                      NEGATIVE


Urine Urobilinogen                   NEGATIVE


Urine Leukocyte Esterase                     2+  H


Urine Microscopic RBC                        17  H


Urine Microscopic WBC                > 182  H


Urine Squamous Epithelial
Cells      FEW  
         
              



Urine Amorphous Crystals             FEW  A


Urine Bacteria                       FEW  A


Urine Hemoglobin                             2+  H


Urine Glucose                        NEGATIVE


Urine Total Protein                          1+  H


White Blood Count                                          4.4  L


Red Blood Count                                           2.66  L


Hemoglobin                                                 7.1  L


Hematocrit                                                22.8  L


Mean Corpuscular Volume                                    85.7


Mean Corpuscular Hemoglobin                               26.7  L


Mean Corpuscular Hemoglobin
Concent  
                   31.1  L
  



Red Cell Distribution Width                               16.6  H


Platelet Count                                             120  L


Mean Platelet Volume                                        9.7


Immature Granulocytes %                                  1.600  H


Neutrophils %                                              75.4


Lymphocytes %                                              18.2


Monocytes %                                                 4.3


Eosinophils %                                               0.5


Basophils %                                                 0.0


Nucleated Red Blood Cells %                                 0.0


Immature Granulocytes #                                  0.070  H


Neutrophils #                                               3.3


Lymphocytes #                                               0.8


Monocytes #                                                0.2  L


Eosinophils #                                               0.0


Basophils #                                                 0.0


Nucleated Red Blood Cells #                                 0.0


Sodium Level                                                               136


Potassium Level                                                            4.5


Chloride Level                                                             104


Carbon Dioxide Level                                                        28


Anion Gap                                                                   4  L


Blood Urea Nitrogen                                                        24  H


Creatinine                                                                1.04


Est Glomerular Filtrat Rate
mL/min   
              
                



Glucose Level                                                              103


Calcium Level                                                             8.1  L








Medications


Medication





Current Medications


Pantoprazole (Protonix Tab) 40 mg DAILY@06 PO  Last administered on 7/29/19at 


05:37; Admin Dose 40 MG;  Start 7/25/19 at 06:00


Albuterol/ Ipratropium (Duoneb) 3 ml Q6H RESP THERAPY  PRN HHN SHORTNESS OF 


BREATH;  Start 7/25/19 at 06:00


Acetaminophen (Tylenol Tab) 650 mg Q6H  PRN PO MILD PAIN(1-3)OR ELEVATED TEMP 


Last administered on 7/25/19at 10:45; Admin Dose 650 MG;  Start 7/25/19 at 06:00


Bisacodyl (Dulcolax) 10 mg DAILY  PRN PO CONSTIPATION;  Start 7/25/19 at 06:00


Miscellaneous Information (Pending Santyl Order For Wound Care) This patient 


ha... PRN  PRN XX WOUND CARE;  Start 7/25/19 at 06:00


Calcium Carbonate (Ca Carbonate) 1,250 mg BID PO  Last administered on 7/29/19at


09:00; Admin Dose 1,250 MG;  Start 7/25/19 at 21:00


Ceftriaxone Sodium 50 ml @  100 mls/hr Q24H IVPB  Last administered on 7/27/19at


15:54; Admin Dose 100 MLS/HR;  Start 7/25/19 at 16:00


Bicalutamide (Casodex) 50 mg DAILY PO  Last administered on 7/29/19at 09:00; 


Admin Dose 50 MG;  Start 7/26/19 at 09:00


Hydromorphone HCl (Dilaudid) 1.5 mg Q4H  PRN IV SEVERE PAIN LEVEL 7-10 Last 


administered on 7/29/19at 11:56; Admin Dose 1.5 MG;  Start 7/26/19 at 15:30


Ketorolac Tromethamine (Toradol) 30 mg Q6H IV  Last administered on 7/29/19at 


03:45; Admin Dose 30 MG;  Start 7/26/19 at 15:30;  Stop 7/29/19 at 15:29


Lorazepam (Ativan) 0.5 mg Q6H  PRN IV ANXIETY Last administered on 7/29/19at 


01:03; Admin Dose 0.5 MG;  Start 7/26/19 at 19:00


Methadone HCl (Methadone Liq) 2 mg Q6 PO  Last administered on 7/29/19at 12:48; 


Admin Dose 2 MG;  Start 7/27/19 at 23:00


Tamsulosin HCl (Flomax) 0.4 mg HS PO  Last administered on 7/28/19at 21:08; 


Admin Dose 0.4 MG;  Start 7/27/19 at 21:00











TOMAS BRICE MD              Jul 29, 2019 13:33

## 2019-07-29 NOTE — CONS
Consultation Date/Type/Reason


Admit Date/Time


Jul 25, 2019 at 05:11


Date/Time of Note


DATE: 7/29/19 


TIME: 14:39





Hx of Present Illness


dictation to follow





Past Medical History


Medical History:  cancer (Of prostate based on the PSA.  Has not been confirmed 


by biopsy), hepatitis (C), hypertension, other (Anemia, hepatitis C, weight 


loss, history of falls and gastritis, history of subdural hematoma)


Home Meds


No Active Prescriptions or Reported Meds


Medications





Current Medications


Pantoprazole (Protonix Tab) 40 mg DAILY@06 PO  Last administered on 7/29/19at 


05:37; Admin Dose 40 MG;  Start 7/25/19 at 06:00


Albuterol/ Ipratropium (Duoneb) 3 ml Q6H RESP THERAPY  PRN HHN SHORTNESS OF 


BREATH;  Start 7/25/19 at 06:00


Acetaminophen (Tylenol Tab) 650 mg Q6H  PRN PO MILD PAIN(1-3)OR ELEVATED TEMP 


Last administered on 7/25/19at 10:45; Admin Dose 650 MG;  Start 7/25/19 at 06:00


Bisacodyl (Dulcolax) 10 mg DAILY  PRN PO CONSTIPATION;  Start 7/25/19 at 06:00


Miscellaneous Information (Pending [facility] Order For Wound Care) This patient 


ha... PRN  PRN XX WOUND CARE;  Start 7/25/19 at 06:00


Calcium Carbonate (Ca Carbonate) 1,250 mg BID PO  Last administered on 7/29/19at


09:00; Admin Dose 1,250 MG;  Start 7/25/19 at 21:00


Ceftriaxone Sodium 50 ml @  100 mls/hr Q24H IVPB  Last administered on 7/27/19at


15:54; Admin Dose 100 MLS/HR;  Start 7/25/19 at 16:00


Bicalutamide (Casodex) 50 mg DAILY PO  Last administered on 7/29/19at 09:00; 


Admin Dose 50 MG;  Start 7/26/19 at 09:00


Hydromorphone HCl (Dilaudid) 1.5 mg Q4H  PRN IV SEVERE PAIN LEVEL 7-10 Last 


administered on 7/29/19at 11:56; Admin Dose 1.5 MG;  Start 7/26/19 at 15:30


Ketorolac Tromethamine (Toradol) 30 mg Q6H IV  Last administered on 7/29/19at 


03:45; Admin Dose 30 MG;  Start 7/26/19 at 15:30;  Stop 7/29/19 at 15:29


Lorazepam (Ativan) 0.5 mg Q6H  PRN IV ANXIETY Last administered on 7/29/19at 


14:33; Admin Dose 0.5 MG;  Start 7/26/19 at 19:00


Methadone HCl (Methadone Liq) 2 mg Q6 PO  Last administered on 7/29/19at 12:48; 


Admin Dose 2 MG;  Start 7/27/19 at 23:00


Tamsulosin HCl (Flomax) 0.4 mg HS PO  Last administered on 7/28/19at 21:08; 


Admin Dose 0.4 MG;  Start 7/27/19 at 21:00


Miscellaneous Information (* Miscellaneous Pharmacy Order) 7.5 ea ONCE IM ;  Sta


rt 7/29/19 at 14:00;  Status UNV


Allergies:  


Coded Allergies:  


     No Known Allergy (Unverified , 1/11/19)





Past Surgical History


Past Surgical Hx:  no surgical history





Social History


Alcohol Use:  occasionally


Smoking Status:  Former smoker


Drug Use:  none, other (unknown)





Exam/Review of Systems


Exam


Vitals





Vital Signs


  Date      Temp  Pulse  Resp  B/P (MAP)   Pulse Ox  O2          O2 Flow    FiO2


Time                                                 Delivery    Rate


   7/29/19  98.3    102    18      121/78        99  Room Air


     14:21                           (92)








Intake and Output





7/28/19 7/28/19 7/29/19





1515:00


23:00


07:00





IntakeIntake Total


150 ml





OutputOutput Total


100 ml





BalanceBalance


-100 ml


150 ml














Results


Result Diagram:  


7/29/19 0426                                                                    


           7/29/19 0441





Results 24hrs





Laboratory Tests


Test
                                7/28/19
21:00  7/29/19
04:26  7/29/19
04:41


Urine Color                          MARTY


Urine Clarity                        TURBID  A


Urine pH                                     6.0


Urine Specific Gravity                     1.013


Urine Ketones                        NEGATIVE


Urine Nitrite                        NEGATIVE


Urine Bilirubin                      NEGATIVE


Urine Urobilinogen                   NEGATIVE


Urine Leukocyte Esterase                     2+  H


Urine Microscopic RBC                        17  H


Urine Microscopic WBC                > 182  H


Urine Squamous Epithelial
Cells      FEW  
         
              



Urine Amorphous Crystals             FEW  A


Urine Bacteria                       FEW  A


Urine Hemoglobin                             2+  H


Urine Glucose                        NEGATIVE


Urine Total Protein                          1+  H


White Blood Count                                          4.4  L


Red Blood Count                                           2.66  L


Hemoglobin                                                 7.1  L


Hematocrit                                                22.8  L


Mean Corpuscular Volume                                    85.7


Mean Corpuscular Hemoglobin                               26.7  L


Mean Corpuscular Hemoglobin
Concent  
                   31.1  L
  



Red Cell Distribution Width                               16.6  H


Platelet Count                                             120  L


Mean Platelet Volume                                        9.7


Immature Granulocytes %                                  1.600  H


Neutrophils %                                              75.4


Lymphocytes %                                              18.2


Monocytes %                                                 4.3


Eosinophils %                                               0.5


Basophils %                                                 0.0


Nucleated Red Blood Cells %                                 0.0


Immature Granulocytes #                                  0.070  H


Neutrophils #                                               3.3


Lymphocytes #                                               0.8


Monocytes #                                                0.2  L


Eosinophils #                                               0.0


Basophils #                                                 0.0


Nucleated Red Blood Cells #                                 0.0


Sodium Level                                                               136


Potassium Level                                                            4.5


Chloride Level                                                             104


Carbon Dioxide Level                                                        28


Anion Gap                                                                   4  L


Blood Urea Nitrogen                                                        24  H


Creatinine                                                                1.04


Est Glomerular Filtrat Rate
mL/min   
              
                



Glucose Level                                                              103


Calcium Level                                                             8.1  L








Medications


Medication





Current Medications


Pantoprazole (Protonix Tab) 40 mg DAILY@06 PO  Last administered on 7/29/19at 


05:37; Admin Dose 40 MG;  Start 7/25/19 at 06:00


Albuterol/ Ipratropium (Duoneb) 3 ml Q6H RESP THERAPY  PRN HHN SHORTNESS OF 


BREATH;  Start 7/25/19 at 06:00


Acetaminophen (Tylenol Tab) 650 mg Q6H  PRN PO MILD PAIN(1-3)OR ELEVATED TEMP 


Last administered on 7/25/19at 10:45; Admin Dose 650 MG;  Start 7/25/19 at 06:00


Bisacodyl (Dulcolax) 10 mg DAILY  PRN PO CONSTIPATION;  Start 7/25/19 at 06:00


Miscellaneous Information (Pending Santyl Order For Wound Care) This patient 


ha... PRN  PRN XX WOUND CARE;  Start 7/25/19 at 06:00


Calcium Carbonate (Ca Carbonate) 1,250 mg BID PO  Last administered on 7/29/19at


09:00; Admin Dose 1,250 MG;  Start 7/25/19 at 21:00


Ceftriaxone Sodium 50 ml @  100 mls/hr Q24H IVPB  Last administered on 7/27/19at


15:54; Admin Dose 100 MLS/HR;  Start 7/25/19 at 16:00


Bicalutamide (Casodex) 50 mg DAILY PO  Last administered on 7/29/19at 09:00; 


Admin Dose 50 MG;  Start 7/26/19 at 09:00


Hydromorphone HCl (Dilaudid) 1.5 mg Q4H  PRN IV SEVERE PAIN LEVEL 7-10 Last 


administered on 7/29/19at 11:56; Admin Dose 1.5 MG;  Start 7/26/19 at 15:30


Ketorolac Tromethamine (Toradol) 30 mg Q6H IV  Last administered on 7/29/19at 


03:45; Admin Dose 30 MG;  Start 7/26/19 at 15:30;  Stop 7/29/19 at 15:29


Lorazepam (Ativan) 0.5 mg Q6H  PRN IV ANXIETY Last administered on 7/29/19at 


14:33; Admin Dose 0.5 MG;  Start 7/26/19 at 19:00


Methadone HCl (Methadone Liq) 2 mg Q6 PO  Last administered on 7/29/19at 12:48; 


Admin Dose 2 MG;  Start 7/27/19 at 23:00


Tamsulosin HCl (Flomax) 0.4 mg HS PO  Last administered on 7/28/19at 21:08; 


Admin Dose 0.4 MG;  Start 7/27/19 at 21:00


Miscellaneous Information (* Miscellaneous Pharmacy Order) 7.5 ea ONCE IM ;  


Start 7/29/19 at 14:00;  Status SLOAN COCHRAN             Jul 29, 2019 14:40

## 2019-07-30 VITALS — HEART RATE: 103 BPM | RESPIRATION RATE: 16 BRPM | SYSTOLIC BLOOD PRESSURE: 113 MMHG | DIASTOLIC BLOOD PRESSURE: 78 MMHG

## 2019-07-30 VITALS — RESPIRATION RATE: 18 BRPM | SYSTOLIC BLOOD PRESSURE: 118 MMHG | HEART RATE: 114 BPM | DIASTOLIC BLOOD PRESSURE: 69 MMHG

## 2019-07-30 VITALS — DIASTOLIC BLOOD PRESSURE: 74 MMHG | RESPIRATION RATE: 16 BRPM | HEART RATE: 98 BPM | SYSTOLIC BLOOD PRESSURE: 114 MMHG

## 2019-07-30 VITALS — DIASTOLIC BLOOD PRESSURE: 70 MMHG | RESPIRATION RATE: 16 BRPM | HEART RATE: 104 BPM | SYSTOLIC BLOOD PRESSURE: 112 MMHG

## 2019-07-30 LAB
ADD MAN DIFF?: NO
BASOPHIL #: 0 10^3/UL (ref 0–0.1)
BASOPHILS %: 0.2 % (ref 0–2)
EOSINOPHILS #: 0 10^3/UL (ref 0–0.5)
EOSINOPHILS %: 0.2 % (ref 0–7)
HEMATOCRIT: 25.2 % (ref 42–52)
HEMOGLOBIN: 7.8 G/DL (ref 14–18)
IMMATURE GRANS #M: 0.11 10^3/UL (ref 0–0.03)
IMMATURE GRANS % (M): 2.2 % (ref 0–0.43)
LYMPHOCYTES #: 0.8 10^3/UL (ref 0.8–2.9)
LYMPHOCYTES %: 15.8 % (ref 15–51)
MEAN CORPUSCULAR HEMOGLOBIN: 26.4 PG (ref 29–33)
MEAN CORPUSCULAR HGB CONC: 31 G/DL (ref 32–37)
MEAN CORPUSCULAR VOLUME: 85.4 FL (ref 82–101)
MEAN PLATELET VOLUME: 9.8 FL (ref 7.4–10.4)
MONOCYTE #: 0.3 10^3/UL (ref 0.3–0.9)
MONOCYTES %: 6.1 % (ref 0–11)
NEUTROPHIL #: 3.8 10^3/UL (ref 1.6–7.5)
NEUTROPHILS %: 75.5 % (ref 39–77)
NUCLEATED RED BLOOD CELLS #: 0 10^3/UL (ref 0–0)
NUCLEATED RED BLOOD CELLS%: 0 /100WBC (ref 0–0)
PLATELET COUNT: 155 10^3/UL (ref 140–415)
RED BLOOD COUNT: 2.95 10^6/UL (ref 4.7–6.1)
RED CELL DISTRIBUTION WIDTH: 16.8 % (ref 11.5–14.5)
WHITE BLOOD COUNT: 5.1 10^3/UL (ref 4.8–10.8)

## 2019-07-30 RX ADMIN — CALCIUM CARBONATE SCH MG: 1250 SUSPENSION ORAL at 08:38

## 2019-07-30 RX ADMIN — BICALUTAMIDE SCH MG: 50 TABLET, FILM COATED ORAL at 08:38

## 2019-07-30 RX ADMIN — METHADONE HYDROCHLORIDE SCH MG: 5 SOLUTION ORAL at 00:08

## 2019-07-30 RX ADMIN — METHADONE HYDROCHLORIDE SCH MG: 5 SOLUTION ORAL at 12:34

## 2019-07-30 RX ADMIN — HYDROMORPHONE HYDROCHLORIDE PRN MG: 2 INJECTION, SOLUTION INTRAMUSCULAR; INTRAVENOUS; SUBCUTANEOUS at 20:03

## 2019-07-30 RX ADMIN — TAMSULOSIN HYDROCHLORIDE 1 MG: 0.4 CAPSULE ORAL at 20:03

## 2019-07-30 RX ADMIN — BICALUTAMIDE 1 MG: 50 TABLET, FILM COATED ORAL at 08:38

## 2019-07-30 RX ADMIN — LORAZEPAM PRN MG: 2 INJECTION, SOLUTION INTRAMUSCULAR; INTRAVENOUS at 23:42

## 2019-07-30 RX ADMIN — METHADONE HYDROCHLORIDE SCH MG: 5 SOLUTION ORAL at 05:35

## 2019-07-30 RX ADMIN — METHADONE HYDROCHLORIDE 1 MG: 5 SOLUTION ORAL at 00:08

## 2019-07-30 RX ADMIN — CALCIUM CARBONATE SCH MG: 1250 SUSPENSION ORAL at 20:03

## 2019-07-30 RX ADMIN — HYDROMORPHONE HYDROCHLORIDE 1 MG: 2 INJECTION, SOLUTION INTRAMUSCULAR; INTRAVENOUS; SUBCUTANEOUS at 20:03

## 2019-07-30 RX ADMIN — HYDROMORPHONE HYDROCHLORIDE 1 MG: 2 INJECTION, SOLUTION INTRAMUSCULAR; INTRAVENOUS; SUBCUTANEOUS at 13:48

## 2019-07-30 RX ADMIN — CALCIUM CARBONATE 1 MG: 1250 SUSPENSION ORAL at 08:38

## 2019-07-30 RX ADMIN — CALCIUM CARBONATE 1 MG: 1250 SUSPENSION ORAL at 20:03

## 2019-07-30 RX ADMIN — HYDROMORPHONE HYDROCHLORIDE PRN MG: 2 INJECTION, SOLUTION INTRAMUSCULAR; INTRAVENOUS; SUBCUTANEOUS at 03:08

## 2019-07-30 RX ADMIN — METHADONE HYDROCHLORIDE SCH MG: 5 SOLUTION ORAL at 23:41

## 2019-07-30 RX ADMIN — LORAZEPAM 1 MG: 2 INJECTION, SOLUTION INTRAMUSCULAR; INTRAVENOUS at 23:42

## 2019-07-30 RX ADMIN — CEFTRIAXONE 1 MLS/HR: 1 INJECTION, SOLUTION INTRAVENOUS at 15:30

## 2019-07-30 RX ADMIN — METHADONE HYDROCHLORIDE 1 MG: 5 SOLUTION ORAL at 12:34

## 2019-07-30 RX ADMIN — TAMSULOSIN HYDROCHLORIDE SCH MG: 0.4 CAPSULE ORAL at 20:03

## 2019-07-30 RX ADMIN — PANTOPRAZOLE SODIUM 1 MG: 40 TABLET, DELAYED RELEASE ORAL at 05:40

## 2019-07-30 RX ADMIN — ACETAMINOPHEN 1 MG: 325 TABLET, FILM COATED ORAL at 15:29

## 2019-07-30 RX ADMIN — METHADONE HYDROCHLORIDE 1 MG: 5 SOLUTION ORAL at 23:41

## 2019-07-30 RX ADMIN — METHADONE HYDROCHLORIDE 1 MG: 5 SOLUTION ORAL at 05:35

## 2019-07-30 RX ADMIN — PANTOPRAZOLE SODIUM SCH MG: 40 TABLET, DELAYED RELEASE ORAL at 05:40

## 2019-07-30 RX ADMIN — HYDROMORPHONE HYDROCHLORIDE 1 MG: 2 INJECTION, SOLUTION INTRAMUSCULAR; INTRAVENOUS; SUBCUTANEOUS at 03:08

## 2019-07-30 RX ADMIN — METHADONE HYDROCHLORIDE SCH MG: 5 SOLUTION ORAL at 17:54

## 2019-07-30 RX ADMIN — CEFTRIAXONE SCH MLS/HR: 1 INJECTION, SOLUTION INTRAVENOUS at 15:30

## 2019-07-30 RX ADMIN — METHADONE HYDROCHLORIDE 1 MG: 5 SOLUTION ORAL at 17:54

## 2019-07-30 RX ADMIN — HYDROMORPHONE HYDROCHLORIDE PRN MG: 2 INJECTION, SOLUTION INTRAMUSCULAR; INTRAVENOUS; SUBCUTANEOUS at 13:48

## 2019-07-30 NOTE — PN
Date/Time of Note


Date/Time of Note


DATE: 7/30/19 


TIME: 11:58





Assessment/Plan


VTE Prophylaxis


Risk score (from Ns)>0 risk:  6


SCD applied (from Ns):  Yes


Pharmacological prophylaxis:  NA/contraindicated


Pharm contraindication:  low risk/ambulating





Lines/Catheters


IV Catheter Type (from [facility]):  Saline Lock


Urinary Cath still in place:  Yes


Reason Cath still needed:  urinary retention





Assessment/Plan


Assessment/Plan


1 Recurrent falls, failure to thrive, weight loss, generalized body aches, 


likely secondary to metastatic prostate cancer.  The patient was here in January 2019.  The patient was seen by multiple consultants including urology and 


oncology.  The patient had a prostate specific antigen over 1000.  The patient 


was deemed that he had capacity to make decisions.  The patient was offered 


nursing home; however, he left and went home.  He never followed up with any 


appointments and likely has progression of disease.


Likely has metastatic prostate cancer now on casodex and lupron


2.  History of falls with subdural hematomas.


3.  Severe anemia.


4.  Pancytopenia with a history of hepatitis C.


5.  Alcoholism.


6.  History of smoking.


7.  Elevated alkaline phosphatase could be secondary to bony metastases.  


Metastatic disease. He is on Casodex


8.  Edema with hypoalbuminemia.


9.  Likely urinary tract infection.


10. cachexia. Malnourishment


11.Chronic pain and anxiety


Assessment/Plan


-Talk to the brother who wanted all the treatment to be pursued for for 


however when I spoke to him and told him that if he is refusing treatments then 


want to do, he said to offer him the treatment and if he refuses then he refuses


and then in that case would wait for him to come on August 6, 


- cw casodex. lupron not given yet


-Follow-up with Dr. Potts  and Dr. guy recommendations


-c/w methadone/dilaudid/toradol for pain fu dr pyle


- now has chan


- cw iv abx fo UTI


- Nutrition


- montior HB 7> 7.8 TODAY


- social service consult


- scd





It is very challenging case.  Patient apparently was supposed to sign hospice 


but did not sign it when spoke to the brother had spoken to the brother and he 


wanted full treatment as if of yesterday.  Patient is in pain and is currently 


on oral Casodex methadone Lupron and is yet to be given.  In case the family 


decided not to be on hospital and patient goes to nursing home we have to check 


with the nursing home if they takes case Casodex and methadone


Result Diagram:  


7/30/19 0436                                                                    


           7/29/19 0441





Results 24hrs





Laboratory Tests


               Test
                                7/30/19
04:36


               White Blood Count                            5.1


               Red Blood Count                            2.95  L


               Hemoglobin                                  7.8  L


               Hematocrit                                 25.2  L


               Mean Corpuscular Volume                     85.4


               Mean Corpuscular Hemoglobin                26.4  L


               Mean Corpuscular Hemoglobin
Concent       31.0  L



               Red Cell Distribution Width                16.8  H


               Platelet Count                              155  #


               Mean Platelet Volume                         9.8


               Immature Granulocytes %                   2.200  H


               Neutrophils %                               75.5


               Lymphocytes %                               15.8


               Monocytes %                                  6.1


               Eosinophils %                                0.2


               Basophils %                                  0.2


               Nucleated Red Blood Cells %                  0.0


               Immature Granulocytes #                   0.110  H


               Neutrophils #                                3.8


               Lymphocytes #                                0.8


               Monocytes #                                  0.3


               Eosinophils #                                0.0


               Basophils #                                  0.0


               Nucleated Red Blood Cells #                  0.0








Subjective


24 Hr Interval Summary


Free Text/Dictation


Pain is everywhere


currently managed by Dr Pyle





Exam/Review of Systems


Exam


Vitals





Vital Signs


  Date      Temp  Pulse  Resp  B/P (MAP)   Pulse Ox  O2          O2 Flow    FiO2


Time                                                 Delivery    Rate


   7/30/19  98.3    104    16      112/70        98


     07:24                           (84)


   7/30/19                                           Room Air


     04:31








Intake and Output





7/29/19 7/29/19 7/30/19





1515:00


23:00


07:00





IntakeIntake Total


650 ml


50 ml


200 ml





OutputOutput Total


300 ml


600 ml





BalanceBalance


350 ml


50 ml


-400 ml











Exam


No acute distress, no events overnight.


Eyes: anicteric, EOM's intact, no pallor


Nose: no rhinorrhea


Neck: supple, no thyromegaly, no carotid bruits


Lungs: clear bilaterally, decreased.


CVS: regular rate and rhythm, no murmurs


Abdomen: soft, bowel sounds present, scaphoid.


Rectal: differed.


External genitalia: no lesions.


Extremities: no edema, DP pulses are palpable


Neuro: alert and oriented x 3


Gait: unable to assess


Motor strenght:weak


Sensory exam: normal


Deep tendon reflexes: normal, Babisky reflexes are absent bilaterally





Results


Results 24hrs





Laboratory Tests


               Test
                                7/30/19
04:36


               White Blood Count                            5.1


               Red Blood Count                            2.95  L


               Hemoglobin                                  7.8  L


               Hematocrit                                 25.2  L


               Mean Corpuscular Volume                     85.4


               Mean Corpuscular Hemoglobin                26.4  L


               Mean Corpuscular Hemoglobin
Concent       31.0  L



               Red Cell Distribution Width                16.8  H


               Platelet Count                              155  #


               Mean Platelet Volume                         9.8


               Immature Granulocytes %                   2.200  H


               Neutrophils %                               75.5


               Lymphocytes %                               15.8


               Monocytes %                                  6.1


               Eosinophils %                                0.2


               Basophils %                                  0.2


               Nucleated Red Blood Cells %                  0.0


               Immature Granulocytes #                   0.110  H


               Neutrophils #                                3.8


               Lymphocytes #                                0.8


               Monocytes #                                  0.3


               Eosinophils #                                0.0


               Basophils #                                  0.0


               Nucleated Red Blood Cells #                  0.0








Medications


Medication





Current Medications


Pantoprazole (Protonix Tab) 40 mg DAILY@06 PO  Last administered on 7/29/19at 


05:37; Admin Dose 40 MG;  Start 7/25/19 at 06:00


Albuterol/ Ipratropium (Duoneb) 3 ml Q6H RESP THERAPY  PRN HHN SHORTNESS OF 


BREATH;  Start 7/25/19 at 06:00


Acetaminophen (Tylenol Tab) 650 mg Q6H  PRN PO MILD PAIN(1-3)OR ELEVATED TEMP 


Last administered on 7/25/19at 10:45; Admin Dose 650 MG;  Start 7/25/19 at 06:00


Bisacodyl (Dulcolax) 10 mg DAILY  PRN PO CONSTIPATION;  Start 7/25/19 at 06:00


Miscellaneous Information (Pending Santyl Order For Wound Care) This patient h


a... PRN  PRN XX WOUND CARE;  Start 7/25/19 at 06:00


Calcium Carbonate (Ca Carbonate) 1,250 mg BID PO  Last administered on 7/30/19at


08:38; Admin Dose 1,250 MG;  Start 7/25/19 at 21:00


Ceftriaxone Sodium 50 ml @  100 mls/hr Q24H IVPB  Last administered on 7/27/19at


15:54; Admin Dose 100 MLS/HR;  Start 7/25/19 at 16:00


Bicalutamide (Casodex) 50 mg DAILY PO  Last administered on 7/30/19at 08:38; Ad


min Dose 50 MG;  Start 7/26/19 at 09:00


Hydromorphone HCl (Dilaudid) 1.5 mg Q4H  PRN IV SEVERE PAIN LEVEL 7-10 Last 


administered on 7/30/19at 03:08; Admin Dose 1.5 MG;  Start 7/26/19 at 15:30


Lorazepam (Ativan) 0.5 mg Q6H  PRN IV ANXIETY Last administered on 7/29/19at 


14:33; Admin Dose 0.5 MG;  Start 7/26/19 at 19:00


Methadone HCl (Methadone Liq) 2 mg Q6 PO  Last administered on 7/30/19at 05:35; 


Admin Dose 2 MG;  Start 7/27/19 at 23:00


Tamsulosin HCl (Flomax) 0.4 mg HS PO  Last administered on 7/29/19at 21:08; 


Admin Dose 0.4 MG;  Start 7/27/19 at 21:00


Non-Formulary Medication 1 dose ONCE SC ;  Start 7/30/19 at 16:30;  Stop 7/30/19


at 23:45;  Status UNV


Enoxaparin Sodium (Lovenox) 40 mg DAILY  ONCE SC ;  Start 7/30/19 at 12:00;  


Stop 7/30/19 at 12:01;  Status UNV











TOMAS BRICE MD              Jul 30, 2019 12:02

## 2019-07-30 NOTE — CONS
Consult Date/Type/Reason


Admit Date/Time


Jul 25, 2019 at 05:11


Initial Consult Date


7/26/19


Type of Consultation:  Urology


Reason for Consultation


Metastatic prostate cancer


Requesting Provider:  ANKIT MONAE MD


Date/Time of Note


DATE: 7/30/19 


TIME: 08:35





Subjective


Patient is weak and complains of pain in his back and lower extremities





Objective


Vitals





Vital Signs


  Date      Temp  Pulse  Resp  B/P (MAP)   Pulse Ox  O2          O2 Flow    FiO2


Time                                                 Delivery    Rate


   7/30/19  98.3    104    16      112/70        98


     07:24                           (84)


   7/30/19                                           Room Air


     04:31








Intake and Output





7/29/19 7/29/19 7/30/19





1515:00


23:00


07:00





IntakeIntake Total


650 ml


50 ml


200 ml





OutputOutput Total


300 ml


600 ml





BalanceBalance


350 ml


50 ml


-400 ml











Exam


White catheter is draining clear urine, the abdomen is soft





Results/Medications


Result Diagram:  


7/30/19 0436                                                                    


           7/29/19 0441





Results 24 hrs





Laboratory Tests


               Test
                                7/30/19
04:36


               White Blood Count                            5.1


               Red Blood Count                            2.95  L


               Hemoglobin                                  7.8  L


               Hematocrit                                 25.2  L


               Mean Corpuscular Volume                     85.4


               Mean Corpuscular Hemoglobin                26.4  L


               Mean Corpuscular Hemoglobin
Concent       31.0  L



               Red Cell Distribution Width                16.8  H


               Platelet Count                              155  #


               Mean Platelet Volume                         9.8


               Immature Granulocytes %                   2.200  H


               Neutrophils %                               75.5


               Lymphocytes %                               15.8


               Monocytes %                                  6.1


               Eosinophils %                                0.2


               Basophils %                                  0.2


               Nucleated Red Blood Cells %                  0.0


               Immature Granulocytes #                   0.110  H


               Neutrophils #                                3.8


               Lymphocytes #                                0.8


               Monocytes #                                  0.3


               Eosinophils #                                0.0


               Basophils #                                  0.0


               Nucleated Red Blood Cells #                  0.0





Home Meds


No Active Prescriptions or Reported Meds


Medications





Current Medications


Pantoprazole (Protonix Tab) 40 mg DAILY@06 PO  Last administered on 7/29/19at 


05:37; Admin Dose 40 MG;  Start 7/25/19 at 06:00


Albuterol/ Ipratropium (Duoneb) 3 ml Q6H RESP THERAPY  PRN HHN SHORTNESS OF 


BREATH;  Start 7/25/19 at 06:00


Acetaminophen (Tylenol Tab) 650 mg Q6H  PRN PO MILD PAIN(1-3)OR ELEVATED TEMP 


Last administered on 7/25/19at 10:45; Admin Dose 650 MG;  Start 7/25/19 at 06:00


Bisacodyl (Dulcolax) 10 mg DAILY  PRN PO CONSTIPATION;  Start 7/25/19 at 06:00


Miscellaneous Information (Pending Santyl Order For Wound Care) This patient 


ha... PRN  PRN XX WOUND CARE;  Start 7/25/19 at 06:00


Calcium Carbonate (Ca Carbonate) 1,250 mg BID PO  Last administered on 7/29/19at


21:07; Admin Dose 1,250 MG;  Start 7/25/19 at 21:00


Ceftriaxone Sodium 50 ml @  100 mls/hr Q24H IVPB  Last administered on 7/27/19at


15:54; Admin Dose 100 MLS/HR;  Start 7/25/19 at 16:00


Bicalutamide (Casodex) 50 mg DAILY PO  Last administered on 7/29/19at 09:00; 


Admin Dose 50 MG;  Start 7/26/19 at 09:00


Hydromorphone HCl (Dilaudid) 1.5 mg Q4H  PRN IV SEVERE PAIN LEVEL 7-10 Last 


administered on 7/30/19at 03:08; Admin Dose 1.5 MG;  Start 7/26/19 at 15:30


Lorazepam (Ativan) 0.5 mg Q6H  PRN IV ANXIETY Last administered on 7/29/19at 


14:33; Admin Dose 0.5 MG;  Start 7/26/19 at 19:00


Methadone HCl (Methadone Liq) 2 mg Q6 PO  Last administered on 7/30/19at 05:35; 


Admin Dose 2 MG;  Start 7/27/19 at 23:00


Tamsulosin HCl (Flomax) 0.4 mg HS PO  Last administered on 7/29/19at 21:08; 


Admin Dose 0.4 MG;  Start 7/27/19 at 21:00


Non-Formulary Medication 1 dose ONCE SC ;  Start 7/30/19 at 16:30;  Stop 7/30/19


at 23:45;  Status UNV





Assessment/Plan


Hospital Course (Demo Recall)


69-year-old -American male was admitted here in January 2019.  At that 


time his PSA was over at thousand and patient was assumed to have prostate 


cancer he was started on bicalutamide and at the time of discharge he was 


instructed to follow-up in the office for biopsy of the prostate to confirm the 


diagnosis of prostate cancer.  However the patient did not follow up with any 


doctor and he went to [facility] because of increased back pain


and weakness in addition to lower extremity pain and was transferred to [facility].  Patient underwent CT of the cervical thoracic and lumbar


spine and was found to have metastatic disease.  Patient states he does urinate 


5-6 times at night and same during the day.  He voids a small amount.  He denies


any gross hematuria.


Presently the patient is very weak and the CT scan of the abdomen and pelvis 


showed:


1.  Diffuse metastasis to the liver as described above. Recommend contrast CT or


MR to further evaluate.


2.  Pathologic lymphadenopathy is noted in the retroperitoneum, the 


gastrohepatic gastrolienal and the portal triad with retroperitoneal 


lymphadenopathy noted at the aortic bifurcation. Incomplete evaluation without 


contrast on this study is noted.


3.  Anterior mid abdominal mass measuring 5.8 cm transverse by 3.2 cm in AP 


dimensions.


4.  Bilateral renal cortical cysts with persistent mild left hydroureter 


nephrosis


5. Diffuse blastic and lytic skeletal metastasis and recommend MRI with contrast


of the spine to evaluate for epidural extension is clinically indicated.


6.  Mild abdominal and pelvic ascites


7.  Mild gallbladder wall calcifications





The patient is on bicalutamide and pain medications.  His cancer is very 


advanced.  Consideration have been made for nursing home and or hospice.  The 


White catheter is draining clear urine and urine culture did grew gram-negative 


rods.  Sensitivities pending.  I did order Leuprolide acetate 7.5 mg and that 


will be given once the pharmacy delivers it.











HAILEE CLIFTON MD            Jul 30, 2019 08:39

## 2019-07-30 NOTE — CONS
Assessment/Plan


Assessment/Plan


Assessment/Plan (Daily)


Spoke with Dr. Figueredo.  Issues remain, and with widespread prostate cancer with 


mets to bone with severe pain secondary to the above.  She has been seen by 


urology and oncology consultations, patient is not able to make decisions on his


own behalf.


Patient cannot go home there are no 24 7-day a week caregivers


Has had multiple falls at home prior to this hospitalization


Status post subdural hematoma secondary to falls


Pancytopenia secondary to hepatitis C


Prior history of noncompliance with medical recommendations


Malnutrition


Cachexia


Family members were either uninformed or unable to take care of patient at home 


and unwilling to assist and have made recommendations for transfer to another 


facility, more work-up, more aggressive therapy. 


Recommendation made by Dr. Louie for Taxotere, Casodex and Lupron


Pain is controlled with combination of methadone and as needed short acting 


opioids





I will assist Dr. Figueredo and speak with family members concerning goals of care 


patient is a DO NOT RESUSCITATE however in my opinion the best only safe option 


is skilled nursing unit admission, continue present pain management.  Patient 


has no past medical history of IV drug abuse so there should be an issue of 


patient being transferred to a skilled nursing unit on methadone and short 


acting opioids for treatment of acute pain from his malignancy.  Success social 


work service to assist with all appropriate discharge commendations.





Consultation Date/Type/Reason


Admit Date/Time


Jul 25, 2019 at 05:11


Date/Time of Note


DATE: 7/30/19 


TIME: 08:40





Past Medical History


Medical History:  cancer (Of prostate based on the PSA.  Has not been confirmed 


by biopsy), hepatitis (C), hypertension, other (Anemia, hepatitis C, weight 


loss, history of falls and gastritis, history of subdural hematoma)


Home Meds


No Active Prescriptions or Reported Meds


Medications





Current Medications


Pantoprazole (Protonix Tab) 40 mg DAILY@06 PO  Last administered on 7/29/19at 


05:37; Admin Dose 40 MG;  Start 7/25/19 at 06:00


Albuterol/ Ipratropium (Duoneb) 3 ml Q6H RESP THERAPY  PRN HHN SHORTNESS OF 


BREATH;  Start 7/25/19 at 06:00


Acetaminophen (Tylenol Tab) 650 mg Q6H  PRN PO MILD PAIN(1-3)OR ELEVATED TEMP 


Last administered on 7/25/19at 10:45; Admin Dose 650 MG;  Start 7/25/19 at 06:00


Bisacodyl (Dulcolax) 10 mg DAILY  PRN PO CONSTIPATION;  Start 7/25/19 at 06:00


Miscellaneous Information (Pending Santyl Order For Wound Care) This patient 


ha... PRN  PRN XX WOUND CARE;  Start 7/25/19 at 06:00


Calcium Carbonate (Ca Carbonate) 1,250 mg BID PO  Last administered on 7/29/19at


21:07; Admin Dose 1,250 MG;  Start 7/25/19 at 21:00


Ceftriaxone Sodium 50 ml @  100 mls/hr Q24H IVPB  Last administered on 7/27/19at


15:54; Admin Dose 100 MLS/HR;  Start 7/25/19 at 16:00


Bicalutamide (Casodex) 50 mg DAILY PO  Last administered on 7/29/19at 09:00; 


Admin Dose 50 MG;  Start 7/26/19 at 09:00


Hydromorphone HCl (Dilaudid) 1.5 mg Q4H  PRN IV SEVERE PAIN LEVEL 7-10 Last 


administered on 7/30/19at 03:08; Admin Dose 1.5 MG;  Start 7/26/19 at 15:30


Lorazepam (Ativan) 0.5 mg Q6H  PRN IV ANXIETY Last administered on 7/29/19at 


14:33; Admin Dose 0.5 MG;  Start 7/26/19 at 19:00


Methadone HCl (Methadone Liq) 2 mg Q6 PO  Last administered on 7/30/19at 05:35; 


Admin Dose 2 MG;  Start 7/27/19 at 23:00


Tamsulosin HCl (Flomax) 0.4 mg HS PO  Last administered on 7/29/19at 21:08; 


Admin Dose 0.4 MG;  Start 7/27/19 at 21:00


Non-Formulary Medication 1 dose ONCE SC ;  Start 7/30/19 at 16:30;  Stop 7/30/19


at 23:45;  Status UNV


Allergies:  


Coded Allergies:  


     No Known Allergy (Unverified , 1/11/19)





Past Surgical History


Past Surgical Hx:  no surgical history





Social History


Alcohol Use:  occasionally


Smoking Status:  Former smoker


Drug Use:  none, other (unknown)





Exam/Review of Systems


Exam


Vitals





Vital Signs


  Date      Temp  Pulse  Resp  B/P (MAP)   Pulse Ox  O2          O2 Flow    FiO2


Time                                                 Delivery    Rate


   7/30/19  98.3    104    16      112/70        98


     07:24                           (84)


   7/30/19                                           Room Air


     04:31








Intake and Output





7/29/19 7/29/19 7/30/19





1515:00


23:00


07:00





IntakeIntake Total


650 ml


50 ml


200 ml





OutputOutput Total


300 ml


600 ml





BalanceBalance


350 ml


50 ml


-400 ml














Results


Result Diagram:  


7/30/19 0436                                                                    


           7/29/19 0441





Results 24hrs





Laboratory Tests


               Test
                                7/30/19
04:36


               White Blood Count                            5.1


               Red Blood Count                            2.95  L


               Hemoglobin                                  7.8  L


               Hematocrit                                 25.2  L


               Mean Corpuscular Volume                     85.4


               Mean Corpuscular Hemoglobin                26.4  L


               Mean Corpuscular Hemoglobin
Concent       31.0  L



               Red Cell Distribution Width                16.8  H


               Platelet Count                              155  #


               Mean Platelet Volume                         9.8


               Immature Granulocytes %                   2.200  H


               Neutrophils %                               75.5


               Lymphocytes %                               15.8


               Monocytes %                                  6.1


               Eosinophils %                                0.2


               Basophils %                                  0.2


               Nucleated Red Blood Cells %                  0.0


               Immature Granulocytes #                   0.110  H


               Neutrophils #                                3.8


               Lymphocytes #                                0.8


               Monocytes #                                  0.3


               Eosinophils #                                0.0


               Basophils #                                  0.0


               Nucleated Red Blood Cells #                  0.0








Medications


Medication





Current Medications


Pantoprazole (Protonix Tab) 40 mg DAILY@06 PO  Last administered on 7/29/19at 


05:37; Admin Dose 40 MG;  Start 7/25/19 at 06:00


Albuterol/ Ipratropium (Duoneb) 3 ml Q6H RESP THERAPY  PRN HHN SHORTNESS OF 


BREATH;  Start 7/25/19 at 06:00


Acetaminophen (Tylenol Tab) 650 mg Q6H  PRN PO MILD PAIN(1-3)OR ELEVATED TEMP 


Last administered on 7/25/19at 10:45; Admin Dose 650 MG;  Start 7/25/19 at 06:00


Bisacodyl (Dulcolax) 10 mg DAILY  PRN PO CONSTIPATION;  Start 7/25/19 at 06:00


Miscellaneous Information (Pending Santyl Order For Wound Care) This patient 


ha... PRN  PRN XX WOUND CARE;  Start 7/25/19 at 06:00


Calcium Carbonate (Ca Carbonate) 1,250 mg BID PO  Last administered on 7/29/19at


21:07; Admin Dose 1,250 MG;  Start 7/25/19 at 21:00


Ceftriaxone Sodium 50 ml @  100 mls/hr Q24H IVPB  Last administered on 7/27/19at


15:54; Admin Dose 100 MLS/HR;  Start 7/25/19 at 16:00


Bicalutamide (Casodex) 50 mg DAILY PO  Last administered on 7/29/19at 09:00; 


Admin Dose 50 MG;  Start 7/26/19 at 09:00


Hydromorphone HCl (Dilaudid) 1.5 mg Q4H  PRN IV SEVERE PAIN LEVEL 7-10 Last 


administered on 7/30/19at 03:08; Admin Dose 1.5 MG;  Start 7/26/19 at 15:30


Lorazepam (Ativan) 0.5 mg Q6H  PRN IV ANXIETY Last administered on 7/29/19at 


14:33; Admin Dose 0.5 MG;  Start 7/26/19 at 19:00


Methadone HCl (Methadone Liq) 2 mg Q6 PO  Last administered on 7/30/19at 05:35; 


Admin Dose 2 MG;  Start 7/27/19 at 23:00


Tamsulosin HCl (Flomax) 0.4 mg HS PO  Last administered on 7/29/19at 21:08; 


Admin Dose 0.4 MG;  Start 7/27/19 at 21:00


Non-Formulary Medication 1 dose ONCE SC ;  Start 7/30/19 at 16:30;  Stop 7/30/19


at 23:45;  Status UNV











SLOAN CASTRO             Jul 30, 2019 08:43

## 2019-07-30 NOTE — CONS
Assessment/Plan


Assessment/Plan


Hospital Course (Demo Recall)


#Metastatic prostate cancer


-pt has disease diffusely throughout the liver, bones and lymph nodes


-PSA is currently> 600


- Per Dr Mcnulty - pt should have a confirmatory prostate bx but given the high 


likelihood of prostate cancer and heavy disease burden, ideally patient should 


be started on upfront Taxotere chemotherapy with Casodex and Lupron


-This was discussed with the patient who states he needs to "think about" 


starting any type of therapy


-he could also be evaluated by radiation oncology as an out patient for his back


pain as palliative XRT may help this issue


-at the very least he should continue Casodex for now


-if he refuses all treatment, he should be referred for hospice eval





Thank you for the opportunity to participate in this patients care


A total of 40 minutes of face to face time was spent


speaking with the patient, of which greater than 50% was spent in counseling


and coordination of care and the detailed question and answer session.





Consultation Date/Type/Reason


Admit Date/Time


Jul 25, 2019 at 05:11


Initial Consult Date


7/26/19


Type of Consult


oncology


Reason for Consultation


metastatic prostate cancer


Requesting Provider:  ANKIT MONAE MD


Date/Time of Note


DATE: 7/30/19 


TIME: 14:07





24 HR Interval Summary


Free Text/Dictation


plan is to send to SNF. pt continues to refuse care





Exam/Review of Systems


Exam


Vitals





Vital Signs


  Date      Temp  Pulse  Resp  B/P (MAP)   Pulse Ox  O2          O2 Flow    FiO2


Time                                                 Delivery    Rate


   7/30/19  98.3    104    16      112/70        98


     07:24                           (84)


   7/30/19                                           Room Air


     04:31








Intake and Output





7/29/19 7/29/19 7/30/19





1515:00


23:00


07:00





IntakeIntake Total


650 ml


50 ml


200 ml





OutputOutput Total


300 ml


600 ml





BalanceBalance


350 ml


50 ml


-400 ml











Constitutional:  alert, oriented, distress, frail


Psych:  anxiety


Head:  normocephalic


Eyes:  nl conjunctiva


ENMT:  nl external ears & nose


Neck:  supple


Respiratory:  clear to auscultation


Cardiovascular:  regular rate and rhythm


Gastrointestinal:  soft


Musculoskeletal:  nl extremities to inspection


Extremities:  normal pulses





Results


Result Diagram:  


7/30/19 0436                                                                    


           7/29/19 0441





Results 24hrs





Laboratory Tests


               Test
                                7/30/19
04:36


               White Blood Count                            5.1


               Red Blood Count                            2.95  L


               Hemoglobin                                  7.8  L


               Hematocrit                                 25.2  L


               Mean Corpuscular Volume                     85.4


               Mean Corpuscular Hemoglobin                26.4  L


               Mean Corpuscular Hemoglobin
Concent       31.0  L



               Red Cell Distribution Width                16.8  H


               Platelet Count                              155  #


               Mean Platelet Volume                         9.8


               Immature Granulocytes %                   2.200  H


               Neutrophils %                               75.5


               Lymphocytes %                               15.8


               Monocytes %                                  6.1


               Eosinophils %                                0.2


               Basophils %                                  0.2


               Nucleated Red Blood Cells %                  0.0


               Immature Granulocytes #                   0.110  H


               Neutrophils #                                3.8


               Lymphocytes #                                0.8


               Monocytes #                                  0.3


               Eosinophils #                                0.0


               Basophils #                                  0.0


               Nucleated Red Blood Cells #                  0.0








Medications


Medication





Current Medications


Pantoprazole (Protonix Tab) 40 mg DAILY@06 PO  Last administered on 7/29/19at 


05:37; Admin Dose 40 MG;  Start 7/25/19 at 06:00


Albuterol/ Ipratropium (Duoneb) 3 ml Q6H RESP THERAPY  PRN HHN SHORTNESS OF 


BREATH;  Start 7/25/19 at 06:00


Acetaminophen (Tylenol Tab) 650 mg Q6H  PRN PO MILD PAIN(1-3)OR ELEVATED TEMP 


Last administered on 7/25/19at 10:45; Admin Dose 650 MG;  Start 7/25/19 at 06:00


Bisacodyl (Dulcolax) 10 mg DAILY  PRN PO CONSTIPATION;  Start 7/25/19 at 06:00


Miscellaneous Information (Pending Santyl Order For Wound Care) This patient 


ha... PRN  PRN XX WOUND CARE;  Start 7/25/19 at 06:00


Calcium Carbonate (Ca Carbonate) 1,250 mg BID PO  Last administered on 7/30/19at


08:38; Admin Dose 1,250 MG;  Start 7/25/19 at 21:00


Ceftriaxone Sodium 50 ml @  100 mls/hr Q24H IVPB  Last administered on 7/27/19at


15:54; Admin Dose 100 MLS/HR;  Start 7/25/19 at 16:00


Bicalutamide (Casodex) 50 mg DAILY PO  Last administered on 7/30/19at 08:38; 


Admin Dose 50 MG;  Start 7/26/19 at 09:00


Hydromorphone HCl (Dilaudid) 1.5 mg Q4H  PRN IV SEVERE PAIN LEVEL 7-10 Last 


administered on 7/30/19at 13:48; Admin Dose 1.5 MG;  Start 7/26/19 at 15:30


Lorazepam (Ativan) 0.5 mg Q6H  PRN IV ANXIETY Last administered on 7/29/19at 


14:33; Admin Dose 0.5 MG;  Start 7/26/19 at 19:00


Methadone HCl (Methadone Liq) 2 mg Q6 PO  Last administered on 7/30/19at 12:34; 


Admin Dose 2 MG;  Start 7/27/19 at 23:00


Tamsulosin HCl (Flomax) 0.4 mg HS PO  Last administered on 7/29/19at 21:08; 


Admin Dose 0.4 MG;  Start 7/27/19 at 21:00


Non-Formulary Medication 1 dose ONCE SC ;  Start 7/31/19 at 16:30;  Stop 7/31/19


at 23:45;  Status UNV


Enoxaparin Sodium (Lovenox) 40 mg DAILY  ONCE SC ;  Start 7/30/19 at 12:00;  


Stop 7/30/19 at 12:01;  Status UNV











TAE MCNULTY M.D.              Jul 30, 2019 14:08

## 2019-07-31 VITALS — HEART RATE: 64 BPM | DIASTOLIC BLOOD PRESSURE: 69 MMHG | RESPIRATION RATE: 19 BRPM | SYSTOLIC BLOOD PRESSURE: 111 MMHG

## 2019-07-31 VITALS — HEART RATE: 82 BPM | DIASTOLIC BLOOD PRESSURE: 69 MMHG | RESPIRATION RATE: 18 BRPM | SYSTOLIC BLOOD PRESSURE: 117 MMHG

## 2019-07-31 VITALS — DIASTOLIC BLOOD PRESSURE: 73 MMHG | RESPIRATION RATE: 18 BRPM | SYSTOLIC BLOOD PRESSURE: 110 MMHG | HEART RATE: 92 BPM

## 2019-07-31 VITALS — RESPIRATION RATE: 18 BRPM | SYSTOLIC BLOOD PRESSURE: 118 MMHG | DIASTOLIC BLOOD PRESSURE: 76 MMHG | HEART RATE: 92 BPM

## 2019-07-31 VITALS — SYSTOLIC BLOOD PRESSURE: 111 MMHG | DIASTOLIC BLOOD PRESSURE: 64 MMHG | HEART RATE: 74 BPM | RESPIRATION RATE: 18 BRPM

## 2019-07-31 LAB
ABNORMAL IP MESSAGE: 1
ADD MAN DIFF?: NO
ANISOCYTOSIS: (no result) (ref 0–0)
BASOPHIL #: 0 10^3/UL (ref 0–0.1)
BASOPHIL #M: 0 10^3/UL (ref 0–0)
BASOPHILS % (M): 1 % (ref 0–2)
BASOPHILS %: 0.5 % (ref 0–2)
BURR CELLS: (no result) (ref 0–0)
ELLIPTO: (no result) (ref 0–0)
EOSINOPHILS #: 0 10^3/UL (ref 0–0.5)
EOSINOPHILS %: 0.9 % (ref 0–7)
HEMATOCRIT: 20.8 % (ref 42–52)
HEMATOCRIT: 23.3 % (ref 42–52)
HEMOGLOBIN: 6.5 G/DL (ref 14–18)
HEMOGLOBIN: 7.2 G/DL (ref 14–18)
IMMATURE GRANS #M: 0.12 10^3/UL (ref 0–0.03)
IMMATURE GRANS % (M): 2.7 % (ref 0–0.43)
LYMPHOCYTES #: 0.9 10^3/UL (ref 0.8–2.9)
LYMPHOCYTES #M: 0.7 10^3/UL (ref 0.8–2.9)
LYMPHOCYTES % (M): 17 % (ref 15–51)
LYMPHOCYTES %: 19.9 % (ref 15–51)
MEAN CORPUSCULAR HEMOGLOBIN: 26.6 PG (ref 29–33)
MEAN CORPUSCULAR HGB CONC: 31.3 G/DL (ref 32–37)
MEAN CORPUSCULAR VOLUME: 85.2 FL (ref 82–101)
MEAN PLATELET VOLUME: 9.6 FL (ref 7.4–10.4)
METAMYELOCYTES #M: 0 10^3/UL (ref 0–0)
METAMYELOCYTES %M: 1 % (ref 0–0)
MICROCYTOSIS: (no result) (ref 0–0)
MONOCYTE #: 0.3 10^3/UL (ref 0.3–0.9)
MONOCYTE #M: 0.2 10^3/UL (ref 0.3–0.9)
MONOCYTES % (M): 5 % (ref 0–11)
MONOCYTES %: 7.4 % (ref 0–11)
MYELOCYTES #M: 0 10^3/UL (ref 0–0)
MYELOCYTES % (M): 1 % (ref 0–0)
NEUTROPHIL #: 3 10^3/UL (ref 1.6–7.5)
NEUTROPHILS %: 68.6 % (ref 39–77)
NUCLEATED RED BLOOD CELLS #: 0 10^3/UL (ref 0–0)
NUCLEATED RED BLOOD CELLS%: 0 /100WBC (ref 0–0)
PLATELET COUNT: 152 10^3/UL (ref 140–415)
PLATELET ESTIMATE: NORMAL
POIKILOCYTOSIS: (no result) (ref 0–0)
POLYCHROMASIA: (no result) (ref 0–0)
POSITIVE DIFF: (no result)
REACTIVE LYMPHOCYTES #M: 0 10^3/UL (ref 0–0)
REACTIVE LYMPHOCYTES% (M): 1 % (ref 0–0)
RED BLOOD COUNT: 2.44 10^6/UL (ref 4.7–6.1)
RED CELL DISTRIBUTION WIDTH: 16.7 % (ref 11.5–14.5)
SEGMENTED NEUTROPHILS (M) %: 74 % (ref 39–77)
SMUDGE%M: 8 % (ref 0–0)
TEAR DROP CELLS: (no result) (ref 0–0)
WHITE BLOOD COUNT: 4.4 10^3/UL (ref 4.8–10.8)

## 2019-07-31 RX ADMIN — CALCIUM CARBONATE 1 MG: 1250 SUSPENSION ORAL at 09:14

## 2019-07-31 RX ADMIN — LORAZEPAM 1 MG: 2 INJECTION, SOLUTION INTRAMUSCULAR; INTRAVENOUS at 22:06

## 2019-07-31 RX ADMIN — METHADONE HYDROCHLORIDE 1 MG: 5 SOLUTION ORAL at 19:05

## 2019-07-31 RX ADMIN — BICALUTAMIDE SCH MG: 50 TABLET, FILM COATED ORAL at 09:16

## 2019-07-31 RX ADMIN — CALCIUM CARBONATE SCH MG: 1250 SUSPENSION ORAL at 09:14

## 2019-07-31 RX ADMIN — CALCIUM CARBONATE SCH MG: 1250 SUSPENSION ORAL at 21:08

## 2019-07-31 RX ADMIN — METHADONE HYDROCHLORIDE 1 MG: 5 SOLUTION ORAL at 05:41

## 2019-07-31 RX ADMIN — PANTOPRAZOLE SODIUM 1 MG: 40 TABLET, DELAYED RELEASE ORAL at 05:40

## 2019-07-31 RX ADMIN — METHADONE HYDROCHLORIDE 1 MG: 5 SOLUTION ORAL at 23:43

## 2019-07-31 RX ADMIN — TAMSULOSIN HYDROCHLORIDE SCH MG: 0.4 CAPSULE ORAL at 21:08

## 2019-07-31 RX ADMIN — HYDROMORPHONE HYDROCHLORIDE 1 MG: 2 INJECTION, SOLUTION INTRAMUSCULAR; INTRAVENOUS; SUBCUTANEOUS at 01:37

## 2019-07-31 RX ADMIN — CALCIUM CARBONATE 1 MG: 1250 SUSPENSION ORAL at 21:08

## 2019-07-31 RX ADMIN — HYDROMORPHONE HYDROCHLORIDE PRN MG: 2 INJECTION, SOLUTION INTRAMUSCULAR; INTRAVENOUS; SUBCUTANEOUS at 16:30

## 2019-07-31 RX ADMIN — HYDROMORPHONE HYDROCHLORIDE PRN MG: 2 INJECTION, SOLUTION INTRAMUSCULAR; INTRAVENOUS; SUBCUTANEOUS at 10:58

## 2019-07-31 RX ADMIN — HYDROMORPHONE HYDROCHLORIDE 1 MG: 2 INJECTION, SOLUTION INTRAMUSCULAR; INTRAVENOUS; SUBCUTANEOUS at 16:30

## 2019-07-31 RX ADMIN — CEFTRIAXONE SCH MLS/HR: 1 INJECTION, SOLUTION INTRAVENOUS at 16:30

## 2019-07-31 RX ADMIN — METHADONE HYDROCHLORIDE SCH MG: 5 SOLUTION ORAL at 23:43

## 2019-07-31 RX ADMIN — LEUPROLIDE ACETATE 1 MG: KIT at 13:36

## 2019-07-31 RX ADMIN — METHADONE HYDROCHLORIDE SCH MG: 5 SOLUTION ORAL at 19:05

## 2019-07-31 RX ADMIN — HYDROMORPHONE HYDROCHLORIDE PRN MG: 2 INJECTION, SOLUTION INTRAMUSCULAR; INTRAVENOUS; SUBCUTANEOUS at 01:37

## 2019-07-31 RX ADMIN — TAMSULOSIN HYDROCHLORIDE 1 MG: 0.4 CAPSULE ORAL at 21:08

## 2019-07-31 RX ADMIN — CEFTRIAXONE 1 MLS/HR: 1 INJECTION, SOLUTION INTRAVENOUS at 16:30

## 2019-07-31 RX ADMIN — BICALUTAMIDE 1 MG: 50 TABLET, FILM COATED ORAL at 09:16

## 2019-07-31 RX ADMIN — METHADONE HYDROCHLORIDE SCH MG: 5 SOLUTION ORAL at 05:41

## 2019-07-31 RX ADMIN — LORAZEPAM PRN MG: 2 INJECTION, SOLUTION INTRAMUSCULAR; INTRAVENOUS at 22:06

## 2019-07-31 RX ADMIN — METHADONE HYDROCHLORIDE SCH MG: 5 SOLUTION ORAL at 12:49

## 2019-07-31 RX ADMIN — METHADONE HYDROCHLORIDE 1 MG: 5 SOLUTION ORAL at 12:49

## 2019-07-31 RX ADMIN — PANTOPRAZOLE SODIUM SCH MG: 40 TABLET, DELAYED RELEASE ORAL at 05:40

## 2019-07-31 RX ADMIN — HYDROMORPHONE HYDROCHLORIDE 1 MG: 2 INJECTION, SOLUTION INTRAMUSCULAR; INTRAVENOUS; SUBCUTANEOUS at 10:58

## 2019-07-31 NOTE — PN
Date/Time of Note


Date/Time of Note


DATE: 7/31/19 


TIME: 14:07





Assessment/Plan


VTE Prophylaxis


Risk score (from Nsg)>0 risk:  6


SCD applied (from Ns):  Yes


Pharmacological prophylaxis:  NA/contraindicated


Pharm contraindication:  low risk/ambulating





Lines/Catheters


IV Catheter Type (from [facility]):  Saline Lock


Urinary Cath still in place:  Yes


Reason Cath still needed:  urinary retention





Assessment/Plan


Assessment/Plan


1 Recurrent falls, failure to thrive, weight loss, generalized body aches, 


likely secondary to metastatic prostate cancer.  The patient was here in January 2019.  The patient was seen by multiple consultants including urology and 


oncology.  The patient had a prostate specific antigen over 1000.  The patient 


was deemed that he had capacity to make decisions.  The patient was offered 


nursing home; however, he left and went home.  He never followed up with any 


appointments and likely has progression of disease.


Likely has metastatic prostate cancer now on casodex and lupron


2.  History of falls with subdural hematomas.


3.  Severe anemia.


4.  Pancytopenia with a history of hepatitis C.


5.  Alcoholism.


6.  History of smoking.


7.  Elevated alkaline phosphatase could be secondary to bony metastases.  


Metastatic disease. He is on Casodex


8.  Edema with hypoalbuminemia.


9.  Likely urinary tract infection.


10. cachexia. Malnourishment


11.Chronic pain and anxiety


Assessment/Plan


-Status post Lupron today, CW CASODEX per urology with cold sore


-Repeat hemoglobin was 7.2.  Hold off of blood thinners due to severe anemia, 


SCDs were put put on however patient had been refusing it


-Talk to the brother who wanted all the treatment to be pursued for for 


however when I spoke to him and told him that if he is refusing treatments then 


want to do, he said to offer him the treatment and if he refuses then he refuses


and then in that case would wait for him to come on August 6, 


-Follow-up with Dr. Potts  and Dr. guy recommendations


-c/w methadone/dilaudid/toradol for pain fu dr pyle


- now has chan


- cw iv abx fo UTI


- Nutrition


- montior HB 7> 7.8>7.2 TODAY


- social service consult


- scd





It is very challenging case.  Patient apparently was supposed to sign hospice 


but did not sign it when spoke to the brother had spoken to the brother and he 


wanted full treatment as if of yesterday.  Patient is in pain and is currently 


on oral Casodex methadone Lupron was give .  In case the family decided not to 


be on hospital and patient goes to nursing home we have to check with the nursi


ng home if they takes case Casodex and methadone, checked with the SNF they do 


not take Casodex and methadone per the  Rudy


Result Diagram:  


7/31/19 0654                                                                    


           7/29/19 0441





Results 24hrs





Laboratory Tests


       Test
                                7/31/19
05:08  7/31/19
06:54


       White Blood Count                           4.4  L


       Red Blood Count                            2.44  L


       Hemoglobin                                 6.5  *L         7.2  L


       Hematocrit                                 20.8  L        23.3  L


       Mean Corpuscular Volume                     85.2


       Mean Corpuscular Hemoglobin                26.6  L


       Mean Corpuscular Hemoglobin
Concent       31.3  L
  



       Red Cell Distribution Width                16.7  H


       Platelet Count                               152


       Mean Platelet Volume                         9.6


       Immature Granulocytes %                   2.700  H


       Neutrophils %                               68.6


       Segmented Neutrophils %
(Manual)             74  
  



       Lymphocytes %                               19.9


       Lymphocytes % (Manual)                        17


       Reactive Lymphocytes %
(Manual)              1  H
  



       Monocytes %                                  7.4


       Monocytes % (Manual)                           5


       Eosinophils %                                0.9


       Basophils %                                  0.5


       Basophils % (Manual)                           1


       Metamyelocytes % (manual)                     1  H


       Myelocytes % (Manual)                         1  H


       Nucleated Red Blood Cells %                  0.0


       Immature Granulocytes #                   0.120  H


       Neutrophils #                                3.0


       Lymphocytes (Manual)                        0.7  L


       Lymphocytes #                                0.9


       Reactive Lymphocytes #                       0.0


       Monocytes #                                  0.3


       Monocytes # (Manual)                        0.2  L


       Eosinophils #                                0.0


       Basophils #                                  0.0


       Basophils # (Manual)                         0.0


       Metamyelocytes #                             0.0


       Myelocytes #                                 0.0


       Nucleated Red Blood Cells #                  0.0


       Platelet Estimate                    NORMAL


       Polychromasia                                 3+


       Poikilocytosis                                2+


       Anisocytosis                                  2+


       Microcytosis                                  2+


       Tear Drop Cells                               1+


       Elliptocytes                                  1+








Subjective


24 Hr Interval Summary


Free Text/Dictation


And is still complaining of pain.


Hemoglobin was 6.5 but the repeat was 7.2 so 6.5 was in error





Exam/Review of Systems


Exam


Vitals





Vital Signs


  Date      Temp  Pulse  Resp  B/P (MAP)   Pulse Ox  O2          O2 Flow    FiO2


Time                                                 Delivery    Rate


   7/31/19  97.8     92    18      110/73        97  Room Air


     07:54                           (85)








Intake and Output





7/30/19 7/30/19 7/31/19





1515:00


23:00


07:00





IntakeIntake Total


740 ml


440 ml


350 ml





OutputOutput Total


350 ml


450 ml





BalanceBalance


740 ml


90 ml


-100 ml











Exam


No acute distress, no events overnight.


Eyes: anicteric, EOM's intact, no pallor


Nose: no rhinorrhea


Neck: supple, no thyromegaly, no carotid bruits


Lungs: clear bilaterally, decreased.


CVS: regular rate and rhythm, no murmurs


Abdomen: soft, bowel sounds present, scaphoid.


Rectal: differed.


External genitalia: no lesions.


Extremities: no edema, DP pulses are palpable


Neuro: alert and oriented x 3


Gait: unable to assess


Motor strenght:weak


Sensory exam: normal


Deep tendon reflexes: normal, Babisky reflexes are absent bilaterally





Results


Results 24hrs





Laboratory Tests


       Test
                                7/31/19
05:08  7/31/19
06:54


       White Blood Count                           4.4  L


       Red Blood Count                            2.44  L


       Hemoglobin                                 6.5  *L         7.2  L


       Hematocrit                                 20.8  L        23.3  L


       Mean Corpuscular Volume                     85.2


       Mean Corpuscular Hemoglobin                26.6  L


       Mean Corpuscular Hemoglobin
Concent       31.3  L
  



       Red Cell Distribution Width                16.7  H


       Platelet Count                               152


       Mean Platelet Volume                         9.6


       Immature Granulocytes %                   2.700  H


       Neutrophils %                               68.6


       Segmented Neutrophils %
(Manual)             74  
  



       Lymphocytes %                               19.9


       Lymphocytes % (Manual)                        17


       Reactive Lymphocytes %
(Manual)              1  H
  



       Monocytes %                                  7.4


       Monocytes % (Manual)                           5


       Eosinophils %                                0.9


       Basophils %                                  0.5


       Basophils % (Manual)                           1


       Metamyelocytes % (manual)                     1  H


       Myelocytes % (Manual)                         1  H


       Nucleated Red Blood Cells %                  0.0


       Immature Granulocytes #                   0.120  H


       Neutrophils #                                3.0


       Lymphocytes (Manual)                        0.7  L


       Lymphocytes #                                0.9


       Reactive Lymphocytes #                       0.0


       Monocytes #                                  0.3


       Monocytes # (Manual)                        0.2  L


       Eosinophils #                                0.0


       Basophils #                                  0.0


       Basophils # (Manual)                         0.0


       Metamyelocytes #                             0.0


       Myelocytes #                                 0.0


       Nucleated Red Blood Cells #                  0.0


       Platelet Estimate                    NORMAL


       Polychromasia                                 3+


       Poikilocytosis                                2+


       Anisocytosis                                  2+


       Microcytosis                                  2+


       Tear Drop Cells                               1+


       Elliptocytes                                  1+








Medications


Medication





Current Medications


Pantoprazole (Protonix Tab) 40 mg DAILY@06 PO  Last administered on 7/31/19at 


05:40; Admin Dose 40 MG;  Start 7/25/19 at 06:00


Albuterol/ Ipratropium (Duoneb) 3 ml Q6H RESP THERAPY  PRN HHN SHORTNESS OF 


BREATH;  Start 7/25/19 at 06:00


Acetaminophen (Tylenol Tab) 650 mg Q6H  PRN PO MILD PAIN(1-3)OR ELEVATED TEMP 


Last administered on 7/30/19at 15:29; Admin Dose 650 MG;  Start 7/25/19 at 06:00


Bisacodyl (Dulcolax) 10 mg DAILY  PRN PO CONSTIPATION;  Start 7/25/19 at 06:00


Miscellaneous Information (Pending Santyl Order For Wound Care) This patient 


ha... PRN  PRN XX WOUND CARE;  Start 7/25/19 at 06:00


Calcium Carbonate (Ca Carbonate) 1,250 mg BID PO  Last administered on 7/31/19at


09:14; Admin Dose 1,250 MG;  Start 7/25/19 at 21:00


Ceftriaxone Sodium 50 ml @  100 mls/hr Q24H IVPB  Last administered on 7/30/19at


15:30; Admin Dose 100 MLS/HR;  Start 7/25/19 at 16:00


Bicalutamide (Casodex) 50 mg DAILY PO  Last administered on 7/31/19at 09:16; 


Admin Dose 50 MG;  Start 7/26/19 at 09:00


Hydromorphone HCl (Dilaudid) 1.5 mg Q4H  PRN IV SEVERE PAIN LEVEL 7-10 Last 


administered on 7/31/19at 10:58; Admin Dose 1.5 MG;  Start 7/26/19 at 15:30


Lorazepam (Ativan) 0.5 mg Q6H  PRN IV ANXIETY Last administered on 7/30/19at 


23:42; Admin Dose 0.5 MG;  Start 7/26/19 at 19:00


Methadone HCl (Methadone Liq) 2 mg Q6 PO  Last administered on 7/31/19at 12:49; 


Admin Dose 2 MG;  Start 7/27/19 at 23:00


Tamsulosin HCl (Flomax) 0.4 mg HS PO  Last administered on 7/30/19at 20:03; 


Admin Dose 0.4 MG;  Start 7/27/19 at 21:00


Leuprolide Acetate 7.5 mg ONCE SC  Last administered on 7/31/19at 13:36; Admin 


Dose 7.5 MG;  Start 7/31/19 at 15:00;  Stop 7/31/19 at 19:00


Ciprofloxacin (Cipro) 250 mg BID@06,18 PO ;  Start 7/31/19 at 18:00











TOMAS BRICE MD              Jul 31, 2019 14:10

## 2019-07-31 NOTE — CONS
Consult Date/Type/Reason


Admit Date/Time


Jul 25, 2019 at 05:11


Initial Consult Date


7/26/19


Type of Consultation:  Urology


Reason for Consultation


Metastatic prostate cancer


Requesting Provider:  ANKIT MONAE MD


Date/Time of Note


DATE: 7/31/19 


TIME: 13:51





Subjective


Patient complains of pain all over his body





Objective


Vitals





Vital Signs


  Date      Temp  Pulse  Resp  B/P (MAP)   Pulse Ox  O2          O2 Flow    FiO2


Time                                                 Delivery    Rate


   7/31/19  97.8     92    18      110/73        97  Room Air


     07:54                           (85)








Intake and Output





7/30/19 7/30/19 7/31/19





1515:00


23:00


07:00





IntakeIntake Total


740 ml


440 ml


350 ml





OutputOutput Total


350 ml


450 ml





BalanceBalance


740 ml


90 ml


-100 ml











Exam


Patient is cachectic.  White catheter is draining clear urine


Urine culture showed:


  ENTEROBACTER CLOACAE


        COLONY COUNT                10,000 - 20,000 CFU/ml





                                    E CLOACAE        


                                    M.I.C.    RX     


                                    --------- ---    


     CEFOTAXIME                                S     


     CIPROFLOXACIN                  1          S     


     GENTAMICIN                     <=1        S     


     LEVOFLOXACIN                   1          S     


     NITROFURANTOIN                 <=16       S     


     TOBRAMYCIN                     <=1        S     


     TRIMETHOPRIM/SULFAMETHOXAZOLE  >=320      R





Results/Medications


Result Diagram:  


7/31/19 0654                                                                    


           7/29/19 0441





Results 24 hrs





Laboratory Tests


       Test
                                7/31/19
05:08  7/31/19
06:54


       White Blood Count                           4.4  L


       Red Blood Count                            2.44  L


       Hemoglobin                                 6.5  *L         7.2  L


       Hematocrit                                 20.8  L        23.3  L


       Mean Corpuscular Volume                     85.2


       Mean Corpuscular Hemoglobin                26.6  L


       Mean Corpuscular Hemoglobin
Concent       31.3  L
  



       Red Cell Distribution Width                16.7  H


       Platelet Count                               152


       Mean Platelet Volume                         9.6


       Immature Granulocytes %                   2.700  H


       Neutrophils %                               68.6


       Segmented Neutrophils %
(Manual)             74  
  



       Lymphocytes %                               19.9


       Lymphocytes % (Manual)                        17


       Reactive Lymphocytes %
(Manual)              1  H
  



       Monocytes %                                  7.4


       Monocytes % (Manual)                           5


       Eosinophils %                                0.9


       Basophils %                                  0.5


       Basophils % (Manual)                           1


       Metamyelocytes % (manual)                     1  H


       Myelocytes % (Manual)                         1  H


       Nucleated Red Blood Cells %                  0.0


       Immature Granulocytes #                   0.120  H


       Neutrophils #                                3.0


       Lymphocytes (Manual)                        0.7  L


       Lymphocytes #                                0.9


       Reactive Lymphocytes #                       0.0


       Monocytes #                                  0.3


       Monocytes # (Manual)                        0.2  L


       Eosinophils #                                0.0


       Basophils #                                  0.0


       Basophils # (Manual)                         0.0


       Metamyelocytes #                             0.0


       Myelocytes #                                 0.0


       Nucleated Red Blood Cells #                  0.0


       Platelet Estimate                    NORMAL


       Polychromasia                                 3+


       Poikilocytosis                                2+


       Anisocytosis                                  2+


       Microcytosis                                  2+


       Tear Drop Cells                               1+


       Elliptocytes                                  1+





Home Meds


No Active Prescriptions or Reported Meds


Medications





Current Medications


Pantoprazole (Protonix Tab) 40 mg DAILY@06 PO  Last administered on 7/31/19at 


05:40; Admin Dose 40 MG;  Start 7/25/19 at 06:00


Albuterol/ Ipratropium (Duoneb) 3 ml Q6H RESP THERAPY  PRN HHN SHORTNESS OF 


BREATH;  Start 7/25/19 at 06:00


Acetaminophen (Tylenol Tab) 650 mg Q6H  PRN PO MILD PAIN(1-3)OR ELEVATED TEMP 


Last administered on 7/30/19at 15:29; Admin Dose 650 MG;  Start 7/25/19 at 06:00


Bisacodyl (Dulcolax) 10 mg DAILY  PRN PO CONSTIPATION;  Start 7/25/19 at 06:00


Miscellaneous Information (Pending [facility] Order For Wound Care) This patient 


ha... PRN  PRN XX WOUND CARE;  Start 7/25/19 at 06:00


Calcium Carbonate (Ca Carbonate) 1,250 mg BID PO  Last administered on 7/31/19at


09:14; Admin Dose 1,250 MG;  Start 7/25/19 at 21:00


Ceftriaxone Sodium 50 ml @  100 mls/hr Q24H IVPB  Last administered on 7/30/19at


15:30; Admin Dose 100 MLS/HR;  Start 7/25/19 at 16:00


Bicalutamide (Casodex) 50 mg DAILY PO  Last administered on 7/31/19at 09:16; 


Admin Dose 50 MG;  Start 7/26/19 at 09:00


Hydromorphone HCl (Dilaudid) 1.5 mg Q4H  PRN IV SEVERE PAIN LEVEL 7-10 Last 


administered on 7/31/19at 10:58; Admin Dose 1.5 MG;  Start 7/26/19 at 15:30


Lorazepam (Ativan) 0.5 mg Q6H  PRN IV ANXIETY Last administered on 7/30/19at 


23:42; Admin Dose 0.5 MG;  Start 7/26/19 at 19:00


Methadone HCl (Methadone Liq) 2 mg Q6 PO  Last administered on 7/31/19at 12:49; 


Admin Dose 2 MG;  Start 7/27/19 at 23:00


Tamsulosin HCl (Flomax) 0.4 mg HS PO  Last administered on 7/30/19at 20:03; 


Admin Dose 0.4 MG;  Start 7/27/19 at 21:00


Leuprolide Acetate 7.5 mg ONCE SC  Last administered on 7/31/19at 13:36; Admin 


Dose 7.5 MG;  Start 7/31/19 at 15:00;  Stop 7/31/19 at 19:00





Assessment/Plan


Hospital Course (Demo Recall)


69-year-old -American male was admitted here in January 2019.  At that 


time his PSA was over at thousand and patient was assumed to have prostate 


cancer he was started on bicalutamide and at the time of discharge he was 


instructed to follow-up in the office for biopsy of the prostate to confirm the 


diagnosis of prostate cancer.  However the patient did not follow up with any 


doctor and he went to [facility] because of increased back pain


and weakness in addition to lower extremity pain and was transferred to [facility].  Patient underwent CT of the cervical thoracic and lumbar


spine and was found to have metastatic disease.  Patient states he does urinate 


5-6 times at night and same during the day.  He voids a small amount.  He denies


any gross hematuria.


Presently the patient is very weak and the CT scan of the abdomen and pelvis 


showed:


1.  Diffuse metastasis to the liver as described above. Recommend contrast CT or


MR to further evaluate.


2.  Pathologic lymphadenopathy is noted in the retroperitoneum, the 


gastrohepatic gastrolienal and the portal triad with retroperitoneal 


lymphadenopathy noted at the aortic bifurcation. Incomplete evaluation without 


contrast on this study is noted.


3.  Anterior mid abdominal mass measuring 5.8 cm transverse by 3.2 cm in AP 


dimensions.


4.  Bilateral renal cortical cysts with persistent mild left hydroureter 


nephrosis


5. Diffuse blastic and lytic skeletal metastasis and recommend MRI with contrast


of the spine to evaluate for epidural extension is clinically indicated.


6.  Mild abdominal and pelvic ascites


7.  Mild gallbladder wall calcifications





The patient is on bicalutamide and pain medications.  His cancer is very 


advanced.  Consideration have been made for nursing home and or hospice.  The 


White catheter is draining clear urine and urine culture did grew:


 


  ENTEROBACTER CLOACAE


        COLONY COUNT                10,000 - 20,000 CFU/ml





                                    E CLOACAE        


                                    M.I.C.    RX     


                                    --------- ---    


     CEFOTAXIME                                S     


     CIPROFLOXACIN                  1          S     


     GENTAMICIN                     <=1        S     


     LEVOFLOXACIN                   1          S     


     NITROFURANTOIN                 <=16       S     


     TOBRAMYCIN                     <=1        S     


     TRIMETHOPRIM/SULFAMETHOXAZOLE  >=320      R     


I will put him on Cipro 250 mg twice a day.





Also I gave him 7.5 mg leuprolide acetate injection into his right arm area.











HAILEE CLIFTON MD            Jul 31, 2019 13:55

## 2019-08-01 VITALS — DIASTOLIC BLOOD PRESSURE: 70 MMHG | RESPIRATION RATE: 18 BRPM | SYSTOLIC BLOOD PRESSURE: 116 MMHG | HEART RATE: 80 BPM

## 2019-08-01 VITALS — SYSTOLIC BLOOD PRESSURE: 122 MMHG | DIASTOLIC BLOOD PRESSURE: 78 MMHG | RESPIRATION RATE: 20 BRPM | HEART RATE: 94 BPM

## 2019-08-01 VITALS — DIASTOLIC BLOOD PRESSURE: 66 MMHG | HEART RATE: 102 BPM | SYSTOLIC BLOOD PRESSURE: 110 MMHG | RESPIRATION RATE: 18 BRPM

## 2019-08-01 VITALS — SYSTOLIC BLOOD PRESSURE: 111 MMHG | HEART RATE: 68 BPM | RESPIRATION RATE: 18 BRPM | DIASTOLIC BLOOD PRESSURE: 66 MMHG

## 2019-08-01 LAB
ADD MAN DIFF?: NO
BASOPHIL #: 0 10^3/UL (ref 0–0.1)
BASOPHILS %: 0.2 % (ref 0–2)
EOSINOPHILS #: 0 10^3/UL (ref 0–0.5)
EOSINOPHILS %: 0.4 % (ref 0–7)
HEMATOCRIT: 24.4 % (ref 42–52)
HEMOGLOBIN: 7.3 G/DL (ref 14–18)
IMMATURE GRANS #M: 0.18 10^3/UL (ref 0–0.03)
IMMATURE GRANS % (M): 3.9 % (ref 0–0.43)
LYMPHOCYTES #: 0.9 10^3/UL (ref 0.8–2.9)
LYMPHOCYTES %: 19.5 % (ref 15–51)
MEAN CORPUSCULAR HEMOGLOBIN: 26.1 PG (ref 29–33)
MEAN CORPUSCULAR HGB CONC: 29.9 G/DL (ref 32–37)
MEAN CORPUSCULAR VOLUME: 87.1 FL (ref 82–101)
MEAN PLATELET VOLUME: 9.7 FL (ref 7.4–10.4)
MONOCYTE #: 0.3 10^3/UL (ref 0.3–0.9)
MONOCYTES %: 7 % (ref 0–11)
NEUTROPHIL #: 3.2 10^3/UL (ref 1.6–7.5)
NEUTROPHILS %: 69 % (ref 39–77)
NUCLEATED RED BLOOD CELLS #: 0 10^3/UL (ref 0–0)
NUCLEATED RED BLOOD CELLS%: 0 /100WBC (ref 0–0)
PLATELET COUNT: 171 10^3/UL (ref 140–415)
RED BLOOD COUNT: 2.8 10^6/UL (ref 4.7–6.1)
RED CELL DISTRIBUTION WIDTH: 16.8 % (ref 11.5–14.5)
WHITE BLOOD COUNT: 4.6 10^3/UL (ref 4.8–10.8)

## 2019-08-01 RX ADMIN — CALCIUM CARBONATE SCH MG: 1250 SUSPENSION ORAL at 21:23

## 2019-08-01 RX ADMIN — ACETAMINOPHEN 1 MG: 325 TABLET, FILM COATED ORAL at 22:54

## 2019-08-01 RX ADMIN — HYDROMORPHONE HYDROCHLORIDE 1 MG: 2 TABLET ORAL at 13:03

## 2019-08-01 RX ADMIN — HYDROMORPHONE HYDROCHLORIDE PRN MG: 2 TABLET ORAL at 19:56

## 2019-08-01 RX ADMIN — CALCIUM CARBONATE SCH MG: 1250 SUSPENSION ORAL at 08:02

## 2019-08-01 RX ADMIN — METHADONE HYDROCHLORIDE SCH MG: 5 SOLUTION ORAL at 05:26

## 2019-08-01 RX ADMIN — BICALUTAMIDE 1 MG: 50 TABLET, FILM COATED ORAL at 08:03

## 2019-08-01 RX ADMIN — TAMSULOSIN HYDROCHLORIDE SCH MG: 0.4 CAPSULE ORAL at 21:20

## 2019-08-01 RX ADMIN — PANTOPRAZOLE SODIUM 1 MG: 40 TABLET, DELAYED RELEASE ORAL at 05:25

## 2019-08-01 RX ADMIN — CEFTRIAXONE 1 MLS/HR: 1 INJECTION, SOLUTION INTRAVENOUS at 16:00

## 2019-08-01 RX ADMIN — CALCIUM CARBONATE 1 MG: 1250 SUSPENSION ORAL at 08:02

## 2019-08-01 RX ADMIN — LORAZEPAM 1 MG: 2 INJECTION, SOLUTION INTRAMUSCULAR; INTRAVENOUS at 09:57

## 2019-08-01 RX ADMIN — HYDROMORPHONE HYDROCHLORIDE 1 MG: 2 TABLET ORAL at 19:56

## 2019-08-01 RX ADMIN — CIPROFLOXACIN HYDROCHLORIDE SCH MG: 500 TABLET, FILM COATED ORAL at 18:00

## 2019-08-01 RX ADMIN — HYDROMORPHONE HYDROCHLORIDE 1 MG: 2 INJECTION, SOLUTION INTRAMUSCULAR; INTRAVENOUS; SUBCUTANEOUS at 04:11

## 2019-08-01 RX ADMIN — BICALUTAMIDE SCH MG: 50 TABLET, FILM COATED ORAL at 08:03

## 2019-08-01 RX ADMIN — HYDROMORPHONE HYDROCHLORIDE PRN MG: 2 TABLET ORAL at 23:49

## 2019-08-01 RX ADMIN — MORPHINE SULFATE 1 MG: 15 TABLET, EXTENDED RELEASE ORAL at 08:02

## 2019-08-01 RX ADMIN — HYDROMORPHONE HYDROCHLORIDE PRN MG: 2 TABLET ORAL at 13:03

## 2019-08-01 RX ADMIN — CIPROFLOXACIN HYDROCHLORIDE 1 MG: 500 TABLET, FILM COATED ORAL at 18:00

## 2019-08-01 RX ADMIN — MORPHINE SULFATE 1 MG: 15 TABLET, EXTENDED RELEASE ORAL at 21:21

## 2019-08-01 RX ADMIN — METHADONE HYDROCHLORIDE 1 MG: 5 SOLUTION ORAL at 05:26

## 2019-08-01 RX ADMIN — CALCIUM CARBONATE 1 MG: 1250 SUSPENSION ORAL at 21:23

## 2019-08-01 RX ADMIN — HYDROMORPHONE HYDROCHLORIDE 1 MG: 2 TABLET ORAL at 23:49

## 2019-08-01 RX ADMIN — MORPHINE SULFATE SCH MG: 15 TABLET, EXTENDED RELEASE ORAL at 08:02

## 2019-08-01 RX ADMIN — MORPHINE SULFATE SCH MG: 15 TABLET, EXTENDED RELEASE ORAL at 21:21

## 2019-08-01 RX ADMIN — PANTOPRAZOLE SODIUM SCH MG: 40 TABLET, DELAYED RELEASE ORAL at 05:25

## 2019-08-01 RX ADMIN — TAMSULOSIN HYDROCHLORIDE 1 MG: 0.4 CAPSULE ORAL at 21:20

## 2019-08-01 RX ADMIN — CEFTRIAXONE SCH MLS/HR: 1 INJECTION, SOLUTION INTRAVENOUS at 16:00

## 2019-08-01 RX ADMIN — HYDROMORPHONE HYDROCHLORIDE PRN MG: 2 INJECTION, SOLUTION INTRAMUSCULAR; INTRAVENOUS; SUBCUTANEOUS at 04:11

## 2019-08-01 RX ADMIN — LORAZEPAM PRN MG: 2 INJECTION, SOLUTION INTRAMUSCULAR; INTRAVENOUS at 09:57

## 2019-08-01 NOTE — CONS
Assessment/Plan


Assessment/Plan


Assessment/Plan (Daily)


Metastatic prostate cancer 


Metastasis to bones


excruciating pain secondary to the above


Lumbosacral spine pain secondary to metastasis


Noncompliant with prior recommendations for treatment


Multiple falls at home secondary to weakness weight loss


Failure to thrive


Malnutrition


Hypertension





I did not hear back from case management yesterday.  Reviewed notes from Dr. Figueredo, [facility] nursing facility will not accept patient on Casodex or 


methadone.  This is unfortunate because methadone is the best drug for 


metastatic disease with bony metastasis.  I have made changes to his pain 


control and discontinue the methadone and added on MS Contin 15 mg twice daily 


continue with the PRN doses of Dilaudid but change to p.o.. Pain is controlled.





Consultation Date/Type/Reason


Admit Date/Time


Jul 25, 2019 at 05:11


Initial Consult Date


7/26/19


Requesting Provider:  ANKIT MONAE MD


Date/Time of Note


DATE: 8/1/19 


TIME: 07:40





Exam/Review of Systems


Exam


Vitals





Vital Signs


  Date      Temp  Pulse  Resp  B/P (MAP)   Pulse Ox  O2          O2 Flow    FiO2


Time                                                 Delivery    Rate


    8/1/19  97.8    102    18      110/66        98


     02:30                           (81)


   7/31/19                                           Room Air


     14:00








Intake and Output





7/31/19 7/31/19 8/1/19





1515:00


23:00


07:00





IntakeIntake Total


730 ml


320 ml





OutputOutput Total


650 ml


700 ml





BalanceBalance


80 ml


-380 ml











Constitutional:  alert, oriented, well developed


Psych:  confusion


Neck:  supple, non-tender


Neurological:  CNS II-XII intact, nl mental status, nl speech, nl strength; 


   No confused, No DTR's symmetric, No focal weakness, No lethargic, No n


umbness, No reflexes, No unresponsive, No other





Results


Result Diagram:  


8/1/19 0441                                                                     


          7/29/19 0441





Results 24hrs





Laboratory Tests


               Test
                                8/1/19
04:41


               White Blood Count                          4.6  L


               Red Blood Count                           2.80  L


               Hemoglobin                                 7.3  L


               Hematocrit                                24.4  L


               Mean Corpuscular Volume                    87.1


               Mean Corpuscular Hemoglobin               26.1  L


               Mean Corpuscular Hemoglobin
Concent      29.9  L



               Red Cell Distribution Width               16.8  H


               Platelet Count                              171


               Mean Platelet Volume                        9.7


               Immature Granulocytes %                  3.900  H


               Neutrophils %                              69.0


               Lymphocytes %                              19.5


               Monocytes %                                 7.0


               Eosinophils %                               0.4


               Basophils %                                 0.2


               Nucleated Red Blood Cells %                 0.0


               Immature Granulocytes #                  0.180  H


               Neutrophils #                               3.2


               Lymphocytes #                               0.9


               Monocytes #                                 0.3


               Eosinophils #                               0.0


               Basophils #                                 0.0


               Nucleated Red Blood Cells #                 0.0








Medications


Medication





Current Medications


Pantoprazole (Protonix Tab) 40 mg DAILY@06 PO  Last administered on 8/1/19at 


05:25; Admin Dose 40 MG;  Start 7/25/19 at 06:00


Albuterol/ Ipratropium (Duoneb) 3 ml Q6H RESP THERAPY  PRN HHN SHORTNESS OF 


BREATH;  Start 7/25/19 at 06:00


Acetaminophen (Tylenol Tab) 650 mg Q6H  PRN PO MILD PAIN(1-3)OR ELEVATED TEMP 


Last administered on 7/30/19at 15:29; Admin Dose 650 MG;  Start 7/25/19 at 06:00


Bisacodyl (Dulcolax) 10 mg DAILY  PRN PO CONSTIPATION;  Start 7/25/19 at 06:00


Miscellaneous Information (Pending [facility] Order For Wound Care) This patient 


ha... PRN  PRN XX WOUND CARE;  Start 7/25/19 at 06:00


Calcium Carbonate (Ca Carbonate) 1,250 mg BID PO  Last administered on 7/31/19at


21:08; Admin Dose 1,250 MG;  Start 7/25/19 at 21:00


Ceftriaxone Sodium 50 ml @  100 mls/hr Q24H IVPB  Last administered on 7/31/19at


16:30; Admin Dose 100 MLS/HR;  Start 7/25/19 at 16:00


Bicalutamide (Casodex) 50 mg DAILY PO  Last administered on 7/31/19at 09:16; 


Admin Dose 50 MG;  Start 7/26/19 at 09:00


Hydromorphone HCl (Dilaudid) 1.5 mg Q4H  PRN IV SEVERE PAIN LEVEL 7-10 Last 


administered on 8/1/19at 04:11; Admin Dose 1.5 MG;  Start 7/26/19 at 15:30


Lorazepam (Ativan) 0.5 mg Q6H  PRN IV ANXIETY Last administered on 7/31/19at 


22:06; Admin Dose 0.5 MG;  Start 7/26/19 at 19:00


Tamsulosin HCl (Flomax) 0.4 mg HS PO  Last administered on 7/31/19at 21:08; 


Admin Dose 0.4 MG;  Start 7/27/19 at 21:00


Morphine Sulfate (Ms Contin (Er)) 15 mg BID PO ;  Start 8/1/19 at 09:00











SLOAN CASTRO              Aug 1, 2019 07:44

## 2019-08-01 NOTE — PN
Date/Time of Note


Date/Time of Note


DATE: 8/1/19 


TIME: 18:19





Assessment/Plan


VTE Prophylaxis


Risk score (from Nsg)>0 risk:  4


SCD applied (from Ns):  Yes


Pharmacological prophylaxis:  NA/contraindicated


Pharm contraindication:  low risk/ambulating





Lines/Catheters


IV Catheter Type (from Nrsg):  Saline Lock


Urinary Cath still in place:  Yes


Reason Cath still needed:  urinary retention





Assessment/Plan


Assessment/Plan


1 Recurrent falls, failure to thrive, weight loss, generalized body aches, 


likely secondary to metastatic prostate cancer.  The patient was here in January 2019.  The patient was seen by multiple consultants including urology and 


oncology.  The patient had a prostate specific antigen over 1000.  The patient 


was deemed that he had capacity to make decisions.  The patient was offered 


nursing home; however, he left and went home.  He never followed up with any 


appointments and likely has progression of disease.


Likely has metastatic prostate cancer now on casodex and lupron


2.  History of falls with subdural hematomas.


3.  Severe anemia.


4.  Pancytopenia with a history of hepatitis C.


5.  Alcoholism.


6.  History of smoking.


7.  Elevated alkaline phosphatase could be secondary to bony metastases.  


Metastatic disease. He is on Casodex


8.  Edema with hypoalbuminemia.


9.  Likely urinary tract infection.


10. cachexia. Malnourishment


11.Chronic pain and anxiety


Assessment/Plan


-cw casoden, sp lupron


-Repeat hemoglobin stable   Hold off of blood thinners due to severe anemia, 


SCDs were put put on however patient had been refusing it


- ? capacity> [facility]


-Talk to the brother who wanted all the treatment to be pursued for for 


however when I spoke to him and told him that if he is refusing treatments then 


want to do, he said to offer him the treatment and if he refuses then he refuses


and then in that case would wait for him to come on August 6, 


-Follow-up with Dr. Potts  and Dr. guy recommendations


- switched to MS contin and dilaudid po> will see if pain is controlled


-c/w oradol for pain fu dr pyle


- now has chan


- cw abx for UTI


- Nutrition


- social service consult


- scd





It is very challenging case.  Patient apparently was supposed to sign hospice 


but did not sign it when spoke to the brother had spoken to the brother and he 


wanted full treatment as if of yesterday.  Patient is in pain and is currently 


on oral Casodex methadone Lupron was give .  In case the family decided not to 


be on hospital and patient goes to nursing home we have to check with the 


nursing h, Pt accepted at [facility]  , will see pain is controlled today on current 


regimen


Result Diagram:


Result Diagram:  


8/1/19 0441                                                                     


          7/29/19 0441





Results 24hrs





Laboratory Tests


               Test
                                8/1/19
04:41


               White Blood Count                          4.6  L


               Red Blood Count                           2.80  L


               Hemoglobin                                 7.3  L


               Hematocrit                                24.4  L


               Mean Corpuscular Volume                    87.1


               Mean Corpuscular Hemoglobin               26.1  L


               Mean Corpuscular Hemoglobin
Concent      29.9  L



               Red Cell Distribution Width               16.8  H


               Platelet Count                              171


               Mean Platelet Volume                        9.7


               Immature Granulocytes %                  3.900  H


               Neutrophils %                              69.0


               Lymphocytes %                              19.5


               Monocytes %                                 7.0


               Eosinophils %                               0.4


               Basophils %                                 0.2


               Nucleated Red Blood Cells %                 0.0


               Immature Granulocytes #                  0.180  H


               Neutrophils #                               3.2


               Lymphocytes #                               0.9


               Monocytes #                                 0.3


               Eosinophils #                               0.0


               Basophils #                                 0.0


               Nucleated Red Blood Cells #                 0.0








Subjective


24 Hr Interval Summary


Free Text/Dictation


PT lying corner of bed, taking his clothes off


awake,alert





Exam/Review of Systems


Exam


Vitals





Vital Signs


  Date      Temp  Pulse  Resp  B/P (MAP)   Pulse Ox  O2          O2 Flow    FiO2


Time                                                 Delivery    Rate


    8/1/19  97.8     80    18      116/70        90  Room Air


     15:32                           (85)








Intake and Output





7/31/19 7/31/19 8/1/19





1515:00


23:00


07:00





IntakeIntake Total


730 ml


320 ml





OutputOutput Total


650 ml


700 ml





BalanceBalance


80 ml


-380 ml











Exam


awake,alert turned side of bed


Eyes: anicteric, EOM's intact, no pallor


Nose: no rhinorrhea


Neck: supple, no thyromegaly, no carotid bruits


Lungs: clear bilaterally, decreased.


CVS: regular rate and rhythm, no murmurs


Abdomen: soft, bowel sounds present, scaphoid.


Rectal: differed.


External genitalia: no lesions.


Extremities: no edema, DP pulses are palpable


Neuro: alert and oriented x 3


Gait: unable to assess


Motor strenght:weak


Sensory exam: normal


Deep tendon reflexes: normal, Babisky reflexes are absent bilaterally





Results





Results


Results 24hrs





Laboratory Tests


               Test
                                8/1/19
04:41


               White Blood Count                          4.6  L


               Red Blood Count                           2.80  L


               Hemoglobin                                 7.3  L


               Hematocrit                                24.4  L


               Mean Corpuscular Volume                    87.1


               Mean Corpuscular Hemoglobin               26.1  L


               Mean Corpuscular Hemoglobin
Concent      29.9  L



               Red Cell Distribution Width               16.8  H


               Platelet Count                              171


               Mean Platelet Volume                        9.7


               Immature Granulocytes %                  3.900  H


               Neutrophils %                              69.0


               Lymphocytes %                              19.5


               Monocytes %                                 7.0


               Eosinophils %                               0.4


               Basophils %                                 0.2


               Nucleated Red Blood Cells %                 0.0


               Immature Granulocytes #                  0.180  H


               Neutrophils #                               3.2


               Lymphocytes #                               0.9


               Monocytes #                                 0.3


               Eosinophils #                               0.0


               Basophils #                                 0.0


               Nucleated Red Blood Cells #                 0.0








Medications


Medication





Current Medications


Pantoprazole (Protonix Tab) 40 mg DAILY@06 PO  Last administered on 8/1/19at 


05:25; Admin Dose 40 MG;  Start 7/25/19 at 06:00


Albuterol/ Ipratropium (Duoneb) 3 ml Q6H RESP THERAPY  PRN HHN SHORTNESS OF 


BREATH;  Start 7/25/19 at 06:00


Acetaminophen (Tylenol Tab) 650 mg Q6H  PRN PO MILD PAIN(1-3)OR ELEVATED TEMP 


Last administered on 7/30/19at 15:29; Admin Dose 650 MG;  Start 7/25/19 at 06:00


Bisacodyl (Dulcolax) 10 mg DAILY  PRN PO CONSTIPATION;  Start 7/25/19 at 06:00


Miscellaneous Information (Pending [facility] Order For Wound Care) This patient 


ha... PRN  PRN XX WOUND CARE;  Start 7/25/19 at 06:00


Calcium Carbonate (Ca Carbonate) 1,250 mg BID PO  Last administered on 8/1/19at 


08:02; Admin Dose 1,250 MG;  Start 7/25/19 at 21:00


Bicalutamide (Casodex) 50 mg DAILY PO  Last administered on 8/1/19at 08:03; 


Admin Dose 50 MG;  Start 7/26/19 at 09:00


Lorazepam (Ativan) 0.5 mg Q6H  PRN IV ANXIETY Last administered on 8/1/19at 


09:57; Admin Dose 0.5 MG;  Start 7/26/19 at 19:00


Tamsulosin HCl (Flomax) 0.4 mg HS PO  Last administered on 7/31/19at 21:08; 


Admin Dose 0.4 MG;  Start 7/27/19 at 21:00


Morphine Sulfate (Ms Contin (Er)) 15 mg BID PO  Last administered on 8/1/19at 


08:02; Admin Dose 15 MG;  Start 8/1/19 at 09:00


Hydromorphone HCl (Dilaudid) 2 mg Q4H  PRN PO SEVERE PAIN LEVEL 7-10 Last 


administered on 8/1/19at 13:03; Admin Dose 2 MG;  Start 8/1/19 at 08:00


Ciprofloxacin (Cipro) 500 mg BID@06,18 PO ;  Start 8/1/19 at 18:00











TOMAS BRICE MD               Aug 1, 2019 18:23

## 2019-08-01 NOTE — CONS
Consultation Date/Type/Reason


Admit Date/Time


Jul 25, 2019 at 05:11


Initial Consult Date


7/26/19


Type of Consult


Oncology


Reason for Consultation


Metastatic Prostate Cancer


Requesting Provider:  ANKIT MONAE MD


Date/Time of Note


DATE: 7/31/19 


TIME: 16:00





24 HR Interval Summary


Free Text/Dictation


#Metastatic prostate cancer


-pt has disease diffusely throughout the liver, bones and lymph nodes


-PSA is currently> 600


- Per Dr Mcnulty - pt should have a confirmatory prostate bx but given the high 


likelihood of prostate cancer and heavy disease burden, ideally patient should 


be started on upfront Taxotere chemotherapy with Casodex and Lupron


-This was discussed with the patient who states he needs to "think about" 


starting any type of therapy


-Today Dr. Potts started patient on Lupron and is continuing Casodex daily


-he could also be evaluated by radiation oncology as an out patient for his back


pain as palliative XRT may help this issue


-if he refuses all treatment, he should be referred for hospice eval but at this


time brother is actively involved in making decisions with the patient and heard


that he does not want hospice for the patient. So, I will arrange a meeting with


the brother and the patient to decide on the treatment. 





#Anemia


-Hgb 7.2, standing order is present to transfuse 1 unit for hgb below 7.0


-will continue to monitor





Thank you for the opportunity to participate in this patients care


A total of 40 minutes of face to face time was spent


speaking with the patient, of which greater than 50% was spent in counseling


and coordination of care and the detailed question and answer session.





Seen in collaboration with Dr. Mcnulty.





Exam/Review of Systems


Exam


Vitals





Vital Signs


  Date      Temp  Pulse  Resp  B/P (MAP)   Pulse Ox  O2          O2 Flow    FiO2


Time                                                 Delivery    Rate


    8/1/19  97.8    102    18      110/66        98


     02:30                           (81)


   7/31/19                                           Room Air


     14:00








Intake and Output





7/31/19 7/31/19 8/1/19





1515:00


23:00


07:00





IntakeIntake Total


730 ml


320 ml





OutputOutput Total


650 ml


700 ml





BalanceBalance


80 ml


-380 ml














Results


Result Diagram:  


8/1/19 0441                                                                     


          7/29/19 0441





Results 24hrs





Laboratory Tests


               Test
                                8/1/19
04:41


               White Blood Count                          4.6  L


               Red Blood Count                           2.80  L


               Hemoglobin                                 7.3  L


               Hematocrit                                24.4  L


               Mean Corpuscular Volume                    87.1


               Mean Corpuscular Hemoglobin               26.1  L


               Mean Corpuscular Hemoglobin
Concent      29.9  L



               Red Cell Distribution Width               16.8  H


               Platelet Count                              171


               Mean Platelet Volume                        9.7


               Immature Granulocytes %                  3.900  H


               Neutrophils %                              69.0


               Lymphocytes %                              19.5


               Monocytes %                                 7.0


               Eosinophils %                               0.4


               Basophils %                                 0.2


               Nucleated Red Blood Cells %                 0.0


               Immature Granulocytes #                  0.180  H


               Neutrophils #                               3.2


               Lymphocytes #                               0.9


               Monocytes #                                 0.3


               Eosinophils #                               0.0


               Basophils #                                 0.0


               Nucleated Red Blood Cells #                 0.0








Medications


Medication





Current Medications


Pantoprazole (Protonix Tab) 40 mg DAILY@06 PO  Last administered on 8/1/19at 


05:25; Admin Dose 40 MG;  Start 7/25/19 at 06:00


Albuterol/ Ipratropium (Duoneb) 3 ml Q6H RESP THERAPY  PRN HHN SHORTNESS OF 


BREATH;  Start 7/25/19 at 06:00


Acetaminophen (Tylenol Tab) 650 mg Q6H  PRN PO MILD PAIN(1-3)OR ELEVATED TEMP 


Last administered on 7/30/19at 15:29; Admin Dose 650 MG;  Start 7/25/19 at 06:00


Bisacodyl (Dulcolax) 10 mg DAILY  PRN PO CONSTIPATION;  Start 7/25/19 at 06:00


Miscellaneous Information (Pending Santyl Order For Wound Care) This patient 


ha... PRN  PRN XX WOUND CARE;  Start 7/25/19 at 06:00


Calcium Carbonate (Ca Carbonate) 1,250 mg BID PO  Last administered on 7/31/19at


21:08; Admin Dose 1,250 MG;  Start 7/25/19 at 21:00


Ceftriaxone Sodium 50 ml @  100 mls/hr Q24H IVPB  Last administered on 7/31/19at


16:30; Admin Dose 100 MLS/HR;  Start 7/25/19 at 16:00


Bicalutamide (Casodex) 50 mg DAILY PO  Last administered on 7/31/19at 09:16; 


Admin Dose 50 MG;  Start 7/26/19 at 09:00


Hydromorphone HCl (Dilaudid) 1.5 mg Q4H  PRN IV SEVERE PAIN LEVEL 7-10 Last 


administered on 8/1/19at 04:11; Admin Dose 1.5 MG;  Start 7/26/19 at 15:30


Lorazepam (Ativan) 0.5 mg Q6H  PRN IV ANXIETY Last administered on 7/31/19at 


22:06; Admin Dose 0.5 MG;  Start 7/26/19 at 19:00


Tamsulosin HCl (Flomax) 0.4 mg HS PO  Last administered on 7/31/19at 21:08; 


Admin Dose 0.4 MG;  Start 7/27/19 at 21:00


Morphine Sulfate (Ms Contin (Er)) 15 mg BID PO ;  Start 8/1/19 at 09:00











LISE MITCHELL NP            Aug 1, 2019 07:47

## 2019-08-01 NOTE — CONS
Assessment/Plan


Assessment/Plan


Hospital Course (Demo Recall)


#Metastatic prostate cancer


-pt has disease diffusely throughout the liver, bones and lymph nodes


-PSA is currently> 600


- Per Dr Mcnulty - pt should have a confirmatory prostate bx but given the high 


likelihood of prostate cancer and heavy disease burden, ideally patient should 


be started on upfront Taxotere chemotherapy with Casodex and Lupron. He may also


be a candidate for upfront therapy with Abiraterone which would start as an out 


patient


-This was discussed with the patient who states he needs to "think about" 


starting any type of therapy


-he could also be evaluated by radiation oncology as an out patient for his back


pain as palliative XRT may help this issue


-at the very least he should continue Casodex for now


-if he refuses all treatment, he should be referred for hospice eval





Thank you for the opportunity to participate in this patients care


A total of 40 minutes of face to face time was spent


speaking with the patient, of which greater than 50% was spent in counseling


and coordination of care and the detailed question and answer session.





Consultation Date/Type/Reason


Admit Date/Time


Jul 25, 2019 at 05:11


Initial Consult Date


7/26/19


Type of Consult


oncology


Reason for Consultation


metastatic prostate cancer


Requesting Provider:  ANKIT MONAE MD


Date/Time of Note


DATE: 8/1/19 


TIME: 11:42





24 HR Interval Summary


Free Text/Dictation


pt's brother states he does not want his brother to go to hospice and wants his 


brother to continue treatment. pt himself is still not complaint with all 


therapies but is currently taking casodex and received lupron yesterday





Exam/Review of Systems


Exam


Vitals





Vital Signs


  Date      Temp  Pulse  Resp  B/P (MAP)   Pulse Ox  O2          O2 Flow    FiO2


Time                                                 Delivery    Rate


    8/1/19  98.0     68    18      111/66        94  Room Air


     07:40                           (81)








Intake and Output





7/31/19 7/31/19 8/1/19





1515:00


23:00


07:00





IntakeIntake Total


730 ml


320 ml





OutputOutput Total


650 ml


700 ml





BalanceBalance


80 ml


-380 ml











Constitutional:  alert, distress, frail


Psych:  anxiety, confusion, depression


Head:  normocephalic


Eyes:  nl conjunctiva


ENMT:  nl external ears & nose


Neck:  supple


Respiratory:  clear to auscultation


Cardiovascular:  regular rate and rhythm


Gastrointestinal:  soft


Musculoskeletal:  nl extremities to inspection





Results


Result Diagram:  


8/1/19 0441                                                                     


          7/29/19 0441





Results 24hrs





Laboratory Tests


               Test
                                8/1/19
04:41


               White Blood Count                          4.6  L


               Red Blood Count                           2.80  L


               Hemoglobin                                 7.3  L


               Hematocrit                                24.4  L


               Mean Corpuscular Volume                    87.1


               Mean Corpuscular Hemoglobin               26.1  L


               Mean Corpuscular Hemoglobin
Concent      29.9  L



               Red Cell Distribution Width               16.8  H


               Platelet Count                              171


               Mean Platelet Volume                        9.7


               Immature Granulocytes %                  3.900  H


               Neutrophils %                              69.0


               Lymphocytes %                              19.5


               Monocytes %                                 7.0


               Eosinophils %                               0.4


               Basophils %                                 0.2


               Nucleated Red Blood Cells %                 0.0


               Immature Granulocytes #                  0.180  H


               Neutrophils #                               3.2


               Lymphocytes #                               0.9


               Monocytes #                                 0.3


               Eosinophils #                               0.0


               Basophils #                                 0.0


               Nucleated Red Blood Cells #                 0.0








Medications


Medication





Current Medications


Pantoprazole (Protonix Tab) 40 mg DAILY@06 PO  Last administered on 8/1/19at 


05:25; Admin Dose 40 MG;  Start 7/25/19 at 06:00


Albuterol/ Ipratropium (Duoneb) 3 ml Q6H RESP THERAPY  PRN HHN SHORTNESS OF 


BREATH;  Start 7/25/19 at 06:00


Acetaminophen (Tylenol Tab) 650 mg Q6H  PRN PO MILD PAIN(1-3)OR ELEVATED TEMP 


Last administered on 7/30/19at 15:29; Admin Dose 650 MG;  Start 7/25/19 at 06:00


Bisacodyl (Dulcolax) 10 mg DAILY  PRN PO CONSTIPATION;  Start 7/25/19 at 06:00


Miscellaneous Information (Pending Santyl Order For Wound Care) This patient h


a... PRN  PRN XX WOUND CARE;  Start 7/25/19 at 06:00


Calcium Carbonate (Ca Carbonate) 1,250 mg BID PO  Last administered on 8/1/19at 


08:02; Admin Dose 1,250 MG;  Start 7/25/19 at 21:00


Ceftriaxone Sodium 50 ml @  100 mls/hr Q24H IVPB  Last administered on 7/31/19at


16:30; Admin Dose 100 MLS/HR;  Start 7/25/19 at 16:00


Bicalutamide (Casodex) 50 mg DAILY PO  Last administered on 8/1/19at 08:03; Admi


n Dose 50 MG;  Start 7/26/19 at 09:00


Lorazepam (Ativan) 0.5 mg Q6H  PRN IV ANXIETY Last administered on 8/1/19at 


09:57; Admin Dose 0.5 MG;  Start 7/26/19 at 19:00


Tamsulosin HCl (Flomax) 0.4 mg HS PO  Last administered on 7/31/19at 21:08; 


Admin Dose 0.4 MG;  Start 7/27/19 at 21:00


Morphine Sulfate (Ms Contin (Er)) 15 mg BID PO  Last administered on 8/1/19at 


08:02; Admin Dose 15 MG;  Start 8/1/19 at 09:00


Hydromorphone HCl (Dilaudid) 2 mg Q4H  PRN PO SEVERE PAIN LEVEL 7-10;  Start 


8/1/19 at 08:00











TAE MCNULTY M.D.               Aug 1, 2019 11:48

## 2019-08-02 VITALS — HEART RATE: 92 BPM | DIASTOLIC BLOOD PRESSURE: 72 MMHG | RESPIRATION RATE: 20 BRPM | SYSTOLIC BLOOD PRESSURE: 115 MMHG

## 2019-08-02 VITALS — RESPIRATION RATE: 17 BRPM | SYSTOLIC BLOOD PRESSURE: 105 MMHG | DIASTOLIC BLOOD PRESSURE: 69 MMHG | HEART RATE: 92 BPM

## 2019-08-02 VITALS — SYSTOLIC BLOOD PRESSURE: 117 MMHG | RESPIRATION RATE: 18 BRPM | DIASTOLIC BLOOD PRESSURE: 58 MMHG | HEART RATE: 89 BPM

## 2019-08-02 VITALS — SYSTOLIC BLOOD PRESSURE: 99 MMHG | HEART RATE: 86 BPM | DIASTOLIC BLOOD PRESSURE: 62 MMHG | RESPIRATION RATE: 19 BRPM

## 2019-08-02 VITALS — SYSTOLIC BLOOD PRESSURE: 116 MMHG | DIASTOLIC BLOOD PRESSURE: 72 MMHG | HEART RATE: 91 BPM | RESPIRATION RATE: 18 BRPM

## 2019-08-02 VITALS — RESPIRATION RATE: 19 BRPM | SYSTOLIC BLOOD PRESSURE: 109 MMHG | DIASTOLIC BLOOD PRESSURE: 78 MMHG | HEART RATE: 93 BPM

## 2019-08-02 LAB
ADD MAN DIFF?: NO
ANION GAP: 5 (ref 5–13)
BASOPHIL #: 0 10^3/UL (ref 0–0.1)
BASOPHILS %: 0.5 % (ref 0–2)
BLOOD UREA NITROGEN: 14 MG/DL (ref 7–20)
CALCIUM: 7.9 MG/DL (ref 8.4–10.2)
CARBON DIOXIDE: 27 MMOL/L (ref 21–31)
CHLORIDE: 102 MMOL/L (ref 97–110)
CREATININE: 0.89 MG/DL (ref 0.61–1.24)
EOSINOPHILS #: 0 10^3/UL (ref 0–0.5)
EOSINOPHILS %: 0.5 % (ref 0–7)
GLUCOSE: 75 MG/DL (ref 70–220)
HEMATOCRIT: 23.3 % (ref 42–52)
HEMOGLOBIN: 7.2 G/DL (ref 14–18)
IMMATURE GRANS #M: 0.1 10^3/UL (ref 0–0.03)
IMMATURE GRANS % (M): 2.7 % (ref 0–0.43)
IMMEDIATE SPIN CROSSMATCH: 1 1
LYMPHOCYTES #: 0.8 10^3/UL (ref 0.8–2.9)
LYMPHOCYTES %: 20.4 % (ref 15–51)
MAGNESIUM: 1.9 MG/DL (ref 1.7–2.5)
MEAN CORPUSCULAR HEMOGLOBIN: 26.4 PG (ref 29–33)
MEAN CORPUSCULAR HGB CONC: 30.9 G/DL (ref 32–37)
MEAN CORPUSCULAR VOLUME: 85.3 FL (ref 82–101)
MEAN PLATELET VOLUME: 9.8 FL (ref 7.4–10.4)
MONOCYTE #: 0.4 10^3/UL (ref 0.3–0.9)
MONOCYTES %: 9.5 % (ref 0–11)
NEUTROPHIL #: 2.4 10^3/UL (ref 1.6–7.5)
NEUTROPHILS %: 66.4 % (ref 39–77)
NUCLEATED RED BLOOD CELLS #: 0 10^3/UL (ref 0–0)
NUCLEATED RED BLOOD CELLS%: 0 /100WBC (ref 0–0)
PHOSPHORUS: 3.5 MG/DL (ref 2.5–4.9)
PLATELET COUNT: 163 10^3/UL (ref 140–415)
POTASSIUM: 4.5 MMOL/L (ref 3.5–5.1)
RED BLOOD COUNT: 2.73 10^6/UL (ref 4.7–6.1)
RED CELL DISTRIBUTION WIDTH: 16.8 % (ref 11.5–14.5)
SODIUM: 134 MMOL/L (ref 135–144)
WHITE BLOOD COUNT: 3.7 10^3/UL (ref 4.8–10.8)

## 2019-08-02 PROCEDURE — 30233N1 TRANSFUSION OF NONAUTOLOGOUS RED BLOOD CELLS INTO PERIPHERAL VEIN, PERCUTANEOUS APPROACH: ICD-10-PCS | Performed by: INTERNAL MEDICINE

## 2019-08-02 PROCEDURE — 30233N1 TRANSFUSION OF NONAUTOLOGOUS RED BLOOD CELLS INTO PERIPHERAL VEIN, PERCUTANEOUS APPROACH: ICD-10-PCS

## 2019-08-02 RX ADMIN — CALCIUM CARBONATE 1 MG: 1250 SUSPENSION ORAL at 20:41

## 2019-08-02 RX ADMIN — CIPROFLOXACIN HYDROCHLORIDE 1 MG: 500 TABLET, FILM COATED ORAL at 18:55

## 2019-08-02 RX ADMIN — CALCIUM CARBONATE 1 MG: 1250 SUSPENSION ORAL at 09:45

## 2019-08-02 RX ADMIN — CIPROFLOXACIN HYDROCHLORIDE SCH MG: 500 TABLET, FILM COATED ORAL at 18:55

## 2019-08-02 RX ADMIN — LORAZEPAM 1 MG: 2 INJECTION, SOLUTION INTRAMUSCULAR; INTRAVENOUS at 00:44

## 2019-08-02 RX ADMIN — PANTOPRAZOLE SODIUM SCH MG: 40 TABLET, DELAYED RELEASE ORAL at 05:26

## 2019-08-02 RX ADMIN — HYDROMORPHONE HYDROCHLORIDE PRN MG: 2 TABLET ORAL at 05:27

## 2019-08-02 RX ADMIN — TAMSULOSIN HYDROCHLORIDE SCH MG: 0.4 CAPSULE ORAL at 20:42

## 2019-08-02 RX ADMIN — PANTOPRAZOLE SODIUM 1 MG: 40 TABLET, DELAYED RELEASE ORAL at 05:26

## 2019-08-02 RX ADMIN — BICALUTAMIDE 1 MG: 50 TABLET, FILM COATED ORAL at 09:46

## 2019-08-02 RX ADMIN — MORPHINE SULFATE SCH MG: 15 TABLET, EXTENDED RELEASE ORAL at 20:42

## 2019-08-02 RX ADMIN — HYDROMORPHONE HYDROCHLORIDE 1 MG: 2 TABLET ORAL at 05:27

## 2019-08-02 RX ADMIN — HYDROMORPHONE HYDROCHLORIDE 1 MG: 2 TABLET ORAL at 13:40

## 2019-08-02 RX ADMIN — CALCIUM CARBONATE SCH MG: 1250 SUSPENSION ORAL at 20:41

## 2019-08-02 RX ADMIN — CALCIUM CARBONATE SCH MG: 1250 SUSPENSION ORAL at 09:45

## 2019-08-02 RX ADMIN — MORPHINE SULFATE SCH MG: 15 TABLET, EXTENDED RELEASE ORAL at 09:45

## 2019-08-02 RX ADMIN — CIPROFLOXACIN HYDROCHLORIDE SCH MG: 500 TABLET, FILM COATED ORAL at 05:26

## 2019-08-02 RX ADMIN — BICALUTAMIDE SCH MG: 50 TABLET, FILM COATED ORAL at 09:46

## 2019-08-02 RX ADMIN — MORPHINE SULFATE 1 MG: 15 TABLET, EXTENDED RELEASE ORAL at 09:45

## 2019-08-02 RX ADMIN — MORPHINE SULFATE 1 MG: 15 TABLET, EXTENDED RELEASE ORAL at 20:42

## 2019-08-02 RX ADMIN — CIPROFLOXACIN HYDROCHLORIDE 1 MG: 500 TABLET, FILM COATED ORAL at 05:26

## 2019-08-02 RX ADMIN — HYDROMORPHONE HYDROCHLORIDE PRN MG: 2 TABLET ORAL at 13:40

## 2019-08-02 RX ADMIN — LIDOCAINE HYDROCHLORIDE 1 MLS/HR: 10 INJECTION, SOLUTION EPIDURAL; INFILTRATION; INTRACAUDAL; PERINEURAL at 19:20

## 2019-08-02 RX ADMIN — LORAZEPAM PRN MG: 2 INJECTION, SOLUTION INTRAMUSCULAR; INTRAVENOUS at 00:44

## 2019-08-02 RX ADMIN — TAMSULOSIN HYDROCHLORIDE 1 MG: 0.4 CAPSULE ORAL at 20:42

## 2019-08-02 NOTE — DS
MEDARIONELISHA KAMINSKI HUA 8/2/19 1714:


Date/Time of Note


Date/Time of Note


DATE: 8/2/19 


TIME: 17:14





Discharge Summary


Admission/Discharge Info


Admit Date/Time


Jul 25, 2019 at 05:11


Discharge Date/Time





Discharge Diagnosis


prostate cancer metastatic disease


Patient Condition:  Stable


Consults


dr Potts, urology, dr Ramesh, oncology, dr Hua, palliative care


Hospital Course


This is a 69-year-old male who was initially admitted here in January 2019 


secondary to AMS, lymphadenopathy with elevated PSA.  The patient also had 


severe anemia, recent weight loss.  Also had history of subdural hematoma and 


history of hepatitis C.  The patient was, at that time, seen by urology 


consultation by Dr. Potts and was thought to have prostate cancer and also was


seen by Dr. Hall at that time.  Urologically the plan was initially to send 


to nursing home; however, the patient refused.  Urologically, plan was to 


continue with Casodex and once out of the hospital to start him on Lupron.  The 


patient was also seen by Dr. Hall and was recommended to follow up as an 


outpatient; however, the patient said that after he went home and he never 


really followed up with anybody.  According to him, he got a new PCP.  He does 


not believe that he had cancer.  According to the patient, he has been living 


with his brother.  He has been feeling increasingly weak, has been having weight


loss.  He has fallen also a couple of times.  When the patient went to [facility], temperature was 36.9, blood pressure was 99/73, afebrile, 


heart rate 86.  White count was 4.9, hemoglobin 9.0, platelet count was 212, 


albumin was 3, ALT 10, total bilirubin 0.7.  Had a CT of the brain that showed 


left frontotemporal and left frontal isodense subdural collections are new since


the prior study, likely represents subacute subdural hematomas.  Also had a ch


est x-ray that showed normal, slight appearance of cardiomegaly.  Demonstrated 


nodular densities in the bilateral lungs consistent with metastatic disease.  


The patient also had a CT of the cervical, thoracic, and lumbar spine that 


showed patient had diffuse permeative process of the visualized osseous 


structures concerning for metastatic disease and the patient was transferred her


e due to insurance reasons.


 


PAST MEDICAL HISTORY:


1.  Likely metastatic prostate cancer.


2.  Severe anemia.


3.  History of subdural hematoma.


4.  History of hepatitis C.


5.  History of alcoholism.


6.  Hypertension.


7.  History of chronic gastritis.


Ds:


1.  Recurrent falls, failure to thrive, weight loss, generalized body aches, 


likely secondary to metastatic prostate cancer.  The patient was here in January 2019.  The patient was seen by multiple consultants including urology and 


oncology.  The patient had a prostate specific antigen over 1000.  The patient 


was deemed that he had capacity to make decisions.  The patient was offered 


nursing home; however, he left and went home.  He never followed up with any 


appointments and likely has progression of disease.


2.  History of falls with subdural hematomas.


3.  Severe anemia.


4.  Pancytopenia with a history of hepatitis C.


5.  Alcoholism.


6.  History of smoking.


7.  Elevated alkaline phosphatase could be secondary to bony metastases.  


Metastatic disease. He is on Casodex


8.  Edema with hypoalbuminemia.


9.  Likely urinary tract infection.


10. cachexia. Malnourishment


11.Chronic pain and anxiety


During hospitalization patient was seen by numerous specialists.  Dr. Potts 


was his urology consult.  We followed his recommendations, he recommended to c/w


Casodex and insert chan catheter.   Patient pain was controlled by dr Hua,


he ordered high doses of dilaudid and Methadone. Prior starting therapy pt was 


asked about his code status, He was alert and oriented x 4, he was very sharp, 


he said he does not want any treatment of cancer, just relief of his pain, he 


said he does not want any tubes , or being intubated, Denied resuscitation, he 


signed a POLST as DNR/DNI.  We offered his a hospice and he initially agreed  


but then refused to sign any papers. We contacted his brother Johan who was 


on vacation, but remotely controls the treatment. Johan requested full 


treatment and said he will influence his brother.   also was 


involved, they were questioning a capacity of the patient. We called psychiatry 


NP Theresa that reported poor mental exam and no capacity to make decisions. 


Definately narcotics influence his ability to think.  Daily we inform Johan ,


who is pt health care decision maker about progress of his brother 


hospitalization. Eventually after their conversation over the phone one day Mr Rosa agreed on biopsy of prostate. Dr Ramesh was oncology consult. She ordered 


Lupron injection once, pt received it. She recommended a biopsy of prostate, pt 


signed a consent but radiology was hesitant to perform it without a surgeon. 


During hospitalization his pain was controlled, we changed pain medications for 


a few times, once in  accordance to SNF coverage to avoid pt copayment. 


Additionally pt was treated for UTI with IV a/b. We also transfused a blood due 


to severe anemia.  Patient is not clinically improved, he complained on pain in 


all extremities and only after high doses of narcotics reported some relief. His


brother Johan was out of town and asked to keep patient in hospital and treat


him until his arrival August 6, but he said he is unable to take care for him at


home. During first days of hospitalization patient is hesitant for any radiation


treatment, he said , that he needs to think about it daily during 


hospitalization to many providers.  He was eating but still look cachectic and 


frail. We were looking for him SNF that was able to provide him with necessary 


level of care, it was challenging with his coverage. Patient was not able to 


tolerate regular diet, we gave him Ensure per dietary recommendations, he  was 


not able to ambulate, he stays in bed, and he required an additional 


oxygenation.  Plan of care was discussed with Dr. Brice.  In stable condition 


patient was discharged to SNF.  Patient was instructed to continue all 


medications.  Patient was instructed to see his oncology in [facility] for 


continuation of the treatment, he had incoming appointment.  All questions were 


answered and all problems were addressed.  Family was agreed for the  transfer 


during the phone conversation.


Home Meds


No Active Prescriptions or Reported Meds


Follow-up Plan


dr Bolton urology 2


dr Ramesh oncology 2 weeks


c/w casodex


Primary Care Provider


Care Physician No Primary


Time spent on discharge:  < 30 minutes


Pending Labs





Laboratory Tests


Test
                                       8/2/19
08:11            8/2/19
08:12


White Blood Count
                3.7 10^3/ul
(4.8-10.8)  



Red Blood Count
                2.73 10^6/ul
(4.70-6.10)  



Hemoglobin
                         7.2 g/dl
(14.0-18.0)  



Hematocrit
                           23.3 %
(42.0-52.0)  



Mean Corpuscular Volume
            85.3 fl
(82.0-101.0)  



Mean Corpuscular Hemoglobin
         26.4 pg
(29.0-33.0)  



Mean Corpuscular                   30.9 g/dl
(32.0-37.0)  



Hemoglobin
Concent


Red Cell Distribution Width
          16.8 %
(11.5-14.5)  



Platelet Count
                    163 10^3/UL
(140-415)  



Mean Platelet Volume
                  9.8 fl
(7.4-10.4)  



Immature Granulocytes %
           2.700 %
(0.001-0.429)  



Neutrophils %
                        66.4 %
(39.0-77.0)  



Lymphocytes %
                        20.4 %
(15.0-51.0)  



Monocytes %
                            9.5 %
(0.0-11.0)  



Eosinophils %
                           0.5 %
(0.0-7.0)  



Basophils %
                             0.5 %
(0.0-2.0)  



Nucleated Red Blood Cells %
       0.0 /100WBC
(0.0-0.0)  



Immature Granulocytes #
       0.100 10^3/ul
(0.0-0.031)  



Neutrophils #
                     2.4 10^3/ul
(1.6-7.5)  



Lymphocytes #
                     0.8 10^3/ul
(0.8-2.9)  



Monocytes #
                       0.4 10^3/ul
(0.3-0.9)  



Eosinophils #
                     0.0 10^3/ul
(0.0-0.5)  



Basophils #
                       0.0 10^3/ul
(0.0-0.1)  



Nucleated Red Blood Cells #
       0.0 10^3/ul
(0.0-0.0)  



Sodium Level
                  
                            134 mmol/L
(135-144)


Potassium Level
               
                            4.5 mmol/L
(3.5-5.1)


Chloride Level
                
                             102 mmol/L
()


Carbon Dioxide Level
          
                               27 mmol/L
(21-31)


Anion Gap                                                              5 (5-13)


Blood Urea Nitrogen
           
                                 14 mg/dl
(7-20)


Creatinine
                    
                          0.89 mg/dl
(0.61-1.24)


Est Glomerular Filtrat         
                           mL/min (>60) 



Rate
mL/min


Glucose Level
                 
                               75 mg/dl
()


Calcium Level
                 
                            7.9 mg/dl
(8.4-10.2)


Phosphorus Level
              
                             3.5 mg/dl
(2.5-4.9)


Magnesium Level
               
                             1.9 mg/dl
(1.7-2.5)








TOMAS BRICE MD 8/21/19 1337:


Discharge Summary


Admission/Discharge Info


Hospital Course


pt was given option of fu Dr Hall/dr ramesh as OPD


spoke to brother johan and informed about diagnosis and follow ups


pt was also seen by dr potts and his recs were followed


Home Meds


No Active Prescriptions or Reported Meds











ELISHA CHRISTENSEN NP              Aug 2, 2019 17:14


TOMAS BRICE MD              Aug 21, 2019 13:37

## 2019-08-02 NOTE — PSY
Date/Time of Note


Date/Time of Note


DATE: 8/2/19 


TIME: 10:02





Psychiatric Subjective Eval


Consent


Pt consented to telemedicine:  No





Subjective Evaluation


Patient location:  inpatient


History of present illness


Patient is a 69-year-old male admitted prostate cancer. Patient has history of 


subdural hematoma, and hepatitis C. on a face-to-face evaluation patient is 


alert but with periods of disorganized thoughts Mini-Mental status exam done and


patient scored very poorly he did not answer half of the questions states got 


every question he answered wrong.  Based on my evaluation he has no capacity at 


this time to make decisions.  He denies any history of depression denies 


suicidal ideation denies feeling hopeless denies feeling helpless and contracted


for safety.  However patient is somewhat anxious and his moving around in 


bed,states he is very uncomfortable


Past psychiatric history


Denies


Hospitalization:  other


Allergies:  


Coded Allergies:  


     No Known Allergy (Unverified , 1/11/19)





Substance Abuse


Substance use:  other


Substance abuse history:  No


Prior substance abuse treatmen:  No





Social History


Marital status:  other


DPA/Conservatorship:  No





Psychiatric Objective Eval


Review of Systems:


Review of Systems:  Not Applicable





Physical Examination:


Physical Examination:  Not Applicable


Appetite:  Decreased


Energy:  Decreased





Mental Status Examination:


Eye Contact:  Fair


Psychomotor Activity:  Slow


Behavior:  Cooperative


Speech:  Soft


AFFECT:  Constricted


Mood:  Anxious


Orientation:  x1


Insight:  Moderate


Judgement:  Moderate


Attention Span:  Distractible


Laboratory Results





Laboratory Tests


Test
                                 8/1/19
04:41    8/2/19
08:11  8/2/19
08:12


White Blood Count                     4.6 10^3/ul     3.7 10^3/ul


Red Blood Count                      2.80 10^6/ul    2.73 10^6/ul


Hemoglobin                               7.3 g/dl        7.2 g/dl


Hematocrit                                 24.4 %          23.3 %


Mean Corpuscular Volume                   87.1 fl         85.3 fl


Mean Corpuscular Hemoglobin               26.1 pg         26.4 pg


Mean Corpuscular                       29.9 g/dl 
     30.9 g/dl 
  



Hemoglobin
Concent


Red Cell Distribution Width                16.8 %          16.8 %


Platelet Count                        171 10^3/UL     163 10^3/UL


Mean Platelet Volume                       9.7 fl          9.8 fl


Immature Granulocytes %                   3.900 %         2.700 %


Neutrophils %                              69.0 %          66.4 %


Lymphocytes %                              19.5 %          20.4 %


Monocytes %                                 7.0 %           9.5 %


Eosinophils %                               0.4 %           0.5 %


Basophils %                                 0.2 %           0.5 %


Nucleated Red Blood Cells %           0.0 /100WBC     0.0 /100WBC


Immature Granulocytes #             0.180 10^3/ul   0.100 10^3/ul


Neutrophils #                         3.2 10^3/ul     2.4 10^3/ul


Lymphocytes #                         0.9 10^3/ul     0.8 10^3/ul


Monocytes #                           0.3 10^3/ul     0.4 10^3/ul


Eosinophils #                         0.0 10^3/ul     0.0 10^3/ul


Basophils #                           0.0 10^3/ul     0.0 10^3/ul


Nucleated Red Blood Cells #           0.0 10^3/ul     0.0 10^3/ul


Sodium Level                                                         134 mmol/L


Potassium Level                                                      4.5 mmol/L


Chloride Level                                                       102 mmol/L


Carbon Dioxide Level                                                  27 mmol/L


Anion Gap                                                                     5


Blood Urea Nitrogen                                                    14 mg/dl


Creatinine                                                           0.89 mg/dl


Est Glomerular Filtrat Rate
mL/min  
               
                mL/min 



Glucose Level                                                          75 mg/dl


Calcium Level                                                         7.9 mg/dl


Phosphorus Level                                                      3.5 mg/dl


Magnesium Level                                                       1.9 mg/dl








Assessment and Plan


Recommendation/Plan


Medication Management


Ativan 0.5 mg IV every 6 hours for anxiety


Multiple antipsychotics:  No


Psychotherapy


Provide supportive therapy


Discharge Disposition:  Other


Legal Status:  Voluntary (Does not meet criteria for 5150 hold)











ALEXUS PAREDES NP           Aug 2, 2019 10:04

## 2019-08-02 NOTE — CONS
Assessment/Plan


Assessment/Plan


Hospital Course (Demo Recall)


#Metastatic prostate cancer


-pt has disease diffusely throughout the liver, bones and lymph nodes


-PSA is currently> 600


- Per Dr Mcnulty - pt should have a confirmatory prostate bx but given the high 


likelihood of prostate cancer and heavy disease burden, ideally patient should 


be started on upfront Taxotere chemotherapy with Casodex and Lupron. He may also


be a candidate for upfront therapy with Abiraterone which would start as an out 


patient


-This was discussed with the patient who states he needs to "think about" 


starting any type of therapy


-he could also be evaluated by radiation oncology as an out patient for his back


pain as palliative XRT may help this issue


-at the very least he should continue Casodex for now


-if he refuses all treatment, he should be referred for hospice eval





Thank you for the opportunity to participate in this patients care


A total of 40 minutes of face to face time was spent


speaking with the patient, of which greater than 50% was spent in counseling


and coordination of care and the detailed question and answer session.





Consultation Date/Type/Reason


Admit Date/Time


Jul 25, 2019 at 05:11


Initial Consult Date


7/26/19


Type of Consult


oncology


Reason for Consultation


metastatic prostate cancer


Requesting Provider:  ANKIT MONAE MD


Date/Time of Note


DATE: 8/2/19 


TIME: 11:01





24 HR Interval Summary


Free Text/Dictation


continues on casodex and lupron





Exam/Review of Systems


Exam


Vitals





Vital Signs


  Date      Temp  Pulse  Resp  B/P (MAP)   Pulse Ox  O2          O2 Flow    FiO2


Time                                                 Delivery    Rate


    8/2/19  97.3     93    19      109/78       100  Room Air


     07:41                           (88)








Intake and Output





8/1/19 8/1/19 8/2/19





1515:00


23:00


07:00





IntakeIntake Total


200 ml


100 ml





OutputOutput Total


500 ml


800 ml





BalanceBalance


200 ml


-400 ml


-800 ml











Constitutional:  alert, distress, frail


Psych:  anxiety, confusion, depression


Head:  normocephalic


Eyes:  nl conjunctiva


ENMT:  nl external ears & nose


Neck:  supple


Respiratory:  clear to auscultation


Cardiovascular:  regular rate and rhythm


Gastrointestinal:  soft


Musculoskeletal:  nl extremities to inspection


Extremities:  normal pulses





Results


Result Diagram:  


8/2/19 0811                                                                     


          8/2/19 0812





Results 24hrs





Laboratory Tests


        Test
                                8/2/19
08:11  8/2/19
08:12


        White Blood Count                          3.7  L


        Red Blood Count                           2.73  L


        Hemoglobin                                 7.2  L


        Hematocrit                                23.3  L


        Mean Corpuscular Volume                    85.3


        Mean Corpuscular Hemoglobin               26.4  L


        Mean Corpuscular Hemoglobin
Concent      30.9  L
  



        Red Cell Distribution Width               16.8  H


        Platelet Count                              163


        Mean Platelet Volume                        9.8


        Immature Granulocytes %                  2.700  H


        Neutrophils %                              66.4


        Lymphocytes %                              20.4


        Monocytes %                                 9.5


        Eosinophils %                               0.5


        Basophils %                                 0.5


        Nucleated Red Blood Cells %                 0.0


        Immature Granulocytes #                  0.100  H


        Neutrophils #                               2.4


        Lymphocytes #                               0.8


        Monocytes #                                 0.4


        Eosinophils #                               0.0


        Basophils #                                 0.0


        Nucleated Red Blood Cells #                 0.0


        Sodium Level                                             134  L


        Potassium Level                                           4.5


        Chloride Level                                            102


        Carbon Dioxide Level                                       27


        Anion Gap                                                   5


        Blood Urea Nitrogen                                        14


        Creatinine                                               0.89


        Est Glomerular Filtrat Rate
mL/min   
               



        Glucose Level                                              75


        Calcium Level                                            7.9  L


        Phosphorus Level                                          3.5


        Magnesium Level                                           1.9








Medications


Medication





Current Medications


Pantoprazole (Protonix Tab) 40 mg DAILY@06 PO  Last administered on 8/2/19at 


05:26; Admin Dose 40 MG;  Start 7/25/19 at 06:00


Albuterol/ Ipratropium (Duoneb) 3 ml Q6H RESP THERAPY  PRN HHN SHORTNESS OF 


BREATH;  Start 7/25/19 at 06:00


Acetaminophen (Tylenol Tab) 650 mg Q6H  PRN PO MILD PAIN(1-3)OR ELEVATED TEMP 


Last administered on 8/1/19at 22:54; Admin Dose 650 MG;  Start 7/25/19 at 06:00


Bisacodyl (Dulcolax) 10 mg DAILY  PRN PO CONSTIPATION;  Start 7/25/19 at 06:00


Miscellaneous Information (Pending Santyl Order For Wound Care) This patient 


ha... PRN  PRN XX WOUND CARE;  Start 7/25/19 at 06:00


Calcium Carbonate (Ca Carbonate) 1,250 mg BID PO  Last administered on 8/2/19at 


09:45; Admin Dose 1,250 MG;  Start 7/25/19 at 21:00


Bicalutamide (Casodex) 50 mg DAILY PO  Last administered on 8/2/19at 09:46; 


Admin Dose 50 MG;  Start 7/26/19 at 09:00


Lorazepam (Ativan) 0.5 mg Q6H  PRN IV ANXIETY Last administered on 8/2/19at 


00:44; Admin Dose 0.5 MG;  Start 7/26/19 at 19:00


Tamsulosin HCl (Flomax) 0.4 mg HS PO  Last administered on 8/1/19at 21:20; Admin


Dose 0.4 MG;  Start 7/27/19 at 21:00


Morphine Sulfate (Ms Contin (Er)) 15 mg BID PO  Last administered on 8/2/19at 


09:45; Admin Dose 15 MG;  Start 8/1/19 at 09:00


Hydromorphone HCl (Dilaudid) 2 mg Q4H  PRN PO SEVERE PAIN LEVEL 7-10 Last 


administered on 8/2/19at 05:27; Admin Dose 2 MG;  Start 8/1/19 at 08:00


Ciprofloxacin (Cipro) 500 mg BID@06,18 PO  Last administered on 8/2/19at 05:26; 


Admin Dose 500 MG;  Start 8/1/19 at 18:00











TAE MCNULTY M.D.               Aug 2, 2019 11:01

## 2019-08-02 NOTE — CONS
Consult Date/Type/Reason


Admit Date/Time


Jul 25, 2019 at 05:11


Initial Consult Date


7/26/19


Type of Consultation:  Urology


Reason for Consultation


Metastatic prostate cancer


Requesting Provider:  ANKIT MONAE MD


Date/Time of Note


DATE: 8/2/19 


TIME: 18:08





Subjective


Patient is very weak and complains of generalized pain.





Objective


Vitals





Vital Signs


  Date      Temp  Pulse  Resp  B/P (MAP)   Pulse Ox  O2          O2 Flow    FiO2


Time                                                 Delivery    Rate


    8/2/19  98.3     92    20      115/72       100  Room Air


     15:39                           (86)








Intake and Output





8/1/19 8/1/19 8/2/19





1515:00


23:00


07:00





IntakeIntake Total


200 ml


100 ml





OutputOutput Total


500 ml


800 ml





BalanceBalance


200 ml


-400 ml


-800 ml











Exam


The White catheter is draining clear urine.  Patient did receive 7.5 mg 


leuprolide acetate yesterday.





Results/Medications


Result Diagram:  


8/2/19 0811                                                                     


          8/2/19 0812





Results 24 hrs





Laboratory Tests


        Test
                                8/2/19
08:11  8/2/19
08:12


        White Blood Count                          3.7  L


        Red Blood Count                           2.73  L


        Hemoglobin                                 7.2  L


        Hematocrit                                23.3  L


        Mean Corpuscular Volume                    85.3


        Mean Corpuscular Hemoglobin               26.4  L


        Mean Corpuscular Hemoglobin
Concent      30.9  L
  



        Red Cell Distribution Width               16.8  H


        Platelet Count                              163


        Mean Platelet Volume                        9.8


        Immature Granulocytes %                  2.700  H


        Neutrophils %                              66.4


        Lymphocytes %                              20.4


        Monocytes %                                 9.5


        Eosinophils %                               0.5


        Basophils %                                 0.5


        Nucleated Red Blood Cells %                 0.0


        Immature Granulocytes #                  0.100  H


        Neutrophils #                               2.4


        Lymphocytes #                               0.8


        Monocytes #                                 0.4


        Eosinophils #                               0.0


        Basophils #                                 0.0


        Nucleated Red Blood Cells #                 0.0


        Sodium Level                                             134  L


        Potassium Level                                           4.5


        Chloride Level                                            102


        Carbon Dioxide Level                                       27


        Anion Gap                                                   5


        Blood Urea Nitrogen                                        14


        Creatinine                                               0.89


        Est Glomerular Filtrat Rate
mL/min   
               



        Glucose Level                                              75


        Calcium Level                                            7.9  L


        Phosphorus Level                                          3.5


        Magnesium Level                                           1.9





Home Meds


No Active Prescriptions or Reported Meds


Medications





Current Medications


Pantoprazole (Protonix Tab) 40 mg DAILY@06 PO  Last administered on 8/2/19at 


05:26; Admin Dose 40 MG;  Start 7/25/19 at 06:00


Albuterol/ Ipratropium (Duoneb) 3 ml Q6H RESP THERAPY  PRN HHN SHORTNESS OF 


BREATH;  Start 7/25/19 at 06:00


Acetaminophen (Tylenol Tab) 650 mg Q6H  PRN PO MILD PAIN(1-3)OR ELEVATED TEMP 


Last administered on 8/1/19at 22:54; Admin Dose 650 MG;  Start 7/25/19 at 06:00


Bisacodyl (Dulcolax) 10 mg DAILY  PRN PO CONSTIPATION;  Start 7/25/19 at 06:00


Miscellaneous Information (Pending Santyl Order For Wound Care) This patient 


ha... PRN  PRN XX WOUND CARE;  Start 7/25/19 at 06:00


Calcium Carbonate (Ca Carbonate) 1,250 mg BID PO  Last administered on 8/2/19at 


09:45; Admin Dose 1,250 MG;  Start 7/25/19 at 21:00


Bicalutamide (Casodex) 50 mg DAILY PO  Last administered on 8/2/19at 09:46; 


Admin Dose 50 MG;  Start 7/26/19 at 09:00


Lorazepam (Ativan) 0.5 mg Q6H  PRN IV ANXIETY Last administered on 8/2/19at 


00:44; Admin Dose 0.5 MG;  Start 7/26/19 at 19:00


Tamsulosin HCl (Flomax) 0.4 mg HS PO  Last administered on 8/1/19at 21:20; Admin


Dose 0.4 MG;  Start 7/27/19 at 21:00


Morphine Sulfate (Ms Contin (Er)) 15 mg BID PO  Last administered on 8/2/19at 


09:45; Admin Dose 15 MG;  Start 8/1/19 at 09:00


Hydromorphone HCl (Dilaudid) 2 mg Q4H  PRN PO SEVERE PAIN LEVEL 7-10 Last 


administered on 8/2/19at 13:40; Admin Dose 2 MG;  Start 8/1/19 at 08:00


Ciprofloxacin (Cipro) 500 mg BID@06,18 PO  Last administered on 8/2/19at 05:26; 


Admin Dose 500 MG;  Start 8/1/19 at 18:00





Assessment/Plan


Hospital Course (Demo Recall)


69-year-old -American male was admitted here in January 2019.  At that 


time his PSA was over at thousand and patient was assumed to have prostate 


cancer he was started on bicalutamide and at the time of discharge he was 


instructed to follow-up in the office for biopsy of the prostate to confirm the 


diagnosis of prostate cancer.  However the patient did not follow up with any 


doctor and he went to [facility] because of increased back pain


and weakness in addition to lower extremity pain and was transferred to [facility].  Patient underwent CT of the cervical thoracic and lumbar


spine and was found to have metastatic disease.  Patient states he does urinate 


5-6 times at night and same during the day.  He voids a small amount.  He denies


any gross hematuria.


Presently the patient is very weak and the CT scan of the abdomen and pelvis 


showed:


1.  Diffuse metastasis to the liver as described above. Recommend contrast CT or


MR to further evaluate.


2.  Pathologic lymphadenopathy is noted in the retroperitoneum, the 


gastrohepatic gastrolienal and the portal triad with retroperitoneal lymph


adenopathy noted at the aortic bifurcation. Incomplete evaluation without 


contrast on this study is noted.


3.  Anterior mid abdominal mass measuring 5.8 cm transverse by 3.2 cm in AP 


dimensions.


4.  Bilateral renal cortical cysts with persistent mild left hydroureter 


nephrosis


5. Diffuse blastic and lytic skeletal metastasis and recommend MRI with contrast


of the spine to evaluate for epidural extension is clinically indicated.


6.  Mild abdominal and pelvic ascites


7.  Mild gallbladder wall calcifications





The patient is on bicalutamide and pain medications.  His cancer is very 


advanced.  Consideration have been made for nursing home and or hospice.  The 


White catheter is draining clear urine and urine culture did grew:


 


  ENTEROBACTER CLOACAE


        COLONY COUNT                10,000 - 20,000 CFU/ml





                                    E CLOACAE        


                                    M.I.C.    RX     


                                    --------- ---    


     CEFOTAXIME                                S     


     CIPROFLOXACIN                  1          S     


     GENTAMICIN                     <=1        S     


     LEVOFLOXACIN                   1          S     


     NITROFURANTOIN                 <=16       S     


     TOBRAMYCIN                     <=1        S     


     TRIMETHOPRIM/SULFAMETHOXAZOLE  >=320      R     


I will put him on Cipro 250 mg twice a day.





On 8/1/2019 he received 7.5 mg leuprolide acetate injection into his right arm 


area.





Patient is also on bicalutamide.  Continue present treatment.











HAILEE CLIFTON MD             Aug 2, 2019 18:11

## 2019-08-02 NOTE — PDOCDIS
Discharge Instructions


DIAGNOSIS


Discharge Diagnosis


prostate cancer metastatic disease





CONDITION


                [id number]
Patient Condition:  [id number]
Stable








HOME CARE INSTRUCTIONS:


                [id number]
Diet Instructions:  [id number]
[id number]


                                        Rest between Activity


                                        Avoid heavy lifting








FOLLOW UP/APPOINTMENTS


Follow-up Plan


dr Bolton urology 2


dr Ramesh oncology 2 weeks


c/w ELISHA Fisher NP              Aug 2, 2019 17:08

## 2019-08-02 NOTE — PN
Date/Time of Note


Date/Time of Note


DATE: 8/2/19 


TIME: 17:01





Assessment/Plan


VTE Prophylaxis


Risk score (from [facility])>0 risk:  6


SCD applied (from [facility]):  No


SCD contraindicated:  patient refusal


Pharmacological prophylaxis:  NA/contraindicated


Pharm contraindication:  thrombocytopenia, anticoag not tolerated





Lines/Catheters


IV Catheter Type (from [facility]):  Saline Lock


Urinary Cath still in place:  Yes


Reason Cath still needed:  urinary retention





Assessment/Plan


Hospital Course


1.  Recurrent falls, failure to thrive, weight loss, generalized body aches, 


likely secondary to metastatic prostate cancer.  The patient was here in January 2019.  The patient was seen by multiple consultants including urology and 


oncology.  The patient had a prostate specific antigen over 1000.  The patient 


was deemed that he had capacity to make decisions.  The patient was offered 


nursing home; however, he left and went home.  He never followed up with any 


appointments and likely has progression of disease.


2.  History of falls with subdural hematomas.


3.  Severe anemia.


4.  Pancytopenia with a history of hepatitis C.


5.  Alcoholism.


6.  History of smoking.


7.  Elevated alkaline phosphatase could be secondary to bony metastases.  


Metastatic disease. He is on Casodex


8.  Edema with hypoalbuminemia.


9.  Likely urinary tract infection.


10. cachexia. Malnourishment


11.Chronic pain and anxiety


Assessment/Plan


-cw Casodex, s/p Lupron


-blood transfusion


-d.c pending


- social service consult seen


- scd


-do not remove chan cath


Result Diagram:  


8/2/19 0811                                                                     


          8/2/19 0812





Results 24hrs





Laboratory Tests


        Test
                                8/2/19
08:11  8/2/19
08:12


        White Blood Count                          3.7  L


        Red Blood Count                           2.73  L


        Hemoglobin                                 7.2  L


        Hematocrit                                23.3  L


        Mean Corpuscular Volume                    85.3


        Mean Corpuscular Hemoglobin               26.4  L


        Mean Corpuscular Hemoglobin
Concent      30.9  L
  



        Red Cell Distribution Width               16.8  H


        Platelet Count                              163


        Mean Platelet Volume                        9.8


        Immature Granulocytes %                  2.700  H


        Neutrophils %                              66.4


        Lymphocytes %                              20.4


        Monocytes %                                 9.5


        Eosinophils %                               0.5


        Basophils %                                 0.5


        Nucleated Red Blood Cells %                 0.0


        Immature Granulocytes #                  0.100  H


        Neutrophils #                               2.4


        Lymphocytes #                               0.8


        Monocytes #                                 0.4


        Eosinophils #                               0.0


        Basophils #                                 0.0


        Nucleated Red Blood Cells #                 0.0


        Sodium Level                                             134  L


        Potassium Level                                           4.5


        Chloride Level                                            102


        Carbon Dioxide Level                                       27


        Anion Gap                                                   5


        Blood Urea Nitrogen                                        14


        Creatinine                                               0.89


        Est Glomerular Filtrat Rate
mL/min   
               



        Glucose Level                                              75


        Calcium Level                                            7.9  L


        Phosphorus Level                                          3.5


        Magnesium Level                                           1.9








Subjective


24 Hr Interval Summary


Musculoskeletal:  bone/joint pain





Exam/Review of Systems


Exam


Vitals





Vital Signs


  Date      Temp  Pulse  Resp  B/P (MAP)   Pulse Ox  O2          O2 Flow    FiO2


Time                                                 Delivery    Rate


    8/2/19  98.3     92    20      115/72       100  Room Air


     15:39                           (86)








Intake and Output





8/1/19 8/1/19 8/2/19





1414:59


22:59


06:59





IntakeIntake Total


200 ml


100 ml





OutputOutput Total


500 ml


800 ml





BalanceBalance


200 ml


-400 ml


-800 ml











Exam


frail


Constitutional:  alert, oriented


Head:  normocephalic, atraumatic


Respiratory:  diminished breath sounds


Cardiovascular:  regular rate and rhythm


Gastrointestinal:  soft, bowel sounds


Genitourinary - Male:  other (chan)


Extremities:  normal pulses





Results


Results 24hrs





Laboratory Tests


        Test
                                8/2/19
08:11  8/2/19
08:12


        White Blood Count                          3.7  L


        Red Blood Count                           2.73  L


        Hemoglobin                                 7.2  L


        Hematocrit                                23.3  L


        Mean Corpuscular Volume                    85.3


        Mean Corpuscular Hemoglobin               26.4  L


        Mean Corpuscular Hemoglobin
Concent      30.9  L
  



        Red Cell Distribution Width               16.8  H


        Platelet Count                              163


        Mean Platelet Volume                        9.8


        Immature Granulocytes %                  2.700  H


        Neutrophils %                              66.4


        Lymphocytes %                              20.4


        Monocytes %                                 9.5


        Eosinophils %                               0.5


        Basophils %                                 0.5


        Nucleated Red Blood Cells %                 0.0


        Immature Granulocytes #                  0.100  H


        Neutrophils #                               2.4


        Lymphocytes #                               0.8


        Monocytes #                                 0.4


        Eosinophils #                               0.0


        Basophils #                                 0.0


        Nucleated Red Blood Cells #                 0.0


        Sodium Level                                             134  L


        Potassium Level                                           4.5


        Chloride Level                                            102


        Carbon Dioxide Level                                       27


        Anion Gap                                                   5


        Blood Urea Nitrogen                                        14


        Creatinine                                               0.89


        Est Glomerular Filtrat Rate
mL/min   
               



        Glucose Level                                              75


        Calcium Level                                            7.9  L


        Phosphorus Level                                          3.5


        Magnesium Level                                           1.9








Medications


Medication





Current Medications


Pantoprazole (Protonix Tab) 40 mg DAILY@06 PO  Last administered on 8/2/19at 


05:26; Admin Dose 40 MG;  Start 7/25/19 at 06:00


Albuterol/ Ipratropium (Duoneb) 3 ml Q6H RESP THERAPY  PRN HHN SHORTNESS OF 


BREATH;  Start 7/25/19 at 06:00


Acetaminophen (Tylenol Tab) 650 mg Q6H  PRN PO MILD PAIN(1-3)OR ELEVATED TEMP 


Last administered on 8/1/19at 22:54; Admin Dose 650 MG;  Start 7/25/19 at 06:00


Bisacodyl (Dulcolax) 10 mg DAILY  PRN PO CONSTIPATION;  Start 7/25/19 at 06:00


Miscellaneous Information (Pending Santyl Order For Wound Care) This patient 


ha... PRN  PRN XX WOUND CARE;  Start 7/25/19 at 06:00


Calcium Carbonate (Ca Carbonate) 1,250 mg BID PO  Last administered on 8/2/19at 


09:45; Admin Dose 1,250 MG;  Start 7/25/19 at 21:00


Bicalutamide (Casodex) 50 mg DAILY PO  Last administered on 8/2/19at 09:46; 


Admin Dose 50 MG;  Start 7/26/19 at 09:00


Lorazepam (Ativan) 0.5 mg Q6H  PRN IV ANXIETY Last administered on 8/2/19at 


00:44; Admin Dose 0.5 MG;  Start 7/26/19 at 19:00


Tamsulosin HCl (Flomax) 0.4 mg HS PO  Last administered on 8/1/19at 21:20; Admin


Dose 0.4 MG;  Start 7/27/19 at 21:00


Morphine Sulfate (Ms Contin (Er)) 15 mg BID PO  Last administered on 8/2/19at 


09:45; Admin Dose 15 MG;  Start 8/1/19 at 09:00


Hydromorphone HCl (Dilaudid) 2 mg Q4H  PRN PO SEVERE PAIN LEVEL 7-10 Last 


administered on 8/2/19at 13:40; Admin Dose 2 MG;  Start 8/1/19 at 08:00


Ciprofloxacin (Cipro) 500 mg BID@06,18 PO  Last administered on 8/2/19at 05:26; 


Admin Dose 500 MG;  Start 8/1/19 at 18:00











ELISHA CHRISTENSEN NP              Aug 2, 2019 17:03

## 2019-08-03 VITALS — HEART RATE: 94 BPM | RESPIRATION RATE: 18 BRPM | SYSTOLIC BLOOD PRESSURE: 119 MMHG | DIASTOLIC BLOOD PRESSURE: 74 MMHG

## 2019-08-03 VITALS — DIASTOLIC BLOOD PRESSURE: 69 MMHG | RESPIRATION RATE: 18 BRPM | SYSTOLIC BLOOD PRESSURE: 110 MMHG | HEART RATE: 94 BPM

## 2019-08-03 VITALS — HEART RATE: 101 BPM | DIASTOLIC BLOOD PRESSURE: 72 MMHG | SYSTOLIC BLOOD PRESSURE: 116 MMHG | RESPIRATION RATE: 18 BRPM

## 2019-08-03 RX ADMIN — PANTOPRAZOLE SODIUM SCH MG: 40 TABLET, DELAYED RELEASE ORAL at 05:25

## 2019-08-03 RX ADMIN — BICALUTAMIDE SCH MG: 50 TABLET, FILM COATED ORAL at 09:19

## 2019-08-03 RX ADMIN — BICALUTAMIDE 1 MG: 50 TABLET, FILM COATED ORAL at 09:19

## 2019-08-03 RX ADMIN — LORAZEPAM 1 MG: 2 INJECTION, SOLUTION INTRAMUSCULAR; INTRAVENOUS at 01:06

## 2019-08-03 RX ADMIN — MORPHINE SULFATE SCH MG: 15 TABLET, EXTENDED RELEASE ORAL at 09:18

## 2019-08-03 RX ADMIN — MORPHINE SULFATE 1 MG: 15 TABLET, EXTENDED RELEASE ORAL at 09:18

## 2019-08-03 RX ADMIN — CIPROFLOXACIN HYDROCHLORIDE 1 MG: 500 TABLET, FILM COATED ORAL at 05:25

## 2019-08-03 RX ADMIN — LORAZEPAM PRN MG: 2 INJECTION, SOLUTION INTRAMUSCULAR; INTRAVENOUS at 01:06

## 2019-08-03 RX ADMIN — CALCIUM CARBONATE 1 MG: 1250 SUSPENSION ORAL at 09:19

## 2019-08-03 RX ADMIN — CIPROFLOXACIN HYDROCHLORIDE SCH MG: 500 TABLET, FILM COATED ORAL at 05:25

## 2019-08-03 RX ADMIN — CALCIUM CARBONATE SCH MG: 1250 SUSPENSION ORAL at 09:19

## 2019-08-03 RX ADMIN — PANTOPRAZOLE SODIUM 1 MG: 40 TABLET, DELAYED RELEASE ORAL at 05:25

## 2019-08-03 NOTE — CONS
Assessment/Plan


Assessment/Plan


Assessment/Plan (Daily)


#Metastatic prostate cancer


-pt has disease diffusely throughout the liver, bones and lymph nodes


-PSA is currently> 600


- Per Dr Mcnulty - pt should have a confirmatory prostate bx but given the high l


ikelihood of prostate cancer and heavy disease burden, ideally patient should be


started on upfront Taxotere chemotherapy with Casodex and Lupron. He may also be


a candidate for upfront therapy with Abiraterone which would start as an out 


patient


-This was discussed with the patient who states he needs to "think about" 


starting any type of therapy


-he could also be evaluated by radiation oncology as an out patient for his back


pain as palliative XRT may help this issue


-at the very least he should continue Casodex for now


-if he refuses all treatment, he should be referred for hospice eval


Patient seen in collaboration with Dr Mcnulty.  staff.





Consultation Date/Type/Reason


Admit Date/Time


Jul 25, 2019 at 05:11


Initial Consult Date


7/26/19


Type of Consult


HEM/ONC


Reason for Consultation


Metastatic Prostate Cancer


Requesting Provider:  ANKIT MONAE MD


Date/Time of Note


DATE: 8/3/19 


TIME: 12:40





24 HR Interval Summary


Free Text/Dictation


Cont Casodex and [place]


no events reported last night


Constitutional:  requiring O2





Detailed Summary


Eyes:  no complaints


ENT:  no complaints


Respiratory:  no complaints


Cardiovascular:  no complaints


Gastrointestinal:  no complaints


Genitourinary:  no complaints


Musculoskeletal:  no complaints


Skin:  no complaints


Neurologic:  no complaints


Lymphatic:  no complaints


Psychological:  nl mood/affect





Exam/Review of Systems


Exam


Vitals





Vital Signs


  Date      Temp  Pulse  Resp  B/P (MAP)   Pulse Ox  O2          O2 Flow    FiO2


Time                                                 Delivery    Rate


    8/3/19  98.3     94    18      110/69        97


     07:33                           (83)


    8/2/19                                           Room Air


     15:39








Intake and Output





8/2/19


8/2/19


8/3/19





1515:00


23:00


07:00





IntakeIntake Total


1120 ml


470 ml





OutputOutput Total


300 ml


250 ml


650 ml





BalanceBalance


-300 ml


870 ml


-180 ml











Constitutional:  alert, well developed


Psych:  nl mood/affect


Head:  atraumatic


Eyes:  nl lids, nl sclera


ENMT:  nl external ears & nose


Neck:  non-tender


Respiratory:  clear to auscultation


Cardiovascular:  nl pulses, other (s1s2)


Gastrointestinal:  soft, non-tender


Musculoskeletal:  muscle weakness


Extremities:  normal pulses


Neurological:  nl speech


Lymph:  nontender





Results


Result Diagram:  


8/2/19 0811                                                                     


          8/2/19 0812





Results 24hrs





Laboratory Tests


                     Test
                     8/3/19
08:05


                     Lab Scanned Report
  BLOOD
TRANSFUSION








Medications


Medication





Current Medications


Pantoprazole (Protonix Tab) 40 mg DAILY@06 PO  Last administered on 8/3/19at 


05:25; Admin Dose 40 MG;  Start 7/25/19 at 06:00


Albuterol/ Ipratropium (Duoneb) 3 ml Q6H RESP THERAPY  PRN HHN SHORTNESS OF 


BREATH;  Start 7/25/19 at 06:00


Acetaminophen (Tylenol Tab) 650 mg Q6H  PRN PO MILD PAIN(1-3)OR ELEVATED TEMP 


Last administered on 8/1/19at 22:54; Admin Dose 650 MG;  Start 7/25/19 at 06:00


Bisacodyl (Dulcolax) 10 mg DAILY  PRN PO CONSTIPATION;  Start 7/25/19 at 06:00


Miscellaneous Information (Pending Santyl Order For Wound Care) This patient 


ha... PRN  PRN XX WOUND CARE;  Start 7/25/19 at 06:00


Calcium Carbonate (Ca Carbonate) 1,250 mg BID PO  Last administered on 8/3/19at 


09:19; Admin Dose 1,250 MG;  Start 7/25/19 at 21:00


Bicalutamide (Casodex) 50 mg DAILY PO  Last administered on 8/3/19at 09:19; 


Admin Dose 50 MG;  Start 7/26/19 at 09:00


Lorazepam (Ativan) 0.5 mg Q6H  PRN IV ANXIETY Last administered on 8/3/19at 


01:06; Admin Dose 0.5 MG;  Start 7/26/19 at 19:00


Tamsulosin HCl (Flomax) 0.4 mg HS PO  Last administered on 8/2/19at 20:42; Admin


Dose 0.4 MG;  Start 7/27/19 at 21:00


Morphine Sulfate (Ms Contin (Er)) 15 mg BID PO  Last administered on 8/3/19at 


09:18; Admin Dose 15 MG;  Start 8/1/19 at 09:00


Hydromorphone HCl (Dilaudid) 2 mg Q4H  PRN PO SEVERE PAIN LEVEL 7-10 Last 


administered on 8/2/19at 13:40; Admin Dose 2 MG;  Start 8/1/19 at 08:00


Ciprofloxacin (Cipro) 500 mg BID@06,18 PO  Last administered on 8/3/19at 05:25; 


Admin Dose 500 MG;  Start 8/1/19 at 18:00











CHONG PRICE                  Aug 3, 2019 12:41

## 2020-08-06 NOTE — CONS
Assessment/Plan


Assessment/Plan


Assessment/Plan (Daily)


Brief note





I spoke with case management yesterday and asked him to aggressively assist with


ongoing goals of care.  Family members essentially are deferring discharge 


asking for patient to be transferred to another facility or to "have different" 


care.  Have reviewed notes from Dr. Price and Dr Figueredo.





Consultation Date/Type/Reason


Admit Date/Time


Jul 25, 2019 at 05:11


Date/Time of Note


DATE: 7/31/19 


TIME: 08:56





Past Medical History


Medical History:  cancer (Of prostate based on the PSA.  Has not been confirmed 


by biopsy), hepatitis (C), hypertension, other (Anemia, hepatitis C, weight 


loss, history of falls and gastritis, history of subdural hematoma)


Home Meds


No Active Prescriptions or Reported Meds


Medications





Current Medications


Pantoprazole (Protonix Tab) 40 mg DAILY@06 PO  Last administered on 7/31/19at 


05:40; Admin Dose 40 MG;  Start 7/25/19 at 06:00


Albuterol/ Ipratropium (Duoneb) 3 ml Q6H RESP THERAPY  PRN HHN SHORTNESS OF 


BREATH;  Start 7/25/19 at 06:00


Acetaminophen (Tylenol Tab) 650 mg Q6H  PRN PO MILD PAIN(1-3)OR ELEVATED TEMP 


Last administered on 7/30/19at 15:29; Admin Dose 650 MG;  Start 7/25/19 at 06:00


Bisacodyl (Dulcolax) 10 mg DAILY  PRN PO CONSTIPATION;  Start 7/25/19 at 06:00


Miscellaneous Information (Pending [facility] Order For Wound Care) This patient 


ha... PRN  PRN XX WOUND CARE;  Start 7/25/19 at 06:00


Calcium Carbonate (Ca Carbonate) 1,250 mg BID PO  Last administered on 7/30/19at


20:03; Admin Dose 1,250 MG;  Start 7/25/19 at 21:00


Ceftriaxone Sodium 50 ml @  100 mls/hr Q24H IVPB  Last administered on 7/30/19at


15:30; Admin Dose 100 MLS/HR;  Start 7/25/19 at 16:00


Bicalutamide (Casodex) 50 mg DAILY PO  Last administered on 7/30/19at 08:38; 


Admin Dose 50 MG;  Start 7/26/19 at 09:00


Hydromorphone HCl (Dilaudid) 1.5 mg Q4H  PRN IV SEVERE PAIN LEVEL 7-10 Last 


administered on 7/31/19at 01:37; Admin Dose 1.5 MG;  Start 7/26/19 at 15:30


Lorazepam (Ativan) 0.5 mg Q6H  PRN IV ANXIETY Last administered on 7/30/19at 


23:42; Admin Dose 0.5 MG;  Start 7/26/19 at 19:00


Methadone HCl (Methadone Liq) 2 mg Q6 PO  Last administered on 7/31/19at 05:41; 


Admin Dose 2 MG;  Start 7/27/19 at 23:00


Tamsulosin HCl (Flomax) 0.4 mg HS PO  Last administered on 7/30/19at 20:03; 


Admin Dose 0.4 MG;  Start 7/27/19 at 21:00


Non-Formulary Medication 1 dose ONCE SC ;  Start 7/31/19 at 16:30;  Stop 7/31/19


at 23:45;  Status UNV


Allergies:  


Coded Allergies:  


     No Known Allergy (Unverified , 1/11/19)





Past Surgical History


Past Surgical Hx:  no surgical history





Social History


Alcohol Use:  occasionally


Smoking Status:  Former smoker


Drug Use:  none, other (unknown)





Exam/Review of Systems


Exam


Vitals





Vital Signs


  Date      Temp  Pulse  Resp  B/P (MAP)   Pulse Ox  O2          O2 Flow    FiO2


Time                                                 Delivery    Rate


   7/31/19  97.8     92    18      110/73        97  Room Air


     07:54                           (85)








Intake and Output





7/30/19 7/30/19 7/31/19





1515:00


23:00


07:00





IntakeIntake Total


740 ml


440 ml


350 ml





OutputOutput Total


350 ml


450 ml





BalanceBalance


740 ml


90 ml


-100 ml














Results


Result Diagram:  


7/31/19 0654                                                                    


           7/29/19 0441





Results 24hrs





Laboratory Tests


       Test
                                7/31/19
05:08  7/31/19
06:54


       White Blood Count                           4.4  L


       Red Blood Count                            2.44  L


       Hemoglobin                                 6.5  *L         7.2  L


       Hematocrit                                 20.8  L        23.3  L


       Mean Corpuscular Volume                     85.2


       Mean Corpuscular Hemoglobin                26.6  L


       Mean Corpuscular Hemoglobin
Concent       31.3  L
  



       Red Cell Distribution Width                16.7  H


       Platelet Count                               152


       Mean Platelet Volume                         9.6


       Immature Granulocytes %                   2.700  H


       Neutrophils %                               68.6


       Segmented Neutrophils %
(Manual)             74  
  



       Lymphocytes %                               19.9


       Lymphocytes % (Manual)                        17


       Reactive Lymphocytes %
(Manual)              1  H
  



       Monocytes %                                  7.4


       Monocytes % (Manual)                           5


       Eosinophils %                                0.9


       Basophils %                                  0.5


       Basophils % (Manual)                           1


       Metamyelocytes % (manual)                     1  H


       Myelocytes % (Manual)                         1  H


       Nucleated Red Blood Cells %                  0.0


       Immature Granulocytes #                   0.120  H


       Neutrophils #                                3.0


       Lymphocytes (Manual)                        0.7  L


       Lymphocytes #                                0.9


       Reactive Lymphocytes #                       0.0


       Monocytes #                                  0.3


       Monocytes # (Manual)                        0.2  L


       Eosinophils #                                0.0


       Basophils #                                  0.0


       Basophils # (Manual)                         0.0


       Metamyelocytes #                             0.0


       Myelocytes #                                 0.0


       Nucleated Red Blood Cells #                  0.0


       Platelet Estimate                    NORMAL


       Polychromasia                                 3+


       Poikilocytosis                                2+


       Anisocytosis                                  2+


       Microcytosis                                  2+


       Tear Drop Cells                               1+


       Elliptocytes                                  1+








Medications


Medication





Current Medications


Pantoprazole (Protonix Tab) 40 mg DAILY@06 PO  Last administered on 7/31/19at 


05:40; Admin Dose 40 MG;  Start 7/25/19 at 06:00


Albuterol/ Ipratropium (Duoneb) 3 ml Q6H RESP THERAPY  PRN HHN SHORTNESS OF 


BREATH;  Start 7/25/19 at 06:00


Acetaminophen (Tylenol Tab) 650 mg Q6H  PRN PO MILD PAIN(1-3)OR ELEVATED TEMP 


Last administered on 7/30/19at 15:29; Admin Dose 650 MG;  Start 7/25/19 at 06:00


Bisacodyl (Dulcolax) 10 mg DAILY  PRN PO CONSTIPATION;  Start 7/25/19 at 06:00


Miscellaneous Information (Pending Santyl Order For Wound Care) This patient 


ha... PRN  PRN XX WOUND CARE;  Start 7/25/19 at 06:00


Calcium Carbonate (Ca Carbonate) 1,250 mg BID PO  Last administered on 7/30/19at


20:03; Admin Dose 1,250 MG;  Start 7/25/19 at 21:00


Ceftriaxone Sodium 50 ml @  100 mls/hr Q24H IVPB  Last administered on 7/30/19at


15:30; Admin Dose 100 MLS/HR;  Start 7/25/19 at 16:00


Bicalutamide (Casodex) 50 mg DAILY PO  Last administered on 7/30/19at 08:38; 


Admin Dose 50 MG;  Start 7/26/19 at 09:00


Hydromorphone HCl (Dilaudid) 1.5 mg Q4H  PRN IV SEVERE PAIN LEVEL 7-10 Last 


administered on 7/31/19at 01:37; Admin Dose 1.5 MG;  Start 7/26/19 at 15:30


Lorazepam (Ativan) 0.5 mg Q6H  PRN IV ANXIETY Last administered on 7/30/19at 23:


42; Admin Dose 0.5 MG;  Start 7/26/19 at 19:00


Methadone HCl (Methadone Liq) 2 mg Q6 PO  Last administered on 7/31/19at 05:41; 


Admin Dose 2 MG;  Start 7/27/19 at 23:00


Tamsulosin HCl (Flomax) 0.4 mg HS PO  Last administered on 7/30/19at 20:03; 


Admin Dose 0.4 MG;  Start 7/27/19 at 21:00


Non-Formulary Medication 1 dose ONCE SC ;  Start 7/31/19 at 16:30;  Stop 7/31/19


at 23:45;  Status UNV











SLOAN CASTRO             Jul 31, 2019 08:58 Double O-Z Plasty Text: The defect edges were debeveled with a #15 scalpel blade.  Given the location of the defect, shape of the defect and the proximity to free margins a Double O-Z plasty (double transposition flap) was deemed most appropriate.  Using a sterile surgical marker, the appropriate transposition flaps were drawn incorporating the defect and placing the expected incisions within the relaxed skin tension lines where possible. The area thus outlined was incised deep to adipose tissue with a #15 scalpel blade.  The skin margins were undermined to an appropriate distance in all directions utilizing iris scissors.  Hemostasis was achieved with electrocautery.  The flaps were then transposed into place, one clockwise and the other counterclockwise, and anchored with interrupted buried subcutaneous sutures.